# Patient Record
Sex: FEMALE | Race: WHITE | NOT HISPANIC OR LATINO | Employment: OTHER | URBAN - NONMETROPOLITAN AREA
[De-identification: names, ages, dates, MRNs, and addresses within clinical notes are randomized per-mention and may not be internally consistent; named-entity substitution may affect disease eponyms.]

---

## 2017-01-10 ENCOUNTER — COMMUNICATION - GICH (OUTPATIENT)
Dept: FAMILY MEDICINE | Facility: OTHER | Age: 62
End: 2017-01-10

## 2017-01-10 DIAGNOSIS — M54.50 LOW BACK PAIN: ICD-10-CM

## 2017-01-12 ENCOUNTER — COMMUNICATION - GICH (OUTPATIENT)
Dept: FAMILY MEDICINE | Facility: OTHER | Age: 62
End: 2017-01-12

## 2017-01-12 DIAGNOSIS — M62.830 MUSCLE SPASM OF BACK: ICD-10-CM

## 2017-03-09 ENCOUNTER — COMMUNICATION - GICH (OUTPATIENT)
Dept: FAMILY MEDICINE | Facility: OTHER | Age: 62
End: 2017-03-09

## 2017-03-09 DIAGNOSIS — F41.3 OTHER MIXED ANXIETY DISORDERS: ICD-10-CM

## 2017-03-11 ENCOUNTER — COMMUNICATION - GICH (OUTPATIENT)
Dept: FAMILY MEDICINE | Facility: OTHER | Age: 62
End: 2017-03-11

## 2017-03-11 DIAGNOSIS — R03.0 ELEVATED BLOOD PRESSURE READING WITHOUT DIAGNOSIS OF HYPERTENSION: ICD-10-CM

## 2017-04-06 ENCOUNTER — COMMUNICATION - GICH (OUTPATIENT)
Dept: FAMILY MEDICINE | Facility: OTHER | Age: 62
End: 2017-04-06

## 2017-04-06 DIAGNOSIS — M54.50 LOW BACK PAIN: ICD-10-CM

## 2017-04-07 ENCOUNTER — COMMUNICATION - GICH (OUTPATIENT)
Dept: FAMILY MEDICINE | Facility: OTHER | Age: 62
End: 2017-04-07

## 2017-04-07 DIAGNOSIS — M62.830 MUSCLE SPASM OF BACK: ICD-10-CM

## 2017-04-08 ENCOUNTER — COMMUNICATION - GICH (OUTPATIENT)
Dept: FAMILY MEDICINE | Facility: OTHER | Age: 62
End: 2017-04-08

## 2017-04-08 DIAGNOSIS — I10 ESSENTIAL (PRIMARY) HYPERTENSION: ICD-10-CM

## 2017-05-05 ENCOUNTER — COMMUNICATION - GICH (OUTPATIENT)
Dept: FAMILY MEDICINE | Facility: OTHER | Age: 62
End: 2017-05-05

## 2017-05-05 DIAGNOSIS — M62.830 MUSCLE SPASM OF BACK: ICD-10-CM

## 2017-05-25 ENCOUNTER — HOSPITAL ENCOUNTER (OUTPATIENT)
Dept: RADIOLOGY | Facility: OTHER | Age: 62
End: 2017-05-25
Attending: FAMILY MEDICINE

## 2017-05-25 ENCOUNTER — HISTORY (OUTPATIENT)
Dept: FAMILY MEDICINE | Facility: OTHER | Age: 62
End: 2017-05-25

## 2017-05-25 ENCOUNTER — COMMUNICATION - GICH (OUTPATIENT)
Dept: SURGERY | Facility: OTHER | Age: 62
End: 2017-05-25

## 2017-05-25 ENCOUNTER — OFFICE VISIT - GICH (OUTPATIENT)
Dept: FAMILY MEDICINE | Facility: OTHER | Age: 62
End: 2017-05-25

## 2017-05-25 DIAGNOSIS — E03.9 HYPOTHYROIDISM: ICD-10-CM

## 2017-05-25 DIAGNOSIS — Z12.11 ENCOUNTER FOR SCREENING FOR MALIGNANT NEOPLASM OF COLON: ICD-10-CM

## 2017-05-25 DIAGNOSIS — F34.1 DYSTHYMIC DISORDER: ICD-10-CM

## 2017-05-25 DIAGNOSIS — I10 ESSENTIAL (PRIMARY) HYPERTENSION: ICD-10-CM

## 2017-05-25 DIAGNOSIS — Z00.00 ENCOUNTER FOR GENERAL ADULT MEDICAL EXAMINATION WITHOUT ABNORMAL FINDINGS: ICD-10-CM

## 2017-05-25 DIAGNOSIS — E78.2 MIXED HYPERLIPIDEMIA: ICD-10-CM

## 2017-05-25 DIAGNOSIS — Z12.31 ENCOUNTER FOR SCREENING MAMMOGRAM FOR MALIGNANT NEOPLASM OF BREAST: ICD-10-CM

## 2017-05-25 LAB
A/G RATIO - HISTORICAL: 1.6 (ref 1–2)
ABSOLUTE BASOPHILS - HISTORICAL: 0 THOU/CU MM
ABSOLUTE EOSINOPHILS - HISTORICAL: 0.2 THOU/CU MM
ABSOLUTE LYMPHOCYTES - HISTORICAL: 1.9 THOU/CU MM (ref 0.9–2.9)
ABSOLUTE MONOCYTES - HISTORICAL: 0.5 THOU/CU MM
ABSOLUTE NEUTROPHILS - HISTORICAL: 4.3 THOU/CU MM (ref 1.7–7)
ALBUMIN SERPL-MCNC: 4.4 G/DL (ref 3.5–5.7)
ALP SERPL-CCNC: 64 IU/L (ref 34–104)
ALT (SGPT) - HISTORICAL: 12 IU/L (ref 7–52)
ANION GAP - HISTORICAL: 5 (ref 5–18)
AST SERPL-CCNC: 20 IU/L (ref 13–39)
BASOPHILS # BLD AUTO: 0.6 %
BILIRUB SERPL-MCNC: 0.9 MG/DL (ref 0.3–1)
BUN SERPL-MCNC: 13 MG/DL (ref 7–25)
BUN/CREAT RATIO - HISTORICAL: 14
CALCIUM SERPL-MCNC: 9.5 MG/DL (ref 8.6–10.3)
CHLORIDE SERPLBLD-SCNC: 99 MMOL/L (ref 98–107)
CHOL/HDL RATIO - HISTORICAL: 3.39
CHOLESTEROL TOTAL: 139 MG/DL
CO2 SERPL-SCNC: 26 MMOL/L (ref 21–31)
CREAT SERPL-MCNC: 0.92 MG/DL (ref 0.7–1.3)
EOSINOPHIL NFR BLD AUTO: 2.2 %
ERYTHROCYTE [DISTWIDTH] IN BLOOD BY AUTOMATED COUNT: 12.5 % (ref 11.5–15.5)
GFR IF NOT AFRICAN AMERICAN - HISTORICAL: >60 ML/MIN/1.73M2
GLOBULIN - HISTORICAL: 2.7 G/DL (ref 2–3.7)
GLUCOSE SERPL-MCNC: 89 MG/DL (ref 70–105)
HCT VFR BLD AUTO: 38 % (ref 33–51)
HDLC SERPL-MCNC: 41 MG/DL (ref 23–92)
HEMOGLOBIN: 12.7 G/DL (ref 12–16)
LDLC SERPL CALC-MCNC: 72 MG/DL
LYMPHOCYTES NFR BLD AUTO: 27.4 % (ref 20–44)
MCH RBC QN AUTO: 27.9 PG (ref 26–34)
MCHC RBC AUTO-ENTMCNC: 33.4 G/DL (ref 32–36)
MCV RBC AUTO: 83 FL (ref 80–100)
MONOCYTES NFR BLD AUTO: 6.9 %
NEUTROPHILS NFR BLD AUTO: 62.3 % (ref 42–72)
NON-HDL CHOLESTEROL - HISTORICAL: 98 MG/DL
PATIENT STATUS - HISTORICAL: NORMAL
PLATELET # BLD AUTO: 279 THOU/CU MM (ref 140–440)
PMV BLD: 8.9 FL (ref 6.5–11)
POTASSIUM SERPL-SCNC: 4 MMOL/L (ref 3.5–5.1)
PROT SERPL-MCNC: 7.1 G/DL (ref 6.4–8.9)
RED BLOOD COUNT - HISTORICAL: 4.56 MIL/CU MM (ref 4–5.2)
SODIUM SERPL-SCNC: 130 MMOL/L (ref 133–143)
TRIGL SERPL-MCNC: 132 MG/DL
TSH - HISTORICAL: 1.23 UIU/ML (ref 0.34–5.6)
WHITE BLOOD COUNT - HISTORICAL: 6.9 THOU/CU MM (ref 4.5–11)

## 2017-05-25 ASSESSMENT — ANXIETY QUESTIONNAIRES
4. TROUBLE RELAXING: SEVERAL DAYS
5. BEING SO RESTLESS THAT IT IS HARD TO SIT STILL: NOT AT ALL
GAD7 TOTAL SCORE: 1
6. BECOMING EASILY ANNOYED OR IRRITABLE: NOT AT ALL
7. FEELING AFRAID AS IF SOMETHING AWFUL MIGHT HAPPEN: NOT AT ALL
2. NOT BEING ABLE TO STOP OR CONTROL WORRYING: NOT AT ALL
1. FEELING NERVOUS, ANXIOUS, OR ON EDGE: NOT AT ALL
3. WORRYING TOO MUCH ABOUT DIFFERENT THINGS: NOT AT ALL

## 2017-05-25 ASSESSMENT — PATIENT HEALTH QUESTIONNAIRE - PHQ9: SUM OF ALL RESPONSES TO PHQ QUESTIONS 1-9: 6

## 2017-05-26 ENCOUNTER — HISTORY (OUTPATIENT)
Dept: FAMILY MEDICINE | Facility: OTHER | Age: 62
End: 2017-05-26

## 2017-05-26 LAB
ALB RAND URINE - HISTORICAL: 9.9 MG/L
CREATININE, URINE - HISTORICAL: 1.19 G/L
MICROALBUMIN, RAND UR - HISTORICAL: 8.3 MG/G CREAT

## 2017-06-02 ENCOUNTER — HISTORY (OUTPATIENT)
Dept: SURGERY | Facility: OTHER | Age: 62
End: 2017-06-02

## 2017-06-09 ENCOUNTER — HOSPITAL ENCOUNTER (OUTPATIENT)
Dept: SURGERY | Facility: OTHER | Age: 62
Discharge: HOME | End: 2017-06-09
Attending: SURGERY | Admitting: SURGERY

## 2017-06-09 ENCOUNTER — HISTORY (OUTPATIENT)
Dept: SURGERY | Facility: OTHER | Age: 62
End: 2017-06-09

## 2017-06-09 ENCOUNTER — TRANSFERRED RECORDS (OUTPATIENT)
Dept: MULTI SPECIALTY CLINIC | Facility: CLINIC | Age: 62
End: 2017-06-09

## 2017-06-09 ENCOUNTER — SURGERY (OUTPATIENT)
Dept: SURGERY | Facility: OTHER | Age: 62
End: 2017-06-09

## 2017-06-14 ENCOUNTER — COMMUNICATION - GICH (OUTPATIENT)
Dept: SURGERY | Facility: OTHER | Age: 62
End: 2017-06-14

## 2017-06-14 ENCOUNTER — HISTORY (OUTPATIENT)
Dept: SURGERY | Facility: OTHER | Age: 62
End: 2017-06-14

## 2017-07-06 ENCOUNTER — COMMUNICATION - GICH (OUTPATIENT)
Dept: FAMILY MEDICINE | Facility: OTHER | Age: 62
End: 2017-07-06

## 2017-07-06 DIAGNOSIS — M54.50 LOW BACK PAIN: ICD-10-CM

## 2017-08-25 ENCOUNTER — COMMUNICATION - GICH (OUTPATIENT)
Dept: FAMILY MEDICINE | Facility: OTHER | Age: 62
End: 2017-08-25

## 2017-08-25 DIAGNOSIS — F41.3 OTHER MIXED ANXIETY DISORDERS: ICD-10-CM

## 2017-09-15 ENCOUNTER — HISTORY (OUTPATIENT)
Dept: FAMILY MEDICINE | Facility: OTHER | Age: 62
End: 2017-09-15

## 2017-09-15 ENCOUNTER — OFFICE VISIT - GICH (OUTPATIENT)
Dept: FAMILY MEDICINE | Facility: OTHER | Age: 62
End: 2017-09-15

## 2017-09-15 DIAGNOSIS — S61.219A LACERATION OF FINGER WITHOUT FOREIGN BODY WITHOUT DAMAGE TO NAIL: ICD-10-CM

## 2017-10-02 ENCOUNTER — COMMUNICATION - GICH (OUTPATIENT)
Dept: FAMILY MEDICINE | Facility: OTHER | Age: 62
End: 2017-10-02

## 2017-10-02 DIAGNOSIS — F41.3 OTHER MIXED ANXIETY DISORDERS: ICD-10-CM

## 2017-11-03 ENCOUNTER — COMMUNICATION - GICH (OUTPATIENT)
Dept: FAMILY MEDICINE | Facility: OTHER | Age: 62
End: 2017-11-03

## 2017-11-03 DIAGNOSIS — M62.830 MUSCLE SPASM OF BACK: ICD-10-CM

## 2017-11-14 ENCOUNTER — COMMUNICATION - GICH (OUTPATIENT)
Dept: FAMILY MEDICINE | Facility: OTHER | Age: 62
End: 2017-11-14

## 2017-11-14 DIAGNOSIS — I10 ESSENTIAL (PRIMARY) HYPERTENSION: ICD-10-CM

## 2017-11-28 ENCOUNTER — AMBULATORY - GICH (OUTPATIENT)
Dept: LAB | Facility: OTHER | Age: 62
End: 2017-11-28

## 2017-11-28 DIAGNOSIS — I10 ESSENTIAL (PRIMARY) HYPERTENSION: ICD-10-CM

## 2017-11-28 LAB
ANION GAP - HISTORICAL: 9 (ref 5–18)
BUN SERPL-MCNC: 11 MG/DL (ref 7–25)
BUN/CREAT RATIO - HISTORICAL: 13
CALCIUM SERPL-MCNC: 9.2 MG/DL (ref 8.6–10.3)
CHLORIDE SERPLBLD-SCNC: 98 MMOL/L (ref 98–107)
CO2 SERPL-SCNC: 26 MMOL/L (ref 21–31)
CREAT SERPL-MCNC: 0.83 MG/DL (ref 0.7–1.3)
GFR IF NOT AFRICAN AMERICAN - HISTORICAL: >60 ML/MIN/1.73M2
GLUCOSE SERPL-MCNC: 96 MG/DL (ref 70–105)
POTASSIUM SERPL-SCNC: 4 MMOL/L (ref 3.5–5.1)
SODIUM SERPL-SCNC: 133 MMOL/L (ref 133–143)

## 2017-11-29 ENCOUNTER — COMMUNICATION - GICH (OUTPATIENT)
Dept: FAMILY MEDICINE | Facility: OTHER | Age: 62
End: 2017-11-29

## 2017-11-29 DIAGNOSIS — T75.3XXA MOTION SICKNESS: ICD-10-CM

## 2017-12-05 ENCOUNTER — AMBULATORY - GICH (OUTPATIENT)
Dept: SCHEDULING | Facility: OTHER | Age: 62
End: 2017-12-05

## 2017-12-10 ENCOUNTER — COMMUNICATION - GICH (OUTPATIENT)
Dept: FAMILY MEDICINE | Facility: OTHER | Age: 62
End: 2017-12-10

## 2017-12-10 DIAGNOSIS — F41.1 GENERALIZED ANXIETY DISORDER: ICD-10-CM

## 2017-12-28 NOTE — TELEPHONE ENCOUNTER
Patient Information     Patient Name MRN Mita Helton [id number] Female 1955      Telephone Encounter by Karina Greer RN at 7/10/2017  9:45 AM     Author:  Karina Greer RN Service:  (none) Author Type:  NURS- Registered Nurse     Filed:  7/10/2017  9:48 AM Encounter Date:  2017 Status:  Signed     :  Karina Greer RN (NURS- Registered Nurse)            Nsaids  Office visit in the past 12 months or per provider note.  Last visit with RENEE MURRIETA was on: 2017 in [facility]-physical   Next visit with RENEE MURRIETA is on: No future appointment listed with this provider  Max refill for 12 months from last office visit or per provider note.  Prescription refilled per RN Medication Refill Policy.................... Karina Greer RN ....................  7/10/2017   9:47 AM

## 2017-12-28 NOTE — TELEPHONE ENCOUNTER
Patient Information     Patient Name MRN Mita Helton [id number] Female 1955      Telephone Encounter by Luci Segovia at 2017 11:23 AM     Author:  Luci Segovia Service:  (none) Author Type:  (none)     Filed:  2017 11:23 AM Encounter Date:  2017 Status:  Signed     :  Luci Segovia            Faxed to christopher Segovia LPN ....................2017  11:23 AM

## 2017-12-28 NOTE — OR ANESTHESIA
Patient Information     Patient Name MRN Sex Mita Paiz [id number] Female 1955      OR Anesthesia by Samina Parikh CRNA at 2017 11:26 AM     Author:  Samina Parikh CRNA Service:  (none) Author Type:  NURS- Nurse Anesthetist     Filed:  2017 11:26 AM Date of Service:  2017 11:26 AM Status:  Signed     :  Samina Parikh CRNA (NURS- Nurse Anesthetist)                                                           ANESTHESIA ASSESSMENT    Date: 17 Time: 11:26 AM      Patient:  Mita Gabriel    Procedure(s) (LRB):  COLONOSCOPY (N/A)    Past Medical History:     Diagnosis  Date     Anxiety      Depression      History of blood transfusion     with delivery of son      HTN (hypertension)      Hyperlipidemia      Hypothyroidism (acquired)      PONV (postoperative nausea and vomiting)      Wrist fracture, left        Past Surgical History:      Procedure  Laterality Date     CATARACT EXTRACTION Bilateral       SECTION       PARTIAL THYROIDECTOMY       WRIST FRACTURE TX      Wrist ORIF         Family History       Problem   Relation Age of Onset     Good Health  Mother      Genitourinary Disease  Father      kidney disease       Cancer  Maternal Grandmother      Bladder       Stroke  Paternal Grandmother 70     Cancer  Maternal Grandfather      Throat and lung       Good Health  Sister      Good Health  Sister      Good Health  Brother      Good Health  Brother      Good Health  Brother      slightly developmentally delayed       Other  Other      Family history of depression and anxiety       Anesthesia Problem  No Family History      Notes no prior complications from anesthesia.       Cancer-breast  No Family History        Patient Active Problem List     Diagnosis  Code     HYPOTHYROIDISM E03.9     PELVIC PAIN, CHRONIC R10.9     HYPERTENSION I10     ARTHRITIS, BACK M47.9     LOW BACK PAIN, CHRONIC M54.5     DEPRESSION/ANXIETY F34.1     ANXIETY F41.1     ALCOHOL  ABUSE, EPISODIC, HX OF F10.21     INSOMNIA NEC G47.00     MOTION SICKNESS T75.3XXA     OBESITY NOS E66.9     CATARACT, SENILE, BILATERAL H25.9     H/O non-ST elevation myocardial infarction (NSTEMI) I25.2     Special screening for malignant neoplasms, colon Z12.11       Prescriptions Prior to Admission       Medication  Sig Dispense Refill     aspirin (ECOTRIN) 81 mg enteric coated tablet Take 1 tablet by mouth once daily with a meal.  0     atorvastatin (LIPITOR) 20 mg tablet Take 1 tablet by mouth once daily. 90 tablet 4     cyclobenzaprine (FLEXERIL) 10 mg tablet TAKE 1 TABLET BY MOUTH TWICE DAILY AS NEEDED FOR MUSCLE SPASMS 60 tablet 5     folic acid 1 mg tablet Take 1 mg by mouth once daily.       gabapentin (NEURONTIN) 300 mg capsule TAKE 1 CAPSULE BY MOUTH THREE TIMES DAILY 270 capsule 0     hydroCHLOROthiazide (HCTZ) 25 mg tablet Take 1 tablet by mouth once daily. 90 tablet 4     levothyroxine (SYNTHROID) 50 mcg tablet Take 1 tablet by mouth before breakfast. 90 tablet 4     lisinopril (PRINIVIL; ZESTRIL) 5 mg tablet Take 1 tablet by mouth once daily. 90 tablet 4     LORazepam (ATIVAN) 0.5 mg tab TAKE 1 TABLET BY MOUTH THREE TIMES DAILY 90 tablet 5     methocarbamol (ROBAXIN) 500 mg tablet Take 1-2 tablets by mouth 4 times daily. 120 tablet 11     metoprolol succinate (TOPROL XL) 50 mg sustained-release tablet Take 1 tablet by mouth once daily. 90 tablet 4     polyethylene glycol-electrolyte (NULYTELY) 420 gram solution Take 240 mL by mouth every 10 minutes. 4000 mL 0     thiamine (VITAMIN B1) 100 mg tablet Take 100 mg by mouth once daily.       TRANSDERM-SCOP 1.5 mg (1 mg over 3 days) patch USE AS DIRECTED FOR FLYING 4 Patch 0     traZODone (DESYREL) 50 mg tablet TAKE 3 TABLETS BY MOUTH AT BEDTIME 270 tablet 5     venlafaxine (EFFEXOR XR) 150 mg Extended-Release capsule Take 1 capsule by mouth every morning. 90 capsule 4       Allergies:No Known Allergies    Review of Systems:  GERD: No  Chest pain:  No  Shortness of breath: No  Recent fever: No  Poor exercise tolerance: No  Bleeding tendency: No  Pregnant: No  Anesthesia Complications: PONV      History    Smoking Status      Never Smoker   Smokeless Tobacco      Never Used     Social History     Social History        Marital status:       Spouse name: N/A     Number of children:  N/A     Years of education:  N/A     Social History Main Topics       Smoking status: Never Smoker     Smokeless tobacco: Never Used     Alcohol use No     Drug use: No     Sexual activity: Not on file     Other Topics   Concern      Service  Yes     [employer]      Blood Transfusions  Yes     Child birth 1981      Caffeine Concern  Yes     2 cups of coffee daily      Occupational Exposure  No     Hobby Hazards  No     Sleep Concern  Yes     Wakes up every 2-3 hours, always has, uses sleep aids      Stress Concern  Yes     5 most days on a scale of 1-10 (8/31/15) Husbands health, family       Weight Concern  Yes     Would like to lose, dropping weight now      Special Diet  No     Back Care  Yes     Accupuncture every other week, massage      Exercise  Yes     gardening, walks daily 8 blocks      Bike Helmet  No     Doesn't ride      Seat Belt  Yes     Self-Exams  Yes     Social History Narrative      with one grown son, two grand children (older granddaughter with brain tumor - she plays hockey, and not intervening with tumor unless things change)    Goes south to [place] for the winter.  Used to golf, but  unable now due to having MS.     was a  but does not fly due to MS    Patient has never smoked.     Regular Exercise - no       Physical Examination:  Breastfeeding? No There is no height or weight on file to calculate BMI. There is no height or weight on file to calculate BSA.  Dental Condition: Good     Mallampati Score (Airway): II  Cardiovascular: Normal  Pulmonary: Normal  Other: (not recorded)    Recent Labs in [facility]:    No  results for input(s): SODIUM, POTASSIUM, CHLORIDE, PN1NBLKP, ANIONGAP, BUN, CREATININE, BUNCREARATIO, CALCIUM, GLUCOSE, GLUCOSEMETER, KETONES, MAGNESIUM, WBC, HGB, HCT, PLT, ABORH, RHTYPE, PREGURINE, BHCGQL, HCGBETAQUANT, INR in the last 72 hours.          Assessment/Plan:  ASA Class: II  Risk of dental injury discussed: Yes  NPO status confirmed: Yes  Anesthetic Plan: MAC  Risk/Benefit/Alt discussed: Yes  Questions answered: Yes  Emergency Case?: No  Labs/ECG/Radiology Reviewed?: Yes      H&P Reviewed.  Patient Examined.      Provider Electronic Signature:  Samina Parikh CRNA

## 2017-12-28 NOTE — OR POSTOP
Patient Information     Patient Name MRN Sex Mita Paiz [id number] Female 1955      OR PostOp by Helen Leon RN at 2017  1:05 PM     Author:  Helen Leon RN Service:  (none) Author Type:  NURS- Registered Nurse     Filed:  2017  1:06 PM Date of Service:  2017  1:05 PM Status:  Signed     :  Helen Leon RN (NURS- Registered Nurse)            Discharge Note    Data:  Mita Gabriel has been discharged home at 1304 via ambulatory accompanied by Registered Nurse.      Action:  Written discharge/follow-up instructions were provided to patient. Prescriptions : None.  Belongings sent with patient. Medications from home sent with patient/family: Not Applicable  Equipment none .     Response:  Patient verbalized understanding of discharge instructions, reason for discharge, and necessary follow-up appointments.

## 2017-12-28 NOTE — TELEPHONE ENCOUNTER
Patient Information     Patient Name MRN Mita Helton [id number] Female 1955      Telephone Encounter by Barbi Macedo MD at 2017  9:25 AM     Author:  Barbi Macedo MD Service:  (none) Author Type:  Physician     Filed:  2017  9:26 AM Encounter Date:  2017 Status:  Signed     :  Barbi Macedo MD (Physician)            I wrote a prescription for #90.  She will need to be seen by primary care physician for ongoing refill to get her through the winter in [place].  Barbi Macedo MD ....................  2017   9:25 AM

## 2017-12-28 NOTE — PATIENT INSTRUCTIONS
Patient Information     Patient Name MRN Mita Helton [id number] Female 1955      Patient Instructions by Melodie Jon NP at 9/15/2017  4:30 PM     Author:  Melodie Jon NP Service:  (none) Author Type:  PHYS- Nurse Practitioner     Filed:  9/15/2017  5:01 PM Encounter Date:  9/15/2017 Status:  Signed     :  Melodie Jon NP (PHYS- Nurse Practitioner)             Wound Care for Cuts   ________________________________________________________________________  KEY POINTS    A cut type of wound is an opening on the surface of the skin and may be straight, jagged, or run in several directions.    The treatment of a wound depends on what caused the wound, where it is, and the size and shape. You can take care of some cuts yourself. A healthcare provider should treat large, deep, jagged, or dirty wounds.    Wash your hands thoroughly with soap and water before you touch the wound. If it is bleeding and is spurting blood, put pressure on it with a bandage or clean cloth and get the medical care right away.  _______________________________________________________________  What is a cut?  Cuts are wounds that go through one or more layers of skin and may go deeper into the fat, muscle, blood vessels, or other tissues under the skin, even to bone. A cut can be seen on the surface of the skin and may be straight, jagged, or run in several directions. A cut is also called a laceration.  What is the cause?  Most cuts happen:    During a fall or an accident    By running into or getting hit with something sharp, pointed, or hard    While working with something sharp, pointed, rough, or jagged    By a deep scratch or bite from an animal  What are the symptoms?  Symptoms may include:    An opening in the skin, or loss of skin    Pain    Redness    Sometimes bleeding  How is it treated?  The treatment of a wound depends on what caused the wound, where it is, and the size and shape. You can  take care of some cuts yourself. A healthcare provider should treat large, deep, jagged, or dirty wounds. A wound heals more quickly, and with less risk of infection and scarring, when the wound is kept clean and the edges are held close together as it heals.  Call or see your healthcare provider when you have a new cut if:    You have a large, deep, or jagged wound    You have bleeding that will not stop    Your cut was caused by something that went through several layers of clothing or through a shoe    Your cut happened in a dirty setting such as a barnyard, construction site, or animal shelter    You have numbness or tingling near the wound    You have not had a tetanus shot in the last 5 years and have a wound caused by a dirty object or there is dirt in the wound    There are foreign objects in the wound, such as wood, glass, or metal slivers    You can see bone, muscle, or tendon in the wound    You have any questions about how to treat the wound  How can I take care of myself?  If you have a small cut:    Wash your hands thoroughly with soap and water for at least 20 seconds before you touch the area.    Clean the wound as well as possible with mild soap and water. Remove any bits of dirt, small pieces of rock, or other debris that you can easily see, but do not poke or pick at the wound.    If it s bleeding and is spurting blood, put pressure on it with a bandage or clean cloth and get the medical care right away. You may need to call 911.    If it s not spurting blood but oozing, put pressure on the wound with a clean cloth or bandage until the bleeding stops, which may take up to 20 minutes. If the wound is still bleeding after 20 minutes, call your health care provider.    Keep the wound and the area around it clean and dry. You may need to put a bandage over the area to keep it clean and dry. Change the bandage every day. Change the bandage more often if it gets dirty or wet.    To prevent infection in  a minor wound, you may use a nonprescription antibiotic ointment. Read the labels and buy products that have only the ingredients that you need and are not allergic to. If you are not sure which medicine is best for your wound, ask your pharmacist.    Don't take aspirin if your wound is bleeding. If needed, take nonprescription pain medicine, such as acetaminophen. Acetaminophen may cause liver damage or other problems. Read the label carefully and take as directed. Unless recommended by your provider, don't take more than 3000 milligrams (mg) in 24 hours or take it for longer than 10 days. To make sure you don t take too much, check other medicines you take to see if they also contain acetaminophen. Ask your provider if you need to avoid drinking alcohol while taking this medicine. Nonsteroidal anti-inflammatory medicines (NSAIDs), such as ibuprofen or naproxen may cause stomach bleeding and other problems. These risks increase with age. Unless recommended by your healthcare provider, don't take an NSAID for more than 10 days.    As the wound heals, some swelling, redness, and mild pain are normal.    Call your healthcare provider if you have:    Symptoms of infection, which include new or worsening redness, swelling, pain, warmth, or drainage in the area of the wound    New bleeding from the wound that won't stop    Pain that is increasing or not getting better with pain medicine    Red streaks going from the wound toward the center of your body, for example, up your arm    Fever, chills, nausea, vomiting, or muscle aches    Any questions about caring for the wound

## 2017-12-28 NOTE — TELEPHONE ENCOUNTER
Patient Information     Patient Name MRN Mita Helton [id number] Female 1955      Telephone Encounter by Kady Gurrola at 11/15/2017  9:49 AM     Author:  Kady Gurrola Service:  (none) Author Type:  (none)     Filed:  11/15/2017  9:51 AM Encounter Date:  2017 Status:  Signed     :  Kady Gurrola            Informed patient of lab orders placed. She was transferred to appointment line to schedule lab only appointment.  Kady Gurrola LPN............................... 11/15/2017 9:51 AM

## 2017-12-28 NOTE — TELEPHONE ENCOUNTER
Patient Information     Patient Name MRN Sex Mita Paiz [id number] Female 1955      Telephone Encounter by Karina Greer RN at 10/3/2017 12:18 PM     Author:  Karina Greer RN Service:  (none) Author Type:  NURS- Registered Nurse     Filed:  10/3/2017 12:29 PM Encounter Date:  10/2/2017 Status:  Signed     :  Karina Greer RN (NURS- Registered Nurse)            This is a Refill request from: [facility]  Name of Medication:LORazepam (ATIVAN) 0.5 mg tab  Quantity requested: 90 x 3   Last fill date: 17  Due for refill: 17  Last visit with PCP:  RENEE MURRIETA DO was on:17  Controlled Substance Agreement: none  Diagnosis r/t this medication request: Other mixed anxiety disorders     Unable to complete prescription refill per RN Medication Refill Policy.................... Karina Greer RN ....................  10/3/2017   12:19 PM

## 2017-12-28 NOTE — TELEPHONE ENCOUNTER
Patient Information     Patient Name MRN Mita Helton [id number] Female 1955      Telephone Encounter by Kady Gurrola at 10/3/2017  2:55 PM     Author:  Kady Gurrola Service:  (none) Author Type:  (none)     Filed:  10/3/2017  2:55 PM Encounter Date:  10/2/2017 Status:  Signed     :  Kady Gurrola            Prescription faxed to pharmacy.  Kady Gurrola LPN............................... 10/3/2017 2:55 PM

## 2017-12-28 NOTE — TELEPHONE ENCOUNTER
Patient Information     Patient Name MRN Mita Helton [id number] Female 1955      Telephone Encounter by Umberto Corea at 10/3/2017  2:55 PM     Author:  Umberto Corea Service:  (none) Author Type:  (none)     Filed:  10/3/2017  2:56 PM Encounter Date:  10/2/2017 Status:  Signed     :  Umberto Corea            After birth date was verified, spoke with Mita and let her know that her prescription was refilled for Ativan. No further questions or concerns.    Umberto Corea ....................  10/3/2017   2:56 PM

## 2017-12-28 NOTE — TELEPHONE ENCOUNTER
Patient Information     Patient Name MRN Sex Mita Paiz [id number] Female 1955      Telephone Encounter by Karina Greer RN at 2017 11:01 AM     Author:  Karina Greer RN Service:  (none) Author Type:  NURS- Registered Nurse     Filed:  2017 11:05 AM Encounter Date:  2017 Status:  Signed     :  Karina Greer RN (NURS- Registered Nurse)            This is a Refill request from: [facility]  Name of Medication: TRANSDERM-SCOP 1.5 mg (1 mg over 3 days) patch  Quantity requested: 4 patches  Last fill date: 2016  Due for refill: yes  Last visit with RENEE MURRIETA was on: 2017-physical     Diagnosis r/t this medication request: Motion sickness     Unable to complete prescription refill per RN Medication Refill Policy.................... Karina Greer RN ....................  2017   11:01 AM

## 2017-12-28 NOTE — PROGRESS NOTES
Patient Information     Patient Name MRN Sex     Mita Gabriel [id number] Female 1955      Progress Notes by Melodie Jon NP at 9/15/2017  4:30 PM     Author:  Melodie Jon NP Service:  (none) Author Type:  PHYS- Nurse Practitioner     Filed:  9/15/2017  7:02 PM Encounter Date:  9/15/2017 Status:  Signed     :  Melodie Jon NP (PHYS- Nurse Practitioner)            Nursing Notes:   Sherly Alvarez NAVEEN  9/15/2017  5:00 PM  Signed  Patient is here today with a left pointer finger laceration, was cutting vegetables. Sherly Alvarez LPN......................9/15/2017 4:46 PM    SUBJECTIVE:    Mita Gabriel is a 61 y.o. female who presents for finger laceration    Hand Laceration    The incident occurred less than 1 hour ago. The laceration is located on the left hand. Size: 0.5 cm. The laceration mechanism was a clean knife. The pain is at a severity of 2/10. The pain has been constant since onset. She reports no foreign bodies present. Her tetanus status is UTD ().       Current Outpatient Prescriptions on File Prior to Visit       Medication  Sig Dispense Refill     aspirin (ECOTRIN) 81 mg enteric coated tablet Take 1 tablet by mouth once daily with a meal.  0     atorvastatin (LIPITOR) 20 mg tablet Take 1 tablet by mouth once daily. 90 tablet 4     cyclobenzaprine (FLEXERIL) 10 mg tablet TAKE 1 TABLET BY MOUTH TWICE DAILY AS NEEDED FOR MUSCLE SPASMS 60 tablet 5     folic acid 1 mg tablet Take 1 mg by mouth once daily.       gabapentin (NEURONTIN) 300 mg capsule TAKE 1 CAPSULE BY MOUTH THREE TIMES DAILY 270 capsule 2     hydroCHLOROthiazide (HCTZ) 25 mg tablet Take 1 tablet by mouth once daily. 90 tablet 4     levothyroxine (SYNTHROID) 50 mcg tablet Take 1 tablet by mouth before breakfast. 90 tablet 4     lisinopril (PRINIVIL; ZESTRIL) 5 mg tablet Take 1 tablet by mouth once daily. 90 tablet 4     LORazepam (ATIVAN) 0.5 mg tab Take 1 tablet by mouth 3 times daily. 90 tablet 0      methocarbamol (ROBAXIN) 500 mg tablet Take 1-2 tablets by mouth 4 times daily. 120 tablet 11     metoprolol succinate (TOPROL XL) 50 mg sustained-release tablet Take 1 tablet by mouth once daily. 90 tablet 4     polyethylene glycol-electrolyte (NULYTELY) 420 gram solution Take 240 mL by mouth every 10 minutes. 4000 mL 0     thiamine (VITAMIN B1) 100 mg tablet Take 100 mg by mouth once daily.       TRANSDERM-SCOP 1.5 mg (1 mg over 3 days) patch USE AS DIRECTED FOR FLYING 4 Patch 0     traZODone (DESYREL) 50 mg tablet TAKE 3 TABLETS BY MOUTH AT BEDTIME 270 tablet 5     venlafaxine (EFFEXOR XR) 150 mg Extended-Release capsule Take 1 capsule by mouth every morning. 90 capsule 4     No current facility-administered medications on file prior to visit.        REVIEW OF SYSTEMS:  ROS    OBJECTIVE:  /80  Pulse 64  Temp 96.9  F (36.1  C) (Tympanic)  Wt 78.5 kg (173 lb)  Breastfeeding? No  BMI 29.24 kg/m2    EXAM:   Physical Exam   Constitutional: She is well-developed, well-nourished, and in no distress.   HENT:   Head: Normocephalic and atraumatic.   Eyes: Conjunctivae are normal.   Cardiovascular: Normal rate.    Pulmonary/Chest: Effort normal. No respiratory distress.   Musculoskeletal:   LT pointer finger has a 0.5 cm round flap type laceration, no actively bleeding. Flap is nearly cut through and off.    Neurological: She is alert.   Skin: Skin is warm and dry. No rash noted.   Psychiatric: Mood and affect normal.   Nursing note and vitals reviewed.    Finger treatments are gone over. Will use silver nitrate to stop any oozing. Dressing applied. No need for further tx.       ASSESSMENT/PLAN:    ICD-10-CM    1. Finger laceration, initial encounter S61.219A         Plan:  Dressing applied. F/U if needed. Wound cares discussed.       YOU VASQUEZ NP ....................  9/15/2017   7:02 PM

## 2017-12-28 NOTE — TELEPHONE ENCOUNTER
Patient Information     Patient Name MRN Sex Mita Paiz [id number] Female 1955      Telephone Encounter by Tobias Vo RN at 2017 11:36 AM     Author:  Tobias Vo RN Service:  (none) Author Type:  NURS- Registered Nurse     Filed:  2017 11:44 AM Encounter Date:  11/3/2017 Status:  Signed     :  Tobias Vo RN (NURS- Registered Nurse)            This is a Refill request from: [facility]  Name of Medication: Flexeril  Quantity requested: 60 tabs with 5 refills  Last fill date: 10/5/17 as per rx request for a 30 day supply  Due for refill: Yes, as per chart review and per rx request  Last visit with RENEE MURRIETA was on: 2017 in [facility]  PCP:  RENEE MURRIETA DO  Controlled Substance Agreement: N/A   Diagnosis r/t this medication request: Back Spasm    Chart review shows that patient was seen by PCP last for a physical on 17. Rx as requested was noted and reviewed by PCP as per office visit notes on that date. No changes noted to rx as requested. However, patient is to have labs completed every 6 months in relation to diagnosis of hypertension. Labs are coming due at the end of 2017. Writer will karma up a 6 month supply of rx as requested, but will send patient a reminder letter that she is due for recheck of her labs as per PCP.     Unable to complete prescription refill per RN Medication Refill Policy.................... Tobias Vo RN ....................  2017   11:37 AM

## 2017-12-28 NOTE — PROCEDURES
Patient Information     Patient Name MRN Sex Mita Paiz [id number] Female 1955      Procedures by Stephany Omalley MD at 2017 12:14 PM     Author:  Stephany Omalley MD Service:  (none) Author Type:  Physician     Filed:  2017 12:19 PM Date of Service:  2017 12:14 PM Status:  Signed     :  Stephany Omalley MD (Physician)        Pre-procedure Diagnoses:    1. Special screening for malignant neoplasms, colon [Z12.11]           Post-procedure Diagnoses:    1. Rectal polyp [K62.1]    2. Diverticulosis of colon without diverticulitis [K57.30]           Procedures:    1. SC COLONOSCOPY BIOPSY SINGLE OR MULTIPLE [58055.0]               PROCEDURE NOTE    SURGEON: Stephany Omalley MD.    PRE-OP DIAGNOSIS:  Screening Colonoscopy      POST-OP DIAGNOSIS: colon polyp rectum, diverticula of colon    PROCEDURE:  Colonoscopy with polypectomy-cold forceps    ESTIMATED BLOOD LOSS: none    COMPLICATIONS:  None    SPECIMEN:  Rectal polyp    ANESTHESIA:  See anesthesia note, anesthesia requested due to: chronic benzodiazepine use    INDICATION FOR THE PROCEDURE: The patient is a 61 y.o. female. The patient has no complaints. I explained to the patient the risks, benefits and alternatives to screening colonoscopy for evaluating the colon for colon polyps and colon cancer. We specifically discussed the risks of bleeding, infection, perforation, potential inability to reach the cecum and the risks of sedation. The patient's questions were answered and the patient wished to proceed. Informed consent paperwork was completed.    PROCEDURE: The patient was taken to the endoscopy suite. Appropriate monitors were attached. The patient was placed in the left lateral decubitus position.Timeout was performed confirming the patient's identity and procedure to be performed.  After appropriate sedation was confirmed, digital rectal exam was performed.  There was normal tone and no gross abnormality was noted. The lubricated  colonoscope was introduced into the anus the colon was insufflated with air. The prep quality was adequate. Under direct visualization the scope was advanced to the cecum. The ileocecal valve was intubated and the terminal ileum inspected. No gross abnormality was noted. The scope was withdrawn back into the cecum. The mucosa of colon was inspected while withdrawing the scope. Multiple diverticula were noted in the colon. A tiny sessile polyp was noted in the rectum and removed with cold forceps. The scope was retroflexed in the rectum and the anorectal junction was inspected. No abnormalities were noted. The scope was returned to a neutral position and the colon was decompressed. The scope was removed. The patient tolerated the procedure with no immediately apparent complication. The patient was taken to recovery in stable condition.    FOLLOW UP:  RECOMMEND high fiber diet, follow up: will call with pathology results.    Stephany Omalley MD

## 2017-12-28 NOTE — TELEPHONE ENCOUNTER
Patient Information     Patient Name MRN Mita Helton [id number] Female 1955      Telephone Encounter by Tobias Vo RN at 2017  4:38 PM     Author:  Tobias Vo RN Service:  (none) Author Type:  NURS- Registered Nurse     Filed:  2017  4:51 PM Encounter Date:  2017 Status:  Signed     :  Tobias Vo RN (NURS- Registered Nurse)            Writer received soft transfer from call center with patient on the line. Per call center staff, patient is calling to inquire about her ativan rx that was requested on 17. Writer spoke to patient. Advised her that rx for ativan was written by PCP for a 6 month supply on 3/9/17, and shouldn't be due for refill until closer to that date. Patient reports that she is out of rx as requested. Last refill date per patient was on 17 for a 30 day supply. Patient reports no misuse of rx as requested, in fact states she has been on it for 17 years with no misuse. Writer advised patient that as PCP is out of the office, and rx wasn't due for refill per chart review until 17, refill RN had routed rx request to PCP for her consideration/approval when she returns. However, writer is more than willing to route rx request to another provider for their consideration/approval in PCP's absence if patient is out. Patient happy with this plan of care. Writer will route rx request to PCP's teamlet in PCP's absence at this time for a limited supply. After call was disconnected, this writer received an additional call from patient. Patient calling to report that her ativan rx doesn't transfer with her when she canales down in [place], and doesn't transfer back up when she comes home to [place] in the spring. Patient reports that PCP wrote her a new rx when she was seen at her physical in May 2017 for a limited supply of ativan. Writer advised patient that he would route this information to authorizing provider as well. Writer was unable to find rx however  as to what patient is referencing in her chart. Patient was seen for a physical with PCP however in May 2017, and mood/anxiety was addressed with no changes in medication noted.    Writer will route rx request to PCP's teamlet at this time as per patient request.    Unable to complete prescription refill per RN Medication Refill Policy.................... Tobias Vo RN ....................  8/30/2017   4:49 PM

## 2017-12-28 NOTE — OR ANESTHESIA
Patient Information     Patient Name MRN Sex Mita Mesa [id number] Female 1955      OR Anesthesia by Samina Parikh CRNA at 2017 12:15 PM     Author:  Samina Parikh CRNA Service:  (none) Author Type:  NURS- Nurse Anesthetist     Filed:  2017 12:15 PM Date of Service:  2017 12:15 PM Status:  Signed     :  Samina Parikh CRNA (NURS- Nurse Anesthetist)            Anesthesia Post Operative Care Note    Name: Mita Gabriel  MRN:   [id number]  :    1955       Procedure Done:  See Surgeon Note        Anesthesia Technique    Anesthetic Type:  MAC       MAC Type:  NC     Oral Trauma:  No    Intraoperative Course   Hemodynamics:  Stable    Ventilation Normal:  Yes Lung Sounds:  Normal      PACU Course        Nondepolarizer Used:       Reversed: N/A   Hemodynamics:  Stable      Hydration: Euvolemic   Temperature:  36.1 - 38.3      Mental Status:  Awake, alert, follows commands   Pain Management:  Adequate   Regional Block:  No   Anesthesia Complications:  None      Vital Signs:  Temp: 96.1  F (35.6  C)  Pulse: 71  BP: (!) 138/117  Resp: 18  SpO2: 98 %                       Active Lines:  Patient Lines/Drains/Airways Status    Active Line     Name: Placement date: Placement time: Site: Days:    PERIPHERAL VAD Right Hand 17   1130   Hand   less than 1                Intake & Output:       Labs:  No results for input(s): WY1YDDYZTVB, EXM4EYBTBJIX, PHARTERIAL, FHG3LPTWCHYT, G4AKNCBANJKD in the last 24 hours.    No results for input(s): MAGNESIUM in the last 24 hours.    No results for input(s): GLUCOSEMETER in the last 720 hours.        Samina Parikh CRNA ....................  2017   12:15 PM

## 2017-12-29 NOTE — H&P
Patient Information     Patient Name MRN Sex Mita Paiz [id number] Female 1955      H&P by Stephany Omalley MD at 2017 11:30 AM     Author:  Stephany Omalley MD Service:  (none) Author Type:  Physician     Filed:  2017 11:32 AM Date of Service:  2017 11:30 AM Status:  Signed     :  Stephany Omalley MD (Physician)            PRE-PROCEDURE NOTE    CHIEF COMPLAINT / REASON FOR PROCEDURE:  Need for screening colonoscopy.    PERTINENT HISTORY   Patient with no complaints. Previous colonoscopy none. No diarrhea, constipation, abdominal pain or rectal bleeding. No family history of colon polyps or colon cancer.    Past Medical History:     Diagnosis  Date     Anxiety      Depression      History of blood transfusion     with delivery of son      HTN (hypertension)      Hyperlipidemia      Hypothyroidism (acquired)      PONV (postoperative nausea and vomiting)      Wrist fracture, left      Past Surgical History:      Procedure  Laterality Date     CATARACT EXTRACTION Bilateral       SECTION       PARTIAL THYROIDECTOMY       WRIST FRACTURE TX      Wrist ORIF       Other:  None  Bleeding tendencies:  No    Relevant Family History:  None    Relevant Social History:  None    A relevant review of systems was performed and was negative.    ALLERGIES/SENSITIVITIES: No Known Allergies     CURRENT MEDICATIONS:    Current Facility-Administered Medications        Medication  Dose Route Frequency Last Rate     lactated Ringers infusion  25 mL/hr Intravenous continuous       lidocaine (1%) injection 0.1-1 mL  0.1-1 mL Intra-Dermal one time prn       scopolamine 1.5mg 1 Patch (TRANSDERM SCOP)  1 Patch Transdermal q72h       sodium chloride 0.9% 5 mL syringe (NORMAL SALINE)  5 mL Intravenous Each Time PRN        Prior to Admission medications          Medication Sig Start Date End Date Taking? Last Dose Authorizing Provider   aspirin (ECOTRIN) 81 mg enteric coated tablet Take 1 tablet by mouth  once daily with a meal. 1/20/15   5/31/2017 at 0800 Ella Baptiste DO   atorvastatin (LIPITOR) 20 mg tablet Take 1 tablet by mouth once daily. 5/26/17 6/8/2017 at am Ella Baptiste,    cyclobenzaprine (FLEXERIL) 10 mg tablet TAKE 1 TABLET BY MOUTH TWICE DAILY AS NEEDED FOR MUSCLE SPASMS 5/9/17 6/8/2017 at pm Ella Baptiste,    folic acid 1 mg tablet Take 1 mg by mouth once daily.    Past Week at Unknown time Reported, Patient   gabapentin (NEURONTIN) 300 mg capsule TAKE 1 CAPSULE BY MOUTH THREE TIMES DAILY 4/6/17    Ella Baptiste,    hydroCHLOROthiazide (HCTZ) 25 mg tablet Take 1 tablet by mouth once daily. 5/26/17 6/9/2017 at 0800 Ella Baptiste,    levothyroxine (SYNTHROID) 50 mcg tablet Take 1 tablet by mouth before breakfast. 5/26/17    Ella Baptiste DO   lisinopril (PRINIVIL; ZESTRIL) 5 mg tablet Take 1 tablet by mouth once daily. 5/26/17 6/9/2017 at 0800 Ella Baptiste,    LORazepam (ATIVAN) 0.5 mg tab TAKE 1 TABLET BY MOUTH THREE TIMES DAILY 3/9/17    Ella Baptiste,    methocarbamol (ROBAXIN) 500 mg tablet Take 1-2 tablets by mouth 4 times daily. 1/12/17    Ella Baptiste,    metoprolol succinate (TOPROL XL) 50 mg sustained-release tablet Take 1 tablet by mouth once daily. 5/26/17 6/9/2017 at 0800 Ella Baptiste,    polyethylene glycol-electrolyte (NULYTELY) 420 gram solution Take 240 mL by mouth every 10 minutes. 5/25/17 6/9/2017 at am Stephany Omalley MD   thiamine (VITAMIN B1) 100 mg tablet Take 100 mg by mouth once daily. 1/13/15   Past Week at Unknown time PRESCRIBING, PROVIDER   TRANSDERM-SCOP 1.5 mg (1 mg over 3 days) patch USE AS DIRECTED FOR FLYING 4/8/16   Not Verified at Unknown time Barbi Macedo MD   traZODone (DESYREL) 50 mg tablet TAKE 3 TABLETS BY MOUTH AT BEDTIME 9/13/16    Ella Baptiste,    venlafaxine (EFFEXOR XR) 150 mg Extended-Release capsule Take 1 capsule by mouth every morning. 5/26/17    Ella Baptiste, DO        PRE-SEDATION ASSESSMENT:    Lung Exam:  Normal  Heart Exam:  Normal    Comment(s):      IMPRESSION:  Need for screening colonoscopy.    PLAN:  I discussed screening colonoscopy with the patient. Anesthesia coverage requested due to chronic benzodiazepine use.    Stephany Omalley MD

## 2017-12-30 NOTE — NURSING NOTE
Patient Information     Patient Name MRN Mita Helton [id number] Female 1955      Nursing Note by Sherly Alvarez at 9/15/2017  4:30 PM     Author:  Sherly Alvarez Service:  (none) Author Type:  (none)     Filed:  9/15/2017  5:00 PM Encounter Date:  9/15/2017 Status:  Signed     :  Sherly Alvarez            Patient is here today with a left pointer finger laceration, was cutting vegetables. Sherly Alvarez LPN......................9/15/2017 4:46 PM

## 2018-01-02 NOTE — TELEPHONE ENCOUNTER
Patient Information     Patient Name MRN Mita Helton [id number] Female 1955      Telephone Encounter by Karina Greer RN at 2017  8:26 AM     Author:  Karina Greer RN Service:  (none) Author Type:  NURS- Registered Nurse     Filed:  2017  8:31 AM Encounter Date:  1/10/2017 Status:  Signed     :  Karina Greer RN (NURS- Registered Nurse)            Refill request for Robaxin 500 mg inappropriate. Discontinued 3/15/16 as not effective. Pharmacy alerted. Unable to complete prescription refill per RN Medication Refill Policy.................... Karina Greer RN ....................  2017   8:29 AM

## 2018-01-03 NOTE — TELEPHONE ENCOUNTER
Patient Information     Patient Name MRN Mita Helton [id number] Female 1955      Telephone Encounter by Kady Gurrola at 2017  4:52 PM     Author:  Kady Gurrola Service:  (none) Author Type:  (none)     Filed:  2017  4:53 PM Encounter Date:  2017 Status:  Signed     :  Kady Gurrola            Phone number not available at this time.  Kady Gurrola LPN............................... 2017 4:53 PM

## 2018-01-03 NOTE — TELEPHONE ENCOUNTER
Patient Information     Patient Name MRN Mita Helton [id number] Female 1955      Telephone Encounter by Ella Murrieta DO at 2017  4:46 PM     Author:  Ella Murrieta DO Service:  (none) Author Type:  PHYS- Osteopathic     Filed:  2017  4:46 PM Encounter Date:  2017 Status:  Signed     :  Ella Murrieta DO (PHYS- Osteopathic)            Rx sent to pharmacy.  ELLA MURRIETA DO

## 2018-01-03 NOTE — TELEPHONE ENCOUNTER
Patient Information     Patient Name MRN Sex Mita Paiz [id number] Female 1955      Telephone Encounter by Karina Greer RN at 3/13/2017 11:37 AM     Author:  Karina Greer RN Service:  (none) Author Type:  NURS- Registered Nurse     Filed:  3/13/2017 11:41 AM Encounter Date:  3/11/2017 Status:  Signed     :  Karina Greer RN (NURS- Registered Nurse)            Ace Inhibitors  Office visit in the past 12 months or per provider note.  Last visit with RENEE MURRIETA was on: 03/15/2016 in [facility]- Pre- op with RENEE MURRIETA DO on 16  Next visit with RENEE MURRIETA is on: No future appointment listed with this provider  Lab test requirements:  Creatinine and Potassium annually, if ordering lab, order BMP.  CREATININE (mg/dL)    Date Value   2016 0.89     POTASSIUM (mmol/L)    Date Value   2016 4.0   Max refill for 12 months from last office visit or per provider note  Prescription refilled per RN Medication Refill Policy.................... Karina Greer RN ....................  3/13/2017   11:40 AM

## 2018-01-03 NOTE — TELEPHONE ENCOUNTER
Patient Information     Patient Name MRMita Rizzo [id number] Female 1955      Telephone Encounter by Kady Gurrola at 2017 10:42 AM     Author:  Kady Gurrola Service:  (none) Author Type:  (none)     Filed:  2017 10:44 AM Encounter Date:  2017 Status:  Signed     :  Kady Gurrola            Mita is calling this morning because her metaxalone 800mg is not covered under her new insurance. She states that she is wanting to be prescribed her methocarbamol again to see if this is covered. Please address.  Kady Gurrola LPN............................... 2017 10:43 AM

## 2018-01-03 NOTE — TELEPHONE ENCOUNTER
"Patient Information     Patient Name MRN Mita Helton [id number] Female 1955      Telephone Encounter by Karina Greer RN at 3/9/2017  2:18 PM     Author:  Karina Greer RN Service:  (none) Author Type:  NURS- Registered Nurse     Filed:  3/9/2017  2:28 PM Encounter Date:  3/9/2017 Status:  Signed     :  Karina Greer RN (NURS- Registered Nurse)            This is a Refill request from: [facility] in [place], [place]  Name of Medication: Ativan 0.5 mg  Quantity requested: 90 x 5  Last fill date: 09/15/16  Due for refill: yes  Last visit with RENEE MURRIETA was on: 03/15/2016 in [facility]-Patient due for annual OV- Contacted patient to update and she reports that she is in [place] until May 2017, 'Renee knows I won't be back until May\".    PCP:  RENEE MURRIETA DO  Controlled Substance Agreement: none  Diagnosis r/t this medication request:  Other mixed anxiety disorders     Unable to complete prescription refill per RN Medication Refill Policy.................... Karina Greer RN ....................  3/9/2017   2:18 PM          "

## 2018-01-03 NOTE — TELEPHONE ENCOUNTER
Patient Information     Patient Name MRN Mita Helton [id number] Female 1955      Telephone Encounter by Kady Gurrola at 2017 11:56 AM     Author:  Kady Gurrola Service:  (none) Author Type:  (none)     Filed:  2017 11:57 AM Encounter Date:  2017 Status:  Signed     :  Kady Gurrola            Left message for Mita to return call on [phone].  Kady Gurrola LPN............................... 2017 11:57 AM

## 2018-01-03 NOTE — TELEPHONE ENCOUNTER
Patient Information     Patient Name MRN Mita Helton [id number] Female 1955      Telephone Encounter by Kady Gurrola at 2017  1:33 PM     Author:  Kady Gurrola Service:  (none) Author Type:  (none)     Filed:  2017  1:33 PM Encounter Date:  2017 Status:  Signed     :  Kady Gurrola of prescription being sent in.  .Kady Gurrola LPN............................... 2017 1:33 PM

## 2018-01-03 NOTE — TELEPHONE ENCOUNTER
Patient Information     Patient Name MRN Mita Helton [id number] Female 1955      Telephone Encounter by Kady Gurrola at 3/10/2017 11:35 AM     Author:  Kady Gurrola Service:  (none) Author Type:  (none)     Filed:  3/10/2017 11:36 AM Encounter Date:  3/9/2017 Status:  Signed     :  Kady Gurrola            Prescription faxed to pharmacy.  Kady Gurrola LPN............................... 3/10/2017 11:36 AM

## 2018-01-04 NOTE — TELEPHONE ENCOUNTER
Patient Information     Patient Name MRN Sex Mita Paiz [id number] Female 1955      Telephone Encounter by Karina Greer RN at 2017  3:40 PM     Author:  Karina Greer RN Service:  (none) Author Type:  NURS- Registered Nurse     Filed:  2017  3:44 PM Encounter Date:  2017 Status:  Signed     :  Karina Greer RN (NURS- Registered Nurse)            Nsaids  Office visit in the past 12 months or per provider note.  Last visit with RENEE MURRIETA was on: 03/15/2016 in [facility]  Next visit with RENEE MURRIETA is on: No future appointment listed with this provider  Max refill for 12 months from last office visit or per provider note.  Due for exam.  Limited refill per protocol and letter mailed.  Karina Greer RN ........   2017    3:42 PM

## 2018-01-04 NOTE — TELEPHONE ENCOUNTER
Patient Information     Patient Name MRN Sex Mita Paiz [id number] Female 1955      Telephone Encounter by Karina Greer RN at 2017  4:02 PM     Author:  Karina Greer RN Service:  (none) Author Type:  NURS- Registered Nurse     Filed:  2017  4:08 PM Encounter Date:  2017 Status:  Signed     :  Karina Greer RN (NURS- Registered Nurse)            This is a Refill request from: [facility]  Name of Medication: Flexeril 10 mg  Quantity requested: 60  Last fill date: 3/10/17  Due for refill: 4/10/17  Last visit with RENEE MURRIETA was on: 03/15/2016 in [facility]-Pre-op on 16- due for annual medication review OV-letter sent  PCP:  RENEE MURRIETA DO  Controlled Substance Agreement:  none   Diagnosis r/t this medication request: back spasm     Unable to complete prescription refill per RN Medication Refill Policy.................... Karina Greer RN ....................  2017   4:02 PM

## 2018-01-04 NOTE — TELEPHONE ENCOUNTER
Patient Information     Patient Name MRN Sex Mita Paiz [id number] Female 1955      Telephone Encounter by Karina Greer RN at 2017  8:30 AM     Author:  Karina Greer RN Service:  (none) Author Type:  NURS- Registered Nurse     Filed:  2017  8:34 AM Encounter Date:  2017 Status:  Signed     :  Karina Greer RN (NURS- Registered Nurse)            FYI- Patient remains due for annual OV after notification on 17  This is a Refill request from: [facility]  Name of Medication: Flexeril 10 mg  Quantity requested: 60  Last fill date: 17  Due for refill: 17  Last visit with RENEE MURRIETA was on: 03/15/2016 in [facility]  PCP:  RENEE MURRIETA DO  Controlled Substance Agreement: na  Diagnosis r/t this medication request: Back spasm     Unable to complete prescription refill per RN Medication Refill Policy.................... Karina Greer RN ....................  2017   8:31 AM

## 2018-01-04 NOTE — TELEPHONE ENCOUNTER
Patient Information     Patient Name MRN Sex Mita Paiz [id number] Female 1955      Telephone Encounter by Karina Greer RN at 4/10/2017 10:04 AM     Author:  Karina Greer RN Service:  (none) Author Type:  NURS- Registered Nurse     Filed:  4/10/2017 10:07 AM Encounter Date:  2017 Status:  Signed     :  Karina Greer RN (NURS- Registered Nurse)            Beta Blockers   Office visit in the past 12 months or per provider note.  Last visit with RENEE MURRIETA was on: 03/15/2016 in [facility]-Pre-op with RENEE MURRIETA DO on 16  Next visit with RENEE MURRIETA is on: No future appointment listed with this provider  Next visit with Family Practice is on: No future appointment listed in this department  Max refill for 12 months from last office visit or per provider note.  Prescription refilled per RN Medication Refill Policy.................... Karina Greer RN ....................  4/10/2017   10:06 AM

## 2018-01-05 NOTE — TELEPHONE ENCOUNTER
Patient Information     Patient Name MRN Mita Helton [id number] Female 1955      Telephone Encounter by Shantell Mathews at 2017  2:58 PM     Author:  Shantell Mathews Service:  (none) Author Type:  (none)     Filed:  2017  3:02 PM Encounter Date:  2017 Status:  Signed     :  Shantell Mathews            Screening Questions for the Scheduling of Screening Colonoscopies   (If Colonoscopy is diagnostic, Provider should review the chart before scheduling.)  Are you younger than 50 or older than 80?  NO  Do you take aspirin or fish oil?  FISH OIL  (if yes, tell patient to stop 1 week prior to Colonoscopy)  Do you take warfarin (Coumadin), clopidogrel (Plavix), apixaban (Eliquis), dabigatram (Pradaxa), rivaroxaban (Xarelto) or any blood thinner? NO   Do you use oxygen at home?  NO   Do you have kidney disease? NO   Are you on dialysis? NO   Have you had a stroke or heart attack in the last year? NO   Have you had a stent in your heart or any blood vessel in the last year? NO  Have you had a transplant of any organ? NO  Have you had a colonoscopy or upper endoscopy (EGD) before? NO         When?  NO  Date of scheduled Colonoscopy. 2017  Provider [facility]

## 2018-01-05 NOTE — PROGRESS NOTES
Patient Information     Patient Name MRN Sex Mita Paiz [id number] Female 1955      Progress Notes by Lesa Mojica at 2017  9:29 AM     Author:  Lesa Mojica Service:  (none) Author Type:  (none)     Filed:  2017  9:29 AM Date of Service:  2017  9:29 AM Status:  Signed     :  Lesa Mojica            Falls Risk Criteria:    Age 65 and older or under age 4        Sensory deficits    Poor vision    Use of ambulatory aides    Impaired judgment    Unable to walk independently    Meets High Risk criteria for falls:  no

## 2018-01-05 NOTE — PROGRESS NOTES
Patient Information     Patient Name MRN Sex Mita Paiz [id number] Female 1955      Progress Notes by Ella Baptiste DO at 2017 10:00 AM     Author:  Ella Baptiste DO Service:  (none) Author Type:  PHYS- Osteopathic     Filed:  2017  7:19 AM Encounter Date:  2017 Status:  Signed     :  Ella Baptiste DO (PHYS- Osteopathic)            ANNUAL PHYSICAL - FEMALE    HPI: Mita Gabriel is a 61 y.o. female who presents for a yearly exam.  Concerns include:    Returned from [place] last week.  Had a good winter.  Feels well without complaints today.  Has a history of HTN which she is on HCTZ, Lisinopril, Toprol XL.  She also had an NSTEMI, which was thought to be caused by alcohol intoxication and strain on the heart.  No angiogram performed at that time.  No further chest pains, SOB.  No N/V/D.  No MICHELLE.  She also has chronic hypothyroidism, hyperlipidemia, and depression/anxiety that she has no concerns about today.    No LMP recorded. Patient is postmenopausal.   Contraception: NA  Risk for STI?: No  Last pap: 3/15/2016, normal with negative HPV.  Due 3/15/2021  Any hx of abnormal paps:  No  FH of early CA?: No  Cholesterol/DM concerns/screening: due today  Tobacco?: No  Calcium intake: No  DEXA: will discuss next year, as she agrees to a colonoscopy today for the first time.   Last mammo: prior to appointment; results pending.  Colonoscopy: never had one previously.  Referral made today.  Immunizations: Tdap 2009; continue yearly flu vaccines; shingles 2013; Prevnar 13 3/15/2016 and Pneumovax 2015.    Patient Active Problem List      Diagnosis Date Noted     H/O non-ST elevation myocardial infarction (NSTEMI) 2015     CATARACT, SENILE, BILATERAL 2012     INSOMNIA NEC 2011     MOTION SICKNESS 2011     OBESITY NOS 2011     HYPOTHYROIDISM 2009     PELVIC PAIN, CHRONIC 2009     HYPERTENSION 10/21/2008     ARTHRITIS, BACK  10/21/2008     LOW BACK PAIN, CHRONIC 10/21/2008     DEPRESSION/ANXIETY 10/21/2008     ANXIETY 10/16/2008     ALCOHOL ABUSE, EPISODIC, HX OF 10/16/2008       Past Medical History:     Diagnosis  Date     Anxiety      Depression      History of blood transfusion     with delivery of son      HTN (hypertension)      Hyperlipidemia      Hypothyroidism (acquired)      Wrist fracture, left      Past Surgical History:      Procedure  Laterality Date      SECTION       PARTIAL THYROIDECTOMY       WRIST FRACTURE TX      Wrist ORIF       Social History     Social History        Marital status:       Spouse name: N/A     Number of children:  N/A     Years of education:  N/A     Occupational History      Not on file.     Social History Main Topics       Smoking status: Never Smoker     Smokeless tobacco: Never Used     Alcohol use No     Drug use: No     Sexual activity: Not on file     Other Topics   Concern      Service  Yes     [employer]      Blood Transfusions  Yes     Child birth       Caffeine Concern  Yes     2 cups of coffee daily      Occupational Exposure  No     Hobby Hazards  No     Sleep Concern  Yes     Wakes up every 2-3 hours, always has, uses sleep aids      Stress Concern  Yes     5 most days on a scale of 1-10 (8/31/15) Husbands health, family       Weight Concern  Yes     Would like to lose, dropping weight now      Special Diet  No     Back Care  Yes     Accupuncture every other week, massage      Exercise  Yes     gardening, walks daily 8 blocks      Bike Helmet  No     Doesn't ride      Seat Belt  Yes     Self-Exams  Yes     Social History Narrative      with one grown son, two grand children (older granddaughter with brain tumor - she plays hockey, and not intervening with tumor unless things change)    Goes south to [place] for the winter.  Used to golf, but  unable now due to having MS.     was a  but does not fly due to MS    Patient has  never smoked.     Regular Exercise - no     Family History       Problem   Relation Age of Onset     Good Health  Mother      Genitourinary Disease  Father      kidney disease       Cancer  Maternal Grandmother      Bladder       Stroke  Paternal Grandmother 70     Cancer  Maternal Grandfather      Throat and lung       Good Health  Sister      Good Health  Sister      Good Health  Brother      Good Health  Brother      Good Health  Brother      slightly developmentally delayed       Other  Other      Family history of depression and anxiety       Anesthesia Problem  No Family History      Notes no prior complications from anesthesia.       Cancer-breast  No Family History      Current Outpatient Prescriptions       Medication  Sig Dispense Refill     aspirin (ECOTRIN) 81 mg enteric coated tablet Take 1 tablet by mouth once daily with a meal.  0     atorvastatin (LIPITOR) 20 mg tablet Take 1 tablet by mouth once daily. 90 tablet 3     cyclobenzaprine (FLEXERIL) 10 mg tablet TAKE 1 TABLET BY MOUTH TWICE DAILY AS NEEDED FOR MUSCLE SPASMS 60 tablet 5     folic acid 1 mg tablet Take 1 mg by mouth once daily.       gabapentin (NEURONTIN) 300 mg capsule TAKE 1 CAPSULE BY MOUTH THREE TIMES DAILY 270 capsule 0     hydrochlorothiazide (HCTZ) 25 mg tablet Take 1 tablet by mouth once daily. 90 tablet 3     levothyroxine (SYNTHROID) 50 mcg tablet Take 1 tablet by mouth before breakfast. 90 tablet 3     lisinopril (PRINIVIL; ZESTRIL) 5 mg tablet TAKE 1 TABLET BY MOUTH EVERY DAY 90 tablet 0     LORazepam (ATIVAN) 0.5 mg tab TAKE 1 TABLET BY MOUTH THREE TIMES DAILY 90 tablet 5     methocarbamol (ROBAXIN) 500 mg tablet Take 1-2 tablets by mouth 4 times daily. 120 tablet 11     metoprolol succinate (TOPROL XL) 50 mg sustained-release tablet TAKE 1 TABLET BY MOUTH EVERY DAY 90 tablet 0     thiamine (VITAMIN B1) 100 mg tablet Take 100 mg by mouth once daily.       TRANSDERM-SCOP 1.5 mg (1 mg over 3 days) patch USE AS DIRECTED FOR  "FLYING 4 Patch 0     traZODone (DESYREL) 50 mg tablet TAKE 3 TABLETS BY MOUTH AT BEDTIME 270 tablet 5     venlafaxine (EFFEXOR XR) 150 mg Extended-Release capsule TAKE 1 CAPSULE BY MOUTH EVERY MORNING. 90 capsule 2     No current facility-administered medications for this visit.      Medications have been reviewed by me and are current to the best of my knowledge and ability.     REVIEW OF SYSTEMS:  Refer to HPI; all other systems reviewed and negative.    PHYSICAL EXAM:  /72  Pulse 68  Ht 1.638 m (5' 4.5\")  Wt 81.3 kg (179 lb 3.2 oz)  BMI 30.28 kg/m2  CONSTITUTIONAL:  Alert, cooperative, NAD.  EYES: No scleral icterus.  PERRLA.  Conjunctiva clear.  ENT/MOUTH: External ears and nose normal.  TMs normal.  Moist mucous membranes. Oropharynx clear.    ENDO: No thyromegaly or thyroid nodules.  LYMPH:  No cervical or supraclavicular LA.    BREASTS: Declines today  CARDIOVASCULAR: Regular, S1, S2.  No S3 or S4.  No murmur/gallop/rub.  No peripheral edema.  RESPIRATORY: CTA bilaterally, no wheezes, rhonchi or rales.  GI: Bowel sounds wnl.  Soft, nontender, nondistended.  No masses or HSM.  No rebound or guarding.  : Declines today.  MSKEL: Grossly normal ROM.  No clubbing.  INTEGUMENTARY:  Warm, dry.  No rash noted on exposed skin.  NEUROLOGIC: Facies symmetric.  Grossly normal movement and tone.  No tremor.  PSYCHIATRIC: Affect normal.  Speech fluent.      PHQ Depression Screening 6/23/2016 5/25/2017   Date of PHQ exam (doc flow) 6/23/2016 5/25/2017   1. Lack of interest/pleasure 0 - Not at all 1 - Several days   2. Feeling down/depressed 0 - Not at all 0 - Not at all   PHQ-2 TOTAL SCORE 0 1   3. Trouble sleeping 2 - More than half the days 0 - Not at all   4. Decreased energy 1 - Several days 0 - Not at all   5. Appetite change 1 - Several days 1 - Several days   6. Feelings of failure 0 - Not at all 1 - Several days   7. Trouble concentrating 0 - Not at all 1 - Several days   8. Activity level 0 - Not at all " 1 - Several days   9. Hurting yourself 0 - Not at all 1 - Several days   PHQ-9 TOTAL SCORE 4 6   PHQ-9 Severity Level none mild   Functional Impairment somewhat difficult somewhat difficult       Results for orders placed or performed in visit on 05/25/17      COMPLETE METABOLIC PANEL      Result  Value Ref Range    SODIUM 130 (L) 133 - 143 mmol/L    POTASSIUM 4.0 3.5 - 5.1 mmol/L    CHLORIDE 99 98 - 107 mmol/L    CO2,TOTAL 26 21 - 31 mmol/L    ANION GAP 5 5 - 18                    GLUCOSE 89 70 - 105 mg/dL    CALCIUM 9.5 8.6 - 10.3 mg/dL    BUN 13 7 - 25 mg/dL    CREATININE 0.92 0.70 - 1.30 mg/dL    BUN/CREAT RATIO           14                    GFR if African American >60 >60 ml/min/1.73m2    GFR if not African American >60 >60 ml/min/1.73m2    ALBUMIN 4.4 3.5 - 5.7 g/dL    PROTEIN,TOTAL 7.1 6.4 - 8.9 g/dL    GLOBULIN                  2.7 2.0 - 3.7 g/dL    A/G RATIO 1.6 1.0 - 2.0                    BILIRUBIN,TOTAL 0.9 0.3 - 1.0 mg/dL    ALK PHOSPHATASE 64 34 - 104 IU/L    ALT (SGPT) 12 7 - 52 IU/L    AST (SGOT) 20 13 - 39 IU/L   TSH      Result  Value Ref Range    TSH 1.23 0.34 - 5.60 uIU/mL   LIPID PANEL      Result  Value Ref Range    CHOLESTEROL,TOTAL 139 <200 mg/dL    TRIGLYCERIDES 132 <150 mg/dL    HDL CHOLESTEROL 41 23 - 92 mg/dL    NON-HDL CHOLESTEROL 98 <145 mg/dl    CHOL/HDL RATIO            3.39 <4.50                    LDL CHOLESTEROL 72 <100 mg/dL    PATIENT STATUS            FASTING                   CBC WITH AUTO DIFFERENTIAL      Result  Value Ref Range    WHITE BLOOD COUNT         6.9 4.5 - 11.0 thou/cu mm    RED BLOOD COUNT           4.56 4.00 - 5.20 mil/cu mm    HEMOGLOBIN                12.7 12.0 - 16.0 g/dL    HEMATOCRIT                38.0 33.0 - 51.0 %    MCV                       83 80 - 100 fL    MCH                       27.9 26.0 - 34.0 pg    MCHC                      33.4 32.0 - 36.0 g/dL    RDW                       12.5 11.5 - 15.5 %    PLATELET COUNT            279 140 - 440 thou/cu  mm    MPV                       8.9 6.5 - 11.0 fL    NEUTROPHILS               62.3 42.0 - 72.0 %    LYMPHOCYTES               27.4 20.0 - 44.0 %    MONOCYTES                 6.9 <12.0 %    EOSINOPHILS               2.2 <8.0 %    BASOPHILS                 0.6 <3.0 %    ABSOLUTE NEUTROPHILS      4.3 1.7 - 7.0 thou/cu mm    ABSOLUTE LYMPHOCYTES      1.9 0.9 - 2.9 thou/cu mm    ABSOLUTE MONOCYTES        0.5 <0.9 thou/cu mm    ABSOLUTE EOSINOPHILS      0.2 <0.5 thou/cu mm    ABSOLUTE BASOPHILS        0.0 <0.3 thou/cu mm   MICROALBUMIN RANDOM URINE      Result  Value Ref Range    ALB RAND URINE            9.9 mg/L    CREATININE,URINE          1.19 g/L    MICROALBUMIN,RAND UR      8.3 <30.0 mg/g creat       ASSESSMENT/PLAN:    ICD-10-CM    1. Annual physical exam Z00.00    2. Colon cancer screening Z12.11 COLONOSCOPY SCREENING-GICH   3. HYPERTENSION I10 COMPLETE METABOLIC PANEL      MICROALBUMIN RANDOM URINE      LIPID PANEL      COMPLETE METABOLIC PANEL      LIPID PANEL      MICROALBUMIN RANDOM URINE      hydroCHLOROthiazide (HCTZ) 25 mg tablet      lisinopril (PRINIVIL; ZESTRIL) 5 mg tablet      metoprolol succinate (TOPROL XL) 50 mg sustained-release tablet   4. Hypothyroidism, unspecified type E03.9 CBC WITH DIFFERENTIAL      TSH      CBC WITH DIFFERENTIAL      TSH      CBC WITH AUTO DIFFERENTIAL      levothyroxine (SYNTHROID) 50 mcg tablet   5. Mixed hyperlipidemia E78.2 atorvastatin (LIPITOR) 20 mg tablet   6. DEPRESSION/ANXIETY F34.1 venlafaxine (EFFEXOR XR) 150 mg Extended-Release capsule     Relevant cancer screening discussed.    Counseled on healthy diet, Calcium and vitamin D intake, and exercise.    HTN, chronic, improved: ongoing exercise discussed and will continue to follow up every 6 months.  Monitoring labs ordered as above; and refills of medications x 1 year.    Hypothyroidism, chronic, stable: due to check TSH today.  If stable, levothyroxine will be refilled x 1 year.    HLP, chronic, stable: lipid  panel added to above labs. Continue atorvastatin at current dosage due to history of NSTEMI.    Depression/Anxiety, chronic, improved: she states she is feeling the best she has for quite a while.  Continue at current dosage - refilled x 1 year.    RENEE MURRIETA, DO

## 2018-01-12 ENCOUNTER — COMMUNICATION - GICH (OUTPATIENT)
Dept: FAMILY MEDICINE | Facility: OTHER | Age: 63
End: 2018-01-12

## 2018-01-12 DIAGNOSIS — M62.830 MUSCLE SPASM OF BACK: ICD-10-CM

## 2018-01-21 ENCOUNTER — COMMUNICATION - GICH (OUTPATIENT)
Dept: FAMILY MEDICINE | Facility: OTHER | Age: 63
End: 2018-01-21

## 2018-01-21 DIAGNOSIS — M62.830 MUSCLE SPASM OF BACK: ICD-10-CM

## 2018-01-28 VITALS
HEART RATE: 68 BPM | BODY MASS INDEX: 29.85 KG/M2 | SYSTOLIC BLOOD PRESSURE: 110 MMHG | HEIGHT: 65 IN | DIASTOLIC BLOOD PRESSURE: 72 MMHG | WEIGHT: 179.2 LBS

## 2018-01-28 VITALS
SYSTOLIC BLOOD PRESSURE: 140 MMHG | HEART RATE: 64 BPM | DIASTOLIC BLOOD PRESSURE: 80 MMHG | BODY MASS INDEX: 29.24 KG/M2 | WEIGHT: 173 LBS | TEMPERATURE: 96.9 F

## 2018-02-04 ASSESSMENT — ANXIETY QUESTIONNAIRES: GAD7 TOTAL SCORE: 1

## 2018-02-04 ASSESSMENT — PATIENT HEALTH QUESTIONNAIRE - PHQ9: SUM OF ALL RESPONSES TO PHQ QUESTIONS 1-9: 6

## 2018-02-12 ENCOUNTER — DOCUMENTATION ONLY (OUTPATIENT)
Dept: FAMILY MEDICINE | Facility: OTHER | Age: 63
End: 2018-02-12

## 2018-02-12 PROBLEM — Z12.11 SPECIAL SCREENING FOR MALIGNANT NEOPLASMS, COLON: Status: ACTIVE | Noted: 2017-06-09

## 2018-02-12 RX ORDER — HYDROCHLOROTHIAZIDE 25 MG/1
25 TABLET ORAL DAILY
COMMUNITY
Start: 2017-05-26 | End: 2018-06-12

## 2018-02-12 RX ORDER — ASPIRIN 81 MG/1
81 TABLET ORAL
Status: ON HOLD | COMMUNITY
Start: 2015-01-20 | End: 2022-05-19

## 2018-02-12 RX ORDER — FOLIC ACID 1 MG/1
1 TABLET ORAL DAILY
COMMUNITY

## 2018-02-12 RX ORDER — SCOLOPAMINE TRANSDERMAL SYSTEM 1 MG/1
PATCH, EXTENDED RELEASE TRANSDERMAL
COMMUNITY
Start: 2017-11-29 | End: 2020-06-23

## 2018-02-12 RX ORDER — ATORVASTATIN CALCIUM 20 MG/1
20 TABLET, FILM COATED ORAL DAILY
COMMUNITY
Start: 2017-05-26 | End: 2018-06-12

## 2018-02-12 RX ORDER — CYCLOBENZAPRINE HCL 10 MG
1 TABLET ORAL 2 TIMES DAILY PRN
COMMUNITY
Start: 2017-11-07 | End: 2018-05-08

## 2018-02-12 RX ORDER — LEVOTHYROXINE SODIUM 50 UG/1
50 TABLET ORAL
COMMUNITY
Start: 2017-05-26 | End: 2018-06-12

## 2018-02-12 RX ORDER — LORAZEPAM 0.5 MG/1
0.5 TABLET ORAL 3 TIMES DAILY
COMMUNITY
Start: 2017-10-03 | End: 2018-04-02

## 2018-02-12 RX ORDER — GABAPENTIN 300 MG/1
300 CAPSULE ORAL 3 TIMES DAILY
COMMUNITY
Start: 2017-07-10 | End: 2018-04-02

## 2018-02-12 RX ORDER — METOPROLOL SUCCINATE 50 MG/1
50 TABLET, EXTENDED RELEASE ORAL DAILY
COMMUNITY
Start: 2017-05-26 | End: 2018-06-12

## 2018-02-12 RX ORDER — TRAZODONE HYDROCHLORIDE 50 MG/1
150 TABLET, FILM COATED ORAL AT BEDTIME
COMMUNITY
Start: 2017-12-11 | End: 2018-06-12

## 2018-02-12 RX ORDER — VENLAFAXINE HYDROCHLORIDE 150 MG/1
150 CAPSULE, EXTENDED RELEASE ORAL EVERY MORNING
COMMUNITY
Start: 2017-05-26 | End: 2018-06-12

## 2018-02-12 RX ORDER — METHOCARBAMOL 500 MG/1
500-1000 TABLET, FILM COATED ORAL 4 TIMES DAILY
COMMUNITY
Start: 2017-01-12 | End: 2018-07-07

## 2018-02-12 RX ORDER — LANOLIN ALCOHOL/MO/W.PET/CERES
100 CREAM (GRAM) TOPICAL DAILY PRN
COMMUNITY
Start: 2015-01-13

## 2018-02-12 RX ORDER — LISINOPRIL 5 MG/1
5 TABLET ORAL DAILY
COMMUNITY
Start: 2017-05-26 | End: 2018-06-12

## 2018-02-12 RX ORDER — POLYETHYLENE GLYCOL 3350, SODIUM CHLORIDE, SODIUM BICARBONATE, POTASSIUM CHLORIDE 420; 11.2; 5.72; 1.48 G/4L; G/4L; G/4L; G/4L
240 POWDER, FOR SOLUTION ORAL
COMMUNITY
Start: 2017-05-25 | End: 2018-06-12

## 2018-02-12 NOTE — TELEPHONE ENCOUNTER
Patient Information     Patient Name MRN Mita Helton [id number] Female 1955      Telephone Encounter by Karina Greer RN at 2018  3:55 PM     Author:  Karina Greer RN Service:  (none) Author Type:  NURS- Registered Nurse     Filed:  2018  3:59 PM Encounter Date:  2018 Status:  Signed     :  Karina Greer RN (NURS- Registered Nurse)            Filled 17 #120 x 11 to [facility] in [place], [place]. Pharmacy alerted. Unable to complete prescription refill per RN Medication Refill Policy.................... Karina Greer RN ....................  2018   3:57 PM    Prescribing Provider: Ella Murrieta DO                 Order Date: 2017  Ordered by: ELLA MURRIETA  Medication:methocarbamol (ROBAXIN) 500 mg tablet    Qty:120 tablet  Ref:11  Start:2017   End:              Route:Oral                Class:eRx    Sig:Take 1-2 tablets by mouth 4 times daily.    Pharmacy:[facility]  [address] - [phone]

## 2018-02-12 NOTE — TELEPHONE ENCOUNTER
Patient Information     Patient Name MRN Mita Helton [id number] Female 1955      Telephone Encounter by Karina Greer RN at 2017  8:53 AM     Author:  Karina Greer RN Service:  (none) Author Type:  NURS- Registered Nurse     Filed:  2017  8:56 AM Encounter Date:  12/10/2017 Status:  Signed     :  Karina Greer RN (NURS- Registered Nurse)            This is a Refill request from:[facility]  Name of Medication: TraZODone (DESYREL) 50 mg tablet  Quantity requested: 270 x 3   Last fill date: 16  Due for refill: yes  Last visit with RENEE MURRIETA was on: 2017- Physical   Controlled Substance Agreement: na  Diagnosis r/t this medication request: Generalized anxiety disorder     Unable to complete prescription refill per RN Medication Refill Policy.................... Karina Greer RN ....................  2017   8:53 AM

## 2018-02-13 NOTE — TELEPHONE ENCOUNTER
Patient Information     Patient Name MRN Mita Helton [id number] Female 1955      Telephone Encounter by Karina Greer RN at 2018 10:33 AM     Author:  Karina Greer RN Service:  (none) Author Type:  NURS- Registered Nurse     Filed:  2018 10:42 AM Encounter Date:  2018 Status:  Signed     :  Karina Greer RN (NURS- Registered Nurse)            This is a Refill request from: [facility]  Name of Medication: Methocarbamol (ROBAXIN) 500 mg tablet  Quantity requested: 120 x 4  Last fill date: 17  Last visit with RENEE MURRIETA was on: 2017   Controlled Substance Agreement:  na  Diagnosis r/t this medication request:Muscle spasms of back     Unable to complete prescription refill per RN Medication Refill Policy.................... Karina Greer RN ....................  2018   10:34 AM

## 2018-04-02 DIAGNOSIS — F41.3 OTHER MIXED ANXIETY DISORDERS: Primary | ICD-10-CM

## 2018-04-02 DIAGNOSIS — M54.42 BILATERAL LOW BACK PAIN WITH LEFT-SIDED SCIATICA, UNSPECIFIED CHRONICITY: ICD-10-CM

## 2018-04-04 RX ORDER — LORAZEPAM 0.5 MG/1
TABLET ORAL
Qty: 90 TABLET | Refills: 5 | Status: SHIPPED | OUTPATIENT
Start: 2018-04-04 | End: 2018-10-02

## 2018-04-04 RX ORDER — LORAZEPAM 0.5 MG/1
TABLET ORAL
Qty: 90 TABLET | Refills: 0 | OUTPATIENT
Start: 2018-04-04

## 2018-04-04 RX ORDER — GABAPENTIN 300 MG/1
CAPSULE ORAL
Qty: 270 CAPSULE | Refills: 0 | OUTPATIENT
Start: 2018-04-04

## 2018-04-04 RX ORDER — GABAPENTIN 300 MG/1
CAPSULE ORAL
Qty: 270 CAPSULE | Refills: 2 | Status: SHIPPED | OUTPATIENT
Start: 2018-04-04 | End: 2018-12-29

## 2018-04-04 NOTE — TELEPHONE ENCOUNTER
Gabapentin (NEURONTIN) 300 MG capsule     Last Written Prescription Date: 07/10/2017  Last Fill Quantity: 270,   # refills: 2    LORazepam (ATIVAN) 0.5 MG tablet  Last Written Prescription Date:  10/03/2017  Last Fill Quantity: 90,   # refills: 5    Last Office Visit: 05/25/2017    Future Office visit:    Next 5 appointments (look out 90 days)     Jun 12, 2018  9:00 AM CDT   PHYSICAL with Ella Baptiste DO   [facility] ([facility]    [address]  [facility] [address]   [phone]                   Routing refill request to provider for review/approval because:  Drug not on the [facility], [facility] or [facility] refill protocol or controlled substance    Unable to complete prescription refill per RNMedication Refill Policy.................... Karina Greer ....................  4/4/2018   4:04 PM

## 2018-04-06 ENCOUNTER — TELEPHONE (OUTPATIENT)
Dept: INTERNAL MEDICINE | Facility: OTHER | Age: 63
End: 2018-04-06

## 2018-04-06 NOTE — TELEPHONE ENCOUNTER
Patient states that Rx for Lorazepam was faxed to the local [facility] instead of [facility], in [place], [place]. She will be in [place] approximately 60 days longer. Refaxed prescription signed by Ella Baptiste DO as requested. Called local [facility] and pharmacist, Maulik, confirmed that he is cancelling the Rx faxed there on 4-4-18. Called pharmacist, Ibeth, in [place] and gave verbal order on Gabapentin as they deny receipt of ERx which shows confirmed on 4-4-18. Pharmacist confirmed that both medications will be ready for patient.  Kelle Kumar, RN on 4/6/2018 at 8:48 AM

## 2018-05-08 DIAGNOSIS — M62.830 BACK MUSCLE SPASM: Primary | ICD-10-CM

## 2018-05-10 RX ORDER — CYCLOBENZAPRINE HCL 10 MG
TABLET ORAL
Qty: 60 TABLET | Refills: 5 | Status: SHIPPED | OUTPATIENT
Start: 2018-05-10 | End: 2018-10-23

## 2018-05-10 NOTE — TELEPHONE ENCOUNTER
Cyclobenzaprine (FLEXERIL) 10 mg tablet     Last Written Prescription Date:  11/07/2017  Last Fill Quantity: 60,   # refills: 5  Last Office Visit: 05/25/2017  Future Office visit:    Next 5 appointments (look out 90 days)     Jun 12, 2018  9:00 AM CDT   PHYSICAL with Ella Baptiste DO   [facility] ([facility]    [address]  [facility] [address]   [phone]                   Routing refill request to provider for review/approval because: Drug not on the [facility], [facility] or [facility] refill protocol or controlled substance      Unable to complete prescription refill per RNMedication Refill Policy.................... Karina Greer ....................  5/10/2018   7:30 AM

## 2018-06-12 ENCOUNTER — HOSPITAL ENCOUNTER (OUTPATIENT)
Dept: MAMMOGRAPHY | Facility: OTHER | Age: 63
Discharge: HOME | End: 2018-06-12
Attending: FAMILY MEDICINE | Admitting: FAMILY MEDICINE
Payer: COMMERCIAL

## 2018-06-12 ENCOUNTER — OFFICE VISIT (OUTPATIENT)
Dept: FAMILY MEDICINE | Facility: OTHER | Age: 63
End: 2018-06-12
Attending: FAMILY MEDICINE
Payer: COMMERCIAL

## 2018-06-12 VITALS
HEART RATE: 64 BPM | BODY MASS INDEX: 28.79 KG/M2 | HEIGHT: 64 IN | SYSTOLIC BLOOD PRESSURE: 112 MMHG | WEIGHT: 168.6 LBS | DIASTOLIC BLOOD PRESSURE: 78 MMHG

## 2018-06-12 DIAGNOSIS — G89.29 CHRONIC NECK PAIN: ICD-10-CM

## 2018-06-12 DIAGNOSIS — M54.2 CHRONIC NECK PAIN: ICD-10-CM

## 2018-06-12 DIAGNOSIS — E78.2 MIXED HYPERLIPIDEMIA: ICD-10-CM

## 2018-06-12 DIAGNOSIS — Z11.59 ENCOUNTER FOR HEPATITIS C SCREENING TEST FOR LOW RISK PATIENT: ICD-10-CM

## 2018-06-12 DIAGNOSIS — Z13.820 SCREENING FOR OSTEOPOROSIS: ICD-10-CM

## 2018-06-12 DIAGNOSIS — Z12.31 VISIT FOR SCREENING MAMMOGRAM: ICD-10-CM

## 2018-06-12 DIAGNOSIS — E06.3 HYPOTHYROIDISM DUE TO HASHIMOTO'S THYROIDITIS: ICD-10-CM

## 2018-06-12 DIAGNOSIS — I10 ESSENTIAL HYPERTENSION: ICD-10-CM

## 2018-06-12 DIAGNOSIS — F41.1 ANXIETY STATE: ICD-10-CM

## 2018-06-12 DIAGNOSIS — Z11.4 SCREENING FOR HIV WITHOUT PRESENCE OF RISK FACTORS: ICD-10-CM

## 2018-06-12 DIAGNOSIS — Z00.00 ROUTINE HISTORY AND PHYSICAL EXAMINATION OF ADULT: Primary | ICD-10-CM

## 2018-06-12 LAB
ALBUMIN SERPL-MCNC: 4.2 G/DL (ref 3.5–5.7)
ALP SERPL-CCNC: 65 U/L (ref 34–104)
ALT SERPL W P-5'-P-CCNC: 12 U/L (ref 7–52)
ANION GAP SERPL CALCULATED.3IONS-SCNC: 7 MMOL/L (ref 3–14)
AST SERPL W P-5'-P-CCNC: 16 U/L (ref 13–39)
BILIRUB SERPL-MCNC: 0.7 MG/DL (ref 0.3–1)
BUN SERPL-MCNC: 11 MG/DL (ref 7–25)
CALCIUM SERPL-MCNC: 9.4 MG/DL (ref 8.6–10.3)
CHLORIDE SERPL-SCNC: 95 MMOL/L (ref 98–107)
CHOLEST SERPL-MCNC: 111 MG/DL
CO2 SERPL-SCNC: 29 MMOL/L (ref 21–31)
CREAT SERPL-MCNC: 0.77 MG/DL (ref 0.6–1.2)
GFR SERPL CREATININE-BSD FRML MDRD: 76 ML/MIN/1.7M2
GLUCOSE SERPL-MCNC: 86 MG/DL (ref 70–105)
HDLC SERPL-MCNC: 36 MG/DL (ref 23–92)
LDLC SERPL CALC-MCNC: 54 MG/DL
NONHDLC SERPL-MCNC: 75 MG/DL
POTASSIUM SERPL-SCNC: 3.8 MMOL/L (ref 3.5–5.1)
PROT SERPL-MCNC: 7.2 G/DL (ref 6.4–8.9)
SODIUM SERPL-SCNC: 131 MMOL/L (ref 134–144)
T4 FREE SERPL-MCNC: 0.93 NG/DL (ref 0.6–1.6)
TRIGL SERPL-MCNC: 104 MG/DL
TSH SERPL DL<=0.05 MIU/L-ACNC: 0.92 IU/ML (ref 0.34–5.6)

## 2018-06-12 PROCEDURE — 80053 COMPREHEN METABOLIC PANEL: CPT | Performed by: FAMILY MEDICINE

## 2018-06-12 PROCEDURE — 87389 HIV-1 AG W/HIV-1&-2 AB AG IA: CPT | Performed by: FAMILY MEDICINE

## 2018-06-12 PROCEDURE — 77063 BREAST TOMOSYNTHESIS BI: CPT

## 2018-06-12 PROCEDURE — 84439 ASSAY OF FREE THYROXINE: CPT | Performed by: FAMILY MEDICINE

## 2018-06-12 PROCEDURE — 86803 HEPATITIS C AB TEST: CPT | Performed by: FAMILY MEDICINE

## 2018-06-12 PROCEDURE — 36415 COLL VENOUS BLD VENIPUNCTURE: CPT | Performed by: FAMILY MEDICINE

## 2018-06-12 PROCEDURE — 84443 ASSAY THYROID STIM HORMONE: CPT | Performed by: FAMILY MEDICINE

## 2018-06-12 PROCEDURE — 80061 LIPID PANEL: CPT | Performed by: FAMILY MEDICINE

## 2018-06-12 PROCEDURE — 99396 PREV VISIT EST AGE 40-64: CPT | Performed by: FAMILY MEDICINE

## 2018-06-12 RX ORDER — LEVOTHYROXINE SODIUM 50 UG/1
50 TABLET ORAL
Qty: 90 TABLET | Refills: 4 | Status: SHIPPED | OUTPATIENT
Start: 2018-06-12 | End: 2019-06-21

## 2018-06-12 RX ORDER — LISINOPRIL 5 MG/1
5 TABLET ORAL DAILY
Qty: 90 TABLET | Refills: 4 | Status: SHIPPED | OUTPATIENT
Start: 2018-06-12 | End: 2019-06-21

## 2018-06-12 RX ORDER — TRAZODONE HYDROCHLORIDE 50 MG/1
150 TABLET, FILM COATED ORAL AT BEDTIME
Qty: 90 TABLET | Refills: 4 | Status: SHIPPED | OUTPATIENT
Start: 2018-06-12 | End: 2018-12-02

## 2018-06-12 RX ORDER — VENLAFAXINE HYDROCHLORIDE 150 MG/1
150 CAPSULE, EXTENDED RELEASE ORAL EVERY MORNING
Qty: 90 CAPSULE | Refills: 4 | Status: SHIPPED | OUTPATIENT
Start: 2018-06-12 | End: 2019-06-21

## 2018-06-12 RX ORDER — HYDROCHLOROTHIAZIDE 25 MG/1
25 TABLET ORAL DAILY
Qty: 90 TABLET | Refills: 4 | Status: SHIPPED | OUTPATIENT
Start: 2018-06-12 | End: 2019-06-21

## 2018-06-12 RX ORDER — METOPROLOL SUCCINATE 50 MG/1
50 TABLET, EXTENDED RELEASE ORAL DAILY
Qty: 90 TABLET | Refills: 4 | Status: SHIPPED | OUTPATIENT
Start: 2018-06-12 | End: 2019-06-21

## 2018-06-12 RX ORDER — ATORVASTATIN CALCIUM 20 MG/1
20 TABLET, FILM COATED ORAL DAILY
Qty: 90 TABLET | Refills: 4 | Status: SHIPPED | OUTPATIENT
Start: 2018-06-12 | End: 2019-06-21

## 2018-06-12 ASSESSMENT — ANXIETY QUESTIONNAIRES
7. FEELING AFRAID AS IF SOMETHING AWFUL MIGHT HAPPEN: SEVERAL DAYS
2. NOT BEING ABLE TO STOP OR CONTROL WORRYING: SEVERAL DAYS
3. WORRYING TOO MUCH ABOUT DIFFERENT THINGS: SEVERAL DAYS
6. BECOMING EASILY ANNOYED OR IRRITABLE: NOT AT ALL
5. BEING SO RESTLESS THAT IT IS HARD TO SIT STILL: SEVERAL DAYS
GAD7 TOTAL SCORE: 7
1. FEELING NERVOUS, ANXIOUS, OR ON EDGE: SEVERAL DAYS

## 2018-06-12 ASSESSMENT — PATIENT HEALTH QUESTIONNAIRE - PHQ9: 5. POOR APPETITE OR OVEREATING: MORE THAN HALF THE DAYS

## 2018-06-12 NOTE — LETTER
Mita Gabriel  [address] [place] [address]    6/12/2018      Dear Ms. Gabriel,    I wanted to let you know about your recent labs.    Your labs look great!  We will keep all medications stable at this time.    Please contact us at [phone] with any questions or concerns that you have.    I have attached your lab results for your records.        Sincerely,         Ella Baptiste     Resulted Orders   Lipid Panel   Result Value Ref Range    Cholesterol 111 <200 mg/dL    Triglycerides 104 <150 mg/dL    HDL Cholesterol 36 23 - 92 mg/dL    LDL Cholesterol Calculated 54 <100 mg/dL      Comment:      Desirable:       <100 mg/dl    Non HDL Cholesterol 75 <130 mg/dL   Comprehensive metabolic panel   Result Value Ref Range    Sodium 131 (L) 134 - 144 mmol/L    Potassium 3.8 3.5 - 5.1 mmol/L    Chloride 95 (L) 98 - 107 mmol/L    Carbon Dioxide 29 21 - 31 mmol/L    Anion Gap 7 3 - 14 mmol/L    Glucose 86 70 - 105 mg/dL    Urea Nitrogen 11 7 - 25 mg/dL    Creatinine 0.77 0.60 - 1.20 mg/dL    GFR Estimate 76 >60 mL/min/1.7m2    GFR Estimate If Black >90 >60 mL/min/1.7m2    Calcium 9.4 8.6 - 10.3 mg/dL    Bilirubin Total 0.7 0.3 - 1.0 mg/dL    Albumin 4.2 3.5 - 5.7 g/dL    Protein Total 7.2 6.4 - 8.9 g/dL    Alkaline Phosphatase 65 34 - 104 U/L    ALT 12 7 - 52 U/L    AST 16 13 - 39 U/L   TSH   Result Value Ref Range    Thyrotropin 0.92 0.34 - 5.60 IU/mL   T4, free   Result Value Ref Range    T4 Free 0.93 0.60 - 1.60 ng/dL

## 2018-06-12 NOTE — PATIENT INSTRUCTIONS

## 2018-06-12 NOTE — PROGRESS NOTES
Nursing Notes:   Kady Gurrola LPN  6/12/2018  9:08 AM  Unsigned  Patient here for physical.   Kady Claudio............................... 6/12/2018 9:08 AM        ANNUAL PHYSICAL - FEMALE    HPI: patient who presents for a yearly exam.  Concerns include:  1. Continued and worsening upper back pain.  Worse than lower back.  Receiving acupuncture once a week for it; with some relief.  Uses OTC NSAIDs with minimal relief.  Has had Xrays in the past showing degeneration - last one in 2015.  Interested in steroid type injections; has not had a recent MRI.  Has anxiety and claustrophobia - worried about having MRI done.  Insurance will not cover it done in [place].  Attempt will be made to have it done here at [facility].    No LMP recorded. Patient is postmenopausal.   Contraception: NA  Risk for STI?: No  Last pap: 3/15/2016; Neg.  Neg HPV.  Any hx of abnormal paps:  No  FH ofearly CA?: No  Cholesterol/DM concerns/screening: Due  Tobacco?: No  Calcium intake: No  DEXA: DUE  Last mammo: completed today  Colonoscopy: 6/9/2017; normal.  10 year follow up.  Immunizations: Tdap 1/9/2009; completes flu; shingles 7/19/2013; prevnar 3/15/2016, psv23 1/13/2015 and 1/15/2017    Patient Active Problem List   Diagnosis     Personal history of alcoholism (H)     Anxiety state     Osteoarthritis of spine     Senile cataract     Dysthymic disorder     H/O non-ST elevation myocardial infarction (NSTEMI)     Hypertension     Hypothyroidism     Insomnia     Lumbago     Obesity     Motion sickness     Abdominal pain     Special screening for malignant neoplasms, colon     Mixed hyperlipidemia       Past Medical History:   Diagnosis Date     Anxiety disorder      Closed fracture of left carpal bone      Essential (primary) hypertension      Hyperlipidemia      Hypothyroidism      Major depressive disorder, single episode      Postoperative nausea and vomiting      Transfusion history     with delivery of son       Past Surgical  History:   Procedure Laterality Date     CATARACT IOL, RT/LT Bilateral       SECTION       COLONOSCOPY N/A 2017    Follow up      OPEN REDUCTION INTERNAL FIXATION WRIST      treating a fracture     THYROIDECTOMY       Partial       Social History     Social History     Marital status:      Spouse name: N/A     Number of children: N/A     Years of education: N/A     Social History Main Topics     Smoking status: Never Smoker     Smokeless tobacco: Never Used     Alcohol use No     Drug use: Not on file      Comment: Drug use: No     Sexual activity: Not on file     Other Topics Concern     Not on file     Social History Narrative     with one grown son, two grand children (older granddaughter with brain tumor - she plays hockey, and not intervening with tumor unless things change)  Goes south to [place] for the winter.  Used to golf, but  unable now due to having MS.  Dai    nd was a  but does not fly due to MS  Patient has never smoked.   Regular Exercise - no       Family History   Problem Relation Age of Onset     Family History Negative Mother      Good Health     Genitourinary Problems Father      Genitourinary Disease,kidney disease     CANCER Maternal Grandmother      Cancer,Bladder     Other - See Comments Paternal Grandmother 70     Stroke     CANCER Maternal Grandfather      Cancer,Throat and lung     Other - See Comments Other      Family history of depression and anxiety     Family History Negative Sister      Good Health     Family History Negative Sister      Good Health     Family History Negative Brother      Good Health     Family History Negative Brother      Good Health     Family History Negative Brother      Good Health,slightly developmentally delayed     Anesthesia Reaction No family hx of      Anesthesia Problem,Notes no prior complications from anesthesia.     Breast Cancer No family hx of      Cancer-breast       Current Outpatient Prescriptions    Medication Sig Dispense Refill     atorvastatin (LIPITOR) 20 MG tablet Take 1 tablet (20 mg) by mouth daily 90 tablet 4     hydrochlorothiazide (HYDRODIURIL) 25 MG tablet Take 1 tablet (25 mg) by mouth daily 90 tablet 4     levothyroxine (SYNTHROID/LEVOTHROID) 50 MCG tablet Take 1 tablet (50 mcg) by mouth every morning (before breakfast) 90 tablet 4     lisinopril (PRINIVIL/ZESTRIL) 5 MG tablet Take 1 tablet (5 mg) by mouth daily 90 tablet 4     metoprolol succinate (TOPROL-XL) 50 MG 24 hr tablet Take 1 tablet (50 mg) by mouth daily 90 tablet 4     traZODone (DESYREL) 50 MG tablet Take 3 tablets (150 mg) by mouth At Bedtime 90 tablet 4     venlafaxine (EFFEXOR-XR) 150 MG 24 hr capsule Take 1 capsule (150 mg) by mouth every morning 90 capsule 4     aspirin EC 81 MG EC tablet Take 81 mg by mouth daily with food       cyclobenzaprine (FLEXERIL) 10 MG tablet TAKE 1 TABLET BY MOUTH TWICE DAILY AS NEEDED FOR MUSCLE SPASMS 60 tablet 5     folic acid (FOLVITE) 1 MG tablet Take 1 mg by mouth daily       gabapentin (NEURONTIN) 300 MG capsule TAKE 1 CAPSULE BY MOUTH THREE TIMES DAILY 270 capsule 2     LORazepam (ATIVAN) 0.5 MG tablet TAKE 1 TABLET BY MOUTH THREE TIMES DAILY 90 tablet 5     methocarbamol (ROBAXIN) 500 MG tablet Take 500-1,000 mg by mouth 4 times daily       scopolamine (TRANSDERM) 72 hr patch APPLY 1 PATCH EVERY 72 HOURS FOR MOTION SICKNESS       thiamine 100 MG tablet Take 100 mg by mouth daily       [DISCONTINUED] atorvastatin (LIPITOR) 20 MG tablet Take 20 mg by mouth daily       [DISCONTINUED] hydrochlorothiazide (HYDRODIURIL) 25 MG tablet Take 25 mg by mouth daily       [DISCONTINUED] levothyroxine (SYNTHROID/LEVOTHROID) 50 MCG tablet Take 50 mcg by mouth every morning (before breakfast)       [DISCONTINUED] lisinopril (PRINIVIL/ZESTRIL) 5 MG tablet Take 5 mg by mouth daily       [DISCONTINUED] metoprolol succinate (TOPROL-XL) 50 MG 24 hr tablet Take 50 mg by mouth daily       [DISCONTINUED] traZODone  "(DESYREL) 50 MG tablet Take 150 mg by mouth At Bedtime       [DISCONTINUED] venlafaxine (EFFEXOR-XR) 150 MG 24 hr capsule Take 150 mg by mouth every morning          REVIEW OF SYSTEMS:  Refer to HPI; all other systems reviewed and negative.    PHYSICAL EXAM:  /78 (BP Location: Right arm, Patient Position: Sitting, Cuff Size: Adult Regular)  Pulse 64  Ht 5' 4\" (1.626 m)  Wt 168 lb 9.6 oz (76.5 kg)  BMI 28.94 kg/m2  CONSTITUTIONAL:  Alert, cooperative, NAD.  EYES: No scleral icterus.  PERRLA.  Conjunctiva clear.  ENT/MOUTH: External ears and nose normal.  TMs normal.  Moist mucous membranes. Oropharynx clear.    ENDO: No thyromegaly or thyroid nodules.  LYMPH:  No cervical or supraclavicular LA.    BREASTS: No skin abnormalities, no erythema.  No discrete masses.  No nipple discharge, no axillary, supra- or infraclavicular LA.   CARDIOVASCULAR: Regular, S1, S2.  No S3 or S4.  No murmur/gallop/rub.  No peripheral edema.  RESPIRATORY: CTA bilaterally, no wheezes, rhonchi or rales.  GI: Bowel sounds wnl.  Soft, nontender, non-distended.  No masses or HSM.  No rebound or guarding.  : Vulva: normal, no lesions or discharge  Urethral meatus: normal size and location, no lesions or discharge  Urethra: no tenderness or masses  Bladder: no fullness or tenderness  Vagina: normal appearance, no abnormal discharge, no lesions.  No evidence of cystocele or rectocele.  Cervix: normal appearance, no lesions, no abnormal discharge.  Uterus: normal size and position, mobile, non-tender  Adnexa: nopalpable masses bilaterally. No cervical motion tenderness.  Pap smear obtained: No  MSKEL: Grossly normal ROM.  No clubbing.  INTEGUMENTARY:Warm, dry.  No rash noted on exposed skin.  NEUROLOGIC: Facies symmetric.  Grossly normal movement and tone.  No tremor.  PSYCHIATRIC: Affect normal.  Speech fluent.      PHQ-9 SCORE 6/23/2016 5/25/2017 6/12/2018   Total Score 4 6 4       Results for orders placed or performed in visit on " 11/28/17   Basic metabolic panel   Result Value Ref Range    Sodium 133 133 - 143 mmol/L    Potassium 4.0 3.5 - 5.1 mmol/L    Glucose 96 70 - 105 mg/dL    Urea Nitrogen 11 7 - 25 mg/dL    Carbon Dioxide 26 21 - 31 mmol/L    GFR If Not  - Historical >60 >60 ml/min/1.73m2    BUN/Creatinine Ratio - Historical 13     GFR Estimate If Black >60 >60 ml/min/1.73m2    Chloride 98 98 - 107 mmol/L    Anion Gap - Historical 9 5 - 18    Calcium 9.2 8.6 - 10.3 mg/dL    Creatinine 0.83 0.70 - 1.30 mg/dL       ASSESSMENT/PLAN:  1. Routine history and physical examination of adult    2. Hypothyroidism due to Hashimoto's thyroiditis  Due for monitoring labs.  If stable; will renew medication x 1 year.  - TSH  - T4, free  - levothyroxine (SYNTHROID/LEVOTHROID) 50 MCG tablet; Take 1 tablet (50 mcg) by mouth every morning (before breakfast)  Dispense: 90 tablet; Refill: 4    3. Essential hypertension  Chronic, stable.  Congratulated on 11# weight loss in last one year; and encouraged to continue healthy walking.  Will refill current medications as below.  Monitoring CMP obtained today due to history of EtOH abuse, and need of monitoring liver enzymes in addition to electrolytes, renal function.  - Comprehensive metabolic panel  - hydrochlorothiazide (HYDRODIURIL) 25 MG tablet; Take 1 tablet (25 mg) by mouth daily  Dispense: 90 tablet; Refill: 4  - lisinopril (PRINIVIL/ZESTRIL) 5 MG tablet; Take 1 tablet (5 mg) by mouth daily  Dispense: 90 tablet; Refill: 4  - metoprolol succinate (TOPROL-XL) 50 MG 24 hr tablet; Take 1 tablet (50 mg) by mouth daily  Dispense: 90 tablet; Refill: 4    4. Mixed hyperlipidemia  Chronic, stable.  Lipid panel obtained today.  If stable, renew lipitor 20mg daily x 1 year.  - Lipid Panel  - atorvastatin (LIPITOR) 20 MG tablet; Take 1 tablet (20 mg) by mouth daily  Dispense: 90 tablet; Refill: 4    5. Screening for osteoporosis  Dexa scan ordered - will complete next year with mammogram.  - DX  Hip/Pelvis/Spine; Future    6. Chronic neck pain  Chronic, worsening.  MRI will be obtained for consideration of steroid injections with IR.  - MR Cervical Spine w/o Contrast; Future    7. Encounter for hepatitis C screening test for low risk patient  - Hepatitis C antibody    8. Screening for HIV without presence of risk factors  - HIV Antigen Antibody Combo    9. Anxiety state  Chronic, stable.  Medications renewed x 1 year.  Will consider pre-treating with ativan before MRI (August); and if not able - will appeal to insurance company for possible open MRI vs under sedation.  - traZODone (DESYREL) 50 MG tablet; Take 3 tablets (150 mg) by mouth At Bedtime  Dispense: 90 tablet; Refill: 4  - venlafaxine (EFFEXOR-XR) 150 MG 24 hr capsule; Take 1 capsule (150 mg) by mouth every morning  Dispense: 90 capsule; Refill: 4    Relevant cancer screening discussed.    Counseled on healthy diet, Calcium and vitamin D intake, and exercise.    Ella Baptiste

## 2018-06-12 NOTE — MR AVS SNAPSHOT
After Visit Summary   6/12/2018    Mita Gabriel    MRN: [id number]           Patient Information     Date Of Birth          1955        Visit Information        Provider Department      6/12/2018 9:00 AM Ella Baptiste DO [facility]        Today's Diagnoses     Routine history and physical examination of adult    -  1    Hypothyroidism due to Hashimoto's thyroiditis        Essential hypertension        Mixed hyperlipidemia        Screening for osteoporosis        Chronic neck pain        Encounter for hepatitis C screening test for low risk patient        Screening for HIV without presence of risk factors        Anxiety state          Care Instructions    Preventive Health Recommendations  Female Ages 50 - 64    Yearly exam: See your health care provider every year in order to  o Review health changes.   o Discuss preventive care.    o Review your medicines if your doctor has prescribed any.      Get a Pap test every three years (unless you have an abnormal result and your provider advises testing more often).    If you get Pap tests with HPV test, you only need to test every 5 years, unless you have an abnormal result.     You do not need a Pap test if your uterus was removed (hysterectomy) and you have not had cancer.    You should be tested each year for STDs (sexually transmitted diseases) if you're at risk.     Have a mammogram every 1 to 2 years.    Have a colonoscopy at age 50, or have a yearly FIT test (stool test). These exams screen for colon cancer.      Have a cholesterol test every 5 years, or more often if advised.    Have a diabetes test (fasting glucose) every three years. If you are at risk for diabetes, you should have this test more often.     If you are at risk for osteoporosis (brittle bone disease), think about having a bone density scan (DEXA).    Shots: Get a flu shot each year. Get a tetanus shot every 10 years.    Nutrition:     Eat at least 5  "servings of fruits and vegetables each day.    Eat whole-grain bread, whole-wheat pasta and brown rice instead of white grains and rice.    Talk to your provider about Calcium and Vitamin D.     Lifestyle    Exercise at least 150 minutes a week (30 minutes a day, 5 days a week). This will help you control your weight and prevent disease.    Limit alcohol to one drink per day.    No smoking.     Wear sunscreen to prevent skin cancer.     See your dentist every six months for an exam and cleaning.    See your eye doctor every 1 to 2 years.            Follow-ups after your visit        Future tests that were ordered for you today     Open Future Orders        Priority Expected Expires Ordered    MR Cervical Spine w/o Contrast Routine  6/12/2019 6/12/2018    DX Hip/Pelvis/Spine Routine  6/12/2019 6/12/2018            Who to contact     If you have questions or need follow up information about today's clinic visit or your schedule please contact [facility] directly at [phone].  Normal or non-critical lab and imaging results will be communicated to you by [employer], letter or phone within 4 business days after the clinic has received the results. If you do not hear from us within 7 days, please contact the clinic through [employer] or phone. If you have a critical or abnormal lab result, we will notify you by phone as soon as possible.  Submit refill requests through [employer] or call your pharmacy and they will forward the refill request to us. Please allow 3 business days for your refill to be completed.          Additional Information About Your Visit        [employer] Information     [employer] lets you send messages to your doctor, view your test results, renew your prescriptions, schedule appointments and more. To sign up, go to [email or web address] . Click on \"Log in\" on the left side of the screen, which will take you to the Welcome page. Then click on \"Sign up Now\" on the right side of the page. " "    You will be asked to enter the access code listed below, as well as some personal information. Please follow the directions to create your username and password.     Your access code is: [email or web address]  Expires: 9/10/2018  8:35 AM     Your access code will  in 90 days. If you need help or a new code, please call your [place] clinic or [phone].        Care EveryWhere ID     This is your Care EveryWhere ID. This could be used by other organizations to access your [place] medical records  [id number]        Your Vitals Were     Pulse Height BMI (Body Mass Index)             64 5' 4\" (1.626 m) 28.94 kg/m2          Blood Pressure from Last 3 Encounters:   18 112/78   09/15/17 140/80   17 110/72    Weight from Last 3 Encounters:   18 168 lb 9.6 oz (76.5 kg)   09/15/17 173 lb (78.5 kg)   17 179 lb 3.2 oz (81.3 kg)              We Performed the Following     Comprehensive metabolic panel     Hepatitis C antibody     HIV Antigen Antibody Combo     Lipid Panel     T4, free     TSH          Where to get your medicines      These medications were sent to [id number] - [place], [place] - [address], [facility] [address]     Phone:  [phone]     atorvastatin 20 MG tablet    hydrochlorothiazide 25 MG tablet    levothyroxine 50 MCG tablet    lisinopril 5 MG tablet    metoprolol succinate 50 MG 24 hr tablet    traZODone 50 MG tablet    venlafaxine 150 MG 24 hr capsule          Primary Care Provider Office Phone # Fax #    Ella Baptiste [phone] [address] [facility] [address]        Equal Access to Services     RIAZ MOHAN : Ramirez Ortiz, rafael vitale, qaybta kaalmada shell salcedo. So [facility] [phone].    ATENCIÓN: Si habla español, tiene a guo disposición servicios gratuitos de asistencia lingüística. Llame al " [phone].    We comply with applicable federal civil rights laws and [place] laws. We do not discriminate on the basis of race, color, national origin, age, disability, sex, sexual orientation, or gender identity.            Thank you!     Thank you for choosing [facility]  for your care. Our goal is always to provide you with excellent care. Hearing back from our patients is one way we can continue to improve our services. Please take a few minutes to complete the written survey that you may receive in the mail after your visit with us. Thank you!             Your Updated Medication List - Protect others around you: Learn how to safely use, store and throw away your medicines at www.disposemymeds.org.          This list is accurate as of 6/12/18 10:10 AM.  Always use your most recent med list.                   Brand Name Dispense Instructions for use Diagnosis    aspirin 81 MG EC tablet      Take 81 mg by mouth daily with food        atorvastatin 20 MG tablet    LIPITOR    90 tablet    Take 1 tablet (20 mg) by mouth daily    Mixed hyperlipidemia       cyclobenzaprine 10 MG tablet    FLEXERIL    60 tablet    TAKE 1 TABLET BY MOUTH TWICE DAILY AS NEEDED FOR MUSCLE SPASMS    Back muscle spasm       folic acid 1 MG tablet    FOLVITE     Take 1 mg by mouth daily        gabapentin 300 MG capsule    NEURONTIN    270 capsule    TAKE 1 CAPSULE BY MOUTH THREE TIMES DAILY    Bilateral low back pain with left-sided sciatica, unspecified chronicity       hydrochlorothiazide 25 MG tablet    HYDRODIURIL    90 tablet    Take 1 tablet (25 mg) by mouth daily    Essential hypertension       levothyroxine 50 MCG tablet    SYNTHROID/LEVOTHROID    90 tablet    Take 1 tablet (50 mcg) by mouth every morning (before breakfast)    Hypothyroidism due to Hashimoto's thyroiditis       lisinopril 5 MG tablet    PRINIVIL/ZESTRIL    90 tablet    Take 1 tablet (5 mg) by mouth daily    Essential hypertension        LORazepam 0.5 MG tablet    ATIVAN    90 tablet    TAKE 1 TABLET BY MOUTH THREE TIMES DAILY    Other mixed anxiety disorders       methocarbamol 500 MG tablet    ROBAXIN     Take 500-1,000 mg by mouth 4 times daily        metoprolol succinate 50 MG 24 hr tablet    TOPROL-XL    90 tablet    Take 1 tablet (50 mg) by mouth daily    Essential hypertension       scopolamine 72 hr patch    TRANSDERM     APPLY 1 PATCH EVERY 72 HOURS FOR MOTION SICKNESS        thiamine 100 MG tablet      Take 100 mg by mouth daily        traZODone 50 MG tablet    DESYREL    90 tablet    Take 3 tablets (150 mg) by mouth At Bedtime    Anxiety state       venlafaxine 150 MG 24 hr capsule    EFFEXOR-XR    90 capsule    Take 1 capsule (150 mg) by mouth every morning    Anxiety state

## 2018-06-13 LAB
HCV AB SERPL QL IA: NONREACTIVE
HIV 1+2 AB+HIV1 P24 AG SERPL QL IA: NONREACTIVE

## 2018-06-13 ASSESSMENT — ANXIETY QUESTIONNAIRES: GAD7 TOTAL SCORE: 7

## 2018-06-13 ASSESSMENT — PATIENT HEALTH QUESTIONNAIRE - PHQ9: SUM OF ALL RESPONSES TO PHQ QUESTIONS 1-9: 4

## 2018-06-20 ENCOUNTER — HOSPITAL ENCOUNTER (OUTPATIENT)
Dept: BONE DENSITY | Facility: OTHER | Age: 63
End: 2018-06-20
Attending: FAMILY MEDICINE
Payer: COMMERCIAL

## 2018-06-20 ENCOUNTER — HOSPITAL ENCOUNTER (OUTPATIENT)
Dept: MRI IMAGING | Facility: OTHER | Age: 63
Discharge: HOME | End: 2018-06-20
Attending: FAMILY MEDICINE | Admitting: FAMILY MEDICINE
Payer: COMMERCIAL

## 2018-06-20 DIAGNOSIS — M54.2 CHRONIC NECK PAIN: ICD-10-CM

## 2018-06-20 DIAGNOSIS — G89.29 CHRONIC NECK PAIN: ICD-10-CM

## 2018-06-20 DIAGNOSIS — Z13.820 SCREENING FOR OSTEOPOROSIS: ICD-10-CM

## 2018-06-20 PROCEDURE — 77080 DXA BONE DENSITY AXIAL: CPT

## 2018-06-20 PROCEDURE — 72141 MRI NECK SPINE W/O DYE: CPT

## 2018-07-07 DIAGNOSIS — M62.830 SPASM OF BACK MUSCLES: Primary | ICD-10-CM

## 2018-07-10 RX ORDER — METHOCARBAMOL 500 MG/1
TABLET, FILM COATED ORAL
Qty: 120 TABLET | Refills: 4 | Status: SHIPPED | OUTPATIENT
Start: 2018-07-10 | End: 2019-02-18

## 2018-07-10 NOTE — TELEPHONE ENCOUNTER
PCP is out this week. Will route to teamlet for consideration.   Methocarbamol (ROBAXIN) 500 mg tablet    Last Written Prescription Date:  01/24/2018  Last Fill Quantity: 120,   # refills: 4  Last Office Visit: 06/12/2018-Physical   Future Office visit:       Routing refill request to provider for review/approval because: Drug not on the [facility], [facility] or [facility] refill protocol or controlled substance    Unable to complete prescription refill per RNMedication Refill Policy.................... Karina Greer ....................  7/10/2018   10:57 AM

## 2018-07-23 NOTE — PROGRESS NOTES
Patient Information     Patient Name  Mita Gabriel MRN  [id number] Sex  Female   1955      Letter by Ella Murrieta DO at      Author:  Ella Murrieta DO Service:  (none) Author Type:  (none)    Filed:   Encounter Date:  2017 Status:  (Other)           Mita Gabriel  [address] [facility] [address]          2017    Dear Ms. Gabriel:    This is to remind you that you are due for your annual medication review office visit with  ELLA MURRIETA DO. Your last visit was on 03/15/2016.     Additional refills of your medication require you to complete this visit.    Please call [phone] to schedule your appointment.    Thank you for choosing [facility] for your health care needs.    Sincerely,      Refill RN  [facility]

## 2018-07-23 NOTE — PROGRESS NOTES
Patient Information     Patient Name  Mita Gabriel MRN  [id number] Sex  Female   1955      Letter by Ella Murrieta DO at      Author:  Ella Murrieta DO Service:  (none) Author Type:  (none)    Filed:   Encounter Date:  2017 Status:  (Other)           Mita Gabriel  [address] [facility] [address]          2017    Dear Ms. Gabriel:    A 90 day refill of Gabapentin (NEURONTIN) 300 mg capsule has been called into your pharmacy.    Additional refills require an annual office visit with ELLA MURRIETA DO.   Please call the clinic at [phone] to schedule your appointment.    If you should require additional refills before your scheduled appointment, please contact your pharmacy and we will refill your medication until that date.      Thank you,    The Refill Nurse  [facility]

## 2018-07-23 NOTE — PROGRESS NOTES
Patient Information     Patient Name  Mita Gabriel MRN  [id number] Sex  Female   1955      Letter by Ella Baptiste DO at      Author:  Ella Baptiste DO Service:  (none) Author Type:  (none)    Filed:   Encounter Date:  11/3/2017 Status:  (Other)           Mita Gabriel  [address] [facility] [address]          2017    Dear Ms. Gabriel:    This is to remind you that you are coming due for your 6 month lab only appointment with relation to your diagnosis of hypertension and to support continued use of your antihypertensive medications. Your last visit was on 17. Additional refills of your medication require you to complete this visit.    Please call [phone] to schedule your lab only appointment.    Thank you for choosing [facility] for your health care needs.    Sincerely,      Refill RN  [facility]

## 2018-07-24 NOTE — PROGRESS NOTES
Patient Information     Patient Name  Mita Gabriel MRN  [id number] Sex  Female   1955      Letter by Qamar Ovalles MD at      Author:  Qamar Ovalles MD Service:  (none) Author Type:  (none)    Filed:   Date of Service:   Status:  (Other)       [facility]  [address]  [place] [address]  [phone]         Mita Gabriel   [address] [place] [address]      May 26, 2017  Date of Breast Imagin2017 10:51 AM    Dear Ms. Gabriel:    We are pleased to inform you that the result of your recent breast imaging examination is normal/benign (not cancer).    A report of your results was sent to your health care provider(s).    Your images will become part of your medical file here at [facility] and will be available for your continuing care. You are responsible for informing any new health care provider or breast imaging facility of the date and location of this examination.    Although mammography is the most accurate method for early detection, not all cancers are found through mammography. If you notice any new changes in your breast(s) please inform your health care provider without delay.    Thank you for choosing [facility] to participate in your healthcare needs.         [facility] Recommendations for Early Breast Cancer Detection   in Women without Symptoms  When to start having mammograms to screen for breast cancer, and how often to have them, is a personal decision. It should be based on your preferences, your values and your risk for developing breast cancer. [facility] recommends that you and your health care provider together determine when mammograms are right for you.    [facility] recommends the following guidelines for women who have an average risk for breast cancer, based on American Cancer Society guidelines:    Age 40 to 44: Mammograms  are optional.     Age 45 to 54: Have a mammogram every year.           Age 55 and older: Have a mammogram every year, or transition to having one every 2 years. Continue to have mammograms as long as your health is good.    If you have a higher than average risk for breast cancer, your health care provider may recommend a different schedule.

## 2018-07-24 NOTE — PROGRESS NOTES
Patient Information     Patient Name  Mita Gabriel MRN  [id number] Sex  Female   1955      Letter by Ella Murrieta DO at      Author:  Ella Murrieta DO Service:  (none) Author Type:  (none)    Filed:   Encounter Date:  2017 Status:  (Other)           Mita Gabriel  [address] [facility] [address]          2017    Dear Ms. Gabriel:    Following are the tests completed during your last clinic visit.  The results of these tests are normal and require no further attention unless otherwise noted.    Results for orders placed or performed in visit on 17      BASIC METABOLIC PANEL      Result  Value Ref Range    SODIUM 133 133 - 143 mmol/L    POTASSIUM 4.0 3.5 - 5.1 mmol/L    CHLORIDE 98 98 - 107 mmol/L    CO2,TOTAL 26 21 - 31 mmol/L    ANION GAP 9 5 - 18                    GLUCOSE 96 70 - 105 mg/dL    CALCIUM 9.2 8.6 - 10.3 mg/dL    BUN 11 7 - 25 mg/dL    CREATININE 0.83 0.70 - 1.30 mg/dL    BUN/CREAT RATIO           13                    GFR if African American >60 >60 ml/min/1.73m2    GFR if not African American >60 >60 ml/min/1.73m2       If you have any further questions or problems contact my office at  [phone].    Thank you,    ELLA MURRIETA DO

## 2018-07-24 NOTE — PROGRESS NOTES
Patient Information     Patient Name  Mita Gabriel MRN  [id number] Sex  Female   1955      Letter by Stephany Omalley MD at      Author:  Stephany Omalley MD Service:  (none) Author Type:  (none)    Filed:   Encounter Date:  2017 Status:  (Other)           Mita Gabriel  [address] [facility] [address]          2017    Dear Ms. Gabriel:    This letter is in regards to your colonoscopy that as done by Stephany Omalley MD on 6/10/17.  The polyps removed from your colon were HYPERPLASTIC.  Hyperplastic polyps are not known to be pre-cancerous and are BENIGN.  For that reason, Dr. Stephany Omalley MD recommends a repeat colonoscopy in 10 years unless you have problems.      Dr. Stephany Omalley MD also recommends a high fiber diet. A fiber supplement such as Metamucil, FiberCon, or Citrucel is recommended. Generic forms of these supplements are fine. These are available over the counter.     If you have any questions, please call the Surgery department at [phone].  A copy of this report will be placed in your electronic medical record for your primary care provider's review.    Sincerely,    General Surgery    Reviewed and electronically signed by provider.

## 2018-08-03 DIAGNOSIS — I10 ESSENTIAL HYPERTENSION: ICD-10-CM

## 2018-08-03 DIAGNOSIS — E78.2 MIXED HYPERLIPIDEMIA: ICD-10-CM

## 2018-08-05 DIAGNOSIS — E06.3 HYPOTHYROIDISM DUE TO HASHIMOTO'S THYROIDITIS: ICD-10-CM

## 2018-08-05 DIAGNOSIS — F41.1 ANXIETY STATE: ICD-10-CM

## 2018-08-05 DIAGNOSIS — I10 ESSENTIAL HYPERTENSION: ICD-10-CM

## 2018-08-07 RX ORDER — ATORVASTATIN CALCIUM 20 MG/1
TABLET, FILM COATED ORAL
Qty: 90 TABLET | Refills: 0 | OUTPATIENT
Start: 2018-08-07

## 2018-08-07 RX ORDER — METOPROLOL SUCCINATE 50 MG/1
TABLET, EXTENDED RELEASE ORAL
Qty: 90 TABLET | Refills: 0 | OUTPATIENT
Start: 2018-08-07

## 2018-08-07 NOTE — TELEPHONE ENCOUNTER
Filled 6/12/18 #90 x 4. Due 6/12/19. Pharmacy alerted. Unable to complete prescription refill per RNMedication Refill Policy.................... Karina Greer ....................  8/7/2018   8:23 AM      atorvastatin (LIPITOR) 20 MG tablet 90 tablet 4 6/12/2018  No   Sig - Route: Take 1 tablet (20 mg) by mouth daily - Oral   Class: E-Prescribe   Order: [id number]   E-Prescribing Status: Receipt confirmed by pharmacy (6/12/2018 10:04 AM CDT)       metoprolol succinate (TOPROL-XL) 50 MG 24 hr tablet 90 tablet 4 6/12/2018  No   Sig - Route: Take 1 tablet (50 mg) by mouth daily - Oral   Class: E-Prescribe   Order: [id number]   E-Prescribing Status: Receipt confirmed by pharmacy (6/12/2018 10:04 AM CDT)     Pharmacy   [id number] - [place], [place] - [address]

## 2018-08-08 RX ORDER — VENLAFAXINE HYDROCHLORIDE 150 MG/1
CAPSULE, EXTENDED RELEASE ORAL
Qty: 90 CAPSULE | Refills: 0 | OUTPATIENT
Start: 2018-08-08

## 2018-08-08 RX ORDER — HYDROCHLOROTHIAZIDE 25 MG/1
TABLET ORAL
Qty: 90 TABLET | Refills: 0 | OUTPATIENT
Start: 2018-08-08

## 2018-08-08 RX ORDER — LEVOTHYROXINE SODIUM 50 UG/1
TABLET ORAL
Qty: 90 TABLET | Refills: 0 | OUTPATIENT
Start: 2018-08-08

## 2018-08-08 NOTE — TELEPHONE ENCOUNTER
Filled 6/12/18 #90 x 4. Pharmacy alerted. Unable to complete prescription refill per RNMedication Refill Policy.................... Karina Greer ....................  8/8/2018   7:43 AM      hydrochlorothiazide (HYDRODIURIL) 25 MG tablet 90 tablet 4 6/12/2018  No   Sig - Route: Take 1 tablet (25 mg) by mouth daily - Oral   Class: E-Prescribe   Order: [id number]   E-Prescribing Status: Receipt confirmed by pharmacy (6/12/2018 10:04 AM CDT)   Printout Tracking   External Result Report   Pharmacy   [facility] [address] [place] [address] [place], [place] - [address]       levothyroxine (SYNTHROID/LEVOTHROID) 50 MCG tablet 90 tablet 4 6/12/2018  No   Sig - Route: Take 1 tablet (50 mcg) by mouth every morning (before breakfast) - Oral   Class: E-Prescribe   Order: [id number]   E-Prescribing Status: Receipt confirmed by pharmacy (6/12/2018 10:04 AM CDT)   Printout Tracking   External Result Report   Pharmacy   [id number] - [place], [place] - [address]  [address]     venlafaxine (EFFEXOR-XR) 150 MG 24 hr capsule 90 capsule 4 6/12/2018  No   Sig - Route: Take 1 capsule (150 mg) by mouth every morning - Oral   Class: E-Prescribe   Order: [id number]   E-Prescribing Status: Receipt confirmed by pharmacy (6/12/2018 10:05 AM CDT)   Printout Tracking   External Result Report   Pharmacy   [facility] [id number] - [place], [place] - [address]  [address]

## 2018-10-02 DIAGNOSIS — F41.3 OTHER MIXED ANXIETY DISORDERS: ICD-10-CM

## 2018-10-05 NOTE — TELEPHONE ENCOUNTER
Lorazepam  0.5 mg     Last Written Prescription Date:  4/4/18  Last Fill Quantity: 90,   # refills: 5  Last Office Visit: 6/12/18  Future Office visit:       Routing refill request to provider for review/approval because:  Drug not on the [facility] or [facility] refill protocol or controlled substance    Unable to complete prescription refill per RN Medication Refill Policy.................... Saadia Prieto ....................  10/5/2018   8:46 AM

## 2018-10-09 RX ORDER — LORAZEPAM 0.5 MG/1
TABLET ORAL
Qty: 90 TABLET | Refills: 5 | Status: SHIPPED | OUTPATIENT
Start: 2018-10-09 | End: 2019-04-05

## 2018-10-23 DIAGNOSIS — M62.830 BACK MUSCLE SPASM: ICD-10-CM

## 2018-10-24 DIAGNOSIS — M62.830 BACK MUSCLE SPASM: ICD-10-CM

## 2018-10-25 RX ORDER — CYCLOBENZAPRINE HCL 10 MG
TABLET ORAL
Qty: 60 TABLET | Refills: 5 | Status: SHIPPED | OUTPATIENT
Start: 2018-10-25 | End: 2019-04-11

## 2018-10-25 NOTE — TELEPHONE ENCOUNTER
Cyclobenzaprine (FLEXERIL) 10 MG tablet  Last Written Prescription Date: 05/10/2018  Last Fill Quantity: 60,   # refills: 5  Last Office Visit: 06/12/2018  Future Office visit:       Routing refill request to provider for review/approval because: Drug not on the [facility], P or [facility] refill protocol or controlled substance    Unable to complete prescription refill per RNMedication Refill Policy.................... Karina Greer ....................  10/25/2018   1:54 PM

## 2018-10-26 RX ORDER — CYCLOBENZAPRINE HCL 10 MG
TABLET ORAL
Qty: 60 TABLET | Refills: 0 | OUTPATIENT
Start: 2018-10-26

## 2018-10-26 NOTE — TELEPHONE ENCOUNTER
Filled 10/25/18 #60 x 5. Pharmacy alerted. Unable to complete prescription refill per RNMedication Refill Policy.................... Karina Greer ....................  10/26/2018   10:29 AM      cyclobenzaprine (FLEXERIL) 10 MG tablet 60 tablet 5 10/25/2018  No   Sig: TAKE 1 TABLET BY MOUTH TWICE DAILY AS NEEDED FOR MUSCLE SPASMS   Class: E-Prescribe   Order: [id number]   E-Prescribing Status: Receipt confirmed by pharmacy (10/25/2018  2:03 PM CDT)   Printout Tracking   External Result Report   Pharmacy   [id number] - [place], [place] - [address]  [address]

## 2018-11-23 DIAGNOSIS — M62.830 BACK MUSCLE SPASM: ICD-10-CM

## 2018-11-23 RX ORDER — CYCLOBENZAPRINE HCL 10 MG
TABLET ORAL
Qty: 60 TABLET | Refills: 0 | OUTPATIENT
Start: 2018-11-23

## 2018-11-23 NOTE — TELEPHONE ENCOUNTER
Filled 10/25/18 #60 x 5. Pharmacy alerted. Unable to complete prescription refill per RNMedication Refill Policy.................... Karina Greer ....................  11/23/2018   4:08 PM    cyclobenzaprine (FLEXERIL) 10 MG tablet 60 tablet 5 10/25/2018  No   Sig: TAKE 1 TABLET BY MOUTH TWICE DAILY AS NEEDED FOR MUSCLE SPASMS   Class: E-Prescribe   Order: [id number]   E-Prescribing Status: Receipt confirmed by pharmacy (10/25/2018  2:03 PM CDT)   Printout Tracking   External Result Report   Pharmacy   [id number] - [place], [place] - [address]  [address]

## 2018-12-02 DIAGNOSIS — F41.1 ANXIETY STATE: ICD-10-CM

## 2018-12-04 RX ORDER — TRAZODONE HYDROCHLORIDE 50 MG/1
TABLET, FILM COATED ORAL
Qty: 270 TABLET | Refills: 1 | Status: SHIPPED | OUTPATIENT
Start: 2018-12-04 | End: 2019-06-03

## 2018-12-04 NOTE — TELEPHONE ENCOUNTER
"Requested Prescriptions   Pending Prescriptions Disp Refills     traZODone (DESYREL) 50 MG tablet [Pharmacy Med Name: TRAZODONE 50MG TABLETS] 270 tablet 0     Sig: TAKE 3 TABLETS BY MOUTH AT BEDTIME    Serotonin Modulators Passed    12/2/2018 12:59 PM       Passed - Recent (12 mo) or future (30 days) visit within the authorizing provider's specialty    Patient had office visit in the last 12 months or has a visit in the next 30 days with authorizing provider or within the authorizing provider's specialty.  See \"Patient Info\" tab in inbasket, or \"Choose Columns\" in Meds & Orders section of the refill encounter.             Passed - Patient is age 18 or older       Passed - No active pregnancy on record       Passed - No positive pregnancy test in past 12 months        LOV-06/12/2018  Prescription refilled per RN Medication RefillPolicy.................... Karina Greer ....................  12/4/2018   11:07 AM        "

## 2018-12-29 DIAGNOSIS — M54.42 BILATERAL LOW BACK PAIN WITH LEFT-SIDED SCIATICA, UNSPECIFIED CHRONICITY: ICD-10-CM

## 2018-12-31 DIAGNOSIS — M54.42 BILATERAL LOW BACK PAIN WITH LEFT-SIDED SCIATICA, UNSPECIFIED CHRONICITY: ICD-10-CM

## 2019-01-03 RX ORDER — GABAPENTIN 300 MG/1
CAPSULE ORAL
Qty: 270 CAPSULE | Refills: 1 | Status: SHIPPED | OUTPATIENT
Start: 2019-01-03 | End: 2019-06-21

## 2019-01-03 RX ORDER — GABAPENTIN 300 MG/1
CAPSULE ORAL
Qty: 270 CAPSULE | Refills: 0 | OUTPATIENT
Start: 2019-01-03

## 2019-01-03 NOTE — TELEPHONE ENCOUNTER
"Filled 1/3/19 #270 x 1 to [facility] in [place], [place]. Contacted [facility] in [place] and spoke with Birdie who states,\" [facility] in  is having a problem with obtaining e -prescribed medications and corporate is working on this. Can I get a verbal\"? Verbal given to and read back by Birdie.       01/03/19 1121 Sign Ella Baptiste, DO Reorder from Order: [id number]       gabapentin (NEURONTIN) 300 MG capsule 270 capsule 1 1/3/2019  No   Sig: TAKE 1 CAPSULE BY MOUTH THREE TIMES DAILY   Sent to pharmacy as: gabapentin (NEURONTIN) 300 MG capsule   Class: E-Prescribe   Order: [id number]   E-Prescribing Status: Receipt confirmed by pharmacy (1/3/2019 11:21 AM CST)   Printout Tracking     External Result Report   Pharmacy     [facility] [id number] - [place], [place] - [address]       "

## 2019-01-03 NOTE — TELEPHONE ENCOUNTER
Gabapentin      Last Written Prescription Date:  04/04/2018  Last Fill Quantity: 270,   # refills: 2  Last Office Visit: 06/12/2018  Future Office visit:       Routing refill request to provider for review/approval because: Drug not on the [facility], P or [facility] refill protocol or controlled substance    Unable to complete prescription refill per RNMedication Refill Policy.................... Karina Greer ....................  1/3/2019   8:07 AM

## 2019-02-18 DIAGNOSIS — M62.830 SPASM OF BACK MUSCLES: ICD-10-CM

## 2019-02-21 RX ORDER — METHOCARBAMOL 500 MG/1
TABLET, FILM COATED ORAL
Qty: 120 TABLET | Refills: 3 | Status: SHIPPED | OUTPATIENT
Start: 2019-02-21 | End: 2019-07-28

## 2019-02-21 NOTE — TELEPHONE ENCOUNTER
Methocarbamol (ROBAXIN) 500 MG tablet  Last Written Prescription Date: 07/10/2018  Last Fill Quantity: 120,   # refills: 4  Last Office Visit: 06/12/2018- Physical   Future Office visit:       Routing refill request to provider for review/approval because: Drug not on the [facility], P or [facility] refill protocol or controlled substance    Unable to complete prescription refill per RNMedication Refill Policy.................... Karina Greer ....................  2/21/2019   7:54 AM

## 2019-04-05 DIAGNOSIS — F41.3 OTHER MIXED ANXIETY DISORDERS: ICD-10-CM

## 2019-04-09 RX ORDER — LORAZEPAM 0.5 MG/1
TABLET ORAL
Qty: 90 TABLET | Refills: 5 | Status: SHIPPED | OUTPATIENT
Start: 2019-04-09 | End: 2019-10-30

## 2019-04-09 NOTE — TELEPHONE ENCOUNTER
LORazepam (ATIVAN) 0.5 MG tablet  Last Written Prescription Date: 10/09/2018  Last Fill Quantity: 90,   # refills: 5  Last Office Visit: 06/12/2018-physical   Future Office visit:    Next 5 appointments (look out 90 days)    Jun 12, 2019  9:00 AM CDT  PHYSICAL with Ella Baptiste DO  [facility] ([facility] [address]  [place] [address]  [phone]           Routing refill request to provider for review/approval because: Drug not on the [facility], [facility] or [facility] refill protocol or controlled substance    Unable to complete prescription refill per RNMedication Refill Policy.................... Karina Greer ....................  4/9/2019   11:20 AM

## 2019-04-10 ENCOUNTER — TELEPHONE (OUTPATIENT)
Dept: FAMILY MEDICINE | Facility: OTHER | Age: 64
End: 2019-04-10

## 2019-04-10 DIAGNOSIS — M62.830 BACK MUSCLE SPASM: ICD-10-CM

## 2019-04-11 RX ORDER — CYCLOBENZAPRINE HCL 10 MG
TABLET ORAL
Qty: 60 TABLET | Refills: 5 | Status: SHIPPED | OUTPATIENT
Start: 2019-04-11 | End: 2019-10-03

## 2019-04-11 NOTE — TELEPHONE ENCOUNTER
Attempted to call patient.  No answer and voicemail box was full.  Noreen Polo LPN 4/11/2019   8:39 AM

## 2019-04-11 NOTE — TELEPHONE ENCOUNTER
Spoke to patient.  She stated that she would like her Ativan sent to the [facility] in [facility].  Patient also states that she needs a refill on her cyclobenzaprine which [facility].   Noreen Polo LPN 4/11/2019   11:13 AM

## 2019-04-11 NOTE — TELEPHONE ENCOUNTER
Informed Mita that I just refaxed prescription to [facility] in [facility].  Kady Claudio............................... 4/11/2019 2:00 PM

## 2019-04-12 NOTE — TELEPHONE ENCOUNTER
"Per fax from [facility] in [place]- We have not received Rx. for Ativan. Per Pharmacist Adria, \" I can see the [facility] in [place], [place] received this but we can't transfer this Rx from them. We need a verbal order\".  Verbal order given to and read back by Adria Pharmacist. Karina Greer RN on 4/12/2019 at 1:58 PM      04/09/19 1447 Sign Ella Baptiste, DO Reorder from Order: [id number]   Outpatient Medication Detail      Disp Refills Start End NAOMI   LORazepam (ATIVAN) 0.5 MG tablet 90 tablet 5 4/9/2019  No   Sig: TAKE 1 TABLET BY MOUTH THREE TIMES DAILY   Class: Local Print   Order: [id number]   Printout Tracking     External Result Report   Pharmacy     [facility] [address] [place] [address] - [address]       "

## 2019-04-13 DIAGNOSIS — M62.830 BACK MUSCLE SPASM: ICD-10-CM

## 2019-04-16 RX ORDER — CYCLOBENZAPRINE HCL 10 MG
TABLET ORAL
Qty: 60 TABLET | Refills: 0 | OUTPATIENT
Start: 2019-04-16

## 2019-04-16 NOTE — TELEPHONE ENCOUNTER
Filled 04/11/2019 #60 x 5. Pharmacy alerted. Unable to complete prescription refill per RNMedication Refill Policy.................... Karina Greer ....................  4/16/2019   10:35 AM      cyclobenzaprine (FLEXERIL) 10 MG tablet 60 tablet 5 4/11/2019  No   Sig: TAKE 1 TABLET BY MOUTH TWICE DAILY AS NEEDED FOR MUSCLE SPASMS   Sent to pharmacy as: cyclobenzaprine (FLEXERIL) 10 MG tablet   Class: E-Prescribe   Order: [id number]   E-Prescribing Status: Receipt confirmed by pharmacy (4/11/2019  1:46 PM CDT)   Printout Tracking     External Result Report   Medication Administration Instructions     TAKE 1 TABLET BY MOUTH TWICE DAILY AS NEEDED FOR MUSCLE SPASMS   Pharmacy     [id number] - [place], [place] - [address] AT [facility] [address]

## 2019-04-25 ENCOUNTER — DOCUMENTATION ONLY (OUTPATIENT)
Dept: OTHER | Facility: CLINIC | Age: 64
End: 2019-04-25

## 2019-05-16 ENCOUNTER — HEALTH MAINTENANCE LETTER (OUTPATIENT)
Age: 64
End: 2019-05-16

## 2019-06-03 DIAGNOSIS — F41.1 ANXIETY STATE: ICD-10-CM

## 2019-06-05 RX ORDER — TRAZODONE HYDROCHLORIDE 50 MG/1
TABLET, FILM COATED ORAL
Qty: 21 TABLET | Refills: 0 | Status: SHIPPED | OUTPATIENT
Start: 2019-06-05 | End: 2019-06-21

## 2019-06-05 NOTE — TELEPHONE ENCOUNTER
Refill Request for: TraZODone (DESYREL) 50 MG tablet   Received From: [facility]  Last Written Prescription Date: 12/04/18 for 270 tablets and 1 refills  LOV: 06/12/18 with PCP  Next Appointment: 06/12/19 with PCP  Protocol: Serotonin Modulators Passed - 6/5 10:16 AM    Prescription approved per [facility] Refill Protocol. Limited supply refill for 7 days with no additional refills. Patient has office visit with PCP in one week. Patient to discuss long term refills at that time.       Noreen Moore on 6/5/2019 at 10:20 AM

## 2019-06-14 ENCOUNTER — HOSPITAL ENCOUNTER (OUTPATIENT)
Dept: MAMMOGRAPHY | Facility: OTHER | Age: 64
Discharge: HOME | End: 2019-06-14
Attending: FAMILY MEDICINE | Admitting: FAMILY MEDICINE
Payer: COMMERCIAL

## 2019-06-14 DIAGNOSIS — Z12.31 VISIT FOR SCREENING MAMMOGRAM: ICD-10-CM

## 2019-06-14 PROCEDURE — 77063 BREAST TOMOSYNTHESIS BI: CPT

## 2019-06-21 ENCOUNTER — OFFICE VISIT (OUTPATIENT)
Dept: FAMILY MEDICINE | Facility: OTHER | Age: 64
End: 2019-06-21
Attending: FAMILY MEDICINE
Payer: COMMERCIAL

## 2019-06-21 VITALS
WEIGHT: 175 LBS | HEART RATE: 72 BPM | HEIGHT: 64 IN | DIASTOLIC BLOOD PRESSURE: 78 MMHG | SYSTOLIC BLOOD PRESSURE: 124 MMHG | BODY MASS INDEX: 29.88 KG/M2 | TEMPERATURE: 97.2 F | RESPIRATION RATE: 16 BRPM

## 2019-06-21 DIAGNOSIS — E78.2 MIXED HYPERLIPIDEMIA: ICD-10-CM

## 2019-06-21 DIAGNOSIS — I10 ESSENTIAL HYPERTENSION: ICD-10-CM

## 2019-06-21 DIAGNOSIS — I25.2 H/O NON-ST ELEVATION MYOCARDIAL INFARCTION (NSTEMI): ICD-10-CM

## 2019-06-21 DIAGNOSIS — E06.3 HYPOTHYROIDISM DUE TO HASHIMOTO'S THYROIDITIS: ICD-10-CM

## 2019-06-21 DIAGNOSIS — G47.00 INSOMNIA, UNSPECIFIED TYPE: ICD-10-CM

## 2019-06-21 DIAGNOSIS — F41.1 ANXIETY STATE: ICD-10-CM

## 2019-06-21 DIAGNOSIS — E03.9 HYPOTHYROIDISM, UNSPECIFIED TYPE: ICD-10-CM

## 2019-06-21 DIAGNOSIS — M54.42 BILATERAL LOW BACK PAIN WITH LEFT-SIDED SCIATICA, UNSPECIFIED CHRONICITY: ICD-10-CM

## 2019-06-21 DIAGNOSIS — Z00.00 ROUTINE HISTORY AND PHYSICAL EXAMINATION OF ADULT: Primary | ICD-10-CM

## 2019-06-21 LAB
ANION GAP SERPL CALCULATED.3IONS-SCNC: 7 MMOL/L (ref 3–14)
BUN SERPL-MCNC: 15 MG/DL (ref 7–25)
CALCIUM SERPL-MCNC: 9.4 MG/DL (ref 8.6–10.3)
CHLORIDE SERPL-SCNC: 94 MMOL/L (ref 98–107)
CHOLEST SERPL-MCNC: 110 MG/DL
CO2 SERPL-SCNC: 31 MMOL/L (ref 21–31)
CREAT SERPL-MCNC: 0.86 MG/DL (ref 0.6–1.2)
GFR SERPL CREATININE-BSD FRML MDRD: 67 ML/MIN/{1.73_M2}
GLUCOSE SERPL-MCNC: 86 MG/DL (ref 70–105)
HDLC SERPL-MCNC: 38 MG/DL (ref 23–92)
LDLC SERPL CALC-MCNC: 54 MG/DL
NONHDLC SERPL-MCNC: 72 MG/DL
POTASSIUM SERPL-SCNC: 4 MMOL/L (ref 3.5–5.1)
SODIUM SERPL-SCNC: 132 MMOL/L (ref 134–144)
T4 FREE SERPL-MCNC: 0.92 NG/DL (ref 0.6–1.6)
TRIGL SERPL-MCNC: 91 MG/DL
TSH SERPL DL<=0.05 MIU/L-ACNC: 0.69 IU/ML (ref 0.34–5.6)

## 2019-06-21 PROCEDURE — 80048 BASIC METABOLIC PNL TOTAL CA: CPT | Mod: ZL | Performed by: FAMILY MEDICINE

## 2019-06-21 PROCEDURE — 84439 ASSAY OF FREE THYROXINE: CPT | Mod: ZL | Performed by: FAMILY MEDICINE

## 2019-06-21 PROCEDURE — 80061 LIPID PANEL: CPT | Mod: ZL | Performed by: FAMILY MEDICINE

## 2019-06-21 PROCEDURE — 99396 PREV VISIT EST AGE 40-64: CPT | Performed by: FAMILY MEDICINE

## 2019-06-21 PROCEDURE — 36415 COLL VENOUS BLD VENIPUNCTURE: CPT | Mod: ZL | Performed by: FAMILY MEDICINE

## 2019-06-21 PROCEDURE — 84443 ASSAY THYROID STIM HORMONE: CPT | Mod: ZL | Performed by: FAMILY MEDICINE

## 2019-06-21 RX ORDER — LEVOTHYROXINE SODIUM 50 UG/1
50 TABLET ORAL
Qty: 90 TABLET | Refills: 4 | Status: SHIPPED | OUTPATIENT
Start: 2019-06-21 | End: 2020-07-24

## 2019-06-21 RX ORDER — HYDROCHLOROTHIAZIDE 25 MG/1
25 TABLET ORAL DAILY
Qty: 90 TABLET | Refills: 4 | Status: SHIPPED | OUTPATIENT
Start: 2019-06-21 | End: 2020-07-24

## 2019-06-21 RX ORDER — GABAPENTIN 300 MG/1
CAPSULE ORAL
Qty: 270 CAPSULE | Refills: 4 | Status: SHIPPED | OUTPATIENT
Start: 2019-06-21 | End: 2020-07-24

## 2019-06-21 RX ORDER — METOPROLOL SUCCINATE 50 MG/1
50 TABLET, EXTENDED RELEASE ORAL DAILY
Qty: 90 TABLET | Refills: 4 | Status: SHIPPED | OUTPATIENT
Start: 2019-06-21 | End: 2020-07-24

## 2019-06-21 RX ORDER — LISINOPRIL 5 MG/1
5 TABLET ORAL DAILY
Qty: 90 TABLET | Refills: 4 | Status: SHIPPED | OUTPATIENT
Start: 2019-06-21 | End: 2020-07-24

## 2019-06-21 RX ORDER — TRAZODONE HYDROCHLORIDE 50 MG/1
TABLET, FILM COATED ORAL
Qty: 270 TABLET | Refills: 4 | Status: SHIPPED | OUTPATIENT
Start: 2019-06-21 | End: 2020-07-24

## 2019-06-21 RX ORDER — ZOLPIDEM TARTRATE 6.25 MG/1
6.25 TABLET, FILM COATED, EXTENDED RELEASE ORAL
Qty: 30 TABLET | Refills: 5 | Status: SHIPPED | OUTPATIENT
Start: 2019-06-21 | End: 2020-01-03

## 2019-06-21 RX ORDER — ATORVASTATIN CALCIUM 20 MG/1
20 TABLET, FILM COATED ORAL DAILY
Qty: 90 TABLET | Refills: 4 | Status: SHIPPED | OUTPATIENT
Start: 2019-06-21 | End: 2020-07-24

## 2019-06-21 RX ORDER — VENLAFAXINE HYDROCHLORIDE 150 MG/1
150 CAPSULE, EXTENDED RELEASE ORAL EVERY MORNING
Qty: 90 CAPSULE | Refills: 4 | Status: SHIPPED | OUTPATIENT
Start: 2019-06-21 | End: 2020-07-24

## 2019-06-21 ASSESSMENT — ANXIETY QUESTIONNAIRES
5. BEING SO RESTLESS THAT IT IS HARD TO SIT STILL: MORE THAN HALF THE DAYS
IF YOU CHECKED OFF ANY PROBLEMS ON THIS QUESTIONNAIRE, HOW DIFFICULT HAVE THESE PROBLEMS MADE IT FOR YOU TO DO YOUR WORK, TAKE CARE OF THINGS AT HOME, OR GET ALONG WITH OTHER PEOPLE: SOMEWHAT DIFFICULT
2. NOT BEING ABLE TO STOP OR CONTROL WORRYING: MORE THAN HALF THE DAYS
GAD7 TOTAL SCORE: 11
7. FEELING AFRAID AS IF SOMETHING AWFUL MIGHT HAPPEN: NOT AT ALL
6. BECOMING EASILY ANNOYED OR IRRITABLE: SEVERAL DAYS
3. WORRYING TOO MUCH ABOUT DIFFERENT THINGS: MORE THAN HALF THE DAYS
1. FEELING NERVOUS, ANXIOUS, OR ON EDGE: MORE THAN HALF THE DAYS

## 2019-06-21 ASSESSMENT — PATIENT HEALTH QUESTIONNAIRE - PHQ9
5. POOR APPETITE OR OVEREATING: MORE THAN HALF THE DAYS
SUM OF ALL RESPONSES TO PHQ QUESTIONS 1-9: 5

## 2019-06-21 ASSESSMENT — MIFFLIN-ST. JEOR: SCORE: 1333.79

## 2019-06-21 ASSESSMENT — PAIN SCALES - GENERAL: PAINLEVEL: MODERATE PAIN (5)

## 2019-06-21 NOTE — NURSING NOTE
Patient presents to the clinic today for a px.   Medication Reconciliation: complete    Mary Ann Gay LPN.................. 6/21/2019 10:48 AM    Blood in stool

## 2019-06-21 NOTE — PROGRESS NOTES
Nursing Notes:   Mary Ann Gay LPN  6/21/2019 11:15 AM  Signed  Patient presents to the clinic today for a px.   Medication Reconciliation: complete    Mary Ann Gay LPN.................. 6/21/2019 10:48 AM       ANNUAL PHYSICAL - FEMALE    HPI: Mita presents for a yearly exam.  Concerns include:  1. Needs medications refilled and monitoring labs done   A) Hypothyroidism.  No new symptoms.  Remains on 50mcg daily.   B) HTN.  Denies any CP, SOB, diaphoresis.  Eats out a lot, and some processed/convenience foods at home due to  having MS.  Cooks a lot though too.   C) Anxiety.  Increased stressors recently.  Mom sicker and moved into [facility] in [place], [place].  Dealing with siblings' opinions and family dynamics that changes with stress.  2. Interested in discussing her bunions after turning 65.  3. Increased back/neck pains.  Would like to get updated MRI of lumbar spine to consider injections, referral, next step in management.    No LMP recorded. Patient is postmenopausal.   Contraception: NA  Risk for STI?: No  Last pap: 3/15/2016; negative Co-testing.  Due 3/15/2021.  Any hx of abnormal paps:  No  FH ofearly CA?: No  Cholesterol/DM concerns/screening: Due  Tobacco?: Never  Calcium intake: in MTV.  DEXA: 6/20/2018: osteopenia.  Frax @ hip 0.5%; Frax for major 7.5%.  Continue Ca/Vitamin D3 and weight bearing exercises. Due next year.  Last mammo: 6/14/2019, normal with fibroglandular changes.  Colonoscopy: 6/9/2017; normal.  10 year follow up.  Immunizations: Tdap 1/16/2019; has had Zostavax, will discuss shingrix.  Receives yearly flu vaccines.  Completed pneumonia vaccines previously - will repeat after age 65.    Patient Active Problem List   Diagnosis     History of alcohol abuse     Anxiety state     Osteoarthritis of spine     Senile cataract     Dysthymic disorder     H/O non-ST elevation myocardial infarction (NSTEMI)     Hypertension     Hypothyroidism     Insomnia     Lumbago      Obesity     Motion sickness     Abdominal pain     Special screening for malignant neoplasms, colon     Mixed hyperlipidemia     Past Medical History:   Diagnosis Date     Anxiety disorder      Closed fracture of left carpal bone      Essential (primary) hypertension      Hyperlipidemia      Hypothyroidism      Major depressive disorder, single episode      Postoperative nausea and vomiting      Transfusion history     with delivery of son     Past Surgical History:   Procedure Laterality Date     CATARACT IOL, RT/LT Bilateral       SECTION       COLONOSCOPY N/A 2017    Follow up      OPEN REDUCTION INTERNAL FIXATION WRIST      treating a fracture     THYROIDECTOMY       Partial     Social History     Socioeconomic History     Marital status:      Spouse name: Not on file     Number of children: Not on file     Years of education: Not on file     Highest education level: Not on file   Occupational History     Not on file   Social Needs     Financial resource strain: Not on file     Food insecurity:     Worry: Not on file     Inability: Not on file     Transportation needs:     Medical: Not on file     Non-medical: Not on file   Tobacco Use     Smoking status: Never Smoker     Smokeless tobacco: Never Used   Substance and Sexual Activity     Alcohol use: No     Alcohol/week: 0.0 oz     Drug use: No     Comment: Drug use: No     Sexual activity: Not on file   Lifestyle     Physical activity:     Days per week: Not on file     Minutes per session: Not on file     Stress: Not on file   Relationships     Social connections:     Talks on phone: Not on file     Gets together: Not on file     Attends Quaker service: Not on file     Active member of club or organization: Not on file     Attends meetings of clubs or organizations: Not on file     Relationship status: Not on file     Intimate partner violence:     Fear of current or ex partner: Not on file     Emotionally abused: Not on file      Physically abused: Not on file     Forced sexual activity: Not on file   Other Topics Concern     Parent/sibling w/ CABG, MI or angioplasty before 65F 55M? Not Asked   Social History Narrative     with one grown son, two grand children (older granddaughter with brain tumor - she plays hockey, and not intervening with tumor unless things change)  Goes south to [place] for the winter.  Used to golf, but  unable now due to having MS.   was a  but does not fly due to MS  Patient has never smoked.   Regular Exercise - no     Family History   Problem Relation Age of Onset     No Known Problems Mother      Genitourinary Problems Father         Genitourinary Disease,kidney disease     No Known Problems Sister      No Known Problems Sister      No Known Problems Brother      No Known Problems Brother      No Known Problems Brother         mild cognitive delay     Cancer Maternal Grandmother         Bladder     Other - See Comments Paternal Grandmother 70        Stroke     Cancer Maternal Grandfather         Throat and lung     Other - See Comments Other         Family history of depression and anxiety     Anesthesia Reaction No family hx of         Anesthesia Problem,Notes no prior complications from anesthesia.     Breast Cancer No family hx of         Cancer-breast       Current Outpatient Medications   Medication Sig Dispense Refill     aspirin EC 81 MG EC tablet Take 81 mg by mouth daily with food       atorvastatin (LIPITOR) 20 MG tablet Take 1 tablet (20 mg) by mouth daily 90 tablet 4     cyclobenzaprine (FLEXERIL) 10 MG tablet TAKE 1 TABLET BY MOUTH TWICE DAILY AS NEEDED FOR MUSCLE SPASMS 60 tablet 5     folic acid (FOLVITE) 1 MG tablet Take 1 mg by mouth daily       gabapentin (NEURONTIN) 300 MG capsule TAKE 1 CAPSULE BY MOUTH THREE TIMES DAILY 270 capsule 4     hydrochlorothiazide (HYDRODIURIL) 25 MG tablet Take 1 tablet (25 mg) by mouth daily 90 tablet 4     levothyroxine (SYNTHROID/LEVOTHROID)  "50 MCG tablet Take 1 tablet (50 mcg) by mouth every morning (before breakfast) 90 tablet 4     lisinopril (PRINIVIL/ZESTRIL) 5 MG tablet Take 1 tablet (5 mg) by mouth daily 90 tablet 4     LORazepam (ATIVAN) 0.5 MG tablet TAKE 1 TABLET BY MOUTH THREE TIMES DAILY 90 tablet 5     methocarbamol (ROBAXIN) 500 MG tablet TAKE 1 TO 2 TABLETS BY MOUTH FOUR TIMES DAILY 120 tablet 3     metoprolol succinate ER (TOPROL-XL) 50 MG 24 hr tablet Take 1 tablet (50 mg) by mouth daily 90 tablet 4     scopolamine (TRANSDERM) 72 hr patch APPLY 1 PATCH EVERY 72 HOURS FOR MOTION SICKNESS       thiamine 100 MG tablet Take 100 mg by mouth daily       traZODone (DESYREL) 50 MG tablet TAKE 3 TABLETS BY MOUTH AT BEDTIME 270 tablet 4     venlafaxine (EFFEXOR-XR) 150 MG 24 hr capsule Take 1 capsule (150 mg) by mouth every morning 90 capsule 4     zolpidem ER (AMBIEN CR) 6.25 MG CR tablet Take 1 tablet (6.25 mg) by mouth nightly as needed for sleep 30 tablet 5      REVIEW OF SYSTEMS:  Refer to HPI; all other systems reviewed and negative.    PHYSICAL EXAM:  /78   Pulse 72   Temp 97.2  F (36.2  C) (Tympanic)   Resp 16   Ht 1.626 m (5' 4\")   Wt 79.4 kg (175 lb)   Breastfeeding? No   BMI 30.04 kg/m    CONSTITUTIONAL:  Alert, cooperative, NAD.  EYES: No scleral icterus.  PERRLA.  Conjunctiva clear.  ENT/MOUTH: External ears and nose normal.  TMs normal.  Moist mucous membranes. Oropharynx clear.    ENDO: No thyromegaly or thyroid nodules.  LYMPH:  No cervical or supraclavicular LA.    BREASTS: No skin abnormalities, no erythema.  No discrete masses.  No nipple discharge, no axillary, supra- or infraclavicular LA.   CARDIOVASCULAR: Regular, S1, S2.  No S3 or S4.  No murmur/gallop/rub.  No peripheral edema.  RESPIRATORY: CTA bilaterally, no wheezes, rhonchi or rales.  GI: Bowel sounds wnl.  Soft, nontender, non-distended.  No masses or HSM.  No rebound or guarding.  : Declines  Pap smear obtained: No  MSKEL: Grossly normal ROM.  No " clubbing.  INTEGUMENTARY:Warm, dry.  No rash noted on exposed skin.  NEUROLOGIC: Facies symmetric.  Grossly normal movement and tone.  No tremor.  PSYCHIATRIC: Affect normal.  Speech fluent.      PHQ-9 SCORE 5/25/2017 6/12/2018 6/21/2019   PHQ-9 Total Score 6 4 5     KIAH-7 SCORE 5/25/2017 6/12/2018 6/21/2019   Total Score 1 7 11           Results for orders placed or performed in visit on 06/21/19   T4, Free   Result Value Ref Range    T4 Free 0.92 0.60 - 1.60 ng/dL   TSH   Result Value Ref Range    Thyrotropin 0.69 0.34 - 5.60 IU/mL   Lipid Panel   Result Value Ref Range    Cholesterol 110 <200 mg/dL    Triglycerides 91 <150 mg/dL    HDL Cholesterol 38 23 - 92 mg/dL    LDL Cholesterol Calculated 54 <100 mg/dL    Non HDL Cholesterol 72 <130 mg/dL   Basic Metabolic Panel   Result Value Ref Range    Sodium 132 (L) 134 - 144 mmol/L    Potassium 4.0 3.5 - 5.1 mmol/L    Chloride 94 (L) 98 - 107 mmol/L    Carbon Dioxide 31 21 - 31 mmol/L    Anion Gap 7 3 - 14 mmol/L    Glucose 86 70 - 105 mg/dL    Urea Nitrogen 15 7 - 25 mg/dL    Creatinine 0.86 0.60 - 1.20 mg/dL    GFR Estimate 67 >60 mL/min/[1.73_m2]    GFR Estimate If Black 81 >60 mL/min/[1.73_m2]    Calcium 9.4 8.6 - 10.3 mg/dL       ASSESSMENT/PLAN:  1. Routine history and physical examination of adult    2. Hypothyroidism, unspecified type  Chronic, stable.  Refilled current dose of levothyroxine x 1 year.  - TSH; Future  - T4, Free; Future  - T4, Free  - TSH    3. Essential hypertension  Monitoring labs obtained.  Stable today - refill hydrochlorothiazide, lisinopril, Toprol XL.  - Basic Metabolic Panel; Future  - hydrochlorothiazide (HYDRODIURIL) 25 MG tablet; Take 1 tablet (25 mg) by mouth daily  Dispense: 90 tablet; Refill: 4  - lisinopril (PRINIVIL/ZESTRIL) 5 MG tablet; Take 1 tablet (5 mg) by mouth daily  Dispense: 90 tablet; Refill: 4  - metoprolol succinate ER (TOPROL-XL) 50 MG 24 hr tablet; Take 1 tablet (50 mg) by mouth daily  Dispense: 90 tablet; Refill:  4  - Basic Metabolic Panel    4. Mixed hyperlipidemia  Lipid panel collected today.  With history of NSTEMI - will be on statin for life.  Refilled at 20mg dose.  - Lipid Panel; Future  - atorvastatin (LIPITOR) 20 MG tablet; Take 1 tablet (20 mg) by mouth daily  Dispense: 90 tablet; Refill: 4  - Lipid Panel    5. Anxiety state  Trazodone not helping with sleep - will slowly transition to Ambien.  Decrease trazodone to 100mg for a week; then 50mg for a week while starting ambien, and then stop trazodone.  Written instructions provided.  Ok to continue Effexor at current dose.  - traZODone (DESYREL) 50 MG tablet; TAKE 3 TABLETS BY MOUTH AT BEDTIME  Dispense: 270 tablet; Refill: 4  - venlafaxine (EFFEXOR-XR) 150 MG 24 hr capsule; Take 1 capsule (150 mg) by mouth every morning  Dispense: 90 capsule; Refill: 4    6. H/O non-ST elevation myocardial infarction (NSTEMI)  Stable; no new symptoms.    7. Bilateral low back pain with left-sided sciatica, unspecified chronicity  Chronic, waxes and wanes with activity.  Continue gabapentin at current dose.  MRI lumbar spine ordered - discussed injections, possible PMR vs spine specialty referral.  For now continued with accupuncture.  - gabapentin (NEURONTIN) 300 MG capsule; TAKE 1 CAPSULE BY MOUTH THREE TIMES DAILY  Dispense: 270 capsule; Refill: 4  - MR Lumbar Spine w/o Contrast; Future    8. Hypothyroidism due to Hashimoto's thyroiditis  See above.  - levothyroxine (SYNTHROID/LEVOTHROID) 50 MCG tablet; Take 1 tablet (50 mcg) by mouth every morning (before breakfast)  Dispense: 90 tablet; Refill: 4    9. Insomnia, unspecified type  See #5.  Related to anxiety.  - zolpidem ER (AMBIEN CR) 6.25 MG CR tablet; Take 1 tablet (6.25 mg) by mouth nightly as needed for sleep  Dispense: 30 tablet; Refill: 5      Relevant cancer screening discussed.    Counseled on healthy diet, Calcium and vitamin D intake, and exercise.    Ella Baptiste, DO  Family Practice

## 2019-06-21 NOTE — PATIENT INSTRUCTIONS
Transition to Ambien:  Start taking a decreased amount of Trazodone - 100mg (two tablets) at bedtime for one week.  Then decrease to 50mg (1 tablet) at bedtime for one week AND start Ambien CR 6.25mg.  In two weeks; stop Trazodone.

## 2019-06-22 ASSESSMENT — ANXIETY QUESTIONNAIRES: GAD7 TOTAL SCORE: 11

## 2019-06-28 DIAGNOSIS — M54.42 BILATERAL LOW BACK PAIN WITH LEFT-SIDED SCIATICA, UNSPECIFIED CHRONICITY: ICD-10-CM

## 2019-07-03 RX ORDER — GABAPENTIN 300 MG/1
CAPSULE ORAL
Qty: 270 CAPSULE | Refills: 0 | OUTPATIENT
Start: 2019-07-03

## 2019-07-03 NOTE — TELEPHONE ENCOUNTER
RX was refilled 06/21/19.  Called pharmacy and verified they received RX from 06/21/19.  They confirmed they have refills, so will refuse RX as duplicate.  Prescription approved per [facility] Refill Protocol.

## 2019-07-22 DIAGNOSIS — F41.1 ANXIETY STATE: ICD-10-CM

## 2019-07-22 DIAGNOSIS — I10 ESSENTIAL HYPERTENSION: ICD-10-CM

## 2019-07-24 RX ORDER — METOPROLOL SUCCINATE 50 MG/1
TABLET, EXTENDED RELEASE ORAL
Qty: 90 TABLET | Refills: 0 | OUTPATIENT
Start: 2019-07-24

## 2019-07-24 RX ORDER — VENLAFAXINE HYDROCHLORIDE 150 MG/1
CAPSULE, EXTENDED RELEASE ORAL
Qty: 90 CAPSULE | Refills: 0 | OUTPATIENT
Start: 2019-07-24

## 2019-07-24 RX ORDER — LISINOPRIL 5 MG/1
TABLET ORAL
Qty: 90 TABLET | Refills: 0 | OUTPATIENT
Start: 2019-07-24

## 2019-07-24 RX ORDER — HYDROCHLOROTHIAZIDE 25 MG/1
TABLET ORAL
Qty: 90 TABLET | Refills: 0 | OUTPATIENT
Start: 2019-07-24

## 2019-07-27 DIAGNOSIS — E06.3 HYPOTHYROIDISM DUE TO HASHIMOTO'S THYROIDITIS: ICD-10-CM

## 2019-07-28 DIAGNOSIS — M62.830 SPASM OF BACK MUSCLES: ICD-10-CM

## 2019-07-29 DIAGNOSIS — M62.830 SPASM OF BACK MUSCLES: ICD-10-CM

## 2019-07-29 RX ORDER — LEVOTHYROXINE SODIUM 50 UG/1
TABLET ORAL
Qty: 90 TABLET | Refills: 0 | OUTPATIENT
Start: 2019-07-29

## 2019-07-29 NOTE — TELEPHONE ENCOUNTER
Call placed to pharmacy requesting with clarification that medication is in [place]. Freya Paez RN on 7/29/2019 at 3:06 PM

## 2019-07-31 NOTE — TELEPHONE ENCOUNTER
Routing refill request to provider for review/approval because:  Drug not on the [facility] refill protocol     Filled on 2-21-19 for #120 X 3 refills. LOV 6-21-19. Kelle Kumar RN on 7/31/2019 at 4:24 PM

## 2019-08-01 RX ORDER — METHOCARBAMOL 500 MG/1
TABLET, FILM COATED ORAL
Qty: 120 TABLET | Refills: 0 | OUTPATIENT
Start: 2019-08-01

## 2019-08-01 RX ORDER — METHOCARBAMOL 500 MG/1
TABLET, FILM COATED ORAL
Qty: 120 TABLET | Refills: 3 | Status: SHIPPED | OUTPATIENT
Start: 2019-08-01 | End: 2019-10-30

## 2019-08-01 NOTE — TELEPHONE ENCOUNTER
Filled 08/01/19 #120 x 3. Pharmacy alerted. Unable to complete prescription refill per RNMedication Refill Policy.................... Karina Greer ....................  8/1/2019   9:53 AM    methocarbamol (ROBAXIN) 500 MG tablet 120 tablet 3 8/1/2019  No   Sig: TAKE 1 TO 2 TABLETS BY MOUTH FOUR TIMES DAILY   Sent to pharmacy as: methocarbamol (ROBAXIN) 500 MG tablet   Class: E-Prescribe   Order: [id number]   E-Prescribing Status: Receipt confirmed by pharmacy (8/1/2019  4:42 AM CDT)   Printout Tracking     External Result Report   Pharmacy     [id number] - [place], [place] - [address]

## 2019-09-05 ENCOUNTER — TELEPHONE (OUTPATIENT)
Dept: FAMILY MEDICINE | Facility: OTHER | Age: 64
End: 2019-09-05

## 2019-09-05 DIAGNOSIS — M21.611 BUNION, RIGHT: ICD-10-CM

## 2019-09-05 DIAGNOSIS — M21.612 BUNION, LEFT: Primary | ICD-10-CM

## 2019-09-05 NOTE — TELEPHONE ENCOUNTER
SMS-Patient called and stated last time she was seen she discussed her bunions and a possible referral. She is ready to see someone for these to discuss surgery. Please advise patient when referral is placed.   Mian Smith on 9/5/2019 at 12:24 PM

## 2019-09-06 NOTE — TELEPHONE ENCOUNTER
Left message with Mita's  stating referral has been placed.  Kady Claudio............................... 9/6/2019 11:03 AM

## 2019-09-06 NOTE — TELEPHONE ENCOUNTER
Referral placed; Dr. Irving @ [facility] unless she desires a different location.  Ella Baptiste, DO

## 2019-09-16 DIAGNOSIS — G47.00 INSOMNIA, UNSPECIFIED TYPE: ICD-10-CM

## 2019-09-19 RX ORDER — ZOLPIDEM TARTRATE 6.25 MG/1
TABLET, FILM COATED, EXTENDED RELEASE ORAL
Qty: 30 TABLET | Refills: 0 | OUTPATIENT
Start: 2019-09-19

## 2019-09-19 NOTE — TELEPHONE ENCOUNTER
Zolpidem refilled on 6/21/19 #30 x 5 refills to [facility]  Pharmacy notified and they have refills  Clarisse Oscar RN on 9/19/2019 at 1:44 PM

## 2019-10-30 DIAGNOSIS — M62.830 SPASM OF BACK MUSCLES: ICD-10-CM

## 2019-10-30 DIAGNOSIS — F41.3 OTHER MIXED ANXIETY DISORDERS: ICD-10-CM

## 2019-11-04 NOTE — TELEPHONE ENCOUNTER
LORazepam (ATIVAN) 0.5 MG tablet  Last Written Prescription Date: 04/09/2019  Last Fill Quantity: 90,   # refills: 5    Methocarbamol (ROBAXIN) 500 MG   Last Written Prescription Date: 08/01/2019  Last Fill Quantity: 120,   # refills: 3  Last Office Visit: 06/21/2019  Future Office visit:       Routing refill request to provider for review/approval because:  Drug not on the [facility], [facility] or [facility] refill protocol or controlled substance.     Unable to complete prescription refill per RNMedication Refill Policy.................... Karina Greer RN ....................  11/4/2019   1:02 PM

## 2019-11-05 RX ORDER — METHOCARBAMOL 500 MG/1
TABLET, FILM COATED ORAL
Qty: 120 TABLET | Refills: 5 | Status: SHIPPED | OUTPATIENT
Start: 2019-11-05 | End: 2020-12-02

## 2019-11-05 RX ORDER — LORAZEPAM 0.5 MG/1
TABLET ORAL
Qty: 90 TABLET | Refills: 5 | Status: SHIPPED | OUTPATIENT
Start: 2019-11-05 | End: 2020-04-28

## 2020-01-02 DIAGNOSIS — G47.00 INSOMNIA, UNSPECIFIED TYPE: ICD-10-CM

## 2020-01-03 ENCOUNTER — TELEPHONE (OUTPATIENT)
Dept: FAMILY MEDICINE | Facility: OTHER | Age: 65
End: 2020-01-03

## 2020-01-03 RX ORDER — ZOLPIDEM TARTRATE 6.25 MG/1
TABLET, FILM COATED, EXTENDED RELEASE ORAL
Qty: 30 TABLET | Refills: 5 | Status: SHIPPED | OUTPATIENT
Start: 2020-01-03 | End: 2020-06-23

## 2020-01-03 NOTE — TELEPHONE ENCOUNTER
You denied Ambien refill and she was wondering why?  Does she need an office visit.  Norma J. Gosselin, LPN .......  1/3/2020  11:41 AM

## 2020-01-03 NOTE — TELEPHONE ENCOUNTER
Zolpidem ER (AMBIEN CR) 6.25 MG CR tablet    Last Written Prescription Date: 06/21/2019  Last Fill Quantity: 30,   # refills: 5  Last Office Visit: 06/21/2019  Future Office visit:       Routing refill request to provider for review/approval because:  Drug not on the [facility], [facility] or [facility] refill protocol or controlled substance.       Unable to complete prescription refill per RNMedication Refill Policy.................... Karina Greer RN ....................  1/3/2020   10:21 AM

## 2020-03-01 DIAGNOSIS — M62.830 SPASM OF BACK MUSCLES: ICD-10-CM

## 2020-03-04 RX ORDER — METHOCARBAMOL 500 MG/1
TABLET, FILM COATED ORAL
Qty: 120 TABLET | Refills: 5 | OUTPATIENT
Start: 2020-03-04

## 2020-03-04 NOTE — TELEPHONE ENCOUNTER
Methocarbamol refilled on 11/5/19 #120 x 5 refills [facility].   [facility] requesting.  Called and spoke with Iglesia at [facility] in [place] and they do not see refill from 11/5/2019 #120 x 5 refills.  Verbal given to Iglesia and they will get ready for patient.  Clarisse Oscar RN on 3/4/2020 at 10:24 AM

## 2020-03-11 ENCOUNTER — HEALTH MAINTENANCE LETTER (OUTPATIENT)
Age: 65
End: 2020-03-11

## 2020-03-28 DIAGNOSIS — M62.830 BACK MUSCLE SPASM: ICD-10-CM

## 2020-03-30 RX ORDER — CYCLOBENZAPRINE HCL 10 MG
TABLET ORAL
Qty: 60 TABLET | Refills: 0 | Status: SHIPPED | OUTPATIENT
Start: 2020-03-30 | End: 2020-04-28

## 2020-04-27 DIAGNOSIS — F41.3 OTHER MIXED ANXIETY DISORDERS: ICD-10-CM

## 2020-04-27 DIAGNOSIS — M62.830 BACK MUSCLE SPASM: ICD-10-CM

## 2020-04-27 NOTE — TELEPHONE ENCOUNTER
CYCLOBENZAPRINE 10MG TABLETS   Last Written Prescription Date:  3/30/2020  Last Fill Quantity: 60,   # refills: 0  Last Office Visit: 6/21/2019  Future Office visit:    Next 5 appointments (look out 90 days)    May 08, 2020  2:00 PM CDT  Pre-Op physical with Ella Baptiste, [facility] [address]  [place] [address]  [phone]   Jul 24, 2020  9:00 AM CDT  PHYSICAL with Ella Baptiste, [facility] [address]  [place] [address]  [phone]           LORAZEPAM 0.5MG TABLETS   Last Written Prescription Date:  90  Last Fill Quantity: 5,   # refills: 11/5/2019    Routing refill request to provider for review/approval because:  Drug not on the [facility], [facility] or [facility] refill protocol or controlled substance. Will forward to provider for consideration.  Unable to complete prescription refill per RNMedication Refill Policy.................... Freya Paez RN ....................  4/27/2020   3:20 PM

## 2020-04-28 RX ORDER — CYCLOBENZAPRINE HCL 10 MG
TABLET ORAL
Qty: 60 TABLET | Refills: 2 | Status: SHIPPED | OUTPATIENT
Start: 2020-04-28 | End: 2020-07-24

## 2020-04-28 RX ORDER — LORAZEPAM 0.5 MG/1
TABLET ORAL
Qty: 90 TABLET | Refills: 2 | Status: SHIPPED | OUTPATIENT
Start: 2020-04-28 | End: 2020-07-24

## 2020-06-23 DIAGNOSIS — G47.00 INSOMNIA, UNSPECIFIED TYPE: ICD-10-CM

## 2020-06-23 DIAGNOSIS — T75.3XXD MOTION SICKNESS, SUBSEQUENT ENCOUNTER: ICD-10-CM

## 2020-06-23 DIAGNOSIS — F41.1 ANXIETY STATE: Primary | ICD-10-CM

## 2020-06-23 RX ORDER — ZOLPIDEM TARTRATE 6.25 MG/1
TABLET, FILM COATED, EXTENDED RELEASE ORAL
Qty: 30 TABLET | Refills: 5 | Status: SHIPPED | OUTPATIENT
Start: 2020-06-23 | End: 2020-12-15

## 2020-06-23 RX ORDER — SCOLOPAMINE TRANSDERMAL SYSTEM 1 MG/1
PATCH, EXTENDED RELEASE TRANSDERMAL
Qty: 30 PATCH | Refills: 1 | Status: SHIPPED | OUTPATIENT
Start: 2020-06-23

## 2020-06-23 NOTE — TELEPHONE ENCOUNTER
Talked with patient and she is currently in [place] and will be flying back here in a few weeks. She would like a prescription for scopolamine patches as she gets them every few years and they last for her.  She also is requesting a refill on her zolpidem to [facility] down there.   Kandice Suazo, MECHELLE, LPN  6/23/2020  1:06 PM

## 2020-06-24 RX ORDER — ZOLPIDEM TARTRATE 6.25 MG/1
TABLET, FILM COATED, EXTENDED RELEASE ORAL
Qty: 30 TABLET | OUTPATIENT
Start: 2020-06-24

## 2020-06-24 NOTE — TELEPHONE ENCOUNTER
Zolpidem refilled on 6/23/2020 #30 x 5 refills to [facility].  Clarisse Oscar RN on 6/24/2020 at 9:18 AM

## 2020-07-24 ENCOUNTER — E-VISIT (OUTPATIENT)
Dept: FAMILY MEDICINE | Facility: OTHER | Age: 65
End: 2020-07-24

## 2020-07-24 ENCOUNTER — VIRTUAL VISIT (OUTPATIENT)
Dept: FAMILY MEDICINE | Facility: OTHER | Age: 65
End: 2020-07-24
Attending: FAMILY MEDICINE
Payer: COMMERCIAL

## 2020-07-24 DIAGNOSIS — M62.830 BACK MUSCLE SPASM: ICD-10-CM

## 2020-07-24 DIAGNOSIS — E06.3 HYPOTHYROIDISM DUE TO HASHIMOTO'S THYROIDITIS: ICD-10-CM

## 2020-07-24 DIAGNOSIS — M19.072 OSTEOARTHRITIS OF JOINTS OF TOES OF BOTH FEET: Primary | ICD-10-CM

## 2020-07-24 DIAGNOSIS — E78.2 MIXED HYPERLIPIDEMIA: ICD-10-CM

## 2020-07-24 DIAGNOSIS — Z53.9 ERRONEOUS ENCOUNTER--DISREGARD: Primary | ICD-10-CM

## 2020-07-24 DIAGNOSIS — M54.42 BILATERAL LOW BACK PAIN WITH LEFT-SIDED SCIATICA, UNSPECIFIED CHRONICITY: ICD-10-CM

## 2020-07-24 DIAGNOSIS — G47.00 INSOMNIA, UNSPECIFIED TYPE: ICD-10-CM

## 2020-07-24 DIAGNOSIS — M19.071 OSTEOARTHRITIS OF JOINTS OF TOES OF BOTH FEET: Primary | ICD-10-CM

## 2020-07-24 DIAGNOSIS — F41.1 ANXIETY STATE: ICD-10-CM

## 2020-07-24 DIAGNOSIS — F41.3 OTHER MIXED ANXIETY DISORDERS: ICD-10-CM

## 2020-07-24 DIAGNOSIS — I10 ESSENTIAL HYPERTENSION: ICD-10-CM

## 2020-07-24 PROCEDURE — 99214 OFFICE O/P EST MOD 30 MIN: CPT | Mod: 95 | Performed by: FAMILY MEDICINE

## 2020-07-24 RX ORDER — METOPROLOL SUCCINATE 50 MG/1
50 TABLET, EXTENDED RELEASE ORAL DAILY
Qty: 90 TABLET | Refills: 4 | Status: SHIPPED | OUTPATIENT
Start: 2020-07-24 | End: 2021-06-29

## 2020-07-24 RX ORDER — LEVOTHYROXINE SODIUM 50 UG/1
50 TABLET ORAL
Qty: 90 TABLET | Refills: 4 | Status: SHIPPED | OUTPATIENT
Start: 2020-07-24 | End: 2021-06-29

## 2020-07-24 RX ORDER — GABAPENTIN 300 MG/1
CAPSULE ORAL
Qty: 270 CAPSULE | Refills: 4 | Status: SHIPPED | OUTPATIENT
Start: 2020-07-24 | End: 2021-06-29

## 2020-07-24 RX ORDER — TRAZODONE HYDROCHLORIDE 50 MG/1
TABLET, FILM COATED ORAL
Qty: 180 TABLET | Refills: 4 | Status: SHIPPED | OUTPATIENT
Start: 2020-07-24 | End: 2021-06-29

## 2020-07-24 RX ORDER — ATORVASTATIN CALCIUM 20 MG/1
20 TABLET, FILM COATED ORAL DAILY
Qty: 90 TABLET | Refills: 4 | Status: SHIPPED | OUTPATIENT
Start: 2020-07-24 | End: 2021-06-29

## 2020-07-24 RX ORDER — LORAZEPAM 0.5 MG/1
TABLET ORAL
Qty: 90 TABLET | Refills: 5 | Status: SHIPPED | OUTPATIENT
Start: 2020-07-24 | End: 2021-01-12

## 2020-07-24 RX ORDER — LISINOPRIL 5 MG/1
5 TABLET ORAL DAILY
Qty: 90 TABLET | Refills: 4 | Status: SHIPPED | OUTPATIENT
Start: 2020-07-24 | End: 2021-06-29

## 2020-07-24 RX ORDER — CYCLOBENZAPRINE HCL 10 MG
TABLET ORAL
Qty: 60 TABLET | Refills: 5 | Status: SHIPPED | OUTPATIENT
Start: 2020-07-24 | End: 2021-01-12

## 2020-07-24 RX ORDER — HYDROCHLOROTHIAZIDE 25 MG/1
25 TABLET ORAL DAILY
Qty: 90 TABLET | Refills: 4 | Status: SHIPPED | OUTPATIENT
Start: 2020-07-24 | End: 2020-10-02

## 2020-07-24 RX ORDER — VENLAFAXINE HYDROCHLORIDE 150 MG/1
150 CAPSULE, EXTENDED RELEASE ORAL EVERY MORNING
Qty: 90 CAPSULE | Refills: 4 | Status: SHIPPED | OUTPATIENT
Start: 2020-07-24 | End: 2021-06-29

## 2020-07-24 ASSESSMENT — ANXIETY QUESTIONNAIRES
6. BECOMING EASILY ANNOYED OR IRRITABLE: NEARLY EVERY DAY
2. NOT BEING ABLE TO STOP OR CONTROL WORRYING: SEVERAL DAYS
IF YOU CHECKED OFF ANY PROBLEMS ON THIS QUESTIONNAIRE, HOW DIFFICULT HAVE THESE PROBLEMS MADE IT FOR YOU TO DO YOUR WORK, TAKE CARE OF THINGS AT HOME, OR GET ALONG WITH OTHER PEOPLE: VERY DIFFICULT
1. FEELING NERVOUS, ANXIOUS, OR ON EDGE: SEVERAL DAYS
5. BEING SO RESTLESS THAT IT IS HARD TO SIT STILL: NEARLY EVERY DAY
3. WORRYING TOO MUCH ABOUT DIFFERENT THINGS: NOT AT ALL
7. FEELING AFRAID AS IF SOMETHING AWFUL MIGHT HAPPEN: NOT AT ALL
GAD7 TOTAL SCORE: 9

## 2020-07-24 ASSESSMENT — PAIN SCALES - GENERAL: PAINLEVEL: SEVERE PAIN (7)

## 2020-07-24 ASSESSMENT — PATIENT HEALTH QUESTIONNAIRE - PHQ9
SUM OF ALL RESPONSES TO PHQ QUESTIONS 1-9: 9
5. POOR APPETITE OR OVEREATING: SEVERAL DAYS

## 2020-07-24 NOTE — NURSING NOTE
"Chief Complaint   Patient presents with     Recheck Medication       Initial There were no vitals taken for this visit. Estimated body mass index is 30.04 kg/m  as calculated from the following:    Height as of 6/21/19: 1.626 m (5' 4\").    Weight as of 6/21/19: 79.4 kg (175 lb).  Medication Reconciliation: complete    Kandice Suazo LPN  "

## 2020-07-24 NOTE — PROGRESS NOTES
"Mita Gabriel is a 64 year old female who is being evaluated via a billable video visit.      The patient has been notified of following:     \"This video visit will be conducted via a call between you and your physician/provider. We have found that certain health care needs can be provided without the need for an in-person physical exam.  This service lets us provide the care you need with a video conversation.  If a prescription is necessary we can send it directly to your pharmacy.  If lab work is needed we can place an order for that and you can then stop by our lab to have the test done at a later time.    Video visits are billed at different rates depending on your insurance coverage.  Please reach out to your insurance provider with any questions.  If during the course of the call the physician/provider feels a video visit is not appropriate, you will not be charged for this service.\"  Patient has given verbal consent for Video visit? Yes  How would you like to obtain your AVS? MyChart  If you are dropped from the video visit, the video invite should be resent to: Text to cell phone: [phone]  Will anyone else be joining your video visit? No     Subjective   Mita Gabriel is a 64 year old female who presents today via video visit for the following health issues:    HPI    Mita is back from [place]; and having a phone visit to review medications.    Mother passed away last year; had some sibling stressor/family issues after her death.  Believes this is more of a power struggle than actually about the money.  Events with this are continuing; and she doesn't know when they will settle down.  Mita's stress was high because of this and feeling in the middle.  She is also taking care of her handicapped brother, who eventually moved to an assisted living facility in [place].    They just returned from [place] one week ago; and is still in quarantine for 2 weeks.  Chartered a jet back so they could avoid the airport " and public.  Has some questions/concerns about Covid-19 and the safety of visiting people, wearing masks and being in public.    Surgery was scheduled for May on her toes/bunion/OA (involving a bone graft and joint reconstruction).  Has had cortisone injections into both feet but felt like that worsened the pain.  Dr. Irving left [facility] this month; therefore doesn't know what the next step is at this time.  Considering a surgery (lapiplasty 3D) with surgeon in [place], [place].  Dr. Barajas (Podiatry).    Sleep: taking Ambien CR at bedtime and normally can sleep ~3 hours.  Then to get back to sleep, she usually takes 1-2 tablets of trazodone and can get another ~3 hours of sleep.  Feels like her brain doesn't shut off.    Low back pain.  Continues.  Planning to get MRI in Sept when she is on Medicare, as her insurance didn't cover one earlier.    Needs to be on Shingrix wait list.  Will add her today for dose #2.      Patient Active Problem List   Diagnosis     History of alcohol abuse     Anxiety state     Osteoarthritis of spine     Senile cataract     Dysthymic disorder     H/O non-ST elevation myocardial infarction (NSTEMI)     Hypertension     Hypothyroidism     Insomnia     Lumbago     Obesity     Motion sickness     Abdominal pain     Special screening for malignant neoplasms, colon     Mixed hyperlipidemia     Past Surgical History:   Procedure Laterality Date     CATARACT IOL, RT/LT Bilateral       SECTION       COLONOSCOPY N/A 2017    Follow up   hyperplastic     OPEN REDUCTION INTERNAL FIXATION WRIST      treating a fracture     THYROIDECTOMY       Partial       Social History     Tobacco Use     Smoking status: Never Smoker     Smokeless tobacco: Never Used   Substance Use Topics     Alcohol use: No     Alcohol/week: 0.0 standard drinks     Family History   Problem Relation Age of Onset     No Known Problems Mother      Genitourinary Problems Father         Genitourinary  Disease,kidney disease     No Known Problems Sister      No Known Problems Sister      No Known Problems Brother      No Known Problems Brother      No Known Problems Brother         mild cognitive delay     Cancer Maternal Grandmother         Bladder     Other - See Comments Paternal Grandmother 70        Stroke     Cancer Maternal Grandfather         Throat and lung     Other - See Comments Other         Family history of depression and anxiety     Anesthesia Reaction No family hx of         Anesthesia Problem,Notes no prior complications from anesthesia.     Breast Cancer No family hx of         Cancer-breast         Reviewed and updated as needed this visit by Provider  Tobacco  Allergies  Meds  Problems  Med Hx  Surg Hx  Fam Hx       Review of Systems   CONSTITUTIONAL: NEGATIVE for fever, chills, change in weight  ENT/MOUTH: NEGATIVE for ear, mouth and throat problems  RESP: NEGATIVE for significant cough or SOB  CV: NEGATIVE for chest pain, palpitations or peripheral edema      Objective    Vitals - Patient Reported  Pain Score: Severe Pain (7)  Pain Loc: Low Back    Physical Exam  Vitals signs and nursing note reviewed.        GENERAL: Healthy, alert and no distress.  Tremulous voice noted.  LUNG: No audible wheezing; able to speak in full sentences without distress.  Psych: somewhat tangential in thought; but clear and concise in discussion of each topic we covered.    Diagnostic Test Results:  none           1. Other mixed anxiety disorders  Chronic, stable on Lorazepam TID  - LORazepam (ATIVAN) 0.5 MG tablet; TAKE 1 TABLET BY MOUTH THREE TIMES DAILY  Dispense: 90 tablet; Refill: 5  - CBC and Differential; Future    2. Back muscle spasm  Worsening; likely DDD lumbar.  Was planning MRI for possible steroid injection but this was not covered by insurance.  Therefore will plan to obtain in Sept 2020.  - cyclobenzaprine (FLEXERIL) 10 MG tablet; TAKE 1 TABLET BY MOUTH TWICE DAILY AS NEEDED FOR MUSCLE  SPASMS  Dispense: 60 tablet; Refill: 5    3. Insomnia, unspecified type  Chronic, stable on Ambien and trazodone.  - CBC and Differential; Future    4. Mixed hyperlipidemia  And s/p NSTEMI.  Rx for lipitor renewed x 1 year at current dose.  - atorvastatin (LIPITOR) 20 MG tablet; Take 1 tablet (20 mg) by mouth daily  Dispense: 90 tablet; Refill: 4  - Lipid Panel; Future    5. Bilateral low back pain with left-sided sciatica, unspecified chronicity  Chronic, worsening.  MRI planned in Sept 2020.  - gabapentin (NEURONTIN) 300 MG capsule; TAKE 1 CAPSULE BY MOUTH THREE TIMES DAILY  Dispense: 270 capsule; Refill: 4    6. Essential hypertension  Chronic, unknown state.  Will need to have BP checked in the office within the next year.  For now, continue on hydrochlorothiazide, lisinopril, metoprolol at current doses.  These were all refilled x 1 year.  - hydrochlorothiazide (HYDRODIURIL) 25 MG tablet; Take 1 tablet (25 mg) by mouth daily  Dispense: 90 tablet; Refill: 4  - lisinopril (ZESTRIL) 5 MG tablet; Take 1 tablet (5 mg) by mouth daily  Dispense: 90 tablet; Refill: 4  - metoprolol succinate ER (TOPROL-XL) 50 MG 24 hr tablet; Take 1 tablet (50 mg) by mouth daily  Dispense: 90 tablet; Refill: 4  - Comprehensive Metabolic Panel; Future    7. Hypothyroidism due to Hashimoto's thyroiditis  Due for monitoring labs; will order for future.  Once she has completed her quarantine from travel - consider presenting to clinic for lab only appointment.  - levothyroxine (SYNTHROID/LEVOTHROID) 50 MCG tablet; Take 1 tablet (50 mcg) by mouth every morning (before breakfast)  Dispense: 90 tablet; Refill: 4  - TSH; Future  - T4, Free; Future    8. Anxiety state  Chronic, stable on current effexor/ativan dosing.  If worsens; will refer to psychiatry.  - venlafaxine (EFFEXOR-XR) 150 MG 24 hr capsule; Take 1 capsule (150 mg) by mouth every morning  Dispense: 90 capsule; Refill: 4  - traZODone (DESYREL) 50 MG tablet; TAKE 1-2 TABLETS BY MOUTH  AT BEDTIME  Dispense: 180 tablet; Refill: 4  - CBC and Differential; Future    9. Osteoarthritis of joints of toes of both feet  Needing new referral as she cant' follow up with Dr. Irving/[facility] any longer due to no one being available.  Will wait until Sept when she is on Medicare.  Has her cards now, but unable to use them until 9/1/2020.  Will refer to podiatry in [place], [place]; per her request.  - [facility]  Referral; Future        Video-Visit Details  Type of service:  Video Visit transitioned to phone visit due to inability to connect.  Start: 9:32 AM  Stop: 10:02 AM  30 minutes    Originating Location (pt. Location): Home  Distant Location (provider location):  [facility]   Platform used for Video Visit: Other: NA; unable    No follow-ups on file.       Ella Baptiste, DO   Family Practice

## 2020-07-25 ASSESSMENT — ANXIETY QUESTIONNAIRES: GAD7 TOTAL SCORE: 9

## 2020-07-26 NOTE — TELEPHONE ENCOUNTER
Provider E-Visit time total (minutes): 0.  Had appointment today.  Ella Baptiste,  on 7/26/2020 at 9:32 AM   This encounter was opened in error. Please disregard.

## 2020-09-08 DIAGNOSIS — G47.00 INSOMNIA, UNSPECIFIED TYPE: ICD-10-CM

## 2020-09-08 DIAGNOSIS — F41.3 OTHER MIXED ANXIETY DISORDERS: ICD-10-CM

## 2020-09-08 DIAGNOSIS — E06.3 HYPOTHYROIDISM DUE TO HASHIMOTO'S THYROIDITIS: ICD-10-CM

## 2020-09-08 DIAGNOSIS — E78.2 MIXED HYPERLIPIDEMIA: ICD-10-CM

## 2020-09-08 DIAGNOSIS — I10 ESSENTIAL HYPERTENSION: ICD-10-CM

## 2020-09-08 DIAGNOSIS — F41.1 ANXIETY STATE: ICD-10-CM

## 2020-09-08 LAB
ALBUMIN SERPL-MCNC: 4.5 G/DL (ref 3.5–5.7)
ALP SERPL-CCNC: 67 U/L (ref 34–104)
ALT SERPL W P-5'-P-CCNC: 11 U/L (ref 7–52)
ANION GAP SERPL CALCULATED.3IONS-SCNC: 7 MMOL/L (ref 3–14)
AST SERPL W P-5'-P-CCNC: 17 U/L (ref 13–39)
BASOPHILS # BLD AUTO: 0 10E9/L (ref 0–0.2)
BASOPHILS NFR BLD AUTO: 0.5 %
BILIRUB SERPL-MCNC: 0.8 MG/DL (ref 0.3–1)
BUN SERPL-MCNC: 13 MG/DL (ref 7–25)
CALCIUM SERPL-MCNC: 9.5 MG/DL (ref 8.6–10.3)
CHLORIDE SERPL-SCNC: 91 MMOL/L (ref 98–107)
CHOLEST SERPL-MCNC: 127 MG/DL
CO2 SERPL-SCNC: 30 MMOL/L (ref 21–31)
CREAT SERPL-MCNC: 0.81 MG/DL (ref 0.6–1.2)
DIFFERENTIAL METHOD BLD: NORMAL
EOSINOPHIL # BLD AUTO: 0.1 10E9/L (ref 0–0.7)
EOSINOPHIL NFR BLD AUTO: 1.8 %
ERYTHROCYTE [DISTWIDTH] IN BLOOD BY AUTOMATED COUNT: 12.4 % (ref 10–15)
GFR SERPL CREATININE-BSD FRML MDRD: 71 ML/MIN/{1.73_M2}
GLUCOSE SERPL-MCNC: 98 MG/DL (ref 70–105)
HCT VFR BLD AUTO: 39.2 % (ref 35–47)
HDLC SERPL-MCNC: 43 MG/DL (ref 23–92)
HGB BLD-MCNC: 13.1 G/DL (ref 11.7–15.7)
IMM GRANULOCYTES # BLD: 0 10E9/L (ref 0–0.4)
IMM GRANULOCYTES NFR BLD: 0.5 %
LDLC SERPL CALC-MCNC: 64 MG/DL
LYMPHOCYTES # BLD AUTO: 1.5 10E9/L (ref 0.8–5.3)
LYMPHOCYTES NFR BLD AUTO: 23.2 %
MCH RBC QN AUTO: 28.7 PG (ref 26.5–33)
MCHC RBC AUTO-ENTMCNC: 33.4 G/DL (ref 31.5–36.5)
MCV RBC AUTO: 86 FL (ref 78–100)
MONOCYTES # BLD AUTO: 0.5 10E9/L (ref 0–1.3)
MONOCYTES NFR BLD AUTO: 7.3 %
NEUTROPHILS # BLD AUTO: 4.4 10E9/L (ref 1.6–8.3)
NEUTROPHILS NFR BLD AUTO: 66.7 %
NONHDLC SERPL-MCNC: 84 MG/DL
PLATELET # BLD AUTO: 254 10E9/L (ref 150–450)
POTASSIUM SERPL-SCNC: 3.9 MMOL/L (ref 3.5–5.1)
PROT SERPL-MCNC: 7.4 G/DL (ref 6.4–8.9)
RBC # BLD AUTO: 4.57 10E12/L (ref 3.8–5.2)
SODIUM SERPL-SCNC: 128 MMOL/L (ref 134–144)
T4 FREE SERPL-MCNC: 0.95 NG/DL (ref 0.6–1.6)
TRIGL SERPL-MCNC: 102 MG/DL
TSH SERPL DL<=0.05 MIU/L-ACNC: 1.08 IU/ML (ref 0.34–5.6)
WBC # BLD AUTO: 6.6 10E9/L (ref 4–11)

## 2020-09-08 PROCEDURE — 80053 COMPREHEN METABOLIC PANEL: CPT | Mod: ZL | Performed by: FAMILY MEDICINE

## 2020-09-08 PROCEDURE — 85025 COMPLETE CBC W/AUTO DIFF WBC: CPT | Mod: ZL | Performed by: FAMILY MEDICINE

## 2020-09-08 PROCEDURE — 36415 COLL VENOUS BLD VENIPUNCTURE: CPT | Mod: ZL | Performed by: FAMILY MEDICINE

## 2020-09-08 PROCEDURE — 84443 ASSAY THYROID STIM HORMONE: CPT | Mod: ZL | Performed by: FAMILY MEDICINE

## 2020-09-08 PROCEDURE — 84439 ASSAY OF FREE THYROXINE: CPT | Mod: ZL | Performed by: FAMILY MEDICINE

## 2020-09-08 PROCEDURE — 80061 LIPID PANEL: CPT | Mod: ZL | Performed by: FAMILY MEDICINE

## 2020-10-02 ENCOUNTER — OFFICE VISIT (OUTPATIENT)
Dept: FAMILY MEDICINE | Facility: OTHER | Age: 65
End: 2020-10-02
Attending: FAMILY MEDICINE
Payer: MEDICARE

## 2020-10-02 VITALS
WEIGHT: 187.38 LBS | HEART RATE: 84 BPM | TEMPERATURE: 97.4 F | SYSTOLIC BLOOD PRESSURE: 126 MMHG | BODY MASS INDEX: 32.16 KG/M2 | DIASTOLIC BLOOD PRESSURE: 80 MMHG | RESPIRATION RATE: 16 BRPM

## 2020-10-02 DIAGNOSIS — F41.3 OTHER MIXED ANXIETY DISORDERS: ICD-10-CM

## 2020-10-02 DIAGNOSIS — K59.09 CHRONIC CONSTIPATION: Primary | ICD-10-CM

## 2020-10-02 DIAGNOSIS — E87.1 HYPONATREMIA: ICD-10-CM

## 2020-10-02 DIAGNOSIS — I10 ESSENTIAL HYPERTENSION: ICD-10-CM

## 2020-10-02 PROCEDURE — 99214 OFFICE O/P EST MOD 30 MIN: CPT | Performed by: FAMILY MEDICINE

## 2020-10-02 PROCEDURE — G0463 HOSPITAL OUTPT CLINIC VISIT: HCPCS

## 2020-10-02 RX ORDER — POLYETHYLENE GLYCOL 3350 17 G/17G
1 POWDER, FOR SOLUTION ORAL 2 TIMES DAILY PRN
Qty: 578 G | Refills: 2 | Status: ON HOLD | OUTPATIENT
Start: 2020-10-02 | End: 2023-10-10

## 2020-10-02 RX ORDER — HYDROCHLOROTHIAZIDE 25 MG/1
12.5 TABLET ORAL DAILY
Qty: 90 TABLET | Refills: 4
Start: 2020-10-02 | End: 2021-10-01

## 2020-10-02 RX ORDER — CLONAZEPAM 0.5 MG/1
0.5 TABLET ORAL 2 TIMES DAILY PRN
Qty: 60 TABLET | Refills: 0 | Status: SHIPPED | OUTPATIENT
Start: 2020-10-02 | End: 2021-02-16

## 2020-10-02 RX ORDER — MULTIVITAMIN WITH IRON
1 TABLET ORAL DAILY
COMMUNITY

## 2020-10-02 ASSESSMENT — ENCOUNTER SYMPTOMS
APPETITE CHANGE: 0
COUGH: 0
CHILLS: 0
SHORTNESS OF BREATH: 0
FATIGUE: 0
FEVER: 0
ACTIVITY CHANGE: 0
CHEST TIGHTNESS: 0

## 2020-10-02 ASSESSMENT — PATIENT HEALTH QUESTIONNAIRE - PHQ9: SUM OF ALL RESPONSES TO PHQ QUESTIONS 1-9: 10

## 2020-10-02 ASSESSMENT — PAIN SCALES - GENERAL: PAINLEVEL: SEVERE PAIN (7)

## 2020-10-02 NOTE — PROGRESS NOTES
"  SUBJECTIVE:   Mita Gabriel is a 65 year old female who presents to clinic today for the following health issues:    HPI  Mita is here to discuss hyponatremia; on first review with recent lab results, I did not see hydrochlorothiazide on her medication list - however she is on hydrochlorothiazide 25mg daily.  She also tells me that she threw her back out in August and did a lot of sitting/recuperating.  Appetite had decreased significantly and she does normally drink a lot of water.  She did have an increase in her head tremors; but relates that to the back pain/weakness she was having.     Anxiety is also increased.  Has been on alprazolam or lorazepam for decades, has never tried clonazepam.  Also remains on Effexor at 150mg daily.  No new life stress; just dealing with Covid pandemic.  Unable to visit with her family as she would normally do - seeing grandkids outdoors with masks, but \"not the same.\"  Sleep is 4-5 hours/night; doing okay.  Appetite is increasing from August, almost back to normal.      Patient Active Problem List    Diagnosis Date Noted     Mixed hyperlipidemia 06/12/2018     Priority: Medium     Special screening for malignant neoplasms, colon 06/09/2017     Priority: Medium     H/O non-ST elevation myocardial infarction (NSTEMI) 01/20/2015     Priority: Medium     Senile cataract 07/24/2012     Priority: Medium     Insomnia 02/08/2011     Priority: Medium     Obesity 02/08/2011     Priority: Medium     Motion sickness 02/08/2011     Priority: Medium     Hypothyroidism 12/04/2009     Priority: Medium     Abdominal pain 01/21/2009     Priority: Medium     Osteoarthritis of spine 10/21/2008     Priority: Medium     Dysthymic disorder 10/21/2008     Priority: Medium     Hypertension 10/21/2008     Priority: Medium     Lumbago 10/21/2008     Priority: Medium     History of alcohol abuse 10/16/2008     Priority: Medium     Anxiety state 10/16/2008     Priority: Medium     Past Medical History: "   Diagnosis Date     Anxiety disorder      Closed fracture of left carpal bone      Essential (primary) hypertension      Hyperlipidemia      Hypothyroidism      Major depressive disorder, single episode      Postoperative nausea and vomiting      Transfusion history     with delivery of son      Past Surgical History:   Procedure Laterality Date     CATARACT IOL, RT/LT Bilateral       SECTION       COLONOSCOPY N/A 2017    Follow up   hyperplastic     OPEN REDUCTION INTERNAL FIXATION WRIST      treating a fracture     THYROIDECTOMY       Partial     Family History   Problem Relation Age of Onset     No Known Problems Mother      Genitourinary Problems Father         Genitourinary Disease,kidney disease     No Known Problems Sister      No Known Problems Sister      No Known Problems Brother      No Known Problems Brother      No Known Problems Brother         mild cognitive delay     Cancer Maternal Grandmother         Bladder     Other - See Comments Paternal Grandmother 70        Stroke     Cancer Maternal Grandfather         Throat and lung     Other - See Comments Other         Family history of depression and anxiety     Anesthesia Reaction No family hx of         Anesthesia Problem,Notes no prior complications from anesthesia.     Breast Cancer No family hx of         Cancer-breast     Social History     Tobacco Use     Smoking status: Never Smoker     Smokeless tobacco: Never Used   Substance Use Topics     Alcohol use: No     Alcohol/week: 0.0 standard drinks     Social History     Social History Narrative     with one grown son, two grand children (older granddaughter with brain tumor - she plays hockey, and not intervening with tumor unless things change)  Goes south to [place] for the winter.  Used to golf, but  unable now due to having MS.   was a  but does not fly due to MS  Patient has never smoked.   Regular Exercise - no     Current Outpatient Medications    Medication Sig Dispense Refill     clonazePAM (KLONOPIN) 0.5 MG tablet Take 1 tablet (0.5 mg) by mouth 2 times daily as needed for anxiety 60 tablet 0     hydrochlorothiazide (HYDRODIURIL) 25 MG tablet Take 0.5 tablets (12.5 mg) by mouth daily 90 tablet 4     magnesium 250 MG tablet Take 1 tablet by mouth daily       Multiple Vitamins-Minerals (MULTIVITAMIN ADULT PO)        polyethylene glycol (MIRALAX) 17 GM/Dose powder Take 17 g (1 capful) by mouth 2 times daily as needed for constipation 578 g 2     POTASSIUM CHLORIDE PO        VITAMIN D PO        aspirin EC 81 MG EC tablet Take 81 mg by mouth daily with food       atorvastatin (LIPITOR) 20 MG tablet Take 1 tablet (20 mg) by mouth daily 90 tablet 4     cyclobenzaprine (FLEXERIL) 10 MG tablet TAKE 1 TABLET BY MOUTH TWICE DAILY AS NEEDED FOR MUSCLE SPASMS 60 tablet 5     folic acid (FOLVITE) 1 MG tablet Take 1 mg by mouth daily       gabapentin (NEURONTIN) 300 MG capsule TAKE 1 CAPSULE BY MOUTH THREE TIMES DAILY 270 capsule 4     levothyroxine (SYNTHROID/LEVOTHROID) 50 MCG tablet Take 1 tablet (50 mcg) by mouth every morning (before breakfast) 90 tablet 4     lisinopril (ZESTRIL) 5 MG tablet Take 1 tablet (5 mg) by mouth daily 90 tablet 4     LORazepam (ATIVAN) 0.5 MG tablet TAKE 1 TABLET BY MOUTH THREE TIMES DAILY 90 tablet 5     methocarbamol (ROBAXIN) 500 MG tablet TAKE 1 TO 2 TABLETS BY MOUTH FOUR TIMES DAILY 120 tablet 5     metoprolol succinate ER (TOPROL-XL) 50 MG 24 hr tablet Take 1 tablet (50 mg) by mouth daily 90 tablet 4     scopolamine (TRANSDERM) 1 MG/3DAYS 72 hr patch APPLY 1 PATCH EVERY 72 HOURS FOR MOTION SICKNESS 30 patch 1     thiamine 100 MG tablet Take 100 mg by mouth daily       traZODone (DESYREL) 50 MG tablet TAKE 1-2 TABLETS BY MOUTH AT BEDTIME 180 tablet 4     venlafaxine (EFFEXOR-XR) 150 MG 24 hr capsule Take 1 capsule (150 mg) by mouth every morning 90 capsule 4     zolpidem ER (AMBIEN CR) 6.25 MG CR tablet TAKE 1 TABLET BY MOUTH EVERY  DAY AT BEDTIME 30 tablet 5     No Known Allergies    Review of Systems   Constitutional: Negative for activity change, appetite change, chills, fatigue and fever.   Respiratory: Negative for cough, chest tightness and shortness of breath.    Cardiovascular: Negative for chest pain and peripheral edema.   All other systems reviewed and are negative.     OBJECTIVE:     /80   Pulse 84   Temp 97.4  F (36.3  C) (Tympanic)   Resp 16   Wt 85 kg (187 lb 6 oz)   BMI 32.16 kg/m    Body mass index is 32.16 kg/m .  Physical Exam  Vitals signs and nursing note reviewed.   Constitutional:       Appearance: Normal appearance.   HENT:      Head: Normocephalic and atraumatic.   Neck:      Musculoskeletal: Normal range of motion.   Cardiovascular:      Rate and Rhythm: Normal rate.      Pulses: Normal pulses.   Pulmonary:      Effort: Pulmonary effort is normal.   Skin:     Capillary Refill: Capillary refill takes less than 2 seconds.   Neurological:      Mental Status: She is alert.      Motor: Tremor (head, chronic) present.   Psychiatric:         Mood and Affect: Mood normal.         Behavior: Behavior normal.     Diagnostic Test Results:  No results found for any visits on 10/02/20.    ASSESSMENT/PLAN:     1. Chronic constipation  Has had since she has been a child; but consider trial of miralax - patient instructions provided for dosing.  - polyethylene glycol (MIRALAX) 17 GM/Dose powder; Take 17 g (1 capful) by mouth 2 times daily as needed for constipation  Dispense: 578 g; Refill: 2    2. Essential hypertension  Chronic; will reduce hydrochlorothiazide due to recent worsening of hyponatremia.  Likley multifactorial with decreased diet and high water intake.  Encouraged to be more lenient on her salt intake; limit fluids to 2L per day or less; consider sugar free gatorade or other similar electrolyte beverage.  Recheck in ~4 weeks; if not improved - stop hydrochlorothiazide completely.  - hydrochlorothiazide  (HYDRODIURIL) 25 MG tablet; Take 0.5 tablets (12.5 mg) by mouth daily  Dispense: 90 tablet; Refill: 4    3. Other mixed anxiety disorders  Worsening/unchanged.  Covid pandemic and possible plans of not going south are affecting her.  Discussed therapy/counseling, but patient refusing as that has not been helpful in the past.  Trial of clonazepam BID instead of lorazepam TID.  One month, and follow up pending efficacy.  - clonazePAM (KLONOPIN) 0.5 MG tablet; Take 1 tablet (0.5 mg) by mouth 2 times daily as needed for anxiety  Dispense: 60 tablet; Refill: 0    4. Hyponatremia  See #2.         Ella Baptiste, [facility] AND \[facility] [place] [facility]\""

## 2020-10-02 NOTE — LETTER
My Depression Action Plan  Name: Mita Gabriel   Date of Birth 1955  Date: 10/2/2020    My doctor: Ella Baptiste   My clinic: [facility]  [address]  [place] [id number] [phone]          GREEN    ZONE   Good Control    What it looks like:     Things are going generally well. You have normal ups and downs. You may even feel depressed from time to time, but bad moods usually last less than a day.   What you need to do:  1. Continue to care for yourself (see self care plan)  2. Check your depression survival kit and update it as needed  3. Follow your physician s recommendations including any medication.  4. Do not stop taking medication unless you consult with your physician first.           YELLOW         ZONE Getting Worse    What it looks like:     Depression is starting to interfere with your life.     It may be hard to get out of bed; you may be starting to isolate yourself from others.    Symptoms of depression are starting to last most all day and this has happened for several days.     You may have suicidal thoughts but they are not constant.   What you need to do:     1. Call your care team. Your response to treatment will improve if you keep your care team informed of your progress. Yellow periods are signs an adjustment may need to be made.     2. Continue your self-care.  Just get dressed and ready for the day.  Don't give yourself time to talk yourself out of it.    3. Talk to someone in your support network.    4. Open up your Depression Self-Care Plan/Wellness Kit.           RED    ZONE Medical Alert - Get Help    What it looks like:     Depression is seriously interfering with your life.     You may experience these or other symptoms: You can t get out of bed most days, can t work or engage in other necessary activities, you have trouble taking care of basic hygiene, or basic responsibilities, thoughts of suicide or death that will not  go away, self-injurious behavior.     What you need to do:  1. Call your care team and request a same-day appointment. If they are not available (weekends or after hours) call your local crisis line, emergency room or 911.            Depression Self-Care Plan / Wellness Kit    Self-Care for Depression  Here s the deal. Your body and mind are really not as separate as most people think.  What you do and think affects how you feel and how you feel influences what you do and think. This means if you do things that people who feel good do, it will help you feel better.  Sometimes this is all it takes.  There is also a place for medication and therapy depending on how severe your depression is, so be sure to consult with your medical provider and/ or Behavioral Health Consultant if your symptoms are worsening or not improving.     In order to better manage my stress, I will:    Exercise  Get some form of exercise, every day. This will help reduce pain and release endorphins, the  feel good  chemicals in your brain. This is almost as good as taking antidepressants!  This is not the same as joining a gym and then never going! (they count on that by the way ) It can be as simple as just going for a walk or doing some gardening, anything that will get you moving.      Hygiene   Maintain good hygiene (get out of bed in the morning, make your bed, brush your teeth, take a shower, and get dressed like you were going to work, even if you are unemployed).  If your clothes don't fit try to get ones that do.    Diet  Strive to eat foods that are good for me, drink plenty of water, and avoid excessive sugar, caffeine, alcohol, and other mood-altering substances.  Some foods that are helpful in depression are: complex carbohydrates, B vitamins, flaxseed, fish or fish oil, fresh fruits and vegetables.    Psychotherapy  Agree to participate in Individual Therapy (if recommended).    Medication  If prescribed medications, I agree to  take them.  Missing doses can result in serious side effects.  I understand that drinking alcohol, or other illicit drug use, may cause potential side effects.  I will not stop my medication abruptly without first discussing it with my provider.    Staying Connected With Others  Stay in touch with my friends, family members, and my primary care provider/team.    Use your imagination  Be creative.  We all have a creative side; it doesn t matter if it s oil painting, sand castles, or mud pies! This will also kick up the endorphins.    Witness Beauty  (AKA stop and smell the roses) Take a look outside, even in mid-winter. Notice colors, textures. Watch the squirrels and birds.     Service to others  Be of service to others.  There is always someone else in need.  By helping others we can  get out of ourselves  and remember the really important things.  This also provides opportunities for practicing all the other parts of the program.    Humor  Laugh and be silly!  Adjust your TV habits for less news and crime-drama and more comedy.    Control your stress  Try breathing deep, massage therapy, biofeedback, and meditation. Find time to relax each day.     Crisis Text Line  http://www.crisistextline.org    The Crisis Text Line serves anyone, in any type of crisis, providing access to free, 24/7 support and information via the medium people already use and trust:    Here's how it works:  1.  Text 391-239 from anywhere in the [place], anytime, about any type of crisis.  2.  A live, trained Crisis Counselor receives the text and responds quickly.  3.  The volunteer Crisis Counselor will help you move from a 'hot moment to a cool moment'.    My support system    Clinic Contact:  Phone number:    Contact 1:  Phone number:    Contact 2:  Phone number:    [employer]/:  Phone number:    Therapist:  Phone number:    Local crisis center:    Phone number:    Other community support:  Phone number:

## 2020-10-02 NOTE — PATIENT INSTRUCTIONS
Constipation Trial treatment of Miralax:     -- Start polyethylene glycol (MiraLax) 2 capful two times a day for a few days, then cut back dose.  Most likely you'll be using 1 capful daily after a few days   -- MiraLax is safe for you to adjust the dose yourself at home. Changes in dose take 12-24 hours to show an effect   -- Progression will be small hard pellet stool, hard log stool, soft stool.  Expect some liquid stool as well.  Goal soft formed daily stool (applesauce consistency stools)   -- After 2-3 days, if you still feel constipated the next step up is to add Senna 1-2 tablets twice a day   -- Use MiraLax daily for at least a month to allow colon to regain strength   -- Drink more water   -- Eat more fruits and vegetables   -- No fiber supplements until at least 1 month out.  Fiber containing foods okay.   -- MiraLax is safe to use indefinitely   -- After 1 month, consider switching from MiraLax to daily fiber supplement (eg Citrucel 1 tbsp daily).

## 2020-10-02 NOTE — NURSING NOTE
"Chief Complaint   Patient presents with     RECHECK     f/u lab results       Initial /80   Pulse 84   Temp 97.4  F (36.3  C) (Tympanic)   Resp 16   Wt 85 kg (187 lb 6 oz)   BMI 32.16 kg/m   Estimated body mass index is 32.16 kg/m  as calculated from the following:    Height as of 6/21/19: 1.626 m (5' 4\").    Weight as of this encounter: 85 kg (187 lb 6 oz).  Medication Reconciliation: complete    Kandice Suazo LPN  "

## 2020-10-16 ENCOUNTER — HOSPITAL ENCOUNTER (OUTPATIENT)
Dept: MAMMOGRAPHY | Facility: OTHER | Age: 65
Discharge: HOME | End: 2020-10-16
Attending: FAMILY MEDICINE | Admitting: FAMILY MEDICINE
Payer: MEDICARE

## 2020-10-16 DIAGNOSIS — Z12.31 VISIT FOR SCREENING MAMMOGRAM: ICD-10-CM

## 2020-10-16 PROCEDURE — 77067 SCR MAMMO BI INCL CAD: CPT

## 2020-11-19 DIAGNOSIS — I10 ESSENTIAL HYPERTENSION: ICD-10-CM

## 2020-11-19 DIAGNOSIS — E87.1 HYPONATREMIA: ICD-10-CM

## 2020-11-19 LAB
ANION GAP SERPL CALCULATED.3IONS-SCNC: 6 MMOL/L (ref 3–14)
BUN SERPL-MCNC: 12 MG/DL (ref 7–25)
CALCIUM SERPL-MCNC: 9.3 MG/DL (ref 8.6–10.3)
CHLORIDE SERPL-SCNC: 99 MMOL/L (ref 98–107)
CO2 SERPL-SCNC: 32 MMOL/L (ref 21–31)
CREAT SERPL-MCNC: 0.9 MG/DL (ref 0.6–1.2)
GFR SERPL CREATININE-BSD FRML MDRD: 63 ML/MIN/{1.73_M2}
GLUCOSE SERPL-MCNC: 95 MG/DL (ref 70–105)
POTASSIUM SERPL-SCNC: 4.4 MMOL/L (ref 3.5–5.1)
SODIUM SERPL-SCNC: 137 MMOL/L (ref 134–144)

## 2020-11-19 PROCEDURE — 36415 COLL VENOUS BLD VENIPUNCTURE: CPT | Mod: ZL | Performed by: FAMILY MEDICINE

## 2020-11-19 PROCEDURE — 80048 BASIC METABOLIC PNL TOTAL CA: CPT | Mod: ZL | Performed by: FAMILY MEDICINE

## 2020-12-02 DIAGNOSIS — M62.830 SPASM OF BACK MUSCLES: ICD-10-CM

## 2020-12-02 RX ORDER — METHOCARBAMOL 500 MG/1
TABLET, FILM COATED ORAL
Qty: 120 TABLET | Refills: 5 | Status: SHIPPED | OUTPATIENT
Start: 2020-12-02 | End: 2021-06-16

## 2020-12-02 NOTE — TELEPHONE ENCOUNTER
Routing refill request to provider for review/approval because:  Drug not on the [facility] refill protocol     LOV: 10/20/2020  Clarisse Oscar RN on 12/2/2020 at 11:47 AM

## 2020-12-14 DIAGNOSIS — G47.00 INSOMNIA, UNSPECIFIED TYPE: ICD-10-CM

## 2020-12-15 RX ORDER — ZOLPIDEM TARTRATE 6.25 MG/1
TABLET, FILM COATED, EXTENDED RELEASE ORAL
Qty: 30 TABLET | Refills: 5 | Status: SHIPPED | OUTPATIENT
Start: 2020-12-15 | End: 2021-06-16

## 2020-12-15 NOTE — TELEPHONE ENCOUNTER
Zolpidem ER (AMBIEN CR) 6.25 MG CR tablet  Last Written Prescription Date: 06/23/2020  Last Fill Quantity: 30,   # refills: 5  Last Office Visit: 10/02/2020  Future Office visit:       Routing refill request to provider for review/approval because:  Drug not on the [facility], [facility] or [facility] refill protocol or controlled substance.     Unable to complete prescription refill per RNMedication Refill Policy.................... Karina Greer RN ....................  12/15/2020   10:59 AM

## 2020-12-17 ENCOUNTER — TELEPHONE (OUTPATIENT)
Dept: FAMILY MEDICINE | Facility: OTHER | Age: 65
End: 2020-12-17

## 2020-12-17 NOTE — TELEPHONE ENCOUNTER
Fax from pharmacy states that the zolpidem ER tablets are not covered but the preferred alternative is zolpidem tablets. Upon calling [facility] they stated that it did go thru insurance and to disregard the message.  Kandice Suazo, FATMATAN, LPN  12/17/2020  3:09 PM

## 2021-01-03 ENCOUNTER — HEALTH MAINTENANCE LETTER (OUTPATIENT)
Age: 66
End: 2021-01-03

## 2021-01-11 DIAGNOSIS — M62.830 BACK MUSCLE SPASM: ICD-10-CM

## 2021-01-11 DIAGNOSIS — F41.3 OTHER MIXED ANXIETY DISORDERS: ICD-10-CM

## 2021-01-12 RX ORDER — LORAZEPAM 0.5 MG/1
TABLET ORAL
Qty: 90 TABLET | Refills: 5 | Status: SHIPPED | OUTPATIENT
Start: 2021-01-12 | End: 2021-07-13

## 2021-01-12 RX ORDER — CYCLOBENZAPRINE HCL 10 MG
TABLET ORAL
Qty: 60 TABLET | Refills: 5 | Status: SHIPPED | OUTPATIENT
Start: 2021-01-12 | End: 2021-02-18 | Stop reason: ALTCHOICE

## 2021-01-12 NOTE — TELEPHONE ENCOUNTER
Refill request for LORazepam and cyclobenzaprine from [facility].  Last office visit 10/02/2020, last refills 7/24/2020.  Medication not on RN Refill protocol.  Routing to PCP to address refill.  Unable to complete prescription refill per RN Medication Refill Policy. Disha Pringle RN 1/12/2021 8:54 AM

## 2021-01-29 ENCOUNTER — MYC MEDICAL ADVICE (OUTPATIENT)
Dept: FAMILY MEDICINE | Facility: OTHER | Age: 66
End: 2021-01-29

## 2021-02-09 ENCOUNTER — TELEPHONE (OUTPATIENT)
Dept: FAMILY MEDICINE | Facility: OTHER | Age: 66
End: 2021-02-09

## 2021-02-09 NOTE — TELEPHONE ENCOUNTER
FYI=  Talked with patient and she is wondering why her cyclobenzaprine was discontinued? Did tell her that Dr. Baptiste does not want her taking two different muscle relaxers. She is wondering if she will go through withdrawals?  She is also wondering if there is a different med that she can take along with the methocarbamol that would be safer than taking the methocarbamol and cyclobenzaprine?  Did make her an appointment for next week as she wanted to talk in person and has other questions. Did not request this message to be sent to Dr. Baptiste since she will talk to her at the appointment. She is planning on weaning off of the cyclobenzaprine over the week to see how it affects her back.  Kandice Suazo, MECHELLE, LPN  2/9/2021  2:24 PM

## 2021-02-09 NOTE — TELEPHONE ENCOUNTER
Please call regarding a medication question.  The patient did not want to be specific.      Shobha Dick on 2/9/2021 at 1:30 PM

## 2021-02-16 ENCOUNTER — OFFICE VISIT (OUTPATIENT)
Dept: FAMILY MEDICINE | Facility: OTHER | Age: 66
End: 2021-02-16
Attending: FAMILY MEDICINE
Payer: MEDICARE

## 2021-02-16 VITALS
TEMPERATURE: 97.9 F | HEART RATE: 82 BPM | OXYGEN SATURATION: 100 % | WEIGHT: 193 LBS | SYSTOLIC BLOOD PRESSURE: 124 MMHG | BODY MASS INDEX: 32.95 KG/M2 | RESPIRATION RATE: 20 BRPM | DIASTOLIC BLOOD PRESSURE: 86 MMHG | HEIGHT: 64 IN

## 2021-02-16 DIAGNOSIS — M54.2 CERVICALGIA: ICD-10-CM

## 2021-02-16 DIAGNOSIS — G47.00 INSOMNIA, UNSPECIFIED TYPE: Primary | ICD-10-CM

## 2021-02-16 DIAGNOSIS — F41.1 ANXIETY STATE: ICD-10-CM

## 2021-02-16 PROCEDURE — 99214 OFFICE O/P EST MOD 30 MIN: CPT | Performed by: FAMILY MEDICINE

## 2021-02-16 PROCEDURE — G0463 HOSPITAL OUTPT CLINIC VISIT: HCPCS

## 2021-02-16 ASSESSMENT — ANXIETY QUESTIONNAIRES
7. FEELING AFRAID AS IF SOMETHING AWFUL MIGHT HAPPEN: NOT AT ALL
GAD7 TOTAL SCORE: 4
2. NOT BEING ABLE TO STOP OR CONTROL WORRYING: SEVERAL DAYS
1. FEELING NERVOUS, ANXIOUS, OR ON EDGE: SEVERAL DAYS
6. BECOMING EASILY ANNOYED OR IRRITABLE: NOT AT ALL
5. BEING SO RESTLESS THAT IT IS HARD TO SIT STILL: NOT AT ALL
IF YOU CHECKED OFF ANY PROBLEMS ON THIS QUESTIONNAIRE, HOW DIFFICULT HAVE THESE PROBLEMS MADE IT FOR YOU TO DO YOUR WORK, TAKE CARE OF THINGS AT HOME, OR GET ALONG WITH OTHER PEOPLE: NOT DIFFICULT AT ALL
3. WORRYING TOO MUCH ABOUT DIFFERENT THINGS: SEVERAL DAYS

## 2021-02-16 ASSESSMENT — PATIENT HEALTH QUESTIONNAIRE - PHQ9
SUM OF ALL RESPONSES TO PHQ QUESTIONS 1-9: 8
5. POOR APPETITE OR OVEREATING: SEVERAL DAYS

## 2021-02-16 ASSESSMENT — PAIN SCALES - GENERAL: PAINLEVEL: MODERATE PAIN (5)

## 2021-02-16 ASSESSMENT — MIFFLIN-ST. JEOR: SCORE: 1405.44

## 2021-02-16 NOTE — NURSING NOTE
"Chief Complaint   Patient presents with     Recheck Medication       Initial /86   Pulse 82   Temp 97.9  F (36.6  C) (Tympanic)   Resp 20   Ht 1.626 m (5' 4\")   Wt 87.5 kg (193 lb)   SpO2 100%   BMI 33.13 kg/m   Estimated body mass index is 33.13 kg/m  as calculated from the following:    Height as of this encounter: 1.626 m (5' 4\").    Weight as of this encounter: 87.5 kg (193 lb).  Medication Reconciliation: complete    Kandice Suazo LPN  "

## 2021-02-16 NOTE — PROGRESS NOTES
"  SUBJECTIVE:   Mita Gabriel is a 65 year old female who presents to clinic today for the following health issues:    HPI  Mita is here in follow up to cessation of her cyclobenzaprine.  She has been on that and methocarbamol x 20+ years.  Worried about having \"withdrawal\" without it.  Discussed the risk of muscle relaxers and age; especially two types of muscle relaxant medications her other medications and potential interactions that could cause fall/etc.  She has done quite well within the last ~1-2 weeks without her cyclobenzaprine.  Still using methocarbamol, typically 1 in the day, and 2 at ~dinnertime.   Most of her difficulty is the back of her neck; and radiates up and around the the forehead.    Biggest difference she noted was worsened sleep.  Mita remains on multiple medications for sleep including: Ambien CR 6.25mg at bedtime, Ativan 0.5mg 3 times daily, Trazodone 50-100mg at bedtime.  She also takes gabapentin 300mg 3 times a day, methocarbamol and Effexor 150mg daily  Feels like at times she doesn't get any good sleep; other nights she may get upwards of 4 hours of good sleep.  Did previously do a trial of clonazepam instead of lorazepam and didn't feel it was as helpful.  Notices her anxiety increases in the evening.    Patient Active Problem List    Diagnosis Date Noted     Hyponatremia 10/02/2020     Priority: Medium     Mixed hyperlipidemia 06/12/2018     Priority: Medium     Special screening for malignant neoplasms, colon 06/09/2017     Priority: Medium     H/O non-ST elevation myocardial infarction (NSTEMI) 01/20/2015     Priority: Medium     Senile cataract 07/24/2012     Priority: Medium     Insomnia 02/08/2011     Priority: Medium     Obesity 02/08/2011     Priority: Medium     Motion sickness 02/08/2011     Priority: Medium     Hypothyroidism 12/04/2009     Priority: Medium     Abdominal pain 01/21/2009     Priority: Medium     Osteoarthritis of spine 10/21/2008     Priority: Medium "     Dysthymic disorder 10/21/2008     Priority: Medium     Hypertension 10/21/2008     Priority: Medium     Lumbago 10/21/2008     Priority: Medium     History of alcohol abuse 10/16/2008     Priority: Medium     Anxiety state 10/16/2008     Priority: Medium     Past Medical History:   Diagnosis Date     Anxiety disorder      Closed fracture of left carpal bone      Essential (primary) hypertension      Hyperlipidemia      Hypothyroidism      Major depressive disorder, single episode      Postoperative nausea and vomiting      Transfusion history     with delivery of son      Past Surgical History:   Procedure Laterality Date     CATARACT IOL, RT/LT Bilateral       SECTION       COLONOSCOPY N/A 2017    Follow up   hyperplastic     OPEN REDUCTION INTERNAL FIXATION WRIST      treating a fracture     THYROIDECTOMY       Partial     Family History   Problem Relation Age of Onset     No Known Problems Mother      Genitourinary Problems Father         Genitourinary Disease,kidney disease     No Known Problems Sister      No Known Problems Sister      No Known Problems Brother      No Known Problems Brother      No Known Problems Brother         mild cognitive delay     Cancer Maternal Grandmother         Bladder     Other - See Comments Paternal Grandmother 70        Stroke     Cancer Maternal Grandfather         Throat and lung     Other - See Comments Other         Family history of depression and anxiety     Anesthesia Reaction No family hx of         Anesthesia Problem,Notes no prior complications from anesthesia.     Breast Cancer No family hx of         Cancer-breast     Social History     Tobacco Use     Smoking status: Never Smoker     Smokeless tobacco: Never Used   Substance Use Topics     Alcohol use: No     Alcohol/week: 0.0 standard drinks     Social History     Social History Narrative     with one grown son, two grand children (older granddaughter with brain tumor - she plays  hockey, and not intervening with tumor unless things change)  Goes south to [place] for the winter.  Used to golf, but  unable now due to having MS.   was a  but does not fly due to MS  Patient has never smoked.   Regular Exercise - no     Current Outpatient Medications   Medication Sig Dispense Refill     aspirin EC 81 MG EC tablet Take 81 mg by mouth daily with food       atorvastatin (LIPITOR) 20 MG tablet Take 1 tablet (20 mg) by mouth daily 90 tablet 4     cyclobenzaprine (FLEXERIL) 10 MG tablet TAKE 1 TABLET BY MOUTH TWICE DAILY AS NEEDED FOR MUSCLE SPASMS 60 tablet 5     folic acid (FOLVITE) 1 MG tablet Take 1 mg by mouth daily       gabapentin (NEURONTIN) 300 MG capsule TAKE 1 CAPSULE BY MOUTH THREE TIMES DAILY 270 capsule 4     hydrochlorothiazide (HYDRODIURIL) 25 MG tablet Take 0.5 tablets (12.5 mg) by mouth daily 90 tablet 4     levothyroxine (SYNTHROID/LEVOTHROID) 50 MCG tablet Take 1 tablet (50 mcg) by mouth every morning (before breakfast) 90 tablet 4     lisinopril (ZESTRIL) 5 MG tablet Take 1 tablet (5 mg) by mouth daily 90 tablet 4     LORazepam (ATIVAN) 0.5 MG tablet TAKE 1 TABLET BY MOUTH THREE TIMES DAILY 90 tablet 5     magnesium 250 MG tablet Take 1 tablet by mouth daily       methocarbamol (ROBAXIN) 500 MG tablet TAKE 1-2 TABLET BY MOUTH FOUR TIMES DAILY 120 tablet 5     metoprolol succinate ER (TOPROL-XL) 50 MG 24 hr tablet Take 1 tablet (50 mg) by mouth daily 90 tablet 4     Multiple Vitamins-Minerals (MULTIVITAMIN ADULT PO)        polyethylene glycol (MIRALAX) 17 GM/Dose powder Take 17 g (1 capful) by mouth 2 times daily as needed for constipation 578 g 2     POTASSIUM CHLORIDE PO        scopolamine (TRANSDERM) 1 MG/3DAYS 72 hr patch APPLY 1 PATCH EVERY 72 HOURS FOR MOTION SICKNESS 30 patch 1     thiamine 100 MG tablet Take 100 mg by mouth daily       traZODone (DESYREL) 50 MG tablet TAKE 1-2 TABLETS BY MOUTH AT BEDTIME 180 tablet 4     venlafaxine (EFFEXOR-XR) 150 MG 24 hr  "capsule Take 1 capsule (150 mg) by mouth every morning 90 capsule 4     VITAMIN D PO        zolpidem ER (AMBIEN CR) 6.25 MG CR tablet TAKE 1 TABLET BY MOUTH AT BEDTIME 30 tablet 5     No Known Allergies    OBJECTIVE:     /86   Pulse 82   Temp 97.9  F (36.6  C) (Tympanic)   Resp 20   Ht 1.626 m (5' 4\")   Wt 87.5 kg (193 lb)   SpO2 100%   BMI 33.13 kg/m    Body mass index is 33.13 kg/m .  Physical Exam  Vitals signs and nursing note reviewed.   Constitutional:       Appearance: Normal appearance.   HENT:      Head: Normocephalic and atraumatic.      Comments: Decreased tremor compared to previous visits.  Cardiovascular:      Rate and Rhythm: Normal rate.   Pulmonary:      Effort: Pulmonary effort is normal.   Skin:     Capillary Refill: Capillary refill takes less than 2 seconds.   Neurological:      General: No focal deficit present.      Mental Status: She is alert. Mental status is at baseline.   Psychiatric:         Mood and Affect: Mood normal.     Diagnostic Test Results:  No results found for any visits on 02/16/21.    ASSESSMENT/PLAN:     1. Insomnia, unspecified type  Increase trazodone up to 150mg at bedtime x 2 weeks; if not helpful, discussed ability to go up to 200mg at bedtime.  With goal of taking off of Ambien or other bedtime need of Ativan.    2. Anxiety state  Has ativan; no changes to medication at this time.  Discussed ongoing risks.    3. Cervicalgia  Stable without cyclobenzaprine.  PT for stretching and HEP if worsens.    Follow up prn.    Activity Duration    Chart accessed < 1 minute    Exam room 27 minutes    Exam room 1 minute    Exam room 2 minutes    Total time: 32 minutes*       Ella Baptiste DO  [facility]  "

## 2021-02-16 NOTE — PATIENT INSTRUCTIONS
For sleep:  Increase Trazodone to 150mg at bedtime (3 of your 50mg tablets at one time.)   Do this for 2-4 weeks; and if not helpful; can increase to 200mg at bedtime (4 of your 50mg tablets at one time.)    For morning cough:  Trial of Flonase (nasal spray) - intranasal steroid.  Take as directed for 2-4 weeks to see if we can lessen morning symptoms of cough and chest tightness.    Patient Education      Fluticasone Furoate Inhalation powder    Fluticasone Furoate Nasal spray, suspension    Fluticasone Propionate Inhalation powder    Fluticasone Propionate Nasal spray, suspension    Fluticasone Propionate Pressurized inhalation, suspension    Fluticasone Propionate Topical cream    Fluticasone Propionate Topical lotion    Fluticasone Propionate Topical ointment  Fluticasone Furoate Nasal spray, suspension  What is this medicine?  FLUTICASONE (floo TIK a sone) is a corticosteroid. This medicine is used to treat the symptoms of allergies like sneezing, itchy red eyes, and itchy, runny, or stuffy nose.  This medicine may be used for other purposes; ask your health care provider or pharmacist if you have questions.  What should I tell my health care provider before I take this medicine?  They need to know if you have any of these conditions:    infection, like tuberculosis, herpes, or fungal infection    recent surgery on nose or sinuses    taking corticosteroid by mouth    an unusual or allergic reaction to fluticasone, steroids, other medicines, foods, dyes, or preservatives    pregnant or trying to get pregnant    breast-feeding  How should I use this medicine?  This medicine is for use in the nose. Follow the directions on your product or prescription label. This medicine works best if used at regular intervals. Do not use more often than directed. Make sure that you are using your nasal spray correctly. After 6 months of daily use without a prescription, talk to your doctor or health care professional before  using it for a longer time. Ask your doctor or health care professional if you have any questions.  Talk to your pediatrician regarding the use of this medicine in children. Special care may be needed. This medicine has been used in children as young as 2 years. After two months of daily use without a prescription in a child, talk to your pediatrician before using it for a longer time.  Overdosage: If you think you have taken too much of this medicine contact a poison control center or emergency room at once.  NOTE: This medicine is only for you. Do not share this medicine with others.  What if I miss a dose?  If you miss a dose, use it as soon as you remember. If it is almost time for your next dose, use only that dose and continue with your regular schedule. Do not use double or extra doses.  What may interact with this medicine?    ketoconazole    metyrapone    some medicines for HIV    vaccines  This list may not describe all possible interactions. Give your health care provider a list of all the medicines, herbs, non-prescription drugs, or dietary supplements you use. Also tell them if you smoke, drink alcohol, or use illegal drugs. Some items may interact with your medicine.  What should I watch for while using this medicine?  Visit your doctor or health care professional for regular checks on your progress. Some symptoms may improve within 12 hours after starting use. Check with your doctor or health care professional if there is no improvement in your condition after 3 weeks of use.  Do not come in contact with people who have chickenpox or the measles while you are taking this medicine. If you do, call your doctor right away.  What side effects may I notice from receiving this medicine?  Side effects that you should report to your doctor or health care professional as soon as possible:    allergic reactions like skin rash, itching or hives, swelling of the face, lips, or tongue    changes in  vision    flu-like symptoms    white patches or sores in the mouth or nose  Side effects that usually do not require medical attention (report to your doctor or health care professional if they continue or are bothersome):    burning or irritation inside the nose or throat    cough    headache    nosebleed    unusual taste or smell  This list may not describe all possible side effects. Call your doctor for medical advice about side effects. You may report side effects to FDA at 8-178-WTF-9950.  Where should I keep my medicine?  Keep out of the reach of children.  Store at room temperature between 15 and 30 degrees C (59 and 86 degrees F). Throw away any unused medicine after the expiration date.  NOTE: This sheet is a summary. It may not cover all possible information. If you have questions about this medicine, talk to your doctor, pharmacist, or health care provider.  NOTE:This sheet is a summary. It may not cover all possible information. If you have questions about this medicine, talk to your doctor, pharmacist, or health care provider. Copyright  2016 [employer]

## 2021-02-17 ASSESSMENT — ANXIETY QUESTIONNAIRES: GAD7 TOTAL SCORE: 4

## 2021-03-11 DIAGNOSIS — E78.2 MIXED HYPERLIPIDEMIA: ICD-10-CM

## 2021-03-11 DIAGNOSIS — I10 ESSENTIAL HYPERTENSION: ICD-10-CM

## 2021-03-11 DIAGNOSIS — M54.42 BILATERAL LOW BACK PAIN WITH LEFT-SIDED SCIATICA, UNSPECIFIED CHRONICITY: ICD-10-CM

## 2021-03-11 RX ORDER — ATORVASTATIN CALCIUM 20 MG/1
TABLET, FILM COATED ORAL
Qty: 90 TABLET | Refills: 4 | OUTPATIENT
Start: 2021-03-11

## 2021-03-11 RX ORDER — GABAPENTIN 300 MG/1
CAPSULE ORAL
Qty: 270 CAPSULE | Refills: 4 | OUTPATIENT
Start: 2021-03-11

## 2021-03-11 RX ORDER — HYDROCHLOROTHIAZIDE 25 MG/1
TABLET ORAL
Qty: 90 TABLET | Refills: 4 | OUTPATIENT
Start: 2021-03-11

## 2021-03-11 NOTE — TELEPHONE ENCOUNTER
[facility] sent Rx request for the following:      Requested Prescriptions   Pending Prescriptions Disp Refills     atorvastatin (LIPITOR) 20 MG tablet [Pharmacy Med Name: ATORVASTATIN 20MG TABLETS] 90 tablet 4     Sig: TAKE 1 TABLET(20 MG) BY MOUTH DAILY        Last Prescription Date:   7/24/20  Last Fill Qty/Refills:         90, R-4    Last Office Visit:              2/16/21  Future Office visit:        7/29/21     Routing refill request to provider for review/approval because:    Unable to be refilled per RN refill protocol.Refill to soon.           gabapentin (NEURONTIN) 300 MG capsule [Pharmacy Med Name: GABAPENTIN 300MG CAPSULES] 270 capsule 4     Sig: TAKE 1 CAPSULE BY MOUTH THREE TIMES DAILY       There is no refill protocol information for this order       Last Prescription Date:   7/24/20  Last Fill Qty/Refills:         270, R-4    Last Office Visit:              2/16/21  Future Office visit:        7/29/21     Routing refill request to provider for review/approval because:  Drug not on the [facility], [facility] or [facility] refill protocol or controlled substance  Unable to be refilled per RN refill protocol.Refill to soon.         hydrochlorothiazide (HYDRODIURIL) 25 MG tablet [Pharmacy Med Name: HYDROCHLOROTHIAZIDE 25MG TABLETS] 90 tablet 4     Sig: TAKE 1 TABLET(25 MG) BY MOUTH DAILY          Last Prescription Date:  10/2/20  Last Fill Qty/Refills:         90, R-4   Last Office Visit:              2/16/21  Future Office visit:        7/29/21     Routing refill request to provider for review/approval because:   Unable to be refilled per RN refill protocol.Refill to soon.  Lena Elizondo RN on 3/11/2021 at 2:24 PM

## 2021-05-02 ENCOUNTER — HOSPITAL ENCOUNTER (EMERGENCY)
Facility: OTHER | Age: 66
Discharge: HOME | End: 2021-05-02
Attending: STUDENT IN AN ORGANIZED HEALTH CARE EDUCATION/TRAINING PROGRAM | Admitting: STUDENT IN AN ORGANIZED HEALTH CARE EDUCATION/TRAINING PROGRAM
Payer: MEDICARE

## 2021-05-02 ENCOUNTER — APPOINTMENT (OUTPATIENT)
Dept: CT IMAGING | Facility: OTHER | Age: 66
End: 2021-05-02
Attending: STUDENT IN AN ORGANIZED HEALTH CARE EDUCATION/TRAINING PROGRAM
Payer: MEDICARE

## 2021-05-02 ENCOUNTER — APPOINTMENT (OUTPATIENT)
Dept: GENERAL RADIOLOGY | Facility: OTHER | Age: 66
End: 2021-05-02
Attending: STUDENT IN AN ORGANIZED HEALTH CARE EDUCATION/TRAINING PROGRAM
Payer: MEDICARE

## 2021-05-02 VITALS
DIASTOLIC BLOOD PRESSURE: 83 MMHG | WEIGHT: 175 LBS | TEMPERATURE: 98.2 F | HEART RATE: 65 BPM | SYSTOLIC BLOOD PRESSURE: 164 MMHG | BODY MASS INDEX: 29.88 KG/M2 | HEIGHT: 64 IN | OXYGEN SATURATION: 97 % | RESPIRATION RATE: 18 BRPM

## 2021-05-02 DIAGNOSIS — S52.502A CLOSED FRACTURE OF DISTAL END OF LEFT RADIUS, UNSPECIFIED FRACTURE MORPHOLOGY, INITIAL ENCOUNTER: ICD-10-CM

## 2021-05-02 PROCEDURE — 72125 CT NECK SPINE W/O DYE: CPT | Mod: ME

## 2021-05-02 PROCEDURE — 70450 CT HEAD/BRAIN W/O DYE: CPT | Mod: ME

## 2021-05-02 PROCEDURE — 73110 X-RAY EXAM OF WRIST: CPT | Mod: LT

## 2021-05-02 PROCEDURE — 99283 EMERGENCY DEPT VISIT LOW MDM: CPT | Mod: 25 | Performed by: STUDENT IN AN ORGANIZED HEALTH CARE EDUCATION/TRAINING PROGRAM

## 2021-05-02 PROCEDURE — 29125 APPL SHORT ARM SPLINT STATIC: CPT | Performed by: STUDENT IN AN ORGANIZED HEALTH CARE EDUCATION/TRAINING PROGRAM

## 2021-05-02 PROCEDURE — 250N000013 HC RX MED GY IP 250 OP 250 PS 637: Mod: GY | Performed by: STUDENT IN AN ORGANIZED HEALTH CARE EDUCATION/TRAINING PROGRAM

## 2021-05-02 PROCEDURE — 250N000009 HC RX 250: Performed by: STUDENT IN AN ORGANIZED HEALTH CARE EDUCATION/TRAINING PROGRAM

## 2021-05-02 PROCEDURE — 999N000065 XR WRIST PORTABLE LEFT G/E 3 VIEWS: Mod: LT

## 2021-05-02 PROCEDURE — 29125 APPL SHORT ARM SPLINT STATIC: CPT | Mod: LT | Performed by: STUDENT IN AN ORGANIZED HEALTH CARE EDUCATION/TRAINING PROGRAM

## 2021-05-02 PROCEDURE — 99284 EMERGENCY DEPT VISIT MOD MDM: CPT | Mod: 25 | Performed by: STUDENT IN AN ORGANIZED HEALTH CARE EDUCATION/TRAINING PROGRAM

## 2021-05-02 RX ORDER — OXYCODONE HYDROCHLORIDE 5 MG/1
5 TABLET ORAL ONCE
Status: COMPLETED | OUTPATIENT
Start: 2021-05-02 | End: 2021-05-02

## 2021-05-02 RX ORDER — OXYCODONE HYDROCHLORIDE 5 MG/1
5 TABLET ORAL EVERY 6 HOURS PRN
Qty: 12 TABLET | Refills: 0 | Status: SHIPPED | OUTPATIENT
Start: 2021-05-02 | End: 2021-05-05

## 2021-05-02 RX ORDER — LIDOCAINE HCL/EPINEPHRINE/PF 2%-1:200K
1 VIAL (ML) INJECTION ONCE
Status: DISCONTINUED | OUTPATIENT
Start: 2021-05-02 | End: 2021-05-02

## 2021-05-02 RX ORDER — ACETAMINOPHEN 500 MG
1000 TABLET ORAL ONCE
Status: COMPLETED | OUTPATIENT
Start: 2021-05-02 | End: 2021-05-02

## 2021-05-02 RX ADMIN — LIDOCAINE HYDROCHLORIDE 5 ML: 10 INJECTION, SOLUTION EPIDURAL; INFILTRATION; INTRACAUDAL; PERINEURAL at 11:46

## 2021-05-02 RX ADMIN — OXYCODONE HYDROCHLORIDE 5 MG: 5 TABLET ORAL at 09:26

## 2021-05-02 RX ADMIN — ACETAMINOPHEN 1000 MG: 500 TABLET, FILM COATED ORAL at 11:46

## 2021-05-02 RX ADMIN — OXYCODONE HYDROCHLORIDE 5 MG: 5 TABLET ORAL at 11:46

## 2021-05-02 ASSESSMENT — MIFFLIN-ST. JEOR: SCORE: 1323.79

## 2021-05-02 NOTE — ED TRIAGE NOTES
Pt was working in garden this morning when leg got caught in a tomato cage and landing on wrist and face. Pain to left wrist, abrasion across nose.

## 2021-05-02 NOTE — ED PROVIDER NOTES
History     Chief Complaint   Patient presents with     Wrist Pain       Mita Gabriel is a 65 year old female who presents with left wrist pain. Pain started immediately prior to arrival after tripping in her garden and landing on her ago after left hand and hitting her face on the ground.  Pain in her left wrist is rated 7/10 and was treated with home ibuprofen.  Denies loss of consciousness, headache, vomiting, vision change.  He does have chronic cervical neck pain, but does complain of some possibly new pain in her cervical region.  Denies numbness, tingling, weakness. Denies additional injury or pain in the surrounding joints.     No Known Allergies    Patient Active Problem List    Diagnosis Date Noted     Hyponatremia 10/02/2020     Priority: Medium     Mixed hyperlipidemia 06/12/2018     Priority: Medium     Special screening for malignant neoplasms, colon 06/09/2017     Priority: Medium     H/O non-ST elevation myocardial infarction (NSTEMI) 01/20/2015     Priority: Medium     Senile cataract 07/24/2012     Priority: Medium     Insomnia 02/08/2011     Priority: Medium     Obesity 02/08/2011     Priority: Medium     Motion sickness 02/08/2011     Priority: Medium     Hypothyroidism 12/04/2009     Priority: Medium     Abdominal pain 01/21/2009     Priority: Medium     Osteoarthritis of spine 10/21/2008     Priority: Medium     Dysthymic disorder 10/21/2008     Priority: Medium     Hypertension 10/21/2008     Priority: Medium     Lumbago 10/21/2008     Priority: Medium     History of alcohol abuse 10/16/2008     Priority: Medium     Anxiety state 10/16/2008     Priority: Medium       Past Medical History:   Diagnosis Date     Anxiety disorder      Closed fracture of left carpal bone      Essential (primary) hypertension      Hyperlipidemia      Hypothyroidism      Major depressive disorder, single episode      Postoperative nausea and vomiting      Transfusion history        Past Surgical History:    Procedure Laterality Date     CATARACT IOL, RT/LT Bilateral       SECTION       COLONOSCOPY N/A 2017    Follow up   hyperplastic     OPEN REDUCTION INTERNAL FIXATION WRIST      treating a fracture     THYROIDECTOMY       Partial       Family History   Problem Relation Age of Onset     No Known Problems Mother      Genitourinary Problems Father         Genitourinary Disease,kidney disease     No Known Problems Sister      No Known Problems Sister      No Known Problems Brother      No Known Problems Brother      No Known Problems Brother         mild cognitive delay     Cancer Maternal Grandmother         Bladder     Other - See Comments Paternal Grandmother 70        Stroke     Cancer Maternal Grandfather         Throat and lung     Other - See Comments Other         Family history of depression and anxiety     Anesthesia Reaction No family hx of         Anesthesia Problem,Notes no prior complications from anesthesia.     Breast Cancer No family hx of         Cancer-breast       Social History     Tobacco Use     Smoking status: Never Smoker     Smokeless tobacco: Never Used   Substance Use Topics     Alcohol use: No     Alcohol/week: 0.0 standard drinks     Drug use: No     Comment: Drug use: No       Medications:    aspirin EC 81 MG EC tablet  atorvastatin (LIPITOR) 20 MG tablet  gabapentin (NEURONTIN) 300 MG capsule  hydrochlorothiazide (HYDRODIURIL) 25 MG tablet  levothyroxine (SYNTHROID/LEVOTHROID) 50 MCG tablet  lisinopril (ZESTRIL) 5 MG tablet  LORazepam (ATIVAN) 0.5 MG tablet  methocarbamol (ROBAXIN) 500 MG tablet  metoprolol succinate ER (TOPROL-XL) 50 MG 24 hr tablet  oxyCODONE (ROXICODONE) 5 MG tablet  polyethylene glycol (MIRALAX) 17 GM/Dose powder  traZODone (DESYREL) 50 MG tablet  venlafaxine (EFFEXOR-XR) 150 MG 24 hr capsule  zolpidem ER (AMBIEN CR) 6.25 MG CR tablet  folic acid (FOLVITE) 1 MG tablet  magnesium 250 MG tablet  Multiple Vitamins-Minerals (MULTIVITAMIN ADULT  "PO)  POTASSIUM CHLORIDE PO  scopolamine (TRANSDERM) 1 MG/3DAYS 72 hr patch  thiamine 100 MG tablet  VITAMIN D PO      Review of Systems: See HPI for pertinent negatives and positives.    Physical Exam   BP (!) 164/83   Pulse 65   Temp 98.2  F (36.8  C) (Tympanic)   Resp 18   Ht 1.626 m (5' 4\")   Wt 79.4 kg (175 lb)   SpO2 97%   BMI 30.04 kg/m       General: awake, uncomfortable  HEENT: small abrasion over nose bridge and forehead, no facial bone tenderness  Respiratory: normal effort, clear to auscultation bilaterally  Cardiovascular: left radial and ulnar pulses 2+  Extremities: left wrist tender and with swelling. ROM and strength limited by pain. Wiggles all right hand digits. Adjacent hand and elbow nontender with full ROM.  MSK: trace cervical spine tenderness  Skin: warm, dry, no rashes, abrasions per above  Neuro: alert, sensation intact throughout left hand, CN 2-12 intact, normal FTN, right  and pedal strength 5/5, sensation intact all extremities, interacts appropriately  Psych: appropriate mood and affect    ED Course           Results for orders placed or performed during the hospital encounter of 05/02/21 (from the past 24 hour(s))   XR Wrist Left G/E 3 Views    Narrative    PROCEDURE:  XR WRIST LEFT G/E 3 VIEWS    HISTORY: fall, pain    COMPARISON:  None.    TECHNIQUE:  4 views of the left wrist were obtained.    FINDINGS:  There is an acute impacted fracture of the distal radius.  There is an old ununited ulnar styloid fracture. No carpal or  metacarpal fractures are seen. The articular spaces are normal in  height.       Impression    IMPRESSION: Acute impacted fracture of the distal radial metaphysis.      ANA SMALLWOOD MD   CT Head w/o Contrast    Narrative    PROCEDURE: CT HEAD W/O CONTRAST     HISTORY: Head trauma, minor (Age >= 65y).    COMPARISON: None.    TECHNIQUE:  Helical images of the head from the foramen magnum to the  vertex were obtained without contrast.    FINDINGS: The " ventricles and sulci are normal in volume. No acute  intracranial hemorrhage, mass effect, midline shift, hydrocephalus or  basilar cystern effacement are present.    The grey-white matter interface is preserved.    The calvarium is intact. The mastoid air cells are clear.  The  visualized paranasal sinuses are clear.      Impression    IMPRESSION: Normal brain      ANA SMALLWOOD MD   CT Cervical Spine w/o Contrast    Narrative    PROCEDURE: CT CERVICAL SPINE W/O CONTRAST 5/2/2021 9:16 AM    HISTORY: Neck trauma (Age >= 65y)    COMPARISONS: None.    Meds/Dose Given:    TECHNIQUE: CT scan of the cervical spine with sagittal and coronal  reconstructions    FINDINGS: There is decrease in height in the C3 C4 C4 C5 C5 C6 and  C6-C7 discs. Uncovertebral joint spurs encroaching on the neural  foramina of C4-C5 and C6 on the right is seen. Uncovertebral joint  spurs are seen encroaching on the neural foramina of the C5 and C6  nerve roots on the left. Mild cervical facet joint degenerative  changes are noted.    There are no fractures of the vertebral bodies or arches.    Atherosclerotic plaquing is seen at the carotid bifurcations. The  prevertebral soft tissues are otherwise normal.         Impression    IMPRESSION: Degenerative changes of the cervical spine. No acute  fracture.    ANA SMALLWOOD MD   XR Wrist Port Left G/E 3 Views    Narrative    PROCEDURE:  XR WRIST PORTABLE LEFT G/E 3 VIEWS    HISTORY: radial post-reduction    COMPARISON:  None.    TECHNIQUE:  3 views of the left wrist were obtained.    FINDINGS:  Is a fracture the distal radius. There is approximately 15  degrees of angulation convex toward the palmar surface of the hand.  There is an old ununited fracture of the ulnar styloid.      Impression    IMPRESSION: Distal radial fracture      ANA SMALLWOOD MD       Medications   oxyCODONE (ROXICODONE) tablet 5 mg (5 mg Oral Given 5/2/21 9027)   lidocaine 1 % 5 mL (5 mLs Intradermal Given 5/2/21 3600)    oxyCODONE (ROXICODONE) tablet 5 mg (5 mg Oral Given 5/2/21 1146)   acetaminophen (TYLENOL) tablet 1,000 mg (1,000 mg Oral Given 5/2/21 1146)       Assessments & Plan (with Medical Decision Making)     I have reviewed nursing notes.    Patient evaluated for left wrist pain. CMS intact. Adjacent joints unremarkable on exam. X-ray with distal radial fracture with impaction and what appears to be some dorsal angulation.  Reduction was attempted after a dose of oxycodone and distal radial lidocaine injection without radiographic change.  Sugar tong splint applied.  Ortho referral placed.  CMS remains intact afterwards.  Symptomatic treatment provided along with a short course of narcotics. Patient given instructions on follow-up and warning signs for which to return to ED. The patient was discharged in stable condition.    I have reviewed the findings, diagnosis, plan and need for any follow up with the patient.    Discharge Medication List as of 5/2/2021 11:52 AM      START taking these medications    Details   oxyCODONE (ROXICODONE) 5 MG tablet Take 1 tablet (5 mg) by mouth every 6 hours as needed for pain or severe pain, Disp-12 tablet, R-0, E-Prescribe             Final diagnoses:   Closed fracture of distal end of left radius, unspecified fracture morphology, initial encounter       5/2/2021   [facility]       Jagjit Escobar MD  05/02/21 1300

## 2021-05-02 NOTE — DISCHARGE INSTRUCTIONS
You have a fracture of your radial bone. Please take scheduled tylenol 1000 mg every 6 hours for the next 3 days. Do not exceed 4000 mg in a 24 hour period. Use oxycodone as needed for severe pain, and may be helpful before bedtime. Please follow up with orthopedic surgery referral. If fingers become numb or blue - please return to the ER.

## 2021-05-05 ENCOUNTER — TELEPHONE (OUTPATIENT)
Dept: FAMILY MEDICINE | Facility: OTHER | Age: 66
End: 2021-05-05

## 2021-05-05 DIAGNOSIS — S62.102A FRACTURE OF WRIST, LEFT, CLOSED, INITIAL ENCOUNTER: Primary | ICD-10-CM

## 2021-05-05 RX ORDER — OXYCODONE HYDROCHLORIDE 5 MG/1
5 TABLET ORAL EVERY 6 HOURS PRN
Qty: 12 TABLET | Refills: 0 | Status: SHIPPED | OUTPATIENT
Start: 2021-05-05 | End: 2021-05-11

## 2021-05-05 NOTE — TELEPHONE ENCOUNTER
Patient was seen in ED by Dr. Luz underwood on 5/2/2021. Patient is scheduled to see Orthopedic department on Friday 5/7/2021 and there is discussion of possible surgery on left wrist. Patient states that she will be out of her pain medication as of today. She is wondering about possible refill or to discuss pain management until her appointment date. Please call her back when available.     Caprice Rivers on 5/5/2021 at 10:24 AM

## 2021-05-05 NOTE — TELEPHONE ENCOUNTER
Refilled; would recommend starting to space out doses as she can tolerate.  Incorporating tylenol and ibuprofen more to decrease reliance on the pain medication.  Ella Baptiste, DO

## 2021-05-05 NOTE — TELEPHONE ENCOUNTER
Talked with patient and she is all out of her oxycodone 5 mg tablets. She is wondering if she can get some more before her ortho appointment this Friday?  Kandice Suazo, MECHELLE, LPN  5/5/2021  10:58 AM

## 2021-05-07 ENCOUNTER — OFFICE VISIT (OUTPATIENT)
Dept: ORTHOPEDICS | Facility: OTHER | Age: 66
End: 2021-05-07
Attending: STUDENT IN AN ORGANIZED HEALTH CARE EDUCATION/TRAINING PROGRAM
Payer: MEDICARE

## 2021-05-07 VITALS — DIASTOLIC BLOOD PRESSURE: 76 MMHG | HEART RATE: 64 BPM | SYSTOLIC BLOOD PRESSURE: 124 MMHG

## 2021-05-07 DIAGNOSIS — S52.502A CLOSED FRACTURE OF DISTAL END OF LEFT RADIUS, UNSPECIFIED FRACTURE MORPHOLOGY, INITIAL ENCOUNTER: ICD-10-CM

## 2021-05-07 PROCEDURE — G0463 HOSPITAL OUTPT CLINIC VISIT: HCPCS

## 2021-05-07 PROCEDURE — 99203 OFFICE O/P NEW LOW 30 MIN: CPT | Performed by: SPECIALIST

## 2021-05-07 NOTE — PROGRESS NOTES
Visit Date: 2021    HISTORY OF PRESENT ILLNESS:  Mita is a 65-year-old, right-hand dominant, retired female.  I am seeing today for evaluation of her left wrist.  The patient fell in the garden approximately 2021, sustaining a left distal radius fracture.  Her orthopedic history is complicated by previous left distal radius fracture.  This one sounds like it was an open fracture.  She is seen today for evaluation of this.  No numbness or tingling.    Past medical history, past surgical history, medications, and ALLERGIES were reviewed.    PHYSICAL EXAMINATION:  Shows a 65-year-old female, alert and oriented x 3 and appropriate.  Gait and station are appropriate, well-groomed and well-kempt.  Examination of both upper extremities reveals full and symmetric range of motion, shoulders and left elbow.  Wrist is splinted.  Her sensation is intact.    X-rays, AP, lateral, and oblique views left wrist were viewed and showed displaced extraarticular fracture of the distal radius.  Pre and post-reduction films were reviewed.  They showed no change in position of 30 degrees apex dorsal angulation of the articular surface.    IMPRESSION:  Displaced left distal radius fracture.  We discussed options at length.  Previous history of fracture makes it difficult to tell how much of the deformity is new but based on the position, we recommended operative intervention.  Risks, complications, and benefits were reviewed, and this can be scheduled at her convenience.    Lobo Sky MD        D: 2021   T: 2021   MT: [facility]    Name:     MITA MCKEON  MRN:      [id number]        Account:    [id number]   :      1955           Visit Date: 2021     Document: [id number]

## 2021-05-07 NOTE — PROGRESS NOTES
Patient is here for consult on her left wrist fracture.   Purnima Hernandez LPN .....................5/7/2021 10:08 AM

## 2021-05-10 ENCOUNTER — ALLIED HEALTH/NURSE VISIT (OUTPATIENT)
Dept: FAMILY MEDICINE | Facility: OTHER | Age: 66
End: 2021-05-10
Attending: SPECIALIST
Payer: MEDICARE

## 2021-05-10 DIAGNOSIS — S52.502A CLOSED FRACTURE OF DISTAL END OF LEFT RADIUS, UNSPECIFIED FRACTURE MORPHOLOGY, INITIAL ENCOUNTER: ICD-10-CM

## 2021-05-10 LAB
SARS-COV-2 RNA RESP QL NAA+PROBE: NORMAL
SPECIMEN SOURCE: NORMAL

## 2021-05-10 PROCEDURE — U0005 INFEC AGEN DETEC AMPLI PROBE: HCPCS | Mod: ZL | Performed by: SPECIALIST

## 2021-05-10 PROCEDURE — C9803 HOPD COVID-19 SPEC COLLECT: HCPCS

## 2021-05-10 PROCEDURE — U0003 INFECTIOUS AGENT DETECTION BY NUCLEIC ACID (DNA OR RNA); SEVERE ACUTE RESPIRATORY SYNDROME CORONAVIRUS 2 (SARS-COV-2) (CORONAVIRUS DISEASE [COVID-19]), AMPLIFIED PROBE TECHNIQUE, MAKING USE OF HIGH THROUGHPUT TECHNOLOGIES AS DESCRIBED BY CMS-2020-01-R: HCPCS | Mod: ZL | Performed by: SPECIALIST

## 2021-05-10 NOTE — NURSING NOTE
Patient swabbed for COVID-19 testing.  Karla Diane LPN on 5/10/2021 at 10:58 AM    Surgery  Dr. Jose Daniel COLEMAN

## 2021-05-11 ENCOUNTER — OFFICE VISIT (OUTPATIENT)
Dept: FAMILY MEDICINE | Facility: OTHER | Age: 66
End: 2021-05-11
Attending: PHYSICIAN ASSISTANT
Payer: MEDICARE

## 2021-05-11 VITALS
HEIGHT: 64 IN | DIASTOLIC BLOOD PRESSURE: 88 MMHG | RESPIRATION RATE: 20 BRPM | OXYGEN SATURATION: 98 % | TEMPERATURE: 98.4 F | HEART RATE: 89 BPM | SYSTOLIC BLOOD PRESSURE: 122 MMHG | WEIGHT: 176.2 LBS | BODY MASS INDEX: 30.08 KG/M2

## 2021-05-11 DIAGNOSIS — I10 ESSENTIAL HYPERTENSION: ICD-10-CM

## 2021-05-11 DIAGNOSIS — E03.9 HYPOTHYROIDISM, UNSPECIFIED TYPE: ICD-10-CM

## 2021-05-11 DIAGNOSIS — Z01.818 PREOP GENERAL PHYSICAL EXAM: Primary | ICD-10-CM

## 2021-05-11 DIAGNOSIS — S52.502D CLOSED FRACTURE OF DISTAL END OF LEFT RADIUS WITH ROUTINE HEALING, UNSPECIFIED FRACTURE MORPHOLOGY, SUBSEQUENT ENCOUNTER: ICD-10-CM

## 2021-05-11 PROBLEM — E87.1 HYPONATREMIA: Status: RESOLVED | Noted: 2020-10-02 | Resolved: 2021-05-11

## 2021-05-11 LAB
ANION GAP SERPL CALCULATED.3IONS-SCNC: 8 MMOL/L (ref 3–14)
BUN SERPL-MCNC: 8 MG/DL (ref 7–25)
CALCIUM SERPL-MCNC: 9.9 MG/DL (ref 8.6–10.3)
CHLORIDE SERPL-SCNC: 92 MMOL/L (ref 98–107)
CO2 SERPL-SCNC: 28 MMOL/L (ref 21–31)
CREAT SERPL-MCNC: 0.7 MG/DL (ref 0.6–1.2)
GFR SERPL CREATININE-BSD FRML MDRD: 84 ML/MIN/{1.73_M2}
GLUCOSE SERPL-MCNC: 126 MG/DL (ref 70–105)
HGB BLD-MCNC: 13.1 G/DL (ref 11.7–15.7)
INTERPRETATION ECG - MUSE: NORMAL
LABORATORY COMMENT REPORT: NORMAL
POTASSIUM SERPL-SCNC: 3.9 MMOL/L (ref 3.5–5.1)
SARS-COV-2 RNA RESP QL NAA+PROBE: NEGATIVE
SODIUM SERPL-SCNC: 128 MMOL/L (ref 134–144)
SPECIMEN SOURCE: NORMAL

## 2021-05-11 PROCEDURE — 93010 ELECTROCARDIOGRAM REPORT: CPT | Performed by: INTERNAL MEDICINE

## 2021-05-11 PROCEDURE — G0463 HOSPITAL OUTPT CLINIC VISIT: HCPCS

## 2021-05-11 PROCEDURE — 93005 ELECTROCARDIOGRAM TRACING: CPT

## 2021-05-11 PROCEDURE — 80048 BASIC METABOLIC PNL TOTAL CA: CPT | Mod: ZL | Performed by: PHYSICIAN ASSISTANT

## 2021-05-11 PROCEDURE — 85018 HEMOGLOBIN: CPT | Mod: ZL | Performed by: PHYSICIAN ASSISTANT

## 2021-05-11 PROCEDURE — 36415 COLL VENOUS BLD VENIPUNCTURE: CPT | Mod: ZL | Performed by: PHYSICIAN ASSISTANT

## 2021-05-11 PROCEDURE — 99214 OFFICE O/P EST MOD 30 MIN: CPT | Performed by: PHYSICIAN ASSISTANT

## 2021-05-11 ASSESSMENT — MIFFLIN-ST. JEOR: SCORE: 1329.24

## 2021-05-11 ASSESSMENT — PAIN SCALES - GENERAL: PAINLEVEL: MODERATE PAIN (4)

## 2021-05-11 NOTE — NURSING NOTE
Chief Complaint   Patient presents with     Pre-Op Exam         Medication Reconciliation: complete    Rossana Ortiz, LPN

## 2021-05-11 NOTE — PROGRESS NOTES
[facility] AND [facility]  [address]  [place] [address]  Phone: [phone]  Fax: [phone]  Primary Provider: Ella Baptiste  Pre-op Performing Provider: TENISHA HINES      PREOPERATIVE EVALUATION:  Today's date: 5/11/2021    Mita Gabriel is a 65 year old female who presents for a preoperative evaluation.    Surgical Information:  Surgery/Procedure: Wrist Open Reduction Internal Fixation  Surgery Location: [facility]  Surgeon: Dr. Sky  Surgery Date: 5/14/21  Time of Surgery:   Where patient plans to recover: At home with family  Fax number for surgical facility: Note does not need to be faxed, will be available electronically in Epic.    Type of Anesthesia Anticipated: to be determined    Assessment & Plan     The proposed surgical procedure is considered INTERMEDIATE risk.    Problem List Items Addressed This Visit        Endocrine    Hypothyroidism       Circulatory    Hypertension    Relevant Orders    Hemoglobin (Completed)    Basic Metabolic Panel (Completed)      Other Visit Diagnoses     Preop general physical exam    -  Primary    Relevant Orders    EKG 12-lead, tracing only (Completed)    Hemoglobin (Completed)    Basic Metabolic Panel (Completed)    Closed fracture of distal end of left radius with routine healing, unspecified fracture morphology, subsequent encounter              Completed hemoglobin and BMP for monitoring prior to surgery.  Labs are stable.  Review through previous cardiology notes from 2015.  Completed an EKG which showed normal sinus rhythm.  Unchanged as compared to previous EKGs.  No acute concerns prior to surgery.  Consulted with Dr. Garcia in internal medicine.    Patient sodium level is found to be decreased. This is likely due to recurrent diarrhea episodes. Patient has increased rest currently with the fracture and is nervous about having the surgery. Having diarrhea due to increased stress this week after the injury. Highly  encouraged increased fluid intake with electrolytes in order to normalize her sodium level.    Risks and Recommendations:  The patient has the following additional risks and recommendations for perioperative complications:   - No identified additional risk factors other than previously addressed    Medication Instructions:  Patient Instructions     Patient should take the following medications the morning of surgery with a small sip of water: Hydrochlorothiazide, lisinopril, metoprolol.  Patient was instructed to hold the following medications the morning of surgery: Hold all other medications.     Patient was advised to call our office and the surgical services with any change in condition or new symptoms if they were to develop between today and their surgical date.  Especially any cardiopulmonary symptoms or symptoms concerning for an infection.     Discontinue aspirin, aleve, naproxen and ibuprofen 7 days prior to surgery to reduce bleeding risk.  It is fine to take tylenol the week before surgery.  Hold vitamins and herbal remedies for 7 days before surgery.      Preparing for Your Surgery  Getting started  A nurse will call you to review your health history and instructions. They will give you an arrival time based on your scheduled surgery time.  Please be ready to share the following:    Your doctor's clinic name and phone number    Your medical, surgical and anesthesia history    A list of allergies and sensitivities    A list of medicines, including herbal treatments and over-the-counter drugs    Whether the patient has a legal guardian (ask how to send us the papers in advance)  If you have a child who's having surgery, please ask for a copy of Preparing for Your Child's Surgery.    Preparing for surgery    Within 30 days of surgery: Have a pre-op exam (sometimes called an H&P, or History and Physical). This can be done at a clinic or pre-operative center.  ? If you're having a , you may not need  this exam. Talk to your care team    At your pre-op exam, talk to your care team about all medicines you take. If you need to stop any medicines before surgery, ask when to start taking them again.  ? We do this for your safety. Many medicines can make you bleed too much during surgery. Some change how well surgery (anesthesia) drugs work.    Call your insurance company to let them know you're having surgery. (If you don't have insurance, call [phone].)    Call your clinic if there's any change in your health. This includes signs of a cold or flu (sore throat, runny nose, cough, rash, fever). It also includes a scrape or scratch near the surgery site.    If you have questions on the day of surgery, call your hospital or surgery center.  Eating and drinking guidelines  For your safety: Unless your surgeon tells you otherwise, follow the guidelines below.    Eat and drink as usual until 8 hours before surgery. After that, no food or milk.    Drink clear liquids until 2 hours before surgery. These are liquids you can see through, like water, Gatorade and Propel Water. You may also have black coffee and tea (no cream or milk).    Nothing by mouth within 2 hours of surgery. This includes gum, candy and breath mints.    If you drink, stop drinking alcohol the night before surgery.    If your care team tells you to take medicine on the morning of surgery, it's okay to take it with a sip of water.  Preventing infection    Shower or bathe the night before and morning of your surgery. Follow the instructions your clinic gave you. (If no instructions, use regular soap.)    Don't shave or clip hair near your surgery site. We'll remove the hair if needed.    Don't smoke or vape the morning of surgery. You may chew nicotine gum up to 2 hours before surgery. A nicotine patch is okay.  ? Note: Some surgeries require you to completely quit smoking and nicotine. Check with your surgeon.    Your care team will make every effort  to keep you safe from infection. We will:  ? Clean our hands often with soap and water (or an alcohol-based hand rub).  ? Clean the skin at your surgery site with a special soap that kills germs.  ? Give you a special gown to keep you warm. (Cold raises the risk of infection.)  ? Wear special hair covers, masks, gowns and gloves during surgery.  ? Give antibiotic medicine, if prescribed. Not all surgeries need antibiotics.  What to bring on the day of surgery    Photo ID and insurance card    Copy of your health care directive, if you have one    Glasses and hearing aides (bring cases)  ? You can't wear contacts during surgery    Inhaler and eye drops, if you use them (tell us about these when you arrive)    CPAP machine or breathing device, if you use them    A few personal items, if spending the night    If you have . . .  ? A pacemaker or ICD (cardiac defibrillator): Bring the ID card.  ? An implanted stimulator: Bring the remote control.  ? A legal guardian: Bring a copy of the certified (court-stamped) guardianship papers.  Please remove any jewelry, including body piercings. Leave jewelry and other valuables at home.  If you're going home the day of surgery  Important: If you don't follow the rules below, we must cancel your surgery.     Arrange for someone to drive you home after surgery. You may not drive, take a taxi or take public transportation by yourself (unless you'll have local anesthesia only).    Arrange for a responsible adult to stay with you overnight. If you don't, we may keep you in the hospital overnight, and you may need to pay the costs yourself.  Questions?   If you have any questions for your care team, list them here:  _________________________________________________________________________________________________________________________________________________________________________________________________________________________________________________________________________________________________________________________  For informational purposes only. Not to replace the advice of your health care provider. Copyright   2003, 2019 [facility]. All rights reserved. Clinically reviewed by Amy Louis MD. [employer] 124395 - REV 4/20.       RECOMMENDATION:  APPROVAL GIVEN to proceed with proposed procedure, without further diagnostic evaluation.    Review of external notes as documented above     Discussion of management or test interpretation with external physician/other qualified healthcare professional/appropriate source - Dr. Garcia      37 minutes spent on the date of the encounter doing chart review, history and exam, documentation and further activities per the note        Subjective     HPI related to upcoming procedure: Patient had a fall on Sunday morning 5/9 in the garden. Unfortunately she fractured the distal left radius. Scheduled for surgical repair.    Preop Questions 5/11/2021   1. Have you ever had a heart attack or stroke? YES - micro heart attack in 2015   2. Have you ever had surgery on your heart or blood vessels, such as a stent placement, a coronary artery bypass, or surgery on an artery in your head, neck, heart, or legs? No   3. Do you have chest pain with activity? No   4. Do you have a history of  heart failure? No   5. Do you currently have a cold, bronchitis or symptoms of other infection? No   6. Do you have a cough, shortness of breath, or wheezing? YES -  chronic cough off and on for a few few years, worse for a few months   7. Do you or anyone in your family have previous history of blood clots? No   8. Do you or does anyone in your family have a serious bleeding problem such as  prolonged bleeding following surgeries or cuts? No   9. Have you ever had problems with anemia or been told to take iron pills? No   10. Have you had any abnormal blood loss such as black, tarry or bloody stools, or abnormal vaginal bleeding? No   11. Have you ever had a blood transfusion? YES - with childbirth   11a. Have you ever had a transfusion reaction? No   12. Are you willing to have a blood transfusion if it is medically needed before, during, or after your surgery? Yes   13. Have you or any of your relatives ever had problems with anesthesia? Yes - nauseas with anesthesia   14. Do you have sleep apnea, excessive snoring or daytime drowsiness? No   15. Do you have any artifical heart valves or other implanted medical devices like a pacemaker, defibrillator, or continuous glucose monitor? No   16. Do you have artificial joints? No   17. Are you allergic to latex? No       Health Care Directive:  Patient has a Health Care Directive on file      Preoperative Review of :   reviewed - controlled substances reflected in medication list.      Status of Chronic Conditions:  HYPERLIPIDEMIA - Patient has a long history of significant Hyperlipidemia requiring medication for treatment with recent good control. Patient reports no problems or side effects with the medication.     HYPERTENSION - Patient has longstanding history of HTN , currently denies any symptoms referable to elevated blood pressure. Specifically denies chest pain, palpitations, dyspnea, orthopnea, PND or peripheral edema. Blood pressure readings have been in normal range. Current medication regimen is as listed below. Patient denies any side effects of medication.     HYPOTHYROIDISM - Patient has a longstanding history of chronic Hypothyroidism. Patient has been doing well, noting no tremor, insomnia, hair loss or changes in skin texture. Continues to take medications as directed, without adverse reactions or side effects. Last TSH No results  found for: TSH.       Heart disease: Patient has history of having a non-STEMI in 2015.  Patient had a thorough work-up completed by cardiology.  She ended up having a stress echo completed on 9/22/2015 which was negative for ischemia.  Since then patient has been doing well.  No chest pain, shortness of breath, wheezing.  Patient is very active.  She does not have chest pain or shortness of breath with exercise.  Able to make it up a flight of stairs without having chest pain or time breathing.  She does have occasional palpitations that last for a few seconds.  She has had palpitations since her 20s.  They are less frequent as compared to her 20s.  Patient does have a large history of anxiety which may have attributed to her symptoms in 2015.    Patient has tolerated surgery well in the past.  Patient has no recent cough or cold symptoms.  No recent fevers, chills, sore throat, ear pain, chest pain, palpitations, problems breathing, stomachaches, nausea, vomiting, constipation, blood in stool or urine, dysuria, frequency, urgency.  Patient has noticed some diarrhea since the accident.  She feels increasingly anxious which is upsetting her stomach.    Patient can walk up a flight of stairs without becoming short of breath or having chest pain: YES   Patient is able to tolerate greater than 4 METs of activity without any cardiopulmonary symptoms.        Review of Systems  CONSTITUTIONAL: NEGATIVE for fever, chills, change in weight  INTEGUMENTARY/SKIN: NEGATIVE for worrisome rashes, moles or lesions  EYES: NEGATIVE for vision changes or irritation  ENT/MOUTH: NEGATIVE for ear, mouth and throat problems  RESP: NEGATIVE for significant cough or SOB  BREAST: NEGATIVE for masses, tenderness or discharge  CV: NEGATIVE for chest pain, palpitations or peripheral edema  GI: NEGATIVE for nausea, abdominal pain, heartburn, or change in bowel habits  : NEGATIVE for frequency, dysuria, or hematuria  MUSCULOSKELETAL: NEGATIVE  for significant arthralgias or myalgia  NEURO: NEGATIVE for weakness, dizziness or paresthesias  ENDOCRINE: NEGATIVE for temperature intolerance, skin/hair changes  HEME: NEGATIVE for bleeding problems  PSYCHIATRIC: NEGATIVE for changes in mood or affect    Patient Active Problem List    Diagnosis Date Noted     Mixed hyperlipidemia 2018     Priority: Medium     Special screening for malignant neoplasms, colon 2017     Priority: Medium     H/O non-ST elevation myocardial infarction (NSTEMI) 2015     Priority: Medium     Senile cataract 2012     Priority: Medium     Insomnia 2011     Priority: Medium     Obesity 2011     Priority: Medium     Motion sickness 2011     Priority: Medium     Hypothyroidism 2009     Priority: Medium     Abdominal pain 2009     Priority: Medium     Osteoarthritis of spine 10/21/2008     Priority: Medium     Dysthymic disorder 10/21/2008     Priority: Medium     Hypertension 10/21/2008     Priority: Medium     Lumbago 10/21/2008     Priority: Medium     History of alcohol abuse 10/16/2008     Priority: Medium     Anxiety state 10/16/2008     Priority: Medium      Past Medical History:   Diagnosis Date     Anxiety disorder      Closed fracture of left carpal bone      Essential (primary) hypertension      Hyperlipidemia      Hypothyroidism      Major depressive disorder, single episode      Postoperative nausea and vomiting      Transfusion history     with delivery of son     Past Surgical History:   Procedure Laterality Date     CATARACT IOL, RT/LT Bilateral       SECTION       COLONOSCOPY N/A 2017    Follow up   hyperplastic     OPEN REDUCTION INTERNAL FIXATION WRIST      treating a fracture     THYROIDECTOMY       Partial     Current Outpatient Medications   Medication Sig Dispense Refill     aspirin EC 81 MG EC tablet Take 81 mg by mouth daily with food       atorvastatin (LIPITOR) 20 MG tablet Take 1 tablet (20  mg) by mouth daily 90 tablet 4     folic acid (FOLVITE) 1 MG tablet Take 1 mg by mouth daily       gabapentin (NEURONTIN) 300 MG capsule TAKE 1 CAPSULE BY MOUTH THREE TIMES DAILY 270 capsule 4     hydrochlorothiazide (HYDRODIURIL) 25 MG tablet Take 0.5 tablets (12.5 mg) by mouth daily 90 tablet 4     levothyroxine (SYNTHROID/LEVOTHROID) 50 MCG tablet Take 1 tablet (50 mcg) by mouth every morning (before breakfast) 90 tablet 4     lisinopril (ZESTRIL) 5 MG tablet Take 1 tablet (5 mg) by mouth daily 90 tablet 4     LORazepam (ATIVAN) 0.5 MG tablet TAKE 1 TABLET BY MOUTH THREE TIMES DAILY 90 tablet 5     magnesium 250 MG tablet Take 1 tablet by mouth daily       methocarbamol (ROBAXIN) 500 MG tablet TAKE 1-2 TABLET BY MOUTH FOUR TIMES DAILY 120 tablet 5     metoprolol succinate ER (TOPROL-XL) 50 MG 24 hr tablet Take 1 tablet (50 mg) by mouth daily 90 tablet 4     Multiple Vitamins-Minerals (MULTIVITAMIN ADULT PO)        polyethylene glycol (MIRALAX) 17 GM/Dose powder Take 17 g (1 capful) by mouth 2 times daily as needed for constipation 578 g 2     POTASSIUM CHLORIDE PO        scopolamine (TRANSDERM) 1 MG/3DAYS 72 hr patch APPLY 1 PATCH EVERY 72 HOURS FOR MOTION SICKNESS 30 patch 1     thiamine 100 MG tablet Take 100 mg by mouth daily       traZODone (DESYREL) 50 MG tablet TAKE 1-2 TABLETS BY MOUTH AT BEDTIME 180 tablet 4     venlafaxine (EFFEXOR-XR) 150 MG 24 hr capsule Take 1 capsule (150 mg) by mouth every morning 90 capsule 4     VITAMIN D PO        zolpidem ER (AMBIEN CR) 6.25 MG CR tablet TAKE 1 TABLET BY MOUTH AT BEDTIME 30 tablet 5       No Known Allergies     Social History     Tobacco Use     Smoking status: Never Smoker     Smokeless tobacco: Never Used   Substance Use Topics     Alcohol use: No     Alcohol/week: 0.0 standard drinks     Family History   Problem Relation Age of Onset     Heart Disease Mother      Genitourinary Problems Father         Genitourinary Disease,kidney disease     Heart Disease  "Father         MI     Depression Sister      No Known Problems Sister      Depression Brother      Bipolar Disorder Brother      No Known Problems Brother         mild cognitive delay     Cancer Maternal Grandmother         Bladder     Other - See Comments Paternal Grandmother 70        Stroke     Cancer Maternal Grandfather         Throat and lung     Other - See Comments Other         Family history of depression and anxiety     Anesthesia Reaction No family hx of         Anesthesia Problem,Notes no prior complications from anesthesia.     Breast Cancer No family hx of         Cancer-breast     History   Drug Use No         Objective     /88 (BP Location: Right arm, Patient Position: Sitting, Cuff Size: Adult Regular)   Pulse 89   Temp 98.4  F (36.9  C)   Resp 20   Ht 1.626 m (5' 4\")   Wt 79.9 kg (176 lb 3.2 oz)   SpO2 98%   BMI 30.24 kg/m      Physical Exam    GENERAL APPEARANCE: healthy, alert and no distress     EYES: EOMI, PERRL     HENT: ear canals and TM's normal and nose and mouth without ulcers or lesions     NECK: no adenopathy, no asymmetry, masses, or scars and thyroid normal to palpation     RESP: lungs clear to auscultation - no rales, rhonchi or wheezes     CV: regular rates and rhythm, normal S1 S2, no S3 or S4 and no murmur, click or rub     ABDOMEN:  soft, nontender, no HSM or masses and bowel sounds normal     MS: extremities normal- no gross deformities noted, no evidence of inflammation in joints, FROM in all extremities.     SKIN: no suspicious lesions or rashes     NEURO: Normal strength and tone, sensory exam grossly normal, mentation intact and speech normal     PSYCH: mentation appears normal. and affect normal/bright     LYMPHATICS: No cervical adenopathy    Recent Labs   Lab Test 11/19/20  1241 09/08/20  1334   HGB  --  13.1   PLT  --  254    128*   POTASSIUM 4.4 3.9   CR 0.90 0.81        Diagnostics:  Recent Results (from the past 24 hour(s))   EKG 12-lead, tracing " only    Collection Time: 05/11/21 12:21 PM   Result Value Ref Range    Interpretation ECG Click View Image link to view waveform and result    Hemoglobin    Collection Time: 05/11/21 12:22 PM   Result Value Ref Range    Hemoglobin 13.1 11.7 - 15.7 g/dL   Basic Metabolic Panel    Collection Time: 05/11/21 12:22 PM   Result Value Ref Range    Sodium 128 (L) 134 - 144 mmol/L    Potassium 3.9 3.5 - 5.1 mmol/L    Chloride 92 (L) 98 - 107 mmol/L    Carbon Dioxide 28 21 - 31 mmol/L    Anion Gap 8 3 - 14 mmol/L    Glucose 126 (H) 70 - 105 mg/dL    Urea Nitrogen 8 7 - 25 mg/dL    Creatinine 0.70 0.60 - 1.20 mg/dL    GFR Estimate 84 >60 mL/min/[1.73_m2]    GFR Estimate If Black >90 >60 mL/min/[1.73_m2]    Calcium 9.9 8.6 - 10.3 mg/dL      EKG: Normal Sinus Rhythm, unchanged from previous tracings    Revised Cardiac Risk Index (RCRI):  The patient has the following serious cardiovascular risks for perioperative complications:   - Coronary Artery Disease (MI, positive stress test, angina, Qs on EKG) = 1 point     RCRI Interpretation: 1 point: Class II (low risk - 0.9% complication rate)           Signed Electronically by: Shobha Enamorado PA-C  Copy of this evaluation report is provided to requesting physician.

## 2021-05-11 NOTE — PATIENT INSTRUCTIONS
Patient should take the following medications the morning of surgery with a small sip of water: Hydrochlorothiazide, lisinopril, metoprolol.  Patient was instructed to hold the following medications the morning of surgery: Hold all other medications.     Patient was advised to call our office and the surgical services with any change in condition or new symptoms if they were to develop between today and their surgical date.  Especially any cardiopulmonary symptoms or symptoms concerning for an infection.     Discontinue aspirin, aleve, naproxen and ibuprofen 7 days prior to surgery to reduce bleeding risk.  It is fine to take tylenol the week before surgery.  Hold vitamins and herbal remedies for 7 days before surgery.      Preparing for Your Surgery  Getting started  A nurse will call you to review your health history and instructions. They will give you an arrival time based on your scheduled surgery time.  Please be ready to share the following:    Your doctor's clinic name and phone number    Your medical, surgical and anesthesia history    A list of allergies and sensitivities    A list of medicines, including herbal treatments and over-the-counter drugs    Whether the patient has a legal guardian (ask how to send us the papers in advance)  If you have a child who's having surgery, please ask for a copy of Preparing for Your Child's Surgery.    Preparing for surgery    Within 30 days of surgery: Have a pre-op exam (sometimes called an H&P, or History and Physical). This can be done at a clinic or pre-operative center.  ? If you're having a , you may not need this exam. Talk to your care team    At your pre-op exam, talk to your care team about all medicines you take. If you need to stop any medicines before surgery, ask when to start taking them again.  ? We do this for your safety. Many medicines can make you bleed too much during surgery. Some change how well surgery (anesthesia) drugs work.    Call  your insurance company to let them know you're having surgery. (If you don't have insurance, call [phone].)    Call your clinic if there's any change in your health. This includes signs of a cold or flu (sore throat, runny nose, cough, rash, fever). It also includes a scrape or scratch near the surgery site.    If you have questions on the day of surgery, call your hospital or surgery center.  Eating and drinking guidelines  For your safety: Unless your surgeon tells you otherwise, follow the guidelines below.    Eat and drink as usual until 8 hours before surgery. After that, no food or milk.    Drink clear liquids until 2 hours before surgery. These are liquids you can see through, like water, Gatorade and Propel Water. You may also have black coffee and tea (no cream or milk).    Nothing by mouth within 2 hours of surgery. This includes gum, candy and breath mints.    If you drink, stop drinking alcohol the night before surgery.    If your care team tells you to take medicine on the morning of surgery, it's okay to take it with a sip of water.  Preventing infection    Shower or bathe the night before and morning of your surgery. Follow the instructions your clinic gave you. (If no instructions, use regular soap.)    Don't shave or clip hair near your surgery site. We'll remove the hair if needed.    Don't smoke or vape the morning of surgery. You may chew nicotine gum up to 2 hours before surgery. A nicotine patch is okay.  ? Note: Some surgeries require you to completely quit smoking and nicotine. Check with your surgeon.    Your care team will make every effort to keep you safe from infection. We will:  ? Clean our hands often with soap and water (or an alcohol-based hand rub).  ? Clean the skin at your surgery site with a special soap that kills germs.  ? Give you a special gown to keep you warm. (Cold raises the risk of infection.)  ? Wear special hair covers, masks, gowns and gloves during  surgery.  ? Give antibiotic medicine, if prescribed. Not all surgeries need antibiotics.  What to bring on the day of surgery    Photo ID and insurance card    Copy of your health care directive, if you have one    Glasses and hearing aides (bring cases)  ? You can't wear contacts during surgery    Inhaler and eye drops, if you use them (tell us about these when you arrive)    CPAP machine or breathing device, if you use them    A few personal items, if spending the night    If you have . . .  ? A pacemaker or ICD (cardiac defibrillator): Bring the ID card.  ? An implanted stimulator: Bring the remote control.  ? A legal guardian: Bring a copy of the certified (court-stamped) guardianship papers.  Please remove any jewelry, including body piercings. Leave jewelry and other valuables at home.  If you're going home the day of surgery  Important: If you don't follow the rules below, we must cancel your surgery.     Arrange for someone to drive you home after surgery. You may not drive, take a taxi or take public transportation by yourself (unless you'll have local anesthesia only).    Arrange for a responsible adult to stay with you overnight. If you don't, we may keep you in the hospital overnight, and you may need to pay the costs yourself.  Questions?   If you have any questions for your care team, list them here: _________________________________________________________________________________________________________________________________________________________________________________________________________________________________________________________________________________________________________________________  For informational purposes only. Not to replace the advice of your health care provider. Copyright   2003, 2019 [employer]. All rights reserved. Clinically reviewed by Amy Louis MD. [employer] 087073 - REV 4/20.

## 2021-05-11 NOTE — H&P (VIEW-ONLY)
[facility] AND [facility]  [address]  [place] [address]  Phone: [phone]  Fax: [phone]  Primary Provider: Ella Baptiste  Pre-op Performing Provider: TENISHA HINES      PREOPERATIVE EVALUATION:  Today's date: 5/11/2021    Mita Gabriel is a 65 year old female who presents for a preoperative evaluation.    Surgical Information:  Surgery/Procedure: Wrist Open Reduction Internal Fixation  Surgery Location: [facility]  Surgeon: Dr. Sky  Surgery Date: 5/14/21  Time of Surgery:   Where patient plans to recover: At home with family  Fax number for surgical facility: Note does not need to be faxed, will be available electronically in Epic.    Type of Anesthesia Anticipated: to be determined    Assessment & Plan     The proposed surgical procedure is considered INTERMEDIATE risk.    Problem List Items Addressed This Visit        Endocrine    Hypothyroidism       Circulatory    Hypertension    Relevant Orders    Hemoglobin (Completed)    Basic Metabolic Panel (Completed)      Other Visit Diagnoses     Preop general physical exam    -  Primary    Relevant Orders    EKG 12-lead, tracing only (Completed)    Hemoglobin (Completed)    Basic Metabolic Panel (Completed)    Closed fracture of distal end of left radius with routine healing, unspecified fracture morphology, subsequent encounter              Completed hemoglobin and BMP for monitoring prior to surgery.  Labs are stable.  Review through previous cardiology notes from 2015.  Completed an EKG which showed normal sinus rhythm.  Unchanged as compared to previous EKGs.  No acute concerns prior to surgery.  Consulted with Dr. Garcia in internal medicine.    Patient sodium level is found to be decreased. This is likely due to recurrent diarrhea episodes. Patient has increased rest currently with the fracture and is nervous about having the surgery. Having diarrhea due to increased stress this week after the injury. Highly  encouraged increased fluid intake with electrolytes in order to normalize her sodium level.    Risks and Recommendations:  The patient has the following additional risks and recommendations for perioperative complications:   - No identified additional risk factors other than previously addressed    Medication Instructions:  Patient Instructions     Patient should take the following medications the morning of surgery with a small sip of water: Hydrochlorothiazide, lisinopril, metoprolol.  Patient was instructed to hold the following medications the morning of surgery: Hold all other medications.     Patient was advised to call our office and the surgical services with any change in condition or new symptoms if they were to develop between today and their surgical date.  Especially any cardiopulmonary symptoms or symptoms concerning for an infection.     Discontinue aspirin, aleve, naproxen and ibuprofen 7 days prior to surgery to reduce bleeding risk.  It is fine to take tylenol the week before surgery.  Hold vitamins and herbal remedies for 7 days before surgery.      Preparing for Your Surgery  Getting started  A nurse will call you to review your health history and instructions. They will give you an arrival time based on your scheduled surgery time.  Please be ready to share the following:    Your doctor's clinic name and phone number    Your medical, surgical and anesthesia history    A list of allergies and sensitivities    A list of medicines, including herbal treatments and over-the-counter drugs    Whether the patient has a legal guardian (ask how to send us the papers in advance)  If you have a child who's having surgery, please ask for a copy of Preparing for Your Child's Surgery.    Preparing for surgery    Within 30 days of surgery: Have a pre-op exam (sometimes called an H&P, or History and Physical). This can be done at a clinic or pre-operative center.  ? If you're having a , you may not need  this exam. Talk to your care team    At your pre-op exam, talk to your care team about all medicines you take. If you need to stop any medicines before surgery, ask when to start taking them again.  ? We do this for your safety. Many medicines can make you bleed too much during surgery. Some change how well surgery (anesthesia) drugs work.    Call your insurance company to let them know you're having surgery. (If you don't have insurance, call [phone].)    Call your clinic if there's any change in your health. This includes signs of a cold or flu (sore throat, runny nose, cough, rash, fever). It also includes a scrape or scratch near the surgery site.    If you have questions on the day of surgery, call your hospital or surgery center.  Eating and drinking guidelines  For your safety: Unless your surgeon tells you otherwise, follow the guidelines below.    Eat and drink as usual until 8 hours before surgery. After that, no food or milk.    Drink clear liquids until 2 hours before surgery. These are liquids you can see through, like water, Gatorade and Propel Water. You may also have black coffee and tea (no cream or milk).    Nothing by mouth within 2 hours of surgery. This includes gum, candy and breath mints.    If you drink, stop drinking alcohol the night before surgery.    If your care team tells you to take medicine on the morning of surgery, it's okay to take it with a sip of water.  Preventing infection    Shower or bathe the night before and morning of your surgery. Follow the instructions your clinic gave you. (If no instructions, use regular soap.)    Don't shave or clip hair near your surgery site. We'll remove the hair if needed.    Don't smoke or vape the morning of surgery. You may chew nicotine gum up to 2 hours before surgery. A nicotine patch is okay.  ? Note: Some surgeries require you to completely quit smoking and nicotine. Check with your surgeon.    Your care team will make every effort  to keep you safe from infection. We will:  ? Clean our hands often with soap and water (or an alcohol-based hand rub).  ? Clean the skin at your surgery site with a special soap that kills germs.  ? Give you a special gown to keep you warm. (Cold raises the risk of infection.)  ? Wear special hair covers, masks, gowns and gloves during surgery.  ? Give antibiotic medicine, if prescribed. Not all surgeries need antibiotics.  What to bring on the day of surgery    Photo ID and insurance card    Copy of your health care directive, if you have one    Glasses and hearing aides (bring cases)  ? You can't wear contacts during surgery    Inhaler and eye drops, if you use them (tell us about these when you arrive)    CPAP machine or breathing device, if you use them    A few personal items, if spending the night    If you have . . .  ? A pacemaker or ICD (cardiac defibrillator): Bring the ID card.  ? An implanted stimulator: Bring the remote control.  ? A legal guardian: Bring a copy of the certified (court-stamped) guardianship papers.  Please remove any jewelry, including body piercings. Leave jewelry and other valuables at home.  If you're going home the day of surgery  Important: If you don't follow the rules below, we must cancel your surgery.     Arrange for someone to drive you home after surgery. You may not drive, take a taxi or take public transportation by yourself (unless you'll have local anesthesia only).    Arrange for a responsible adult to stay with you overnight. If you don't, we may keep you in the hospital overnight, and you may need to pay the costs yourself.  Questions?   If you have any questions for your care team, list them here:  _________________________________________________________________________________________________________________________________________________________________________________________________________________________________________________________________________________________________________________________  For informational purposes only. Not to replace the advice of your health care provider. Copyright   2003, 2019 [facility]. All rights reserved. Clinically reviewed by Amy Louis MD. [employer] [id number] - REV 4/20.       RECOMMENDATION:  APPROVAL GIVEN to proceed with proposed procedure, without further diagnostic evaluation.    Review of external notes as documented above     Discussion of management or test interpretation with external physician/other qualified healthcare professional/appropriate source - Dr. Garcia      37 minutes spent on the date of the encounter doing chart review, history and exam, documentation and further activities per the note        Subjective     HPI related to upcoming procedure: Patient had a fall on Sunday morning 5/9 in the garden. Unfortunately she fractured the distal left radius. Scheduled for surgical repair.    Preop Questions 5/11/2021   1. Have you ever had a heart attack or stroke? YES - micro heart attack in 2015   2. Have you ever had surgery on your heart or blood vessels, such as a stent placement, a coronary artery bypass, or surgery on an artery in your head, neck, heart, or legs? No   3. Do you have chest pain with activity? No   4. Do you have a history of  heart failure? No   5. Do you currently have a cold, bronchitis or symptoms of other infection? No   6. Do you have a cough, shortness of breath, or wheezing? YES -  chronic cough off and on for a few few years, worse for a few months   7. Do you or anyone in your family have previous history of blood clots? No   8. Do you or does anyone in your family have a serious bleeding problem such as  prolonged bleeding following surgeries or cuts? No   9. Have you ever had problems with anemia or been told to take iron pills? No   10. Have you had any abnormal blood loss such as black, tarry or bloody stools, or abnormal vaginal bleeding? No   11. Have you ever had a blood transfusion? YES - with childbirth   11a. Have you ever had a transfusion reaction? No   12. Are you willing to have a blood transfusion if it is medically needed before, during, or after your surgery? Yes   13. Have you or any of your relatives ever had problems with anesthesia? Yes - nauseas with anesthesia   14. Do you have sleep apnea, excessive snoring or daytime drowsiness? No   15. Do you have any artifical heart valves or other implanted medical devices like a pacemaker, defibrillator, or continuous glucose monitor? No   16. Do you have artificial joints? No   17. Are you allergic to latex? No       Health Care Directive:  Patient has a Health Care Directive on file      Preoperative Review of :   reviewed - controlled substances reflected in medication list.      Status of Chronic Conditions:  HYPERLIPIDEMIA - Patient has a long history of significant Hyperlipidemia requiring medication for treatment with recent good control. Patient reports no problems or side effects with the medication.     HYPERTENSION - Patient has longstanding history of HTN , currently denies any symptoms referable to elevated blood pressure. Specifically denies chest pain, palpitations, dyspnea, orthopnea, PND or peripheral edema. Blood pressure readings have been in normal range. Current medication regimen is as listed below. Patient denies any side effects of medication.     HYPOTHYROIDISM - Patient has a longstanding history of chronic Hypothyroidism. Patient has been doing well, noting no tremor, insomnia, hair loss or changes in skin texture. Continues to take medications as directed, without adverse reactions or side effects. Last TSH No results  found for: TSH.       Heart disease: Patient has history of having a non-STEMI in 2015.  Patient had a thorough work-up completed by cardiology.  She ended up having a stress echo completed on 9/22/2015 which was negative for ischemia.  Since then patient has been doing well.  No chest pain, shortness of breath, wheezing.  Patient is very active.  She does not have chest pain or shortness of breath with exercise.  Able to make it up a flight of stairs without having chest pain or time breathing.  She does have occasional palpitations that last for a few seconds.  She has had palpitations since her 20s.  They are less frequent as compared to her 20s.  Patient does have a large history of anxiety which may have attributed to her symptoms in 2015.    Patient has tolerated surgery well in the past.  Patient has no recent cough or cold symptoms.  No recent fevers, chills, sore throat, ear pain, chest pain, palpitations, problems breathing, stomachaches, nausea, vomiting, constipation, blood in stool or urine, dysuria, frequency, urgency.  Patient has noticed some diarrhea since the accident.  She feels increasingly anxious which is upsetting her stomach.    Patient can walk up a flight of stairs without becoming short of breath or having chest pain: YES   Patient is able to tolerate greater than 4 METs of activity without any cardiopulmonary symptoms.        Review of Systems  CONSTITUTIONAL: NEGATIVE for fever, chills, change in weight  INTEGUMENTARY/SKIN: NEGATIVE for worrisome rashes, moles or lesions  EYES: NEGATIVE for vision changes or irritation  ENT/MOUTH: NEGATIVE for ear, mouth and throat problems  RESP: NEGATIVE for significant cough or SOB  BREAST: NEGATIVE for masses, tenderness or discharge  CV: NEGATIVE for chest pain, palpitations or peripheral edema  GI: NEGATIVE for nausea, abdominal pain, heartburn, or change in bowel habits  : NEGATIVE for frequency, dysuria, or hematuria  MUSCULOSKELETAL: NEGATIVE  for significant arthralgias or myalgia  NEURO: NEGATIVE for weakness, dizziness or paresthesias  ENDOCRINE: NEGATIVE for temperature intolerance, skin/hair changes  HEME: NEGATIVE for bleeding problems  PSYCHIATRIC: NEGATIVE for changes in mood or affect    Patient Active Problem List    Diagnosis Date Noted     Mixed hyperlipidemia 2018     Priority: Medium     Special screening for malignant neoplasms, colon 2017     Priority: Medium     H/O non-ST elevation myocardial infarction (NSTEMI) 2015     Priority: Medium     Senile cataract 2012     Priority: Medium     Insomnia 2011     Priority: Medium     Obesity 2011     Priority: Medium     Motion sickness 2011     Priority: Medium     Hypothyroidism 2009     Priority: Medium     Abdominal pain 2009     Priority: Medium     Osteoarthritis of spine 10/21/2008     Priority: Medium     Dysthymic disorder 10/21/2008     Priority: Medium     Hypertension 10/21/2008     Priority: Medium     Lumbago 10/21/2008     Priority: Medium     History of alcohol abuse 10/16/2008     Priority: Medium     Anxiety state 10/16/2008     Priority: Medium      Past Medical History:   Diagnosis Date     Anxiety disorder      Closed fracture of left carpal bone      Essential (primary) hypertension      Hyperlipidemia      Hypothyroidism      Major depressive disorder, single episode      Postoperative nausea and vomiting      Transfusion history     with delivery of son     Past Surgical History:   Procedure Laterality Date     CATARACT IOL, RT/LT Bilateral       SECTION       COLONOSCOPY N/A 2017    Follow up   hyperplastic     OPEN REDUCTION INTERNAL FIXATION WRIST      treating a fracture     THYROIDECTOMY       Partial     Current Outpatient Medications   Medication Sig Dispense Refill     aspirin EC 81 MG EC tablet Take 81 mg by mouth daily with food       atorvastatin (LIPITOR) 20 MG tablet Take 1 tablet (20  mg) by mouth daily 90 tablet 4     folic acid (FOLVITE) 1 MG tablet Take 1 mg by mouth daily       gabapentin (NEURONTIN) 300 MG capsule TAKE 1 CAPSULE BY MOUTH THREE TIMES DAILY 270 capsule 4     hydrochlorothiazide (HYDRODIURIL) 25 MG tablet Take 0.5 tablets (12.5 mg) by mouth daily 90 tablet 4     levothyroxine (SYNTHROID/LEVOTHROID) 50 MCG tablet Take 1 tablet (50 mcg) by mouth every morning (before breakfast) 90 tablet 4     lisinopril (ZESTRIL) 5 MG tablet Take 1 tablet (5 mg) by mouth daily 90 tablet 4     LORazepam (ATIVAN) 0.5 MG tablet TAKE 1 TABLET BY MOUTH THREE TIMES DAILY 90 tablet 5     magnesium 250 MG tablet Take 1 tablet by mouth daily       methocarbamol (ROBAXIN) 500 MG tablet TAKE 1-2 TABLET BY MOUTH FOUR TIMES DAILY 120 tablet 5     metoprolol succinate ER (TOPROL-XL) 50 MG 24 hr tablet Take 1 tablet (50 mg) by mouth daily 90 tablet 4     Multiple Vitamins-Minerals (MULTIVITAMIN ADULT PO)        polyethylene glycol (MIRALAX) 17 GM/Dose powder Take 17 g (1 capful) by mouth 2 times daily as needed for constipation 578 g 2     POTASSIUM CHLORIDE PO        scopolamine (TRANSDERM) 1 MG/3DAYS 72 hr patch APPLY 1 PATCH EVERY 72 HOURS FOR MOTION SICKNESS 30 patch 1     thiamine 100 MG tablet Take 100 mg by mouth daily       traZODone (DESYREL) 50 MG tablet TAKE 1-2 TABLETS BY MOUTH AT BEDTIME 180 tablet 4     venlafaxine (EFFEXOR-XR) 150 MG 24 hr capsule Take 1 capsule (150 mg) by mouth every morning 90 capsule 4     VITAMIN D PO        zolpidem ER (AMBIEN CR) 6.25 MG CR tablet TAKE 1 TABLET BY MOUTH AT BEDTIME 30 tablet 5       No Known Allergies     Social History     Tobacco Use     Smoking status: Never Smoker     Smokeless tobacco: Never Used   Substance Use Topics     Alcohol use: No     Alcohol/week: 0.0 standard drinks     Family History   Problem Relation Age of Onset     Heart Disease Mother      Genitourinary Problems Father         Genitourinary Disease,kidney disease     Heart Disease  "Father         MI     Depression Sister      No Known Problems Sister      Depression Brother      Bipolar Disorder Brother      No Known Problems Brother         mild cognitive delay     Cancer Maternal Grandmother         Bladder     Other - See Comments Paternal Grandmother 70        Stroke     Cancer Maternal Grandfather         Throat and lung     Other - See Comments Other         Family history of depression and anxiety     Anesthesia Reaction No family hx of         Anesthesia Problem,Notes no prior complications from anesthesia.     Breast Cancer No family hx of         Cancer-breast     History   Drug Use No         Objective     /88 (BP Location: Right arm, Patient Position: Sitting, Cuff Size: Adult Regular)   Pulse 89   Temp 98.4  F (36.9  C)   Resp 20   Ht 1.626 m (5' 4\")   Wt 79.9 kg (176 lb 3.2 oz)   SpO2 98%   BMI 30.24 kg/m      Physical Exam    GENERAL APPEARANCE: healthy, alert and no distress     EYES: EOMI, PERRL     HENT: ear canals and TM's normal and nose and mouth without ulcers or lesions     NECK: no adenopathy, no asymmetry, masses, or scars and thyroid normal to palpation     RESP: lungs clear to auscultation - no rales, rhonchi or wheezes     CV: regular rates and rhythm, normal S1 S2, no S3 or S4 and no murmur, click or rub     ABDOMEN:  soft, nontender, no HSM or masses and bowel sounds normal     MS: extremities normal- no gross deformities noted, no evidence of inflammation in joints, FROM in all extremities.     SKIN: no suspicious lesions or rashes     NEURO: Normal strength and tone, sensory exam grossly normal, mentation intact and speech normal     PSYCH: mentation appears normal. and affect normal/bright     LYMPHATICS: No cervical adenopathy    Recent Labs   Lab Test 11/19/20  1241 09/08/20  1334   HGB  --  13.1   PLT  --  254    128*   POTASSIUM 4.4 3.9   CR 0.90 0.81        Diagnostics:  Recent Results (from the past 24 hour(s))   EKG 12-lead, tracing " only    Collection Time: 05/11/21 12:21 PM   Result Value Ref Range    Interpretation ECG Click View Image link to view waveform and result    Hemoglobin    Collection Time: 05/11/21 12:22 PM   Result Value Ref Range    Hemoglobin 13.1 11.7 - 15.7 g/dL   Basic Metabolic Panel    Collection Time: 05/11/21 12:22 PM   Result Value Ref Range    Sodium 128 (L) 134 - 144 mmol/L    Potassium 3.9 3.5 - 5.1 mmol/L    Chloride 92 (L) 98 - 107 mmol/L    Carbon Dioxide 28 21 - 31 mmol/L    Anion Gap 8 3 - 14 mmol/L    Glucose 126 (H) 70 - 105 mg/dL    Urea Nitrogen 8 7 - 25 mg/dL    Creatinine 0.70 0.60 - 1.20 mg/dL    GFR Estimate 84 >60 mL/min/[1.73_m2]    GFR Estimate If Black >90 >60 mL/min/[1.73_m2]    Calcium 9.9 8.6 - 10.3 mg/dL      EKG: Normal Sinus Rhythm, unchanged from previous tracings    Revised Cardiac Risk Index (RCRI):  The patient has the following serious cardiovascular risks for perioperative complications:   - Coronary Artery Disease (MI, positive stress test, angina, Qs on EKG) = 1 point     RCRI Interpretation: 1 point: Class II (low risk - 0.9% complication rate)           Signed Electronically by: Shobha Enamorado PA-C  Copy of this evaluation report is provided to requesting physician.

## 2021-05-13 ENCOUNTER — ANESTHESIA EVENT (OUTPATIENT)
Dept: SURGERY | Facility: OTHER | Age: 66
End: 2021-05-13
Payer: MEDICARE

## 2021-05-14 ENCOUNTER — ANESTHESIA (OUTPATIENT)
Dept: SURGERY | Facility: OTHER | Age: 66
End: 2021-05-14
Payer: MEDICARE

## 2021-05-14 ENCOUNTER — HOSPITAL ENCOUNTER (OUTPATIENT)
Facility: OTHER | Age: 66
Discharge: HOME | End: 2021-05-14
Attending: SPECIALIST | Admitting: SPECIALIST
Payer: MEDICARE

## 2021-05-14 ENCOUNTER — HOSPITAL ENCOUNTER (OUTPATIENT)
Dept: GENERAL RADIOLOGY | Facility: OTHER | Age: 66
End: 2021-05-14
Attending: SPECIALIST | Admitting: SPECIALIST
Payer: MEDICARE

## 2021-05-14 VITALS
HEART RATE: 63 BPM | SYSTOLIC BLOOD PRESSURE: 148 MMHG | DIASTOLIC BLOOD PRESSURE: 81 MMHG | HEIGHT: 64 IN | OXYGEN SATURATION: 94 % | WEIGHT: 176 LBS | RESPIRATION RATE: 10 BRPM | BODY MASS INDEX: 30.05 KG/M2 | TEMPERATURE: 97.8 F

## 2021-05-14 DIAGNOSIS — S52.552D OTHER CLOSED EXTRA-ARTICULAR FRACTURE OF DISTAL END OF LEFT RADIUS WITH ROUTINE HEALING, SUBSEQUENT ENCOUNTER: Primary | ICD-10-CM

## 2021-05-14 DIAGNOSIS — S52.502A CLOSED FRACTURE OF DISTAL END OF LEFT RADIUS, UNSPECIFIED FRACTURE MORPHOLOGY, INITIAL ENCOUNTER: ICD-10-CM

## 2021-05-14 PROCEDURE — 272N000001 HC OR GENERAL SUPPLY STERILE: Performed by: SPECIALIST

## 2021-05-14 PROCEDURE — 272N000002 HC OR SUPPLY OTHER OPNP: Performed by: SPECIALIST

## 2021-05-14 PROCEDURE — 76942 ECHO GUIDE FOR BIOPSY: CPT | Mod: 26 | Performed by: NURSE ANESTHETIST, CERTIFIED REGISTERED

## 2021-05-14 PROCEDURE — 258N000003 HC RX IP 258 OP 636: Performed by: NURSE ANESTHETIST, CERTIFIED REGISTERED

## 2021-05-14 PROCEDURE — 710N000012 HC RECOVERY PHASE 2, PER MINUTE: Performed by: SPECIALIST

## 2021-05-14 PROCEDURE — 250N000009 HC RX 250: Performed by: NURSE ANESTHETIST, CERTIFIED REGISTERED

## 2021-05-14 PROCEDURE — 999N000141 HC STATISTIC PRE-PROCEDURE NURSING ASSESSMENT: Performed by: SPECIALIST

## 2021-05-14 PROCEDURE — 360N000084 HC SURGERY LEVEL 4 W/ FLUORO, PER MIN: Performed by: SPECIALIST

## 2021-05-14 PROCEDURE — 250N000011 HC RX IP 250 OP 636: Performed by: NURSE ANESTHETIST, CERTIFIED REGISTERED

## 2021-05-14 PROCEDURE — 25607 OPTX DST RD XARTC FX/EPI SEP: CPT | Performed by: NURSE ANESTHETIST, CERTIFIED REGISTERED

## 2021-05-14 PROCEDURE — 250N000009 HC RX 250: Performed by: SPECIALIST

## 2021-05-14 PROCEDURE — 999N000180 XR SURGERY CARM FLUORO LESS THAN 5 MIN

## 2021-05-14 PROCEDURE — 25607 OPTX DST RD XARTC FX/EPI SEP: CPT | Mod: LT | Performed by: SPECIALIST

## 2021-05-14 PROCEDURE — 250N000011 HC RX IP 250 OP 636: Performed by: SPECIALIST

## 2021-05-14 PROCEDURE — 64415 NJX AA&/STRD BRCH PLXS IMG: CPT | Mod: LT | Performed by: NURSE ANESTHETIST, CERTIFIED REGISTERED

## 2021-05-14 PROCEDURE — C1713 ANCHOR/SCREW BN/BN,TIS/BN: HCPCS | Performed by: SPECIALIST

## 2021-05-14 PROCEDURE — 370N000017 HC ANESTHESIA TECHNICAL FEE, PER MIN: Performed by: SPECIALIST

## 2021-05-14 DEVICE — IMPLANTABLE DEVICE: Type: IMPLANTABLE DEVICE | Site: WRIST | Status: FUNCTIONAL

## 2021-05-14 RX ORDER — NALOXONE HYDROCHLORIDE 0.4 MG/ML
0.4 INJECTION, SOLUTION INTRAMUSCULAR; INTRAVENOUS; SUBCUTANEOUS
Status: DISCONTINUED | OUTPATIENT
Start: 2021-05-14 | End: 2021-05-14 | Stop reason: HOSPADM

## 2021-05-14 RX ORDER — DEXAMETHASONE SODIUM PHOSPHATE 10 MG/ML
INJECTION, SOLUTION INTRAMUSCULAR; INTRAVENOUS PRN
Status: DISCONTINUED | OUTPATIENT
Start: 2021-05-14 | End: 2021-05-14

## 2021-05-14 RX ORDER — HYDROCODONE BITARTRATE AND ACETAMINOPHEN 7.5; 325 MG/15ML; MG/15ML
10 SOLUTION ORAL
Status: DISCONTINUED | OUTPATIENT
Start: 2021-05-14 | End: 2021-05-14 | Stop reason: HOSPADM

## 2021-05-14 RX ORDER — PROPOFOL 10 MG/ML
INJECTION, EMULSION INTRAVENOUS PRN
Status: DISCONTINUED | OUTPATIENT
Start: 2021-05-14 | End: 2021-05-14

## 2021-05-14 RX ORDER — NALOXONE HYDROCHLORIDE 0.4 MG/ML
0.2 INJECTION, SOLUTION INTRAMUSCULAR; INTRAVENOUS; SUBCUTANEOUS
Status: DISCONTINUED | OUTPATIENT
Start: 2021-05-14 | End: 2021-05-14 | Stop reason: HOSPADM

## 2021-05-14 RX ORDER — LIDOCAINE HYDROCHLORIDE 20 MG/ML
INJECTION, SOLUTION INFILTRATION; PERINEURAL PRN
Status: DISCONTINUED | OUTPATIENT
Start: 2021-05-14 | End: 2021-05-14

## 2021-05-14 RX ORDER — FLUMAZENIL 0.1 MG/ML
0.2 INJECTION, SOLUTION INTRAVENOUS
Status: DISCONTINUED | OUTPATIENT
Start: 2021-05-14 | End: 2021-05-14 | Stop reason: HOSPADM

## 2021-05-14 RX ORDER — FENTANYL CITRATE 50 UG/ML
25-50 INJECTION, SOLUTION INTRAMUSCULAR; INTRAVENOUS
Status: DISCONTINUED | OUTPATIENT
Start: 2021-05-14 | End: 2021-05-14 | Stop reason: HOSPADM

## 2021-05-14 RX ORDER — SODIUM CHLORIDE, SODIUM LACTATE, POTASSIUM CHLORIDE, CALCIUM CHLORIDE 600; 310; 30; 20 MG/100ML; MG/100ML; MG/100ML; MG/100ML
INJECTION, SOLUTION INTRAVENOUS CONTINUOUS
Status: DISCONTINUED | OUTPATIENT
Start: 2021-05-14 | End: 2021-05-14 | Stop reason: HOSPADM

## 2021-05-14 RX ORDER — DEXAMETHASONE SODIUM PHOSPHATE 4 MG/ML
INJECTION, SOLUTION INTRA-ARTICULAR; INTRALESIONAL; INTRAMUSCULAR; INTRAVENOUS; SOFT TISSUE PRN
Status: DISCONTINUED | OUTPATIENT
Start: 2021-05-14 | End: 2021-05-14

## 2021-05-14 RX ORDER — KETAMINE HYDROCHLORIDE 10 MG/ML
INJECTION INTRAMUSCULAR; INTRAVENOUS PRN
Status: DISCONTINUED | OUTPATIENT
Start: 2021-05-14 | End: 2021-05-14

## 2021-05-14 RX ORDER — MEPERIDINE HYDROCHLORIDE 50 MG/ML
12.5 INJECTION INTRAMUSCULAR; INTRAVENOUS; SUBCUTANEOUS
Status: DISCONTINUED | OUTPATIENT
Start: 2021-05-14 | End: 2021-05-14 | Stop reason: HOSPADM

## 2021-05-14 RX ORDER — HYDROCODONE BITARTRATE AND ACETAMINOPHEN 5; 325 MG/1; MG/1
1-2 TABLET ORAL EVERY 4 HOURS PRN
Qty: 15 TABLET | Refills: 0 | Status: SHIPPED | OUTPATIENT
Start: 2021-05-14 | End: 2021-06-29

## 2021-05-14 RX ORDER — LIDOCAINE 40 MG/G
CREAM TOPICAL
Status: DISCONTINUED | OUTPATIENT
Start: 2021-05-14 | End: 2021-05-14 | Stop reason: HOSPADM

## 2021-05-14 RX ORDER — ONDANSETRON 4 MG/1
4 TABLET, ORALLY DISINTEGRATING ORAL EVERY 30 MIN PRN
Status: DISCONTINUED | OUTPATIENT
Start: 2021-05-14 | End: 2021-05-14 | Stop reason: HOSPADM

## 2021-05-14 RX ORDER — ONDANSETRON 2 MG/ML
4 INJECTION INTRAMUSCULAR; INTRAVENOUS EVERY 30 MIN PRN
Status: DISCONTINUED | OUTPATIENT
Start: 2021-05-14 | End: 2021-05-14 | Stop reason: HOSPADM

## 2021-05-14 RX ORDER — BUPIVACAINE HYDROCHLORIDE 5 MG/ML
INJECTION, SOLUTION EPIDURAL; INTRACAUDAL PRN
Status: DISCONTINUED | OUTPATIENT
Start: 2021-05-14 | End: 2021-05-14

## 2021-05-14 RX ORDER — ONDANSETRON 2 MG/ML
INJECTION INTRAMUSCULAR; INTRAVENOUS PRN
Status: DISCONTINUED | OUTPATIENT
Start: 2021-05-14 | End: 2021-05-14

## 2021-05-14 RX ORDER — MAGNESIUM HYDROXIDE 1200 MG/15ML
LIQUID ORAL PRN
Status: DISCONTINUED | OUTPATIENT
Start: 2021-05-14 | End: 2021-05-14 | Stop reason: HOSPADM

## 2021-05-14 RX ORDER — CEFAZOLIN SODIUM 2 G/100ML
2 INJECTION, SOLUTION INTRAVENOUS ONCE
Status: COMPLETED | OUTPATIENT
Start: 2021-05-14 | End: 2021-05-14

## 2021-05-14 RX ORDER — PROPOFOL 10 MG/ML
INJECTION, EMULSION INTRAVENOUS CONTINUOUS PRN
Status: DISCONTINUED | OUTPATIENT
Start: 2021-05-14 | End: 2021-05-14

## 2021-05-14 RX ADMIN — MIDAZOLAM 1 MG: 1 INJECTION INTRAMUSCULAR; INTRAVENOUS at 10:30

## 2021-05-14 RX ADMIN — LIDOCAINE HYDROCHLORIDE 40 MG: 20 INJECTION, SOLUTION INFILTRATION; PERINEURAL at 10:34

## 2021-05-14 RX ADMIN — PROPOFOL 140 MCG/KG/MIN: 10 INJECTION, EMULSION INTRAVENOUS at 10:34

## 2021-05-14 RX ADMIN — MIDAZOLAM 2 MG: 1 INJECTION INTRAMUSCULAR; INTRAVENOUS at 09:59

## 2021-05-14 RX ADMIN — Medication 10 MG: at 10:37

## 2021-05-14 RX ADMIN — PROPOFOL 80 MG: 10 INJECTION, EMULSION INTRAVENOUS at 10:34

## 2021-05-14 RX ADMIN — ONDANSETRON 4 MG: 2 INJECTION INTRAMUSCULAR; INTRAVENOUS at 10:40

## 2021-05-14 RX ADMIN — DEXAMETHASONE SODIUM PHOSPHATE 4 MG: 10 INJECTION, SOLUTION INTRAMUSCULAR; INTRAVENOUS at 10:04

## 2021-05-14 RX ADMIN — LIDOCAINE HYDROCHLORIDE 0.1 ML: 10 INJECTION, SOLUTION EPIDURAL; INFILTRATION; INTRACAUDAL; PERINEURAL at 09:51

## 2021-05-14 RX ADMIN — SODIUM CHLORIDE, POTASSIUM CHLORIDE, SODIUM LACTATE AND CALCIUM CHLORIDE 100 ML/HR: 600; 310; 30; 20 INJECTION, SOLUTION INTRAVENOUS at 09:51

## 2021-05-14 RX ADMIN — BUPIVACAINE HYDROCHLORIDE 30 ML: 5 INJECTION, SOLUTION EPIDURAL; INTRACAUDAL; PERINEURAL at 10:04

## 2021-05-14 RX ADMIN — DEXAMETHASONE SODIUM PHOSPHATE 8 MG: 4 INJECTION, SOLUTION INTRA-ARTICULAR; INTRALESIONAL; INTRAMUSCULAR; INTRAVENOUS; SOFT TISSUE at 10:40

## 2021-05-14 RX ADMIN — SODIUM CHLORIDE 40 MCG: 0.9 INJECTION, SOLUTION INTRAVENOUS at 10:04

## 2021-05-14 RX ADMIN — FENTANYL CITRATE 50 MCG: 50 INJECTION, SOLUTION INTRAMUSCULAR; INTRAVENOUS at 12:00

## 2021-05-14 RX ADMIN — CEFAZOLIN SODIUM 2 G: 2 INJECTION, SOLUTION INTRAVENOUS at 10:19

## 2021-05-14 RX ADMIN — Medication 10 MG: at 10:47

## 2021-05-14 ASSESSMENT — MIFFLIN-ST. JEOR: SCORE: 1328.33

## 2021-05-14 NOTE — ANESTHESIA CARE TRANSFER NOTE
Patient: Mita Gabriel    Procedure(s):  Wrist Open Reduction Internal Fixation    Diagnosis: Wrist fracture [S62.109A]  Diagnosis Additional Information: No value filed.    Anesthesia Type:   MAC     Note:      Level of Consciousness: drowsy  Oxygen Supplementation: room air    Independent Airway: airway patency satisfactory and stable  Dentition: dentition unchanged  Vital Signs Stable: post-procedure vital signs reviewed and stable  Report to RN Given: handoff report given  Patient transferred to: Phase II    Handoff Report: Identifed the Patient, Identified the Reponsible Provider, Reviewed the pertinent medical history, Discussed the surgical course, Reviewed Intra-OP anesthesia mangement and issues during anesthesia, Set expectations for post-procedure period and Allowed opportunity for questions and acknowledgement of understanding      Vitals: (Last set prior to Anesthesia Care Transfer)  CRNA VITALS  5/14/2021 1110 - 5/14/2021 1150      5/14/2021             Resp Rate (set):  10        Electronically Signed By: GABRIELA ARAGON CRNA  May 14, 2021  11:50 AM

## 2021-05-14 NOTE — OP NOTE
Procedure Date: 05/14/2021    PREOPERATIVE DIAGNOSIS:  Distal radius fracture.    POSTOPERATIVE DIAGNOSIS:  Distal radius fracture.    PROCEDURE PERFORMED:  ORIF, left distal radius fracture using a Adell locking plate.    ASSISTANT:  Anthony Oscar.    ANESTHESIA:  Block.    INDICATIONS FOR PROCEDURE:  This is a 65-year-old female who fell sustaining a displaced distal radius fracture.  Attempts at closed reduction were unsatisfactory.  He was ultimately offered surgical treatment and elected to proceed.    DESCRIPTION OF PROCEDURE:  Following medical clearance, the patient was brought to the operating room and placed on the operating table.  A block had been placed in preoperative holding area for postoperative analgesia.  The patient's left upper extremity was then elevated, exsanguinated, and the tourniquet was inflated to 250 mmHg.  The left upper extremity was then elevated, exsanguinated, and tourniquet was inflated to 250 mmHg.  Standard volar incision was made at the wrist, carried sharply down through skin and subcutaneous tissue.  Careful spreading was performed.  The FCR fascia was incised and pronator quadratus was elevated.  We identified the fracture site.  This was reduced.  A narrow standard size plate was selected.  This was placed on the volar cortex of the radius.  Position was confirmed and the screw holes were filled with appropriately sized locking and nonlocking screws.  AP, lateral and oblique view showed extraarticular location of all screws were appropriate length and anatomic reduction.  Wounds were then irrigated.  Tourniquet was deflated.  Circulation returned promptly to the hand intrinsic muscles of the pressure.  Skin was closed with Prolene suture.  Sterile dressing applied.  The patient was brought to recovery room in stable condition.    Lobo Sky MD        D: 05/14/2021   T: 05/14/2021   MT: angel    Name:     ROSALBA MCKEON  MRN:      [id number]        Account:         [id number]   :      1955           Procedure Date: 2021     Document: [id number]

## 2021-05-14 NOTE — BRIEF OP NOTE
[facility]    Brief Operative Note    Pre-operative diagnosis: Wrist fracture [S62.109A]  Post-operative diagnosis Same as pre-operative diagnosis    Procedure: Procedure(s):  Wrist Open Reduction Internal Fixation  Surgeon: Surgeon(s) and Role:     * Lobo Sky MD - Primary     * Anthony Oscar PA - Assisting  Anesthesia: MAC with Block   Estimated blood loss: None  Drains: None  Specimens: * No specimens in log *  Findings:   None.  Complications: None.  Implants:   Implant Name Type Inv. Item Serial No.  Lot No. LRB No. Used Action   volar distal radius plates  narrow    SJ  Left 1 Implanted   2.7mm non locking screws    SJ  Left 1 Implanted and Explanted   2.7mm nonlocking screws    SJ  Left 1 Implanted   2.7mm nonlocking screws    SJ  Left 1 Implanted   2.7mm nonlocking screws    SJ  Left 1 Implanted   2.7mm locking screw    SJ  Left 2 Implanted   2.7mm locking dcrew    SJ  Left 1 Implanted   2.7mm lockin screws    SJ  Left 1 Implanted   2.7mm locking screws    SJ  Left 1 Implanted

## 2021-05-14 NOTE — ANESTHESIA POSTPROCEDURE EVALUATION
Patient: Mita Gabriel    Procedure(s):  Wrist Open Reduction Internal Fixation    Diagnosis:Wrist fracture [S62.109A]  Diagnosis Additional Information: No value filed.    Anesthesia Type:  MAC    Note:  Disposition: Outpatient   Postop Pain Control: Uneventful            Sign Out: Well controlled pain   PONV: No   Neuro/Psych: Uneventful            Sign Out: Acceptable/Baseline neuro status   Airway/Respiratory: Uneventful            Sign Out: Acceptable/Baseline resp. status   CV/Hemodynamics: Uneventful            Sign Out: Acceptable CV status; No obvious hypovolemia; No obvious fluid overload   Other NRE: NONE   DID A NON-ROUTINE EVENT OCCUR? No           Last vitals:  Vitals:    05/14/21 1005 05/14/21 1010 05/14/21 1145   BP: (!) 164/99 (!) 172/90 134/89   Pulse: 81 80 72   Resp: 12 10    Temp:   97.8  F (36.6  C)   SpO2: 100% 98% 95%       Last vitals prior to Anesthesia Care Transfer:  CRNA VITALS  5/14/2021 1110 - 5/14/2021 1210      5/14/2021             Resp Rate (set):  10          Electronically Signed By: GABRIELA COVARRUBIAS CRNA  May 14, 2021  12:37 PM

## 2021-05-14 NOTE — ANESTHESIA PREPROCEDURE EVALUATION
Anesthesia Pre-Procedure Evaluation    Patient: Mita Gabriel   MRN: [id number] : 1955        Preoperative Diagnosis: Wrist fracture [S62.109A]   Procedure : Procedure(s):  Wrist Open Reduction Internal Fixation     Past Medical History:   Diagnosis Date     Anxiety disorder      Closed fracture of left carpal bone      Essential (primary) hypertension      Hyperlipidemia      Hypothyroidism      Major depressive disorder, single episode      Postoperative nausea and vomiting      Transfusion history     with delivery of son      Past Surgical History:   Procedure Laterality Date     CATARACT IOL, RT/LT Bilateral       SECTION       COLONOSCOPY N/A 2017    Follow up   hyperplastic     OPEN REDUCTION INTERNAL FIXATION WRIST      treating a fracture     THYROIDECTOMY       Partial      No Known Allergies   Social History     Tobacco Use     Smoking status: Never Smoker     Smokeless tobacco: Never Used   Substance Use Topics     Alcohol use: No     Alcohol/week: 0.0 standard drinks      Wt Readings from Last 1 Encounters:   21 79.9 kg (176 lb 3.2 oz)        Anesthesia Evaluation   Pt has had prior anesthetic.     No history of anesthetic complications       ROS/MED HX  ENT/Pulmonary:  - neg pulmonary ROS     Neurologic:  - neg neurologic ROS     Cardiovascular:     (+) Dyslipidemia hypertension-----    METS/Exercise Tolerance: >4 METS    Hematologic:  - neg hematologic  ROS     Musculoskeletal:   (+) arthritis,     GI/Hepatic:  - neg GI/hepatic ROS     Renal/Genitourinary:  - neg Renal ROS     Endo:     (+) thyroid problem, hypothyroidism,     Psychiatric/Substance Use:     (+) psychiatric history anxiety and depression     Infectious Disease:  - neg infectious disease ROS     Malignancy:  - neg malignancy ROS     Other:  - neg other ROS          Physical Exam    Airway        Mallampati: II   TM distance: > 3 FB   Neck ROM: full   Mouth opening: > 3 cm    Respiratory Devices and  Support         Dental  no notable dental history         Cardiovascular   cardiovascular exam normal       Rhythm and rate: regular and normal     Pulmonary   pulmonary exam normal        breath sounds clear to auscultation           OUTSIDE LABS:  CBC:   Lab Results   Component Value Date    WBC 6.6 09/08/2020    HGB 13.1 05/11/2021    HGB 13.1 09/08/2020    HCT 39.2 09/08/2020    HCT 38.0 05/25/2017     09/08/2020     05/25/2017     BMP:   Lab Results   Component Value Date     (L) 05/11/2021     11/19/2020    POTASSIUM 3.9 05/11/2021    POTASSIUM 4.4 11/19/2020    CHLORIDE 92 (L) 05/11/2021    CHLORIDE 99 11/19/2020    CO2 28 05/11/2021    CO2 32 (H) 11/19/2020    BUN 8 05/11/2021    BUN 12 11/19/2020    CR 0.70 05/11/2021    CR 0.90 11/19/2020     (H) 05/11/2021    GLC 95 11/19/2020     COAGS:   Lab Results   Component Value Date    INR 1.2 01/09/2015     POC: No results found for: BGM, HCG, HCGS  HEPATIC:   Lab Results   Component Value Date    ALBUMIN 4.5 09/08/2020    PROTTOTAL 7.4 09/08/2020    ALT 11 09/08/2020    AST 17 09/08/2020    ALKPHOS 67 09/08/2020    BILITOTAL 0.8 09/08/2020     OTHER:   Lab Results   Component Value Date    CHRISTINA 9.9 05/11/2021    PHOS 3.0 01/23/2015    MAG 1.7 (L) 01/23/2015    T4 0.95 09/08/2020       Anesthesia Plan    ASA Status:  2   NPO Status:  NPO Appropriate    Anesthesia Type: MAC.   Induction: Propofol.           Consents    Anesthesia Plan(s) and associated risks, benefits, and realistic alternatives discussed. Questions answered and patient/representative(s) expressed understanding.     - Discussed with:  Patient      - Extended Intubation/Ventilatory Support Discussed: No.      - Patient is DNR/DNI Status: Yes             Suspend during perioperative period? Yes.             Agree to: chemical resuscitation, chest compression/defibrillation.   Use of blood products discussed: Yes.     - Discussed with: Patient.     - Consented:  consented to blood products            Reason for refusal: other.     Postoperative Care    Pain management: IV analgesics, Peripheral nerve block (Single Shot), Multi-modal analgesia.   PONV prophylaxis: Ondansetron (or other 5HT-3)     Comments:                GABRIELA COVARRUBIAS CRNA

## 2021-05-14 NOTE — BRIEF OP NOTE
[facility]    Brief Operative Note    Pre-operative diagnosis: Wrist fracture [S62.109A]  Post-operative diagnosis Same as pre-operative diagnosis    Procedure: Procedure(s):  Wrist Open Reduction Internal Fixation  Surgeon: Surgeon(s) and Role:     * Lobo Sky MD - Primary  Anesthesia: MAC with Block   Estimated blood loss: None  Drains: None  Specimens: * No specimens in log *  Findings:   None.  Complications: None.  Implants: * No implants in log *

## 2021-05-14 NOTE — ANESTHESIA PROCEDURE NOTES
Infraclavicular Procedure Note  Pre-Procedure   Staff -        CRNA: Samina Parikh APRN CRNA       Performed By: CRNA       Location: pre-op       Procedure Start/Stop Times: 5/14/2021 10:00 AM and 5/14/2021 10:05 AM       Pre-Anesthestic Checklist: patient identified, IV checked, site marked, risks and benefits discussed, informed consent, monitors and equipment checked, pre-op evaluation, at physician/surgeon's request and post-op pain management  Timeout:       Correct Patient: Yes        Correct Procedure: Yes        Correct Site: Yes        Correct Position: Yes        Correct Laterality: Yes        Site Marked: Yes  Procedure Documentation  Procedure: Infraclavicular       Diagnosis: POST OPERATIVE PAIN CONTROL       Laterality: left       Patient Position: supine       Patient Prep/Sterile Barriers: sterile gloves, mask       Skin prep: Chloraprep      Local skin infiltrated with 0.5 mL of. Local skin infiltration: 0.5% Bupivicaine.        Needle Type: insulated and short bevel       Needle Gauge: 20.        Needle Length (Inches): 4        - Ultrasound guided       - Ultrasound used to identify targeted nerve, plexus, vascular marker, or fascial plane and place a needle adjacent to it in real-time       - Ultrasound was used to visualize the spread of anesthetic in close proximity to the above referenced structure       - A permanent image is entered into the patient's record.       - The nerve(s) appeared anatomically normal       - There were no apparent abnormal pathologic findings    Assessment/Narrative         The placement was negative for: blood aspirated, painful injection and site bleeding       Paresthesias: No.     Bolus given via needle..        Secured via.        Insertion/Infusion Method: Single Shot       Complications: none    Comments:  Left INFRACLAVICULAR Brachial Plexus BLOCK FOR POST-OP PAIN MANAGEMENT  [unfilled]  Brachial Plexus Block with Ultrasound Guidance.    Patient: ROSALBA NASCIMETNO  LINDA  Patient : 1955  Date: May 14, 2021  Procedure time:  1000 to 1005  Diagnosis: Left wrist pain.    Surgeon requests infraclavicular block of the brachial plexus for post-op pain management.    The procedure, potential benefits, risks, and alternatives were discussed during the preoperative interview with the patient, who voiced understanding of the information and agreed to proceed with the procedure.    Universal protocol was followed.  TIME OUT conducted just prior to starting procedure confirmed patient identity, site/side, procedure, patient position and availability of correct equipment.    Versed 2mg IV was administered for sedation by the DSU RN prior to the procedure.    The Left infraclavicular area was scanned with US to locate the brachial plexus adjacent to the brachial artery. The area was prepped with Chlordexidine and skin wheal with 0.5%  Bupivicaine. A 20 gauge 4 inch  stimuplex insulated needle was advanced posterior to the artery and adjacent to the plexus, but not within the plexus. A 5 ml bolus ws injected easily and painlessly and demonstrated hydrodissection around the nerve. A total of30 ml of 0.5% Bupivacaine with 4 mg PF dexamethasone and 40 mcg of dexmedetomidine was injected incrementally, with negative aspiration every 5 ml. There were no other immediate complications apparent.  A copy of the ultrasound image was saved to the patient's chart.    Electronically Signed by  GABRIELA COVARRUBIAS CRNA

## 2021-05-20 DIAGNOSIS — Z87.81 S/P ORIF (OPEN REDUCTION INTERNAL FIXATION) FRACTURE: Primary | ICD-10-CM

## 2021-05-20 DIAGNOSIS — Z98.890 S/P ORIF (OPEN REDUCTION INTERNAL FIXATION) FRACTURE: Primary | ICD-10-CM

## 2021-05-21 ENCOUNTER — HOSPITAL ENCOUNTER (OUTPATIENT)
Dept: GENERAL RADIOLOGY | Facility: OTHER | Age: 66
End: 2021-05-21
Attending: SPECIALIST
Payer: MEDICARE

## 2021-05-21 ENCOUNTER — OFFICE VISIT (OUTPATIENT)
Dept: FAMILY MEDICINE | Facility: OTHER | Age: 66
End: 2021-05-21
Attending: NURSE PRACTITIONER
Payer: MEDICARE

## 2021-05-21 ENCOUNTER — HOSPITAL ENCOUNTER (OUTPATIENT)
Dept: GENERAL RADIOLOGY | Facility: OTHER | Age: 66
End: 2021-05-21
Attending: NURSE PRACTITIONER
Payer: MEDICARE

## 2021-05-21 ENCOUNTER — OFFICE VISIT (OUTPATIENT)
Dept: ORTHOPEDICS | Facility: OTHER | Age: 66
End: 2021-05-21
Attending: SPECIALIST
Payer: MEDICARE

## 2021-05-21 VITALS
HEIGHT: 64 IN | DIASTOLIC BLOOD PRESSURE: 102 MMHG | BODY MASS INDEX: 28.85 KG/M2 | HEART RATE: 77 BPM | OXYGEN SATURATION: 99 % | SYSTOLIC BLOOD PRESSURE: 164 MMHG | WEIGHT: 169 LBS | RESPIRATION RATE: 20 BRPM | TEMPERATURE: 96.9 F

## 2021-05-21 DIAGNOSIS — Z98.890 S/P ORIF (OPEN REDUCTION INTERNAL FIXATION) FRACTURE: ICD-10-CM

## 2021-05-21 DIAGNOSIS — Z87.81 S/P ORIF (OPEN REDUCTION INTERNAL FIXATION) FRACTURE: ICD-10-CM

## 2021-05-21 DIAGNOSIS — Z87.81 S/P ORIF (OPEN REDUCTION INTERNAL FIXATION) FRACTURE: Primary | ICD-10-CM

## 2021-05-21 DIAGNOSIS — K59.03 DRUG-INDUCED CONSTIPATION: Primary | ICD-10-CM

## 2021-05-21 DIAGNOSIS — Z98.890 S/P ORIF (OPEN REDUCTION INTERNAL FIXATION) FRACTURE: Primary | ICD-10-CM

## 2021-05-21 PROCEDURE — G0463 HOSPITAL OUTPT CLINIC VISIT: HCPCS

## 2021-05-21 PROCEDURE — G0463 HOSPITAL OUTPT CLINIC VISIT: HCPCS | Mod: 25

## 2021-05-21 PROCEDURE — 73110 X-RAY EXAM OF WRIST: CPT | Mod: LT

## 2021-05-21 PROCEDURE — 99024 POSTOP FOLLOW-UP VISIT: CPT | Performed by: SPECIALIST

## 2021-05-21 PROCEDURE — 99213 OFFICE O/P EST LOW 20 MIN: CPT | Performed by: NURSE PRACTITIONER

## 2021-05-21 PROCEDURE — 74019 RADEX ABDOMEN 2 VIEWS: CPT

## 2021-05-21 ASSESSMENT — PAIN SCALES - GENERAL: PAINLEVEL: WORST PAIN (10)

## 2021-05-21 ASSESSMENT — MIFFLIN-ST. JEOR: SCORE: 1296.58

## 2021-05-21 NOTE — PROGRESS NOTES
Patient is here for follow up on her left wrist ORIF.   Purnima Hernandez LPN .....................5/21/2021 1:02 PM

## 2021-05-21 NOTE — PROGRESS NOTES
Visit Date: 2021    Mita returns for followup 1 week status post open reduction and internal fixation of a comminuted left distal radius fracture.  Overall, she is doing well though she has had some issues with constipation.    PHYSICAL EXAMINATION:  Examination today reveals her incision to heal nicely.  She has mild limitation of digital range of motion.  Pronation is well preserved.  Supination is limited.    IMAGING:  X-rays, AP, lateral, and oblique views of the left wrist reveal the patient is status post ORIF, left distal radius.  No evidence of failure of fixation.    IMPRESSION:  One week status post ORIF, left distal radius.    PLAN:  The plan will be to place her in a removable splint.  We will see her back for followup in about 4 weeks.  If range of motion is not progressing, she will contact us and we will arrange therapy.  She is in agreement with this plan.    Lobo Sky MD        D: 2021   T: 2021   MT: PAKMT    Name:     MITA MCKEON KIEL  MRN:      [id number]        Account:    [id number]   :      1955           Visit Date: 2021     Document: [id number]

## 2021-05-21 NOTE — PROGRESS NOTES
ASSESSMENT/PLAN:  1. Drug-induced constipation    - XR Abdomen 2 Views    Xray films and radiology report were reviewed.     Discussed xray results with the patient and informed her that there was no obstruction or free air seen on xray.     Instructed the patient to try taking OTC milk of magnesia and use an enema today. If this does not help with her constipation she was instructed to try another enema tomorrow and then to be seen in ER if she has had no results after the second enema.     The patient was also instructed to try prune juice, increase her fluid intake, increase her physical activity and fiber intake.     Discussed how narcotics can worsen constipation.        Discussed warning signs/symptoms indicative of need to f/u    Follow up if symptoms persist or worsen or concerns      I explained my diagnostic considerations and recommendations to the patient, who voiced understanding and agreement with the treatment plan. All questions were answered. We discussed potential side effects of any prescribed or recommended therapies, as well as expectations for response to treatments.    Disclaimer:  This note consists of words and symbols derived from keyboarding, dictation, or using voice recognition software. As a result, there may be errors in the script that have gone undetected. Please consider this when interpreting information found in this note.    HPI:    Mita Gabriel is a 65 year old female  who presents to [facility] today for constipation. The patient's last bowel movement was on Monday, which was a hard stool. Last week she had an open reduction for a fracture of her left wrist. She reports that she has been taking Norco daily for her pain and believes this is contributing to her constipation. She states that she is feeling anxious due to the recent surgery and now being unable to have a bowel movement. The patient reports taking magnesium citrate she drank about 3/4 of the bottle on  "Wednesday and reports having a small amount of hard stool that day. Reports taking one senna last week. She has been passing gas. She has a history of constipation. Pain to lower abdomen states that it feels uncomfortable \"like I am full\". Denies blood in stool.      Past Medical History:   Diagnosis Date     Anxiety disorder      Closed fracture of left carpal bone      Essential (primary) hypertension      Hyperlipidemia      Hypothyroidism      Major depressive disorder, single episode      Postoperative nausea and vomiting      Transfusion history     with delivery of son     Past Surgical History:   Procedure Laterality Date     CATARACT IOL, RT/LT Bilateral       SECTION       COLONOSCOPY N/A 2017    Follow up   hyperplastic     OPEN REDUCTION INTERNAL FIXATION WRIST      treating a fracture     OPEN REDUCTION INTERNAL FIXATION WRIST Left 2021    Procedure: Wrist Open Reduction Internal Fixation;  Surgeon: Lobo Sky MD;  Location: [facility] OR     THYROIDECTOMY       Partial     Social History     Tobacco Use     Smoking status: Never Smoker     Smokeless tobacco: Never Used   Substance Use Topics     Alcohol use: No     Alcohol/week: 0.0 standard drinks     Current Outpatient Medications   Medication Sig Dispense Refill     aspirin EC 81 MG EC tablet Take 81 mg by mouth daily with food       atorvastatin (LIPITOR) 20 MG tablet Take 1 tablet (20 mg) by mouth daily 90 tablet 4     folic acid (FOLVITE) 1 MG tablet Take 1 mg by mouth daily       gabapentin (NEURONTIN) 300 MG capsule TAKE 1 CAPSULE BY MOUTH THREE TIMES DAILY 270 capsule 4     hydrochlorothiazide (HYDRODIURIL) 25 MG tablet Take 0.5 tablets (12.5 mg) by mouth daily 90 tablet 4     HYDROcodone-acetaminophen (NORCO) 5-325 MG tablet Take 1-2 tablets by mouth every 4 hours as needed for moderate to severe pain 15 tablet 0     levothyroxine (SYNTHROID/LEVOTHROID) 50 MCG tablet Take 1 tablet (50 mcg) by mouth every morning " "(before breakfast) 90 tablet 4     lisinopril (ZESTRIL) 5 MG tablet Take 1 tablet (5 mg) by mouth daily 90 tablet 4     LORazepam (ATIVAN) 0.5 MG tablet TAKE 1 TABLET BY MOUTH THREE TIMES DAILY 90 tablet 5     magnesium 250 MG tablet Take 1 tablet by mouth daily       methocarbamol (ROBAXIN) 500 MG tablet TAKE 1-2 TABLET BY MOUTH FOUR TIMES DAILY 120 tablet 5     metoprolol succinate ER (TOPROL-XL) 50 MG 24 hr tablet Take 1 tablet (50 mg) by mouth daily 90 tablet 4     Multiple Vitamins-Minerals (MULTIVITAMIN ADULT PO)        polyethylene glycol (MIRALAX) 17 GM/Dose powder Take 17 g (1 capful) by mouth 2 times daily as needed for constipation 578 g 2     POTASSIUM CHLORIDE PO        scopolamine (TRANSDERM) 1 MG/3DAYS 72 hr patch APPLY 1 PATCH EVERY 72 HOURS FOR MOTION SICKNESS 30 patch 1     thiamine 100 MG tablet Take 100 mg by mouth daily as needed       traZODone (DESYREL) 50 MG tablet TAKE 1-2 TABLETS BY MOUTH AT BEDTIME 180 tablet 4     venlafaxine (EFFEXOR-XR) 150 MG 24 hr capsule Take 1 capsule (150 mg) by mouth every morning 90 capsule 4     VITAMIN D PO        zolpidem ER (AMBIEN CR) 6.25 MG CR tablet TAKE 1 TABLET BY MOUTH AT BEDTIME 30 tablet 5     No Known Allergies      Past medical history, past surgical history, current medications and allergies reviewed and accurate to the best of my knowledge.        ROS:  Refer to HPI    BP (!) 164/102 (BP Location: Right arm, Patient Position: Sitting, Cuff Size: Adult Regular)   Pulse 77   Temp 96.9  F (36.1  C) (Tympanic)   Resp 20   Ht 1.626 m (5' 4\")   Wt 76.7 kg (169 lb)   LMP  (LMP Unknown)   SpO2 99%   Breastfeeding No   BMI 29.01 kg/m      EXAM:  General Appearance: Well appearing female, appropriate appearance for age. No acute distress  Abdomen: soft, reports tenderness to her lower mid and lower left quadrant, no rigidity, no rebound tenderness or guarding, normal bowel sounds present  Psychological: Anxious, alert, oriented, and pleasant. "       Xray:  Results for orders placed or performed during the hospital encounter of 05/21/21   XR Wrist Left G/E 3 Views     Status: None    Narrative    PROCEDURE: XR WRIST LEFT G/E 3 VIEWS 5/21/2021 12:57 PM    HISTORY: S/P ORIF (open reduction internal fixation) fracture; S/P  ORIF (open reduction internal fixation) fracture    COMPARISONS: 5/2/2021.    TECHNIQUE: 3 views.    FINDINGS: There has been interval open reduction and internal fixation  of the distal radius fracture with volarly applied plate and multiple  screws. Fracture appears to be healing.    Ulnar styloid fracture is again noted. There is generalized osteopenia  with scattered degenerative changes.         Impression    IMPRESSION: Healing distal radius fracture status post ORIF.    SADA WYNN MD   Results for orders placed or performed in visit on 05/21/21   XR Abdomen 2 Views     Status: None    Narrative    PROCEDURE:  XR ABDOMEN 2VIEWS    HISTORY:  Drug-induced constipation.    TECHNIQUE:  AP and supine radiographs of the abdomen.    COMPARISON:  None.    FINDINGS:     The lung bases are clear. No subdiaphragmatic air is seen.    No dilated loops of small bowel or air-fluid levels are seen.      Impression    IMPRESSION:    No obstruction or free air.    EVANGELISTA CLEMONS MD

## 2021-05-21 NOTE — PATIENT INSTRUCTIONS
Try a fleet enema at home today and milk of magnesia and repeat this tomorrow, if this is not affective then follow up  Patient Education     Treating Constipation  Constipation is a common and often uncomfortable problem. Constipation means you have bowel movements fewer than 3 times per week. Or that you strain to pass hard, dry stool. It can last a short time. Or it can be a problem that never seems to go away. The good news is that it can often be treated and controlled.   Eat more fiber  One of the best ways to help treat constipation is to increase your fiber intake. You can do this either through diet or by using fiber supplements. Fiber (in whole grains, fruits, and vegetables) adds bulk and absorbs water to soften the stool. This helps the stool pass through the colon more easily. When you increase your fiber intake, do it slowly to prevent side effects such as bloating. Also increase the amount of water that you drink. Eating more of these foods can add fiber to your diet:     High-fiber cereals    Whole grains, bran, and brown rice    Vegetables such as carrots, broccoli, and greens    Fresh fruits (especially apples, pears, and dried fruits such as raisins and apricots)    Nuts and legumes (especially beans such as lentils, kidney beans, and lima beans)  Get physically active  Exercise helps improve the working of your colon which helps ease constipation. Try to get some physical activity every day. If you haven t been active for a while, talk with your healthcare provider before starting again.     Laxatives  Your healthcare provider may suggest an over-the-counter product to help ease your constipation. He or she may suggest the use of bulk-forming agents or laxatives. Laxatives, if used as directed, are common and safe. Follow directions carefully when using them. See your provider for new-onset constipation, or long-term constipation, to rule out other causes such as medicines or thyroid disease.    Louis last reviewed this educational content on 6/1/2019 2000-2021 [employer]. All rights reserved. This information is not intended as a substitute for professional medical care. Always follow your healthcare professional's instructions.

## 2021-05-21 NOTE — NURSING NOTE
Patient presents to clinic experiencing constipation, bloating and pressure for past 5 days.  She has been on pain medication after fracture to left wrist on 05/02/21.   Medication Reconciliation: complete    Kelle Wayne LPN

## 2021-06-14 DIAGNOSIS — G47.00 INSOMNIA, UNSPECIFIED TYPE: ICD-10-CM

## 2021-06-14 DIAGNOSIS — M62.830 SPASM OF BACK MUSCLES: ICD-10-CM

## 2021-06-15 NOTE — TELEPHONE ENCOUNTER
[facility] sent Rx request for the following:     Requested Prescriptions   Pending Prescriptions Disp Refills     zolpidem ER (AMBIEN CR) 6.25 MG CR tablet [Pharmacy Med Name: ZOLPIDEM ER 6.25MG TABLETS] 30 tablet      Sig: TAKE 1 TABLET BY MOUTH AT BEDTIME        Last Prescription Date:   12/15/2020  Last Fill Qty/Refills:         30, R-5    Last Office Visit:              5/11/2021 Oja, pre-op  Future Office visit:           6/29/221 Emile    Routing refill request to provider for review/approval because:  Drug not on the [facility], [facility] or [facility] refill protocol or controlled substance     There is no refill protocol information for this order          methocarbamol (ROBAXIN) 500 MG tablet [Pharmacy Med Name: METHOCARBAMOL 500MG TABLETS] 120 tablet 5     Sig: TAKE 1-2 TABLET BY MOUTH FOUR TIMES DAILY         Last Prescription Date:   12/2/2020  Last Fill Qty/Refills:         120, R-5      Routing refill request to provider for review/approval because:  Drug not on the [facility], [facility] or [facility] refill protocol or controlled substance  Drug not on the [facility] refill protocol         There is no refill protocol information for this order        Unable to complete prescription refill per RN Medication Refill Policy.................... Judy Castaneda RN ....................  6/15/2021   2:33 PM

## 2021-06-16 RX ORDER — METHOCARBAMOL 500 MG/1
TABLET, FILM COATED ORAL
Qty: 120 TABLET | Refills: 5 | Status: SHIPPED | OUTPATIENT
Start: 2021-06-16 | End: 2021-12-16

## 2021-06-16 RX ORDER — ZOLPIDEM TARTRATE 6.25 MG/1
TABLET, FILM COATED, EXTENDED RELEASE ORAL
Qty: 30 TABLET | Refills: 5 | Status: SHIPPED | OUTPATIENT
Start: 2021-06-16 | End: 2021-12-21

## 2021-06-17 DIAGNOSIS — Z87.81 S/P ORIF (OPEN REDUCTION INTERNAL FIXATION) FRACTURE: Primary | ICD-10-CM

## 2021-06-17 DIAGNOSIS — Z98.890 S/P ORIF (OPEN REDUCTION INTERNAL FIXATION) FRACTURE: Primary | ICD-10-CM

## 2021-06-18 ENCOUNTER — HOSPITAL ENCOUNTER (OUTPATIENT)
Dept: GENERAL RADIOLOGY | Facility: OTHER | Age: 66
End: 2021-06-18
Attending: SPECIALIST
Payer: MEDICARE

## 2021-06-18 ENCOUNTER — OFFICE VISIT (OUTPATIENT)
Dept: ORTHOPEDICS | Facility: OTHER | Age: 66
End: 2021-06-18
Attending: SPECIALIST
Payer: MEDICARE

## 2021-06-18 DIAGNOSIS — Z98.890 S/P ORIF (OPEN REDUCTION INTERNAL FIXATION) FRACTURE: ICD-10-CM

## 2021-06-18 DIAGNOSIS — Z98.890 S/P ORIF (OPEN REDUCTION INTERNAL FIXATION) FRACTURE: Primary | ICD-10-CM

## 2021-06-18 DIAGNOSIS — Z87.81 S/P ORIF (OPEN REDUCTION INTERNAL FIXATION) FRACTURE: Primary | ICD-10-CM

## 2021-06-18 DIAGNOSIS — Z87.81 S/P ORIF (OPEN REDUCTION INTERNAL FIXATION) FRACTURE: ICD-10-CM

## 2021-06-18 PROCEDURE — G0463 HOSPITAL OUTPT CLINIC VISIT: HCPCS

## 2021-06-18 PROCEDURE — 73110 X-RAY EXAM OF WRIST: CPT | Mod: LT

## 2021-06-18 PROCEDURE — 99024 POSTOP FOLLOW-UP VISIT: CPT | Performed by: SPECIALIST

## 2021-06-18 NOTE — PROGRESS NOTES
Patient is here for follow up on her left wrist ORIF.   Purnima Hernandez LPN .....................6/18/2021 2:03 PM

## 2021-06-19 NOTE — PROGRESS NOTES
Visit Date: 2021    HISTORY OF PRESENT ILLNESS:  The patient returns for followup 5 weeks status post ORIF of a comminuted left distal radius.  She is back today.  Very pleased with the results.  Progressing nicely.    PHYSICAL EXAMINATION:  Physical examination today reveals the patient to be alert, oriented x3, and appropriate.  She is well groomed and well kempt.  Examination of her wrist shows the incision to be healing nicely.  Her digital range of motion is full.  Pronation is full.  She does have a few degrees lack of supination.  Palmar flexion and dorsiflexion are nontender.    IMAGING:  X-rays were reviewed, including three views of the left wrist.  These revealed the patient to be status post ORIF with anatomic reduction.    IMPRESSION:  Five weeks status post ORIF, left distal radius, doing well at this point.     PLAN:  At this point, we are going to wean her from her brace.  We will allow her to advance her activities as tolerated and we will see her back for followup on an as-needed basis.    Lobo Sky MD        D: 2021   T: 2021   MT: DA    Name:     LINDA ROSALBA D.  MRN:      [id number]        Account:    [id number]   :      1955           Visit Date: 2021     Document: [id number]

## 2021-06-29 ENCOUNTER — OFFICE VISIT (OUTPATIENT)
Dept: FAMILY MEDICINE | Facility: OTHER | Age: 66
End: 2021-06-29
Attending: FAMILY MEDICINE
Payer: MEDICARE

## 2021-06-29 VITALS
HEART RATE: 84 BPM | RESPIRATION RATE: 20 BRPM | WEIGHT: 160 LBS | DIASTOLIC BLOOD PRESSURE: 84 MMHG | SYSTOLIC BLOOD PRESSURE: 136 MMHG | BODY MASS INDEX: 27.31 KG/M2 | TEMPERATURE: 97 F | HEIGHT: 64 IN | OXYGEN SATURATION: 98 %

## 2021-06-29 DIAGNOSIS — F41.3 OTHER MIXED ANXIETY DISORDERS: ICD-10-CM

## 2021-06-29 DIAGNOSIS — I10 ESSENTIAL HYPERTENSION: ICD-10-CM

## 2021-06-29 DIAGNOSIS — Z23 NEED FOR PNEUMOCOCCAL VACCINATION: ICD-10-CM

## 2021-06-29 DIAGNOSIS — R10.2 SUPRAPUBIC PAIN: ICD-10-CM

## 2021-06-29 DIAGNOSIS — E03.9 HYPOTHYROIDISM, UNSPECIFIED TYPE: ICD-10-CM

## 2021-06-29 DIAGNOSIS — M54.42 BILATERAL LOW BACK PAIN WITH LEFT-SIDED SCIATICA, UNSPECIFIED CHRONICITY: ICD-10-CM

## 2021-06-29 DIAGNOSIS — F41.1 ANXIETY STATE: ICD-10-CM

## 2021-06-29 DIAGNOSIS — M19.072 OSTEOARTHRITIS OF JOINTS OF TOES OF BOTH FEET: ICD-10-CM

## 2021-06-29 DIAGNOSIS — Z78.0 MENOPAUSE: ICD-10-CM

## 2021-06-29 DIAGNOSIS — G47.00 INSOMNIA, UNSPECIFIED TYPE: ICD-10-CM

## 2021-06-29 DIAGNOSIS — E78.2 MIXED HYPERLIPIDEMIA: ICD-10-CM

## 2021-06-29 DIAGNOSIS — S52.502D CLOSED FRACTURE OF DISTAL END OF LEFT RADIUS WITH ROUTINE HEALING, UNSPECIFIED FRACTURE MORPHOLOGY, SUBSEQUENT ENCOUNTER: ICD-10-CM

## 2021-06-29 DIAGNOSIS — F34.1 DYSTHYMIC DISORDER: ICD-10-CM

## 2021-06-29 DIAGNOSIS — E06.3 HYPOTHYROIDISM DUE TO HASHIMOTO'S THYROIDITIS: ICD-10-CM

## 2021-06-29 DIAGNOSIS — M85.80 OSTEOPENIA, UNSPECIFIED LOCATION: ICD-10-CM

## 2021-06-29 DIAGNOSIS — K59.00 CONSTIPATION, UNSPECIFIED CONSTIPATION TYPE: ICD-10-CM

## 2021-06-29 DIAGNOSIS — Z00.00 WELCOME TO MEDICARE PREVENTIVE VISIT: Primary | ICD-10-CM

## 2021-06-29 DIAGNOSIS — R19.8 SUPRAPUBIC FULLNESS: ICD-10-CM

## 2021-06-29 DIAGNOSIS — M19.071 OSTEOARTHRITIS OF JOINTS OF TOES OF BOTH FEET: ICD-10-CM

## 2021-06-29 LAB
ALBUMIN SERPL-MCNC: 4.2 G/DL (ref 3.5–5.7)
ALP SERPL-CCNC: 72 U/L (ref 34–104)
ALT SERPL W P-5'-P-CCNC: 10 U/L (ref 7–52)
ANION GAP SERPL CALCULATED.3IONS-SCNC: 8 MMOL/L (ref 3–14)
AST SERPL W P-5'-P-CCNC: 12 U/L (ref 13–39)
BASOPHILS # BLD AUTO: 0 10E9/L (ref 0–0.2)
BASOPHILS NFR BLD AUTO: 0.4 %
BILIRUB SERPL-MCNC: 0.6 MG/DL (ref 0.3–1)
BUN SERPL-MCNC: 10 MG/DL (ref 7–25)
CALCIUM SERPL-MCNC: 9.8 MG/DL (ref 8.6–10.3)
CHLORIDE SERPL-SCNC: 96 MMOL/L (ref 98–107)
CHOLEST SERPL-MCNC: 116 MG/DL
CO2 SERPL-SCNC: 31 MMOL/L (ref 21–31)
CREAT SERPL-MCNC: 0.71 MG/DL (ref 0.6–1.2)
DIFFERENTIAL METHOD BLD: NORMAL
EOSINOPHIL # BLD AUTO: 0.1 10E9/L (ref 0–0.7)
EOSINOPHIL NFR BLD AUTO: 0.8 %
ERYTHROCYTE [DISTWIDTH] IN BLOOD BY AUTOMATED COUNT: 12.8 % (ref 10–15)
GFR SERPL CREATININE-BSD FRML MDRD: 83 ML/MIN/{1.73_M2}
GLUCOSE SERPL-MCNC: 113 MG/DL (ref 70–105)
HCT VFR BLD AUTO: 40.6 % (ref 35–47)
HDLC SERPL-MCNC: 40 MG/DL (ref 23–92)
HGB BLD-MCNC: 13.4 G/DL (ref 11.7–15.7)
IMM GRANULOCYTES # BLD: 0 10E9/L (ref 0–0.4)
IMM GRANULOCYTES NFR BLD: 0.5 %
LDLC SERPL CALC-MCNC: 55 MG/DL
LYMPHOCYTES # BLD AUTO: 0.9 10E9/L (ref 0.8–5.3)
LYMPHOCYTES NFR BLD AUTO: 10.8 %
MCH RBC QN AUTO: 28.3 PG (ref 26.5–33)
MCHC RBC AUTO-ENTMCNC: 33 G/DL (ref 31.5–36.5)
MCV RBC AUTO: 86 FL (ref 78–100)
MONOCYTES # BLD AUTO: 0.6 10E9/L (ref 0–1.3)
MONOCYTES NFR BLD AUTO: 7.6 %
NEUTROPHILS # BLD AUTO: 6.7 10E9/L (ref 1.6–8.3)
NEUTROPHILS NFR BLD AUTO: 79.9 %
NONHDLC SERPL-MCNC: 76 MG/DL
PLATELET # BLD AUTO: 280 10E9/L (ref 150–450)
POTASSIUM SERPL-SCNC: 3.6 MMOL/L (ref 3.5–5.1)
PROT SERPL-MCNC: 7.3 G/DL (ref 6.4–8.9)
RBC # BLD AUTO: 4.73 10E12/L (ref 3.8–5.2)
SODIUM SERPL-SCNC: 135 MMOL/L (ref 134–144)
T4 FREE SERPL-MCNC: 0.92 NG/DL (ref 0.6–1.6)
TRIGL SERPL-MCNC: 107 MG/DL
TSH SERPL DL<=0.05 MIU/L-ACNC: 0.59 IU/ML (ref 0.34–5.6)
WBC # BLD AUTO: 8.4 10E9/L (ref 4–11)

## 2021-06-29 PROCEDURE — G0402 INITIAL PREVENTIVE EXAM: HCPCS | Mod: 25 | Performed by: FAMILY MEDICINE

## 2021-06-29 PROCEDURE — G0463 HOSPITAL OUTPT CLINIC VISIT: HCPCS | Mod: 25,27

## 2021-06-29 PROCEDURE — 85025 COMPLETE CBC W/AUTO DIFF WBC: CPT | Mod: ZL | Performed by: FAMILY MEDICINE

## 2021-06-29 PROCEDURE — 36415 COLL VENOUS BLD VENIPUNCTURE: CPT | Mod: ZL | Performed by: FAMILY MEDICINE

## 2021-06-29 PROCEDURE — G0463 HOSPITAL OUTPT CLINIC VISIT: HCPCS

## 2021-06-29 PROCEDURE — 80061 LIPID PANEL: CPT | Mod: ZL | Performed by: FAMILY MEDICINE

## 2021-06-29 PROCEDURE — G0009 ADMIN PNEUMOCOCCAL VACCINE: HCPCS | Performed by: FAMILY MEDICINE

## 2021-06-29 PROCEDURE — 84439 ASSAY OF FREE THYROXINE: CPT | Mod: ZL | Performed by: FAMILY MEDICINE

## 2021-06-29 PROCEDURE — 84443 ASSAY THYROID STIM HORMONE: CPT | Mod: ZL | Performed by: FAMILY MEDICINE

## 2021-06-29 PROCEDURE — G0402 INITIAL PREVENTIVE EXAM: HCPCS | Performed by: FAMILY MEDICINE

## 2021-06-29 PROCEDURE — 99213 OFFICE O/P EST LOW 20 MIN: CPT | Mod: 25 | Performed by: FAMILY MEDICINE

## 2021-06-29 PROCEDURE — 80053 COMPREHEN METABOLIC PANEL: CPT | Mod: ZL | Performed by: FAMILY MEDICINE

## 2021-06-29 RX ORDER — LISINOPRIL 5 MG/1
5 TABLET ORAL DAILY
Qty: 90 TABLET | Refills: 4 | Status: SHIPPED | OUTPATIENT
Start: 2021-06-29 | End: 2022-09-08

## 2021-06-29 RX ORDER — ATORVASTATIN CALCIUM 20 MG/1
20 TABLET, FILM COATED ORAL DAILY
Qty: 90 TABLET | Refills: 4 | Status: SHIPPED | OUTPATIENT
Start: 2021-06-29 | End: 2022-09-07

## 2021-06-29 RX ORDER — TRAZODONE HYDROCHLORIDE 150 MG/1
150 TABLET ORAL AT BEDTIME
Qty: 90 TABLET | Refills: 4 | Status: SHIPPED | OUTPATIENT
Start: 2021-06-29 | End: 2022-09-07

## 2021-06-29 RX ORDER — VENLAFAXINE HYDROCHLORIDE 150 MG/1
150 CAPSULE, EXTENDED RELEASE ORAL EVERY MORNING
Qty: 90 CAPSULE | Refills: 4 | Status: SHIPPED | OUTPATIENT
Start: 2021-06-29 | End: 2022-07-05

## 2021-06-29 RX ORDER — GABAPENTIN 300 MG/1
CAPSULE ORAL
Qty: 270 CAPSULE | Refills: 4 | Status: SHIPPED | OUTPATIENT
Start: 2021-06-29 | End: 2022-09-07

## 2021-06-29 RX ORDER — METOPROLOL SUCCINATE 50 MG/1
50 TABLET, EXTENDED RELEASE ORAL DAILY
Qty: 90 TABLET | Refills: 4 | Status: SHIPPED | OUTPATIENT
Start: 2021-06-29 | End: 2022-09-12

## 2021-06-29 RX ORDER — LEVOTHYROXINE SODIUM 50 UG/1
50 TABLET ORAL
Qty: 90 TABLET | Refills: 4 | Status: SHIPPED | OUTPATIENT
Start: 2021-06-29 | End: 2022-09-07

## 2021-06-29 ASSESSMENT — PATIENT HEALTH QUESTIONNAIRE - PHQ9
5. POOR APPETITE OR OVEREATING: MORE THAN HALF THE DAYS
SUM OF ALL RESPONSES TO PHQ QUESTIONS 1-9: 11

## 2021-06-29 ASSESSMENT — ANXIETY QUESTIONNAIRES
6. BECOMING EASILY ANNOYED OR IRRITABLE: NOT AT ALL
1. FEELING NERVOUS, ANXIOUS, OR ON EDGE: NOT AT ALL
GAD7 TOTAL SCORE: 5
3. WORRYING TOO MUCH ABOUT DIFFERENT THINGS: SEVERAL DAYS
2. NOT BEING ABLE TO STOP OR CONTROL WORRYING: SEVERAL DAYS
IF YOU CHECKED OFF ANY PROBLEMS ON THIS QUESTIONNAIRE, HOW DIFFICULT HAVE THESE PROBLEMS MADE IT FOR YOU TO DO YOUR WORK, TAKE CARE OF THINGS AT HOME, OR GET ALONG WITH OTHER PEOPLE: SOMEWHAT DIFFICULT
5. BEING SO RESTLESS THAT IT IS HARD TO SIT STILL: SEVERAL DAYS
7. FEELING AFRAID AS IF SOMETHING AWFUL MIGHT HAPPEN: NOT AT ALL

## 2021-06-29 ASSESSMENT — PAIN SCALES - GENERAL: PAINLEVEL: MILD PAIN (3)

## 2021-06-29 ASSESSMENT — MIFFLIN-ST. JEOR: SCORE: 1255.76

## 2021-06-29 NOTE — NURSING NOTE
"Chief Complaint   Patient presents with     Medicare Visit       Initial BP (!) 142/94   Pulse 84   Temp 97  F (36.1  C) (Tympanic)   Resp 20   Ht 1.626 m (5' 4\")   Wt 72.6 kg (160 lb)   LMP  (LMP Unknown)   SpO2 98%   BMI 27.46 kg/m   Estimated body mass index is 27.46 kg/m  as calculated from the following:    Height as of this encounter: 1.626 m (5' 4\").    Weight as of this encounter: 72.6 kg (160 lb).  Medication Reconciliation: complete    Kandice Suazo LPN     FOOD SECURITY SCREENING QUESTIONS  Hunger Vital Signs:  Within the past 12 months we worried whether our food would run out before we got money to buy more. Never  Within the past 12 months the food we bought just didn't last and we didn't have money to get more. Never  Kandice Suazo LPN 6/29/2021 1:24 PM    "

## 2021-06-29 NOTE — PROGRESS NOTES
The patient s PHQ-9 score is consistent with moderate depression. She was provided with information regarding depression and was advised to schedule a follow up appointment in 4 weeks to further address this issue.

## 2021-06-29 NOTE — PROGRESS NOTES
SUBJECTIVE:   Mita Gabriel is a 65 year old female who presents for Preventive Visit.      Patient has been advised of split billing requirements and indicates understanding: Yes   Are you in the first 12 months of your Medicare coverage?  Yes,  Visual Acuity:  Right Eye: 20/32   Left Eye: 20/20  Both Eyes: 20/20    HPI  Do you feel safe in your environment? Yes    Have you ever done Advance Care Planning? (For example, a Health Directive, POLST, or a discussion with a medical provider or your loved ones about your wishes): Yes, advance care planning is on file.       Fall risk  Fallen 2 or more times in the past year?: No  Any fall with injury in the past year?: No    Cognitive Screening   1) Repeat 3 items (Leader, Season, Table)    2) Clock draw: NORMAL  3) 3 item recall: Recalls 2 objects   Results: NORMAL clock, 1-2 items recalled: COGNITIVE IMPAIRMENT LESS LIKELY    Mini-CogTM Copyright S Raiza. Licensed by the author for use in [facility]; reprinted with permission ([email or web address]). All rights reserved.      Do you have sleep apnea, excessive snoring or daytime drowsiness?: no    Reviewed and updated as needed this visit by clinical staff  Tobacco  Allergies  Meds      Soc Hx        Reviewed and updated as needed this visit by Provider                Social History     Tobacco Use     Smoking status: Never Smoker     Smokeless tobacco: Never Used   Substance Use Topics     Alcohol use: No     Alcohol/week: 0.0 standard drinks         No flowsheet data found.      Current providers sharing in care for this patient include:   Patient Care Team:  Ella Baptiste DO as PCP - General (Family Practice)  Ella Baptiste DO as Assigned PCP  Lobo Sky MD as Assigned Musculoskeletal Provider    The following health maintenance items are reviewed in Epic and correct as of today:  Health Maintenance Due   Topic Date Due     MEDICARE ANNUAL WELLNESS VISIT  09/16/2020     Pap: 3/15/2016,  "neg co-testing.  NO longer needed.  Mammo: 10/16/2020 (BiRads1).  Dexa: 6/20/2018, with recent distal radial fracture.  Discuss BISPHOSPHONATE  Colonoscopy: 6/9/2017, 10 years.  Tdap 1/16/2019, flu yearly; Shingrix complete.  PCV13 DUE; PPSV23 next year.  Covid complete.    Anxiety/insomnia: worsening.  Has only been able to sleep 3-5 hours a night for the last few months.  She is on trazodone 150mg nightly and Ambien CR 6.25 nightly.  Some nights she claims that she doesn't sleep at all.  Not necessarily napping during the day.  No energy, then anxiety is worsened/heightened with any triggers.    Constipation: worsening.  Has used 1/3 bottle of mag citrate every ~3-4 days.  Doesn't normally go everyday, so this keeps her feeling like she is going.  Tried miralax and \"didn't work.\"  Does have to push/strain at first.  Taking some olive oil and fish oil supplements to help with stools.  Has always had difficulty with BMs.  Normal colonoscopy in 6/2017.  No blood in stool; no shape/caliber change.    Review of Systems  CONSTITUTIONAL: NEGATIVE for fever, chills, change in weight  ENT/MOUTH: NEGATIVE for ear, mouth and throat problems  RESP: NEGATIVE for significant cough or SOB  CV: NEGATIVE for chest pain, palpitations or peripheral edema    OBJECTIVE:   /84   Pulse 84   Temp 97  F (36.1  C) (Tympanic)   Resp 20   Ht 1.626 m (5' 4\")   Wt 72.6 kg (160 lb)   LMP  (LMP Unknown)   SpO2 98%   BMI 27.46 kg/m   Estimated body mass index is 27.46 kg/m  as calculated from the following:    Height as of this encounter: 1.626 m (5' 4\").    Weight as of this encounter: 72.6 kg (160 lb).  Physical Exam  GENERAL APPEARANCE: healthy, alert and no distress  EYES: Eyes grossly normal to inspection, PERRL and conjunctivae and sclerae normal  HENT: ear canals and TM's normal, nose and mouth without ulcers or lesions, oropharynx clear and oral mucous membranes moist  NECK: no adenopathy, no asymmetry, masses, or scars and " thyroid normal to palpation  RESP: lungs clear to auscultation - no rales, rhonchi or wheezes  CV: regular rate and rhythm, normal S1 S2, no S3 or S4, no murmur, click or rub, no peripheral edema and peripheral pulses strong  ABDOMEN: soft, nontender, no hepatosplenomegaly,  bowel sounds normal.  Palpable bulge/subq lump in suprapubic region, at midline point of C/S scar and just inferior.  Causes some TTP.  MS: no musculoskeletal defects are noted and gait is age appropriate without ataxia  SKIN: no suspicious lesions or rashes  NEURO: Normal strength and tone, sensory exam grossly normal, mentation intact and speech normal  PSYCH: mentation appears normal and affect normal/bright    Diagnostic Test Results:  Results for orders placed or performed in visit on 06/29/21   Thyrotropin GH     Status: None   Result Value Ref Range    Thyrotropin 0.59 0.34 - 5.60 IU/mL   Lipid Panel     Status: None   Result Value Ref Range    Cholesterol 116 <200 mg/dL    Triglycerides 107 <150 mg/dL    HDL Cholesterol 40 23 - 92 mg/dL    LDL Cholesterol Calculated 55 <100 mg/dL    Non HDL Cholesterol 76 <130 mg/dL   T4, Free     Status: None   Result Value Ref Range    T4 Free 0.92 0.60 - 1.60 ng/dL   CBC and Differential     Status: None   Result Value Ref Range    WBC 8.4 4.0 - 11.0 10e9/L    RBC Count 4.73 3.8 - 5.2 10e12/L    Hemoglobin 13.4 11.7 - 15.7 g/dL    Hematocrit 40.6 35.0 - 47.0 %    MCV 86 78 - 100 fl    MCH 28.3 26.5 - 33.0 pg    MCHC 33.0 31.5 - 36.5 g/dL    RDW 12.8 10.0 - 15.0 %    Platelet Count 280 150 - 450 10e9/L    Diff Method Automated Method     % Neutrophils 79.9 %    % Lymphocytes 10.8 %    % Monocytes 7.6 %    % Eosinophils 0.8 %    % Basophils 0.4 %    % Immature Granulocytes 0.5 %    Absolute Neutrophil 6.7 1.6 - 8.3 10e9/L    Absolute Lymphocytes 0.9 0.8 - 5.3 10e9/L    Absolute Monocytes 0.6 0.0 - 1.3 10e9/L    Absolute Eosinophils 0.1 0.0 - 0.7 10e9/L    Absolute Basophils 0.0 0.0 - 0.2 10e9/L    Abs  Immature Granulocytes 0.0 0 - 0.4 10e9/L   Comprehensive Metabolic Panel     Status: Abnormal   Result Value Ref Range    Sodium 135 134 - 144 mmol/L    Potassium 3.6 3.5 - 5.1 mmol/L    Chloride 96 (L) 98 - 107 mmol/L    Carbon Dioxide 31 21 - 31 mmol/L    Anion Gap 8 3 - 14 mmol/L    Glucose 113 (H) 70 - 105 mg/dL    Urea Nitrogen 10 7 - 25 mg/dL    Creatinine 0.71 0.60 - 1.20 mg/dL    GFR Estimate 83 >60 mL/min/[1.73_m2]    GFR Estimate If Black >90 >60 mL/min/[1.73_m2]    Calcium 9.8 8.6 - 10.3 mg/dL    Bilirubin Total 0.6 0.3 - 1.0 mg/dL    Albumin 4.2 3.5 - 5.7 g/dL    Protein Total 7.3 6.4 - 8.9 g/dL    Alkaline Phosphatase 72 34 - 104 U/L    ALT 10 7 - 52 U/L    AST 12 (L) 13 - 39 U/L       ASSESSMENT / PLAN:   1. Welcome to Medicare preventive visit  - Comprehensive Metabolic Panel; Future  - CBC and Differential; Future  - Thyrotropin GH  - T4, Free; Future  - Lipid Panel; Future  - GH IMM-  PNEUMOCOCCAL CONJ VACCINE 13 VALENT IM  - Lipid Panel  - T4, Free  - CBC and Differential  - Comprehensive Metabolic Panel    2. Essential hypertension  Chronic; stable.  Monitoring labs today.  If stable will continue to be on ACEI in low dose (would increase next to 10mg daily if BP continues to creep upward).  Refilled ACEI and B-blocker at current doses x 1 year.  - Comprehensive Metabolic Panel; Future  - lisinopril (ZESTRIL) 5 MG tablet; Take 1 tablet (5 mg) by mouth daily  Dispense: 90 tablet; Refill: 4  - metoprolol succinate ER (TOPROL-XL) 50 MG 24 hr tablet; Take 1 tablet (50 mg) by mouth daily  Dispense: 90 tablet; Refill: 4  - Comprehensive Metabolic Panel    3. Hypothyroidism, unspecified type  Chronic, stable.  Monitoring labs due.  Refilled current levothyroxine dose x 1 year.  - Thyrotropin GH  - T4, Free; Future  - T4, Free    4. Closed fracture of distal end of left radius with routine healing, unspecified fracture morphology, subsequent encounter  S/p ORIF.  Progressing well.  Not currently in OT.   If failing to improve over next month - would consider referral for her.    5. Mixed hyperlipidemia  Chronic; stable.  Monitoring labs obtained and continue on current statin dose x 1 year.  No adverse effects of her medications noted today.  - Lipid Panel; Future  - atorvastatin (LIPITOR) 20 MG tablet; Take 1 tablet (20 mg) by mouth daily  Dispense: 90 tablet; Refill: 4  - Lipid Panel    6. Other mixed anxiety disorders  Worsening; but likely multifactorial with poor sleep.  - CBC and Differential; Future  - CBC and Differential    7. Osteoarthritis of joints of toes of both feet  Chronic; no concerns today.    8. Dysthymic disorder  Chronic, stable.  - CBC and Differential; Future  - CBC and Differential    9. Need for pneumococcal vaccination  Updated today.  - GH IMM-  PNEUMOCOCCAL CONJ VACCINE 13 VALENT IM    10. Bilateral low back pain with left-sided sciatica, unspecified chronicity  Chronic; stable.  Limits her ability for activity.  But encouraged to continue to exercise for healthy lifestyle, improvement in sleep/anxiety.  - gabapentin (NEURONTIN) 300 MG capsule; TAKE 1 CAPSULE BY MOUTH THREE TIMES DAILY  Dispense: 270 capsule; Refill: 4    11. Hypothyroidism due to Hashimoto's thyroiditis  See above.  - levothyroxine (SYNTHROID/LEVOTHROID) 50 MCG tablet; Take 1 tablet (50 mcg) by mouth every morning (before breakfast)  Dispense: 90 tablet; Refill: 4    12. Anxiety state  See above.  - traZODone (DESYREL) 150 MG tablet; Take 1 tablet (150 mg) by mouth At Bedtime  Dispense: 90 tablet; Refill: 4  - venlafaxine (EFFEXOR-XR) 150 MG 24 hr capsule; Take 1 capsule (150 mg) by mouth every morning  Dispense: 90 capsule; Refill: 4    13. Menopause  - CBC and Differential; Future  - DX Hip/Pelvis/Spine; Future  - CBC and Differential    14. Osteopenia, unspecified location  Ordered Dexa for fall 2021 with next Mammogram.  - CBC and Differential; Future  - DX Hip/Pelvis/Spine; Future  - CBC and Differential    15.  "Insomnia, unspecified type  Chronic, worsening. Has been on trazodone and Ambien CR.  Rx trial change to Belsomra x 30d and monitor if better or worse.  Discussed sleep hygiene in detail today including limited screen time x 2 hours prior to bed, maintaining a strict routine at bedtime, and healthy exercise levels throughout the day to help improve sleep onset naturally.  - CBC and Differential; Future  - Suvorexant (BELSOMRA) 15 MG tablet; Take 1 tablet (15 mg) by mouth nightly as needed for sleep  Dispense: 30 tablet; Refill: 0  - CBC and Differential    16. Suprapubic fullness  With palpable bulge.  C/S incision hernia vs lipoma vs other.  CT ordered to evaluate.  - CT Abdomen Pelvis w/o Contrast; Future    17. Suprapubic pain  See above.  - CT Abdomen Pelvis w/o Contrast; Future    18. Constipation  Bowel regimen outlined in patient instructions with titration of Miralax +/- Colace or Senna.  Goal: daily/soft BMs x 1 month.  Consideration for Linzess if not improving.      Patient has been advised of split billing requirements and indicates understanding: Yes  COUNSELING:  Reviewed preventive health counseling, as reflected in patient instructions    Estimated body mass index is 27.46 kg/m  as calculated from the following:    Height as of this encounter: 1.626 m (5' 4\").    Weight as of this encounter: 72.6 kg (160 lb).        She reports that she has never smoked. She has never used smokeless tobacco.      Appropriate preventive services were discussed with this patient, including applicable screening as appropriate for cardiovascular disease, diabetes, osteopenia/osteoporosis, and glaucoma.  As appropriate for age/gender, discussed screening for colorectal cancer, prostate cancer, breast cancer, and cervical cancer. Checklist reviewing preventive services available has been given to the patient.    Reviewed patients plan of care and provided an AVS. The Intermediate Care Plan ( asthma action plan, low back " pain action plan, and migraine action plan) for Mita meets the Care Plan requirement. This Care Plan has been established and reviewed with the Patient.    Counseling Resources:  ATP IV Guidelines  Pooled Cohorts Equation Calculator  Breast Cancer Risk Calculator  Breast Cancer: Medication to Reduce Risk  FRAX Risk Assessment  ICSI Preventive Guidelines  Dietary Guidelines for Americans, 2010  USDA's MyPlate  ASA Prophylaxis  Lung CA Screening    Ella Baptiste, [facility] AND [facility]

## 2021-06-29 NOTE — PATIENT INSTRUCTIONS
FOR CONSTIPATION and re-training nerves:   -- Start polyethylene glycol (MiraLax) 2 capful two times a day for a few days, then cut back dose.  Most likely you'll be using 1 capful daily after a few days   -- MiraLax is safe for you to adjust the dose yourself at home. Changes in dose take 12-24 hours to show an effect   -- Progression will be small hard pellet stool, hard log stool, soft stool.  Expect some liquid stool as well.  Goal soft formed daily stool (applesauce consistency stools)   -- After 2-3 days, if you still feel constipated the next step up is to add Senna 1-2 tablets twice a day   -- Use MiraLax daily for at least a month to allow colon to regain strength   -- Drink more water   -- Eat more fruits and vegetables   -- No fiber supplements until at least 1 month out.  Fiber containing foods okay.   -- MiraLax is safe to use indefinitely   -- After 1 month, consider switching from MiraLax to daily fiber supplement (eg Citrucel 1 tbsp daily).    Patient Education   Personalized Prevention Plan  You are due for the preventive services outlined below.  Your care team is available to assist you in scheduling these services.  If you have already completed any of these items, please share that information with your care team to update in your medical record.  There are no preventive care reminders to display for this patient.    Depression and Suicide in Older Adults    Nearly 2 million older Americans have some type of depression. Some of them even take their own lives. Yet depression among older adults is often ignored. Learn the warning signs. You may help spare a loved one needless pain. You may also save a life.   What is depression?  Depression is a common and serious illness that affects the way you think and feel. It is not a normal part of aging, nor is it a sign of weakness, a character flaw, or something you can snap out of. Most people with depression need treatment to get better. The most  "common symptom is a feeling of deep sadness. People who are depressed also may seem tired and listless. And nothing seems to give them pleasure. It s normal to grieve or be sad sometimes. But sadness lessens or passes with time. Depression rarely goes away or improves on its own. A person with clinical depression can't \"snap out of it.\" Other symptoms of depression are:     Sleeping more or less than normal    Eating more or less than normal    Having headaches, stomachaches, or other pains that don t go away    Feeling nervous,  empty,  or worthless    Crying a great deal    Thinking or talking about suicide or death    Loss of interest in activities previously enjoyed    Social isolation    Feeling confused or forgetful  What causes it?  The causes of depression aren t fully known. But it is thought to result from a complex blend of these factors:     Biochemistry. Certain chemicals in the brain play a role.    Genes. Depression does run in families.    Life stress. Life stresses can also trigger depression in some people. Older adults often face many stressors, such as death of friends or a spouse, health problems, and financial concerns.    Chronic conditions. This includes conditions such as diabetes, heart disease, or cancer. These can cause symptoms of depression. Medicine side effects can cause changes in thoughts and behaviors.  How you can help  Often, depressed people may not want to ask for help. When they do, they may be ignored. Or, they may receive the wrong treatment. You can help by showing parents and older friends love and support. If they seem depressed, don t lecture the person, ignore the symptoms, or discount the symptoms as a  normal  part of aging -which they are not. Get involved, listen, and show interest and support.   Help them understand that depression is a treatable illness. Tell them you can help them find the right treatment. Offer to go to their healthcare provider's appointment " with them for support when the symptoms are discussed. With their approval, contact a local mental health center, social service agency, or hospital about services.   You can be an advocate for him or her at healthcare appointments. Many older adults have chronic illnesses that can cause symptoms of depression. Medicine side effects can change thoughts and behaviors. You can help make sure that the healthcare provider looks at all of these factors. He or she should refer your family member or friend to a mental healthcare provider when needed. in some cases, untreated depression can lead to a misdiagnosis. A person may be diagnosed with a brain disorder such as dementia. If the healthcare provider does not take the issue of depression seriously, help your family member or friend to find another provider.   Don't be afraid to ask  If you think an older person you care about could be suicidal, ask,  Have you thought about suicide?  Most people will tell you the truth. If they say  yes,  they may already have a plan for how and when they will attempt it. Find out as much as you can. The more detailed the plan, and the easier it is to carry out, the more danger the person is in right now. Tell the person you are there for them and do not want them to harm him or herself. Don't wait to get help for the person. Call the person's healthcare provider, local hospital, or emergency services.   To learn more    National Suicide Prevention Lifeline (crisis hotline) 742-807-GSYI (815-160-3158)    National [place] of Mental Azioil392-430-7688lod.[facility] Hospitalh.nih.gov    National [place] on Mental Lbpwlmm769-038-7610url.[place].org    Mental Health Wqrbtcr777-243-5956qzs.[facility].org    National Suicide Wrgsvwk902-NESEEPU (661-169-4338)    Call 152  Never leave the person alone. A person who is actively suicidal needs psychiatric care right away. They will need constant supervision. Never leave the person out of sight. Call 911 or the national  24-hour suicide crisis hotline at 775-638-UILP (489-174-1821). You can also take the person to the closest emergency room.   Louis last reviewed this educational content on 5/1/2020 2000-2021 [employer]. All rights reserved. This information is not intended as a substitute for professional medical care. Always follow your healthcare professional's instructions.

## 2021-06-30 ASSESSMENT — ANXIETY QUESTIONNAIRES: GAD7 TOTAL SCORE: 5

## 2021-07-12 DIAGNOSIS — F41.3 OTHER MIXED ANXIETY DISORDERS: ICD-10-CM

## 2021-07-13 RX ORDER — LORAZEPAM 0.5 MG/1
TABLET ORAL
Qty: 90 TABLET | Refills: 5 | Status: SHIPPED | OUTPATIENT
Start: 2021-07-13 | End: 2022-01-07

## 2021-07-13 NOTE — TELEPHONE ENCOUNTER
Taryn JORDAN  sent Rx request for the following:       Requested Prescriptions   Pending Prescriptions Disp Refills     LORazepam (ATIVAN) 0.5 MG tablet [Pharmacy Med Name: LORAZEPAM 0.5MG TABLETS] 90 tablet      Sig: TAKE 1 TABLET BY MOUTH THREE TIMES DAILY     Last Prescription Date:   1/12/2021  Last Fill Qty/Refills:         90, R-5    Last Office Visit:              6/29/2021  Future Office visit:           7/23/2021    Routing refill request to provider for review/approval because:        There is no refill protocol information for this order        Unable to complete prescription refill per RN Medication Refill Policy.................... Judy Castaneda RN ....................  7/13/2021   10:34 AM

## 2021-07-23 ENCOUNTER — VIRTUAL VISIT (OUTPATIENT)
Dept: FAMILY MEDICINE | Facility: OTHER | Age: 66
End: 2021-07-23
Attending: FAMILY MEDICINE
Payer: MEDICARE

## 2021-07-23 DIAGNOSIS — R10.30 LOWER ABDOMINAL PAIN: ICD-10-CM

## 2021-07-23 DIAGNOSIS — K59.03 DRUG INDUCED CONSTIPATION: ICD-10-CM

## 2021-07-23 DIAGNOSIS — G47.00 INSOMNIA, UNSPECIFIED TYPE: Primary | ICD-10-CM

## 2021-07-23 PROCEDURE — 99442 PR PHYSICIAN TELEPHONE EVALUATION 11-20 MIN: CPT | Mod: 95 | Performed by: FAMILY MEDICINE

## 2021-07-23 NOTE — PROGRESS NOTES
Mita is a 65 year old who is being evaluated via a billable telephone visit.     What phone number would you like to be contacted at? [phone]  How would you like to obtain your AVS? Cas    Assessment & Plan     Insomnia, unspecified type  Would like to proceed with another month of treatment with Belsomra as she thinks she is sleeping better overall and adjusting to the new medication.  If still having more groggy effects in another month - encouraged to call back and we can consider Restoril vs Lunesta vs other.   Did send med in with refills; and notified her of this in the event she is happy with the transition over the next month.  - Suvorexant (BELSOMRA) 15 MG tablet; Take 1 tablet (15 mg) by mouth nightly as needed for sleep    Drug induced constipation  Improved with miralax; but then introduced and changed to Senna and Senna-S.  Not taking miralax BID; therefore encouraged up to 2 capfuls BID prn before addition of other medications.  Suspect she will need this for another full month or more to re-train her colon.  Has CT due to lower abdominal pain/bulge scheduled for 8/3/2021.  Follow up prn.    Lower abdominal pain  See above.    Follow up in one month (phone okay); sooner prn.    Ella Baptiste,   [facility]    Subjective   Mita is a 65 year old who presents for the following health issues:    HPI   Follow up to sleep medication changes.  Trial change to Belsomra from Ambien/Trazodone.  Also encouraged good sleep hygiene in detail at our last visit - limiting screen time before bed, bedtime routine consistency and exercise during the day to help improve sleep naturally.  She is sleeping better overall; uncertain if she was having     Constipation - doing a little better.  Using miralax daily.  Wondering about senna (not helpful) or Senna-S.   questions coffee/olive oil.    Review of Systems   CONSTITUTIONAL: NEGATIVE for fever, chills, change in  weight  ENT/MOUTH: NEGATIVE for ear, mouth and throat problems  RESP: NEGATIVE for significant cough or SOB  CV: NEGATIVE for chest pain, palpitations or peripheral edema      Objective    LMP  (LMP Unknown)     Physical Exam   healthy, alert and no distress  PSYCH: Alert and oriented times 3; coherent speech, normal   rate and volume, able to articulate logical thoughts, able   to abstract reason, no tangential thoughts, no hallucinations   or delusions  Her affect is normal  RESP: No cough, no audible wheezing, able to talk in full sentences  Remainder of exam unable to be completed due to telephone visits    No new diagnostics        Phone call duration: 14 minutes 10 seconds  Start: 3:06 PM  Stop: 3:21 PM

## 2021-08-03 ENCOUNTER — TELEPHONE (OUTPATIENT)
Dept: FAMILY MEDICINE | Facility: OTHER | Age: 66
End: 2021-08-03

## 2021-08-03 ENCOUNTER — HOSPITAL ENCOUNTER (OUTPATIENT)
Dept: CT IMAGING | Facility: OTHER | Age: 66
End: 2021-08-03
Attending: FAMILY MEDICINE
Payer: MEDICARE

## 2021-08-03 ENCOUNTER — HOSPITAL ENCOUNTER (OUTPATIENT)
Dept: BONE DENSITY | Facility: OTHER | Age: 66
End: 2021-08-03
Attending: FAMILY MEDICINE
Payer: MEDICARE

## 2021-08-03 DIAGNOSIS — R19.8 SUPRAPUBIC FULLNESS: ICD-10-CM

## 2021-08-03 DIAGNOSIS — M85.80 OSTEOPENIA, UNSPECIFIED LOCATION: ICD-10-CM

## 2021-08-03 DIAGNOSIS — R10.2 SUPRAPUBIC PAIN: ICD-10-CM

## 2021-08-03 DIAGNOSIS — Z78.0 MENOPAUSE: ICD-10-CM

## 2021-08-03 PROCEDURE — 74177 CT ABD & PELVIS W/CONTRAST: CPT | Mod: MG

## 2021-08-03 PROCEDURE — 250N000011 HC RX IP 250 OP 636: Performed by: FAMILY MEDICINE

## 2021-08-03 PROCEDURE — 77080 DXA BONE DENSITY AXIAL: CPT

## 2021-08-03 RX ORDER — IOPAMIDOL 755 MG/ML
93 INJECTION, SOLUTION INTRAVASCULAR ONCE
Status: COMPLETED | OUTPATIENT
Start: 2021-08-03 | End: 2021-08-03

## 2021-08-03 RX ADMIN — IOPAMIDOL 93 ML: 755 INJECTION, SOLUTION INTRAVENOUS at 14:25

## 2021-08-03 NOTE — TELEPHONE ENCOUNTER
Patient was wondering if she could schedule a telephone visit with Dr. Baptiste to follow up on bowel movements. If you could work her in, she would appreciate it. Her phone number is [phone].     Thank you,   Olena Davalos on 8/3/2021 at 12:34 PM

## 2021-08-04 DIAGNOSIS — N83.201 CYSTS OF BOTH OVARIES: Primary | ICD-10-CM

## 2021-08-04 DIAGNOSIS — N83.202 CYSTS OF BOTH OVARIES: Primary | ICD-10-CM

## 2021-08-04 NOTE — PROGRESS NOTES
Result note re: CT abd/pelvis showing ovarian cyst.  Order placed for pelvic US.  Ella Baptiste, DO

## 2021-08-05 DIAGNOSIS — Z98.890 S/P ORIF (OPEN REDUCTION INTERNAL FIXATION) FRACTURE: Primary | ICD-10-CM

## 2021-08-05 DIAGNOSIS — Z87.81 S/P ORIF (OPEN REDUCTION INTERNAL FIXATION) FRACTURE: Primary | ICD-10-CM

## 2021-08-10 ENCOUNTER — TELEPHONE (OUTPATIENT)
Dept: FAMILY MEDICINE | Facility: OTHER | Age: 66
End: 2021-08-10

## 2021-08-10 NOTE — TELEPHONE ENCOUNTER
Tried calling patient with no answer and no voicemail.  Kandice Suazo LPN, LPN  8/10/2021  3:53 PM

## 2021-08-13 NOTE — TELEPHONE ENCOUNTER
The patient knows she needs an ultrasound but is wondering what her next step is to do about her intestinal issue. She does not want to have an accident when she is having her ultrasound. She does not know from day to day how her bowels are going to be. It is like irritable bowel syndrome.   Imani Parrish LPN..................8/13/2021   9:43 AM

## 2021-08-13 NOTE — TELEPHONE ENCOUNTER
After the patient's name and date of birth was verified, the patient was told the below information.  Imani Parrish LPN..................8/13/2021   12:09 PM

## 2021-08-20 ENCOUNTER — VIRTUAL VISIT (OUTPATIENT)
Dept: FAMILY MEDICINE | Facility: OTHER | Age: 66
End: 2021-08-20
Attending: FAMILY MEDICINE
Payer: MEDICARE

## 2021-08-20 VITALS — BODY MASS INDEX: 26.61 KG/M2 | WEIGHT: 155 LBS

## 2021-08-20 DIAGNOSIS — K59.1 FUNCTIONAL DIARRHEA: Primary | ICD-10-CM

## 2021-08-20 PROCEDURE — 99442 PR PHYSICIAN TELEPHONE EVALUATION 11-20 MIN: CPT | Mod: 95 | Performed by: FAMILY MEDICINE

## 2021-08-20 ASSESSMENT — PATIENT HEALTH QUESTIONNAIRE - PHQ9
5. POOR APPETITE OR OVEREATING: NEARLY EVERY DAY
SUM OF ALL RESPONSES TO PHQ QUESTIONS 1-9: 13

## 2021-08-20 ASSESSMENT — ANXIETY QUESTIONNAIRES
3. WORRYING TOO MUCH ABOUT DIFFERENT THINGS: NOT AT ALL
GAD7 TOTAL SCORE: 10
7. FEELING AFRAID AS IF SOMETHING AWFUL MIGHT HAPPEN: SEVERAL DAYS
5. BEING SO RESTLESS THAT IT IS HARD TO SIT STILL: MORE THAN HALF THE DAYS
2. NOT BEING ABLE TO STOP OR CONTROL WORRYING: MORE THAN HALF THE DAYS
IF YOU CHECKED OFF ANY PROBLEMS ON THIS QUESTIONNAIRE, HOW DIFFICULT HAVE THESE PROBLEMS MADE IT FOR YOU TO DO YOUR WORK, TAKE CARE OF THINGS AT HOME, OR GET ALONG WITH OTHER PEOPLE: VERY DIFFICULT
1. FEELING NERVOUS, ANXIOUS, OR ON EDGE: MORE THAN HALF THE DAYS
6. BECOMING EASILY ANNOYED OR IRRITABLE: NOT AT ALL

## 2021-08-20 ASSESSMENT — PAIN SCALES - GENERAL: PAINLEVEL: MODERATE PAIN (4)

## 2021-08-20 NOTE — PROGRESS NOTES
Mita is a 65 year old who is being evaluated via a billable telephone visit.      What phone number would you like to be contacted at? [phone]  How would you like to obtain your AVS? MyChart    Assessment & Plan     Functional diarrhea  Stop Miralax.  Long conversation re: waxing and waning between constipation/loose.  Titrate medication as needed after Wednesday's US.    L adnexal cyst  Further work up with pelvic US on Wednesday.  If further concerns, would refer to OB/GYN.    Ella Baptiste, [facility]    Subjective   Mita is a 65 year old who presents for the following health issues:    HPI   Mita is being contacted for complaints of diarrhea.  Has been taking miralax for constipation.  Hasn't stopped using the medication.    Discussed the CT findings (US scheduled).    Review of Systems   CONSTITUTIONAL: NEGATIVE for fever, chills, change in weight  ENT/MOUTH: NEGATIVE for ear, mouth and throat problems  RESP: NEGATIVE for significant cough or SOB  CV: NEGATIVE for chest pain, palpitations or peripheral edema      Objective     Wt 70.3 kg (155 lb)   LMP  (LMP Unknown)   BMI 26.61 kg/m      Physical Exam   healthy, alert and no distress  PSYCH: Alert and oriented times 3; coherent speech, normal   rate and volume, able to articulate logical thoughts, able   to abstract reason, no tangential thoughts, no hallucinations   or delusions  Her affect is normal  RESP: No cough, no audible wheezing, able to talk in full sentences  Remainder of exam unable to be completed due to telephone visits      Phone call duration: 15 minutes  Start: 2:58 PM  Stop: 3:13 PM

## 2021-08-20 NOTE — NURSING NOTE
"Chief Complaint   Patient presents with     RECHECK     bowel movements       Initial Wt 70.3 kg (155 lb)   LMP  (LMP Unknown)   BMI 26.61 kg/m   Estimated body mass index is 26.61 kg/m  as calculated from the following:    Height as of 6/29/21: 1.626 m (5' 4\").    Weight as of this encounter: 70.3 kg (155 lb).  Medication Reconciliation: complete    Kandice Suazo LPN  "

## 2021-08-21 ASSESSMENT — ANXIETY QUESTIONNAIRES: GAD7 TOTAL SCORE: 10

## 2021-08-25 ENCOUNTER — HOSPITAL ENCOUNTER (OUTPATIENT)
Dept: ULTRASOUND IMAGING | Facility: OTHER | Age: 66
Discharge: HOME | End: 2021-08-25
Attending: FAMILY MEDICINE | Admitting: FAMILY MEDICINE
Payer: MEDICARE

## 2021-08-25 DIAGNOSIS — N83.201 CYSTS OF BOTH OVARIES: ICD-10-CM

## 2021-08-25 DIAGNOSIS — N83.202 CYSTS OF BOTH OVARIES: ICD-10-CM

## 2021-08-25 PROCEDURE — 76830 TRANSVAGINAL US NON-OB: CPT

## 2021-08-26 DIAGNOSIS — N83.202 CYSTS OF BOTH OVARIES: Primary | ICD-10-CM

## 2021-08-26 DIAGNOSIS — N83.201 CYSTS OF BOTH OVARIES: Primary | ICD-10-CM

## 2021-09-29 DIAGNOSIS — I10 ESSENTIAL HYPERTENSION: ICD-10-CM

## 2021-10-01 RX ORDER — HYDROCHLOROTHIAZIDE 25 MG/1
TABLET ORAL
Qty: 90 TABLET | Refills: 4 | Status: SHIPPED | OUTPATIENT
Start: 2021-10-01 | End: 2022-09-21

## 2021-10-01 NOTE — TELEPHONE ENCOUNTER
"Requested Prescriptions   Pending Prescriptions Disp Refills     hydrochlorothiazide (HYDRODIURIL) 25 MG tablet [Pharmacy Med Name: HYDROCHLOROTHIAZIDE 25MG TABLETS] 90 tablet 4     Sig: TAKE 1 TABLET(25 MG) BY MOUTH DAILY       Diuretics (Including Combos) Protocol Passed - 9/29/2021 12:56 PM        Passed - Blood pressure under 140/90 in past 12 months     BP Readings from Last 3 Encounters:   06/29/21 136/84   05/21/21 (!) 164/102   05/14/21 (!) 148/81                 Passed - Recent (12 mo) or future (30 days) visit within the authorizing provider's specialty     Patient has had an office visit with the authorizing provider or a provider within the authorizing providers department within the previous 12 mos or has a future within next 30 days. See \"Patient Info\" tab in inbasket, or \"Choose Columns\" in Meds & Orders section of the refill encounter.              Passed - Medication is active on med list        Passed - Patient is age 18 or older        Passed - No active pregancy on record        Passed - Normal serum creatinine on file in past 12 months     Recent Labs   Lab Test 06/29/21  1442   CR 0.71              Passed - Normal serum potassium on file in past 12 months     Recent Labs   Lab Test 06/29/21  1442   POTASSIUM 3.6                    Passed - Normal serum sodium on file in past 12 months     Recent Labs   Lab Test 06/29/21  1442                 Passed - No positive pregnancy test in past 12 months     Last Written Prescription Date:  10/2/20  Last Fill Quantity: 90,   # refills: 4  Last Office Visit: 8/20/21 [facility]  Future Office visit:   none    Routing refill request to provider for review/approval because:  Unable to fill prescription refills per RN Medical Refill Policy.  Brenda J. Goodell, RN on 10/1/2021 at 3:01 PM      "

## 2021-10-09 ENCOUNTER — HEALTH MAINTENANCE LETTER (OUTPATIENT)
Age: 66
End: 2021-10-09

## 2021-12-08 ENCOUNTER — HOSPITAL ENCOUNTER (OUTPATIENT)
Dept: MAMMOGRAPHY | Facility: OTHER | Age: 66
End: 2021-12-08
Attending: FAMILY MEDICINE
Payer: MEDICARE

## 2021-12-08 ENCOUNTER — HOSPITAL ENCOUNTER (OUTPATIENT)
Dept: ULTRASOUND IMAGING | Facility: OTHER | Age: 66
End: 2021-12-08
Attending: FAMILY MEDICINE
Payer: MEDICARE

## 2021-12-08 DIAGNOSIS — N83.201 CYSTS OF BOTH OVARIES: ICD-10-CM

## 2021-12-08 DIAGNOSIS — N83.202 CYSTS OF BOTH OVARIES: ICD-10-CM

## 2021-12-08 DIAGNOSIS — Z12.31 VISIT FOR SCREENING MAMMOGRAM: ICD-10-CM

## 2021-12-08 PROCEDURE — 77063 BREAST TOMOSYNTHESIS BI: CPT

## 2021-12-08 PROCEDURE — 76830 TRANSVAGINAL US NON-OB: CPT

## 2021-12-13 DIAGNOSIS — M62.830 SPASM OF BACK MUSCLES: ICD-10-CM

## 2021-12-16 RX ORDER — METHOCARBAMOL 500 MG/1
TABLET, FILM COATED ORAL
Qty: 120 TABLET | Refills: 5 | Status: SHIPPED | OUTPATIENT
Start: 2021-12-16 | End: 2022-06-16

## 2021-12-16 NOTE — TELEPHONE ENCOUNTER
[id number]  sent Rx request for the following:      Requested Prescriptions   Pending Prescriptions Disp Refills     methocarbamol (ROBAXIN) 500 MG tablet [Pharmacy Med Name: METHOCARBAMOL 500MG TABLETS] 120 tablet 5     Sig: TAKE 1-2 TABLET BY MOUTH FOUR TIMES DAILY       There is no refill protocol information for this order        Last Prescription Date:   6/16/21  Last Fill Qty/Refills:         120, R-5  Last Office Visit:              080/20/21  Future Office visit:           None    Routing refill request to provider for review/approval because:  Unable to complete prescription refill per RN Medication Refill Policy. Malu Gutierrez RN .............. 12/16/2021  9:53 AM

## 2021-12-19 DIAGNOSIS — G47.00 INSOMNIA, UNSPECIFIED TYPE: ICD-10-CM

## 2021-12-20 NOTE — TELEPHONE ENCOUNTER
Requested Prescriptions   Pending Prescriptions Disp Refills     zolpidem ER (AMBIEN CR) 6.25 MG CR tablet [Pharmacy Med Name: ZOLPIDEM ER 6.25MG TABLETS] 30 tablet      Sig: TAKE 1 TABLET BY MOUTH AT BEDTIME       There is no refill protocol information for this order        Taryn sent Rx request for the following: zolpidem ER (AMBIEN CR) 6.25 MG CR tablet [Pharmacy Med Name: ZOLPIDEM ER 6.25MG TABLETS]  SigTAKE 1 TABLET BY MOUTH AT BEDTIME    Last Prescription Date:   6/6/21  Last Fill Qty/Refills:         30, R-5    Last Office Visit:              7/23/21   Future Office visit:           None     Routing refill request to provider for review/approval because:  Drug not on the [facility], [facility] or [facility] refill protocol or controlled substance        Debra Sims RN on 12/20/2021 at 4:13 PM

## 2021-12-21 DIAGNOSIS — G47.00 INSOMNIA, UNSPECIFIED TYPE: ICD-10-CM

## 2021-12-21 RX ORDER — ZOLPIDEM TARTRATE 6.25 MG/1
TABLET, FILM COATED, EXTENDED RELEASE ORAL
Qty: 30 TABLET | Refills: 5 | Status: SHIPPED | OUTPATIENT
Start: 2021-12-21 | End: 2022-06-29

## 2021-12-21 RX ORDER — ZOLPIDEM TARTRATE 6.25 MG/1
6.25 TABLET, FILM COATED, EXTENDED RELEASE ORAL AT BEDTIME
Qty: 30 TABLET | Refills: 5 | Status: CANCELLED | OUTPATIENT
Start: 2021-12-21

## 2021-12-21 NOTE — TELEPHONE ENCOUNTER
I'm assuming that response is that they will cover:  Zolpidem 5mg tablets and not the Zolpidem CR 6.25mg tablets.  However, patient has been on these for 3+ years and continues to have benefit from the long acting medication.    So does a PA need to be started or what is pharmacy's next step?    Ella Baptiste, DO on 12/21/2021 at 11:47 AM

## 2021-12-21 NOTE — TELEPHONE ENCOUNTER
Note from pharmacy: drug not covered by patient plan. There preferred alternative is ZOLPIDEMTABMG. Please call/fax the pharmacy to change medication along with strength, directions, quantity, and refills.

## 2021-12-21 NOTE — TELEPHONE ENCOUNTER
Will send this to the [facility] to initiate the PA process.  Kandice Suazo LPN, LPN  12/21/2021  3:20 PM

## 2021-12-21 NOTE — TELEPHONE ENCOUNTER
zolpidem ER (AMBIEN CR) 6.25 MG CR tablet  Last Written Prescription Date:  12/21/21  Last Fill Quantity: 30,   # refills: 5  Note from pharmacy: drug not covered by patient plan. There preferred alternative is ZOLPIDEMTABMG. Please call/fax the pharmacy to change medication along with strength, directions, quantity, and refills.   Last Office Visit: 8/20/21  Future Office visit:       Routing refill request to provider for review/approval because:  Drug not on the [facility], [facility] or [facility] refill protocol or controlled substance and see above note from pharmacy. Unable to complete prescription refill per RNMedication Refill Policy.................... Freya Paez RN ....................  12/21/2021   11:16 AM

## 2022-01-06 DIAGNOSIS — F41.3 OTHER MIXED ANXIETY DISORDERS: ICD-10-CM

## 2022-01-07 RX ORDER — LORAZEPAM 0.5 MG/1
TABLET ORAL
Qty: 90 TABLET | Refills: 5 | Status: SHIPPED | OUTPATIENT
Start: 2022-01-07 | End: 2022-07-05

## 2022-01-07 NOTE — TELEPHONE ENCOUNTER
LORAZEPAM 0.5MG TABLETS       Last Written Prescription Date:  7/13/21  Last Fill Quantity: 90,   # refills: 5  Last Office Visit: 8/20/21  Future Office visit:       Routing refill request to provider for review/approval because:  Drug not on the [facility], [facility] or [facility] refill protocol or controlled substance

## 2022-02-18 NOTE — TELEPHONE ENCOUNTER
Received a fax from [employer] again saying that this med has not gone through patient's insurance. Will send to [facility]'s to check on status of PA.  Kandice Suazo LPN, LPN  2/18/2022  1:10 PM

## 2022-05-10 ENCOUNTER — APPOINTMENT (OUTPATIENT)
Dept: GENERAL RADIOLOGY | Facility: OTHER | Age: 67
End: 2022-05-10
Attending: PHYSICIAN ASSISTANT
Payer: MEDICARE

## 2022-05-10 ENCOUNTER — APPOINTMENT (OUTPATIENT)
Dept: CT IMAGING | Facility: OTHER | Age: 67
End: 2022-05-10
Attending: PHYSICIAN ASSISTANT
Payer: MEDICARE

## 2022-05-10 ENCOUNTER — HOSPITAL ENCOUNTER (EMERGENCY)
Facility: OTHER | Age: 67
Discharge: HOME | End: 2022-05-11
Attending: PHYSICIAN ASSISTANT | Admitting: PHYSICIAN ASSISTANT
Payer: MEDICARE

## 2022-05-10 DIAGNOSIS — S82.892A ANKLE FRACTURE, LEFT, CLOSED, INITIAL ENCOUNTER: ICD-10-CM

## 2022-05-10 DIAGNOSIS — E83.42 HYPOMAGNESEMIA: ICD-10-CM

## 2022-05-10 DIAGNOSIS — R79.89 ELEVATED D-DIMER: ICD-10-CM

## 2022-05-10 DIAGNOSIS — N30.00 ACUTE CYSTITIS WITHOUT HEMATURIA: ICD-10-CM

## 2022-05-10 DIAGNOSIS — R55 SYNCOPE, UNSPECIFIED SYNCOPE TYPE: ICD-10-CM

## 2022-05-10 LAB
ALBUMIN SERPL-MCNC: 3.7 G/DL (ref 3.5–5.7)
ALBUMIN UR-MCNC: NEGATIVE MG/DL
ALP SERPL-CCNC: 69 U/L (ref 34–104)
ALT SERPL W P-5'-P-CCNC: 11 U/L (ref 7–52)
ANION GAP SERPL CALCULATED.3IONS-SCNC: 6 MMOL/L (ref 3–14)
APPEARANCE UR: CLEAR
AST SERPL W P-5'-P-CCNC: 12 U/L (ref 13–39)
BASOPHILS # BLD AUTO: 0 10E3/UL (ref 0–0.2)
BASOPHILS NFR BLD AUTO: 0 %
BILIRUB SERPL-MCNC: 0.6 MG/DL (ref 0.3–1)
BILIRUB UR QL STRIP: NEGATIVE
BUN SERPL-MCNC: 13 MG/DL (ref 7–25)
CALCIUM SERPL-MCNC: 8.8 MG/DL (ref 8.6–10.3)
CHLORIDE BLD-SCNC: 98 MMOL/L (ref 98–107)
CO2 SERPL-SCNC: 30 MMOL/L (ref 21–31)
COLOR UR AUTO: ABNORMAL
CREAT SERPL-MCNC: 0.88 MG/DL (ref 0.6–1.2)
CRP SERPL-MCNC: <1 MG/L
D DIMER PPP FEU-MCNC: 7.1 UG/ML FEU (ref 0–0.5)
EOSINOPHIL # BLD AUTO: 0.1 10E3/UL (ref 0–0.7)
EOSINOPHIL NFR BLD AUTO: 2 %
ERYTHROCYTE [DISTWIDTH] IN BLOOD BY AUTOMATED COUNT: 12.2 % (ref 10–15)
ERYTHROCYTE [SEDIMENTATION RATE] IN BLOOD BY WESTERGREN METHOD: 8 MM/HR (ref 0–30)
GFR SERPL CREATININE-BSD FRML MDRD: 72 ML/MIN/1.73M2
GLUCOSE BLD-MCNC: 99 MG/DL (ref 70–105)
GLUCOSE UR STRIP-MCNC: NEGATIVE MG/DL
HCT VFR BLD AUTO: 37.2 % (ref 35–47)
HGB BLD-MCNC: 12.3 G/DL (ref 11.7–15.7)
HGB UR QL STRIP: NEGATIVE
HYALINE CASTS: 1 /LPF
IMM GRANULOCYTES # BLD: 0 10E3/UL
IMM GRANULOCYTES NFR BLD: 0 %
KETONES UR STRIP-MCNC: NEGATIVE MG/DL
LACTATE SERPL-SCNC: 1.1 MMOL/L (ref 0.7–2)
LEUKOCYTE ESTERASE UR QL STRIP: ABNORMAL
LYMPHOCYTES # BLD AUTO: 1.5 10E3/UL (ref 0.8–5.3)
LYMPHOCYTES NFR BLD AUTO: 24 %
MAGNESIUM SERPL-MCNC: 1.6 MG/DL (ref 1.9–2.7)
MCH RBC QN AUTO: 29.4 PG (ref 26.5–33)
MCHC RBC AUTO-ENTMCNC: 33.1 G/DL (ref 31.5–36.5)
MCV RBC AUTO: 89 FL (ref 78–100)
MONOCYTES # BLD AUTO: 0.6 10E3/UL (ref 0–1.3)
MONOCYTES NFR BLD AUTO: 9 %
MUCOUS THREADS #/AREA URNS LPF: PRESENT /LPF
NEUTROPHILS # BLD AUTO: 4 10E3/UL (ref 1.6–8.3)
NEUTROPHILS NFR BLD AUTO: 65 %
NITRATE UR QL: NEGATIVE
NRBC # BLD AUTO: 0 10E3/UL
NRBC BLD AUTO-RTO: 0 /100
NT-PROBNP SERPL-MCNC: 31 PG/ML (ref 0–100)
PH UR STRIP: 7 [PH] (ref 5–9)
PLATELET # BLD AUTO: 237 10E3/UL (ref 150–450)
POTASSIUM BLD-SCNC: 3.7 MMOL/L (ref 3.5–5.1)
PROT SERPL-MCNC: 6.1 G/DL (ref 6.4–8.9)
RBC # BLD AUTO: 4.18 10E6/UL (ref 3.8–5.2)
RBC URINE: 1 /HPF
SODIUM SERPL-SCNC: 134 MMOL/L (ref 134–144)
SP GR UR STRIP: 1.02 (ref 1–1.03)
TROPONIN I SERPL-MCNC: <2.4 PG/ML (ref 0–34)
TSH SERPL DL<=0.005 MIU/L-ACNC: 1.18 MU/L (ref 0.4–4)
UROBILINOGEN UR STRIP-MCNC: NORMAL MG/DL
WBC # BLD AUTO: 6.3 10E3/UL (ref 4–11)
WBC URINE: 6 /HPF

## 2022-05-10 PROCEDURE — 85025 COMPLETE CBC W/AUTO DIFF WBC: CPT | Performed by: PHYSICIAN ASSISTANT

## 2022-05-10 PROCEDURE — 82040 ASSAY OF SERUM ALBUMIN: CPT | Performed by: PHYSICIAN ASSISTANT

## 2022-05-10 PROCEDURE — 93005 ELECTROCARDIOGRAM TRACING: CPT | Performed by: PHYSICIAN ASSISTANT

## 2022-05-10 PROCEDURE — 999N000065 XR ANKLE PORT LEFT G/E 3 VIEWS: Mod: LT

## 2022-05-10 PROCEDURE — 36415 COLL VENOUS BLD VENIPUNCTURE: CPT | Performed by: PHYSICIAN ASSISTANT

## 2022-05-10 PROCEDURE — 73610 X-RAY EXAM OF ANKLE: CPT | Mod: LT

## 2022-05-10 PROCEDURE — 83605 ASSAY OF LACTIC ACID: CPT | Performed by: PHYSICIAN ASSISTANT

## 2022-05-10 PROCEDURE — 86140 C-REACTIVE PROTEIN: CPT | Performed by: PHYSICIAN ASSISTANT

## 2022-05-10 PROCEDURE — 85379 FIBRIN DEGRADATION QUANT: CPT | Performed by: PHYSICIAN ASSISTANT

## 2022-05-10 PROCEDURE — 83735 ASSAY OF MAGNESIUM: CPT | Performed by: PHYSICIAN ASSISTANT

## 2022-05-10 PROCEDURE — 27810 TREATMENT OF ANKLE FRACTURE: CPT | Mod: LT | Performed by: PHYSICIAN ASSISTANT

## 2022-05-10 PROCEDURE — 87086 URINE CULTURE/COLONY COUNT: CPT | Performed by: PHYSICIAN ASSISTANT

## 2022-05-10 PROCEDURE — G1004 CDSM NDSC: HCPCS

## 2022-05-10 PROCEDURE — 250N000011 HC RX IP 250 OP 636: Performed by: PHYSICIAN ASSISTANT

## 2022-05-10 PROCEDURE — 85652 RBC SED RATE AUTOMATED: CPT | Performed by: PHYSICIAN ASSISTANT

## 2022-05-10 PROCEDURE — 81003 URINALYSIS AUTO W/O SCOPE: CPT | Performed by: PHYSICIAN ASSISTANT

## 2022-05-10 PROCEDURE — 999N000186 HC STATISTIC TRAUMA - NO PRIOR: Performed by: PHYSICIAN ASSISTANT

## 2022-05-10 PROCEDURE — 83880 ASSAY OF NATRIURETIC PEPTIDE: CPT | Performed by: PHYSICIAN ASSISTANT

## 2022-05-10 PROCEDURE — 71275 CT ANGIOGRAPHY CHEST: CPT | Mod: TC,MG

## 2022-05-10 PROCEDURE — G1004 CDSM NDSC: HCPCS | Mod: TC

## 2022-05-10 PROCEDURE — 99285 EMERGENCY DEPT VISIT HI MDM: CPT | Mod: 25 | Performed by: PHYSICIAN ASSISTANT

## 2022-05-10 PROCEDURE — 84484 ASSAY OF TROPONIN QUANT: CPT | Performed by: PHYSICIAN ASSISTANT

## 2022-05-10 PROCEDURE — 27810 TREATMENT OF ANKLE FRACTURE: CPT | Mod: 54 | Performed by: PHYSICIAN ASSISTANT

## 2022-05-10 PROCEDURE — 250N000013 HC RX MED GY IP 250 OP 250 PS 637: Performed by: PHYSICIAN ASSISTANT

## 2022-05-10 PROCEDURE — 84443 ASSAY THYROID STIM HORMONE: CPT | Performed by: PHYSICIAN ASSISTANT

## 2022-05-10 PROCEDURE — 99284 EMERGENCY DEPT VISIT MOD MDM: CPT | Mod: 57 | Performed by: PHYSICIAN ASSISTANT

## 2022-05-10 PROCEDURE — 80053 COMPREHEN METABOLIC PANEL: CPT | Performed by: PHYSICIAN ASSISTANT

## 2022-05-10 RX ORDER — IOPAMIDOL 755 MG/ML
100 INJECTION, SOLUTION INTRAVASCULAR ONCE
Status: COMPLETED | OUTPATIENT
Start: 2022-05-10 | End: 2022-05-10

## 2022-05-10 RX ORDER — CIPROFLOXACIN 250 MG/1
250 TABLET, FILM COATED ORAL 2 TIMES DAILY
Qty: 14 TABLET | Refills: 0 | Status: SHIPPED | OUTPATIENT
Start: 2022-05-10 | End: 2022-05-16

## 2022-05-10 RX ORDER — MAGNESIUM OXIDE 400 MG/1
800 TABLET ORAL ONCE
Status: COMPLETED | OUTPATIENT
Start: 2022-05-10 | End: 2022-05-10

## 2022-05-10 RX ORDER — HYDROCODONE BITARTRATE AND ACETAMINOPHEN 5; 325 MG/1; MG/1
1 TABLET ORAL ONCE
Status: COMPLETED | OUTPATIENT
Start: 2022-05-10 | End: 2022-05-10

## 2022-05-10 RX ORDER — HYDROCODONE BITARTRATE AND ACETAMINOPHEN 5; 325 MG/1; MG/1
1 TABLET ORAL EVERY 6 HOURS PRN
Qty: 12 TABLET | Refills: 0 | Status: SHIPPED | OUTPATIENT
Start: 2022-05-10 | End: 2022-05-16

## 2022-05-10 RX ADMIN — Medication 800 MG: at 23:10

## 2022-05-10 RX ADMIN — IOPAMIDOL 100 ML: 755 INJECTION, SOLUTION INTRAVENOUS at 21:52

## 2022-05-10 RX ADMIN — HYDROCODONE BITARTRATE AND ACETAMINOPHEN 1 TABLET: 5; 325 TABLET ORAL at 23:10

## 2022-05-10 ASSESSMENT — ENCOUNTER SYMPTOMS
EYE PAIN: 0
STRIDOR: 0
TREMORS: 0
NAUSEA: 0
FACIAL SWELLING: 0
SORE THROAT: 0
SEIZURES: 0
BACK PAIN: 0
ABDOMINAL PAIN: 0
WHEEZING: 0
FLANK PAIN: 0
VOMITING: 0
AGITATION: 0
NECK PAIN: 0
FEVER: 0

## 2022-05-11 VITALS
OXYGEN SATURATION: 98 % | RESPIRATION RATE: 14 BRPM | SYSTOLIC BLOOD PRESSURE: 151 MMHG | WEIGHT: 150 LBS | TEMPERATURE: 98 F | DIASTOLIC BLOOD PRESSURE: 87 MMHG | BODY MASS INDEX: 25.75 KG/M2 | HEART RATE: 68 BPM

## 2022-05-11 NOTE — ED NOTES
Patient fitted for crutches but is very weak and not having easy time using them.  Recommended her starting with only using them for transfer from chair to chair or commode initially and to go slow with them.  She has a wheelchair at home which she expects to use more often than the crutches.

## 2022-05-11 NOTE — ED NOTES
Partial trauma called. Pt alert denies pain in head. Slight pain in ankle. Went to CT off monitor ok with provider

## 2022-05-11 NOTE — ED PROVIDER NOTES
History     Chief Complaint   Patient presents with     Fall     Ankle Pain     Head Injury     HPI  Mita Gabriel is a 66 year old female who reports that she had been working all day and had not really eaten much.  She got up about an hour ago and felt lightheaded and she twisted her left ankle and fell back hitting her head on the ground.  She reports that she had brief loss of consciousness.  Denies any headache at this time no nausea or vomiting.  Denies any visual changes.  Her main concern is her left ankle.  She has medial swelling and has not been able to put any weight on it since her fall.  She is not on blood thinners, other than a daily aspirin.    Allergies:  No Known Allergies    Problem List:    Patient Active Problem List    Diagnosis Date Noted     Mixed hyperlipidemia 06/12/2018     Priority: Medium     Special screening for malignant neoplasms, colon 06/09/2017     Priority: Medium     H/O non-ST elevation myocardial infarction (NSTEMI) 01/20/2015     Priority: Medium     Senile cataract 07/24/2012     Priority: Medium     Insomnia 02/08/2011     Priority: Medium     Obesity 02/08/2011     Priority: Medium     Motion sickness 02/08/2011     Priority: Medium     Hypothyroidism 12/04/2009     Priority: Medium     Abdominal pain 01/21/2009     Priority: Medium     Osteoarthritis of spine 10/21/2008     Priority: Medium     Dysthymic disorder 10/21/2008     Priority: Medium     Hypertension 10/21/2008     Priority: Medium     Lumbago 10/21/2008     Priority: Medium     History of alcohol abuse 10/16/2008     Priority: Medium     Anxiety state 10/16/2008     Priority: Medium        Past Medical History:    Past Medical History:   Diagnosis Date     Anxiety disorder      Closed fracture of left carpal bone      Essential (primary) hypertension      Hyperlipidemia      Hypothyroidism      Major depressive disorder, single episode      Postoperative nausea and vomiting      Transfusion history         Past Surgical History:    Past Surgical History:   Procedure Laterality Date     CATARACT IOL, RT/LT Bilateral       SECTION       COLONOSCOPY N/A 2017    Follow up   hyperplastic     OPEN REDUCTION INTERNAL FIXATION WRIST  1999    treating a fracture     OPEN REDUCTION INTERNAL FIXATION WRIST Left 2021    Procedure: Wrist Open Reduction Internal Fixation;  Surgeon: Lobo Sky MD;  Location: [facility] OR     THYROIDECTOMY       Partial       Family History:    Family History   Problem Relation Age of Onset     Heart Disease Mother      Genitourinary Problems Father         Genitourinary Disease,kidney disease     Heart Disease Father         MI     Depression Sister      No Known Problems Sister      Depression Brother      Bipolar Disorder Brother      No Known Problems Brother         mild cognitive delay     Cancer Maternal Grandmother         Bladder     Other - See Comments Paternal Grandmother 70        Stroke     Cancer Maternal Grandfather         Throat and lung     Other - See Comments Other         Family history of depression and anxiety     Anesthesia Reaction No family hx of         Anesthesia Problem,Notes no prior complications from anesthesia.     Breast Cancer No family hx of         Cancer-breast       Social History:  Marital Status:   [2]  Social History     Tobacco Use     Smoking status: Never Smoker     Smokeless tobacco: Never Used   Substance Use Topics     Alcohol use: No     Alcohol/week: 0.0 standard drinks     Drug use: No        Medications:    aspirin EC 81 MG EC tablet  atorvastatin (LIPITOR) 20 MG tablet  folic acid (FOLVITE) 1 MG tablet  gabapentin (NEURONTIN) 300 MG capsule  hydrochlorothiazide (HYDRODIURIL) 25 MG tablet  levothyroxine (SYNTHROID/LEVOTHROID) 50 MCG tablet  lisinopril (ZESTRIL) 5 MG tablet  LORazepam (ATIVAN) 0.5 MG tablet  magnesium 250 MG tablet  methocarbamol (ROBAXIN) 500 MG tablet  metoprolol succinate ER (TOPROL-XL) 50 MG  24 hr tablet  Multiple Vitamins-Minerals (MULTIVITAMIN ADULT PO)  polyethylene glycol (MIRALAX) 17 GM/Dose powder  POTASSIUM CHLORIDE PO  scopolamine (TRANSDERM) 1 MG/3DAYS 72 hr patch  Suvorexant (BELSOMRA) 15 MG tablet  Suvorexant (BELSOMRA) 15 MG tablet  thiamine 100 MG tablet  traZODone (DESYREL) 150 MG tablet  venlafaxine (EFFEXOR-XR) 150 MG 24 hr capsule  VITAMIN D PO  zolpidem ER (AMBIEN CR) 6.25 MG CR tablet          Review of Systems   Constitutional: Negative for fever.   HENT: Negative for facial swelling and sore throat.         Close head injury with brief loss of consciousness   Eyes: Negative for pain.   Respiratory: Negative for wheezing and stridor.    Cardiovascular: Negative for chest pain.   Gastrointestinal: Negative for abdominal pain, nausea and vomiting.   Genitourinary: Negative for flank pain.   Musculoskeletal: Negative for back pain and neck pain.        Left ankle injury and swelling   Skin: Negative for pallor.   Neurological: Negative for tremors and seizures.   Psychiatric/Behavioral: Negative for agitation.   All other systems reviewed and are negative.      Physical Exam   BP: 130/70  Pulse: 72  Temp: 98  F (36.7  C)  Resp: 16  Weight: 68 kg (150 lb)  SpO2: 97 %      Physical Exam  Vitals and nursing note reviewed.   Constitutional:       General: She is not in acute distress.     Appearance: Normal appearance. She is not ill-appearing or toxic-appearing.   HENT:      Head: Normocephalic. No raccoon eyes, right periorbital erythema or left periorbital erythema.      Comments: Unable to appreciate any significant posterior scalp swelling or bleeding.  No signs of laceration.     Right Ear: No drainage or tenderness.      Left Ear: No drainage or tenderness.      Nose: Nose normal.   Eyes:      General: Lids are normal. Gaze aligned appropriately. No scleral icterus.     Extraocular Movements: Extraocular movements intact.      Pupils: Pupils are equal, round, and reactive to light.    Neck:      Trachea: No tracheal deviation.   Cardiovascular:      Rate and Rhythm: Normal rate.   Pulmonary:      Effort: Pulmonary effort is normal. No respiratory distress.      Breath sounds: No stridor.   Abdominal:      Tenderness: There is no abdominal tenderness.   Musculoskeletal:         General: No deformity or signs of injury. Normal range of motion.      Cervical back: Normal range of motion. No signs of trauma.      Comments: Left ankle with medial swelling and tenderness.  Otherwise she has good distal capillary refill, good distal pulses and is neurologically intact distally.   Skin:     General: Skin is warm and dry.      Capillary Refill: Capillary refill takes less than 2 seconds.      Coloration: Skin is not jaundiced or pale.   Neurological:      General: No focal deficit present.      Mental Status: She is alert and oriented to person, place, and time.      GCS: GCS eye subscore is 4. GCS verbal subscore is 5. GCS motor subscore is 6.      Motor: No tremor or seizure activity.   Psychiatric:         Attention and Perception: Attention normal.         Mood and Affect: Mood normal.         ED Course     EKG shows sinus rhythm.  Inferior infarct, age undetermined.  T wave abnormality consider anterior ischemia.  Heart rate is 84.  This is essentially unchanged from previous EKG on 8/18/2015.    PROCEDURE: Left ankle fracture manual reduction and splinting.  After verbal consent, patient's left ankle was manually grabbed at the great toe and distal traction was made lightly applied.  Crepitus was noted but her deformed ankle returned to an acceptable alignment.  She was subsequently padded at this time and a posterior Aceves splint was applied.  Postreduction x-rays show good reduction.  No complications from this procedure.  CMS remained x4 before and after splinting.      ED Course as of 05/11/22 1251   Tue May 10, 2022   2053 Protein Total(!): 6.1   Wed May 11, 2022   1250 Nt probnp inpatient  (BNP)   1250 Magnesium(!): 1.6         Results for orders placed or performed during the hospital encounter of 05/10/22 (from the past 24 hour(s))   XR Ankle Port Left G/E 3 Views    Narrative    PROCEDURE:  XR ANKLE PORT LEFT G/E 3 VIEWS    HISTORY: injury and medial swelling.    COMPARISON:  None.    TECHNIQUE:  3 views left ankle.    FINDINGS:  There is an acute bimalleolar fracture of the left ankle  with associated lateral subluxation and slight rotation of the talus  relative to the tibia. No definite posterior malleolar fracture is  identified. No foreign body is seen.       Impression    IMPRESSION: Bimalleolar fracture of the left ankle. Recommend  follow-up.    EVANGELISTA CLEMONS MD         SYSTEM ID:  [id number]   CT Head w/o Contrast    Narrative    PROCEDURE: CT HEAD W/O CONTRAST     HISTORY: Trauma - Head Injury.    COMPARISON: 5/2/21.    TECHNIQUE:  Helical images of the head from the foramen magnum to the  vertex were obtained without contrast.    FINDINGS: The ventricles and sulci are normal in volume. No acute  intracranial hemorrhage, mass effect, midline shift, hydrocephalus or  basilar cystern effacement are present.    The grey-white matter interface is preserved.    The calvarium is intact. The mastoid air cells are clear.  The  visualized paranasal sinuses are clear.      Impression    IMPRESSION: Stable CT appearance of the head.      EVANGELISTA CLEMONS MD         SYSTEM ID:  [id number]   CBC with platelets differential    Narrative    The following orders were created for panel order CBC with platelets differential.  Procedure                               Abnormality         Status                     ---------                               -----------         ------                     CBC with platelets and d...[[id number]]                      Final result                 Please view results for these tests on the individual orders.   D dimer quantitative   Result Value Ref Range     D-Dimer Quantitative 7.10 (H) 0.00 - 0.50 ug/mL FEU    Narrative    This D-dimer assay is intended for use in conjunction with a clinical pretest probability assessment model to exclude pulmonary embolism (PE) and deep venous thrombosis (DVT) in outpatients suspected of PE or DVT. The cut-off value is 0.50 ug/mL FEU.   Comprehensive metabolic panel   Result Value Ref Range    Sodium 134 134 - 144 mmol/L    Potassium 3.7 3.5 - 5.1 mmol/L    Chloride 98 98 - 107 mmol/L    Carbon Dioxide (CO2) 30 21 - 31 mmol/L    Anion Gap 6 3 - 14 mmol/L    Urea Nitrogen 13 7 - 25 mg/dL    Creatinine 0.88 0.60 - 1.20 mg/dL    Calcium 8.8 8.6 - 10.3 mg/dL    Glucose 99 70 - 105 mg/dL    Alkaline Phosphatase 69 34 - 104 U/L    AST 12 (L) 13 - 39 U/L    ALT 11 7 - 52 U/L    Protein Total 6.1 (L) 6.4 - 8.9 g/dL    Albumin 3.7 3.5 - 5.7 g/dL    Bilirubin Total 0.6 0.3 - 1.0 mg/dL    GFR Estimate 72 >60 mL/min/1.73m2   Lactic acid whole blood   Result Value Ref Range    Lactic Acid 1.1 0.7 - 2.0 mmol/L   Troponin I   Result Value Ref Range    Troponin I <2.4 0.0 - 34.0 pg/mL   Magnesium   Result Value Ref Range    Magnesium 1.6 (L) 1.9 - 2.7 mg/dL   TSH Reflex [facility]   Result Value Ref Range    TSH 1.18 0.40 - 4.00 mU/L   CRP inflammation   Result Value Ref Range    CRP Inflammation <1.0 <10.0 mg/L   Nt probnp inpatient (BNP)   Result Value Ref Range    N terminal Pro BNP Inpatient 31 0 - 100 pg/mL   Erythrocyte sedimentation rate auto   Result Value Ref Range    Erythrocyte Sedimentation Rate 8 0 - 30 mm/hr   CBC with platelets and differential   Result Value Ref Range    WBC Count 6.3 4.0 - 11.0 10e3/uL    RBC Count 4.18 3.80 - 5.20 10e6/uL    Hemoglobin 12.3 11.7 - 15.7 g/dL    Hematocrit 37.2 35.0 - 47.0 %    MCV 89 78 - 100 fL    MCH 29.4 26.5 - 33.0 pg    MCHC 33.1 31.5 - 36.5 g/dL    RDW 12.2 10.0 - 15.0 %    Platelet Count 237 150 - 450 10e3/uL    % Neutrophils 65 %    % Lymphocytes 24 %    % Monocytes 9 %    % Eosinophils 2 %    %  Basophils 0 %    % Immature Granulocytes 0 %    NRBCs per 100 WBC 0 <1 /100    Absolute Neutrophils 4.0 1.6 - 8.3 10e3/uL    Absolute Lymphocytes 1.5 0.8 - 5.3 10e3/uL    Absolute Monocytes 0.6 0.0 - 1.3 10e3/uL    Absolute Eosinophils 0.1 0.0 - 0.7 10e3/uL    Absolute Basophils 0.0 0.0 - 0.2 10e3/uL    Absolute Immature Granulocytes 0.0 <=0.4 10e3/uL    Absolute NRBCs 0.0 10e3/uL   XR Ankle Port Left G/E 3 Views    Narrative    PROCEDURE:  XR ANKLE PORT LEFT G/E 3 VIEWS    HISTORY: post reduction.    COMPARISON:  5/10/2022    TECHNIQUE:  3 views left ankle.    FINDINGS:  The previously described bimalleolar fracture of the left  ankle has been reduced. A splint is in place. Recommend continued  follow up.    EVANGELISTA CLEMONS MD         SYSTEM ID:  [id number]   UA with Microscopic reflex to Culture    Specimen: Urine, Clean Catch   Result Value Ref Range    Color Urine Light Yellow Colorless, Straw, Light Yellow, Yellow    Appearance Urine Clear Clear    Glucose Urine Negative Negative mg/dL    Bilirubin Urine Negative Negative    Ketones Urine Negative Negative mg/dL    Specific Gravity Urine 1.018 1.000 - 1.030    Blood Urine Negative Negative    pH Urine 7.0 5.0 - 9.0    Protein Albumin Urine Negative Negative mg/dL    Urobilinogen Urine Normal Normal, 2.0 mg/dL    Nitrite Urine Negative Negative    Leukocyte Esterase Urine Moderate (A) Negative    Mucus Urine Present (A) None Seen /LPF    RBC Urine 1 <=2 /HPF    WBC Urine 6 (H) <=5 /HPF    Hyaline Casts Urine 1 <=2 /LPF    Narrative    Urine Culture ordered based on laboratory criteria   CTA Chest Abdomen Pelvis w Contrast    Narrative    PROCEDURE INFORMATION:   Exam: CTA Abdomen and Pelvis With Contrast   Exam date and time: 5/10/2022 9:21 PM   Age: 66 years old   Clinical indication: Abnormal findings; Abnormal lab test; Elevated d-dimer;   Other: Syncope; Patient HX: Fall, elevated d dimer     TECHNIQUE:   Imaging protocol: Computed tomographic  angiography of the abdomen and pelvis   with contrast material.   3D rendering (Not supervised by radiologist): MIP and/or 3D reconstructed   images were created by the technologist.   Radiation optimization: All CT scans at this facility use at least one of these   dose optimization techniques: automated exposure control; mA and/or kV   adjustment per patient size (includes targeted exams where dose is matched to   clinical indication); or iterative reconstruction.   Contrast material: ISOVUE 370; Contrast volume: 100 ml; Contrast route:   INTRAVENOUS (IV);      COMPARISON:   CT ABDOMEN PELVIS W CONTRAST 8/3/2021 1:57 PM     FINDINGS:   Aorta: Atherosclerotic change of the abdominal aorta. No aneurysm or dissection   of the abdominal aorta.   Celiac trunk and mesenteric arteries: No occlusion or significant stenosis.   Renal arteries: No occlusion or significant stenosis.   Right iliac arteries: No occlusion or significant stenosis.  Moderate   atherosclerotic changes.    Left iliac arteries: No occlusion or significant stenosis.  Moderate   atherosclerotic changes.    Liver: No mass.   Gallbladder and bile ducts: Unremarkable. No calcified stones. No ductal   dilation.   Pancreas: Unremarkable. No mass. No ductal dilation.   Spleen: Unremarkable. No splenomegaly.   Adrenal glands: Unremarkable. No mass.   Kidneys and ureters: Unremarkable. No solid mass. No hydronephrosis.   Stomach and bowel: Unremarkable. No obstruction. No mucosal thickening.   Appendix: No evidence of appendicitis.   Intraperitoneal space: Unremarkable. No free air. No significant fluid   collection.   Lymph nodes: Unremarkable. No enlarged lymph nodes.     Urinary bladder: Unremarkable. No mass.   Reproductive: 6.3 x 6.4 cm cyst of the left ovary. 2 cm cyst of the right   ovary.   Bones/joints: No acute fracture. No dislocation.   Soft tissues: Unremarkable.       Impression    IMPRESSION:   Moderate atherosclerotic change of the abdominal  aorta and iliac arteries.  No   aneurysm or dissection of the aorta.    All visceral arteries are normal.    Large cyst of the left ovary.  Cannot exclude a low-grade malignancy.    Recommend correlation with pelvic ultrasound as an outpatient.    THIS DOCUMENT HAS BEEN ELECTRONICALLY SIGNED BY JIMENEZ GERONIMO MD       Medications   HYDROcodone-acetaminophen (NORCO) 5-325 MG per tablet 1 tablet (1 tablet Oral Given 5/10/22 2310)   iopamidol (ISOVUE-370) solution 100 mL (100 mLs Intravenous Given 5/10/22 2152)   magnesium oxide (MAG-OX) tablet 800 mg (800 mg Oral Given 5/10/22 2310)       Assessments & Plan (with Medical Decision Making)     I have reviewed the nursing notes.    I have reviewed the findings, diagnosis, plan and need for follow up with the patient.      Discharge Medication List as of 5/10/2022 11:26 PM      START taking these medications    Details   ciprofloxacin (CIPRO) 250 MG tablet Take 1 tablet (250 mg) by mouth 2 times daily, Disp-14 tablet, R-0, [facility]      HYDROcodone-acetaminophen (NORCO) 5-325 MG tablet Take 1 tablet by mouth every 6 hours as needed for severe pain, Disp-12 tablet, R-0, [facility]             Final diagnoses:   Ankle fracture, left, closed, initial encounter   Syncope, unspecified syncope type   Elevated d-dimer   Acute cystitis without hematuria   Hypomagnesemia     Afebrile.  Vital signs stable.  Orthostatic vitals appear to be unremarkable.  Patient reports having eaten very little yesterday and was very active.  She had an episode where she got up quickly and became lightheaded and had a syncopal episode where she fell backwards and hit her head on the ground.  She had a brief loss of consciousness from this.  And declines any real injury to her head however she did twist her ankle when she fell.  Now she is complaining of left ankle pain with deformity.  Patient was given a Norco orally for pain control.  Differential diagnosis for syncopal episode includes but  is not limited to arrhythmias, MI, hypertrophic obstructive cardiomyopathy, pulmonary hypertension, orthostatic hypotension, hypovolemia, carotid sinus hypersensitivity, TIA or stroke.   EKG shows sinus rhythm.  Inferior infarct, age undetermined.  T wave abnormality consider anterior ischemia.  Heart rate is 84.  This is essentially unchanged from previous EKG on 8/18/2015.  Patient was placed on cardiac telemetry.  Her initial troponin is normal.  BNP is normal.  TSH is normal.  Her magnesium returns decreased at 1.6 and replacement was initiated.  D-dimer returned significantly elevated at 7.10.  This could be from her injury to her ankle and soft tissue damage.  However given her syncope cannot rule out PE versus vascular dissection.  . No previous for comparison.  CRP is normal.  CBC shows normal white blood cells no left shift.  CMP is unremarkable.  Lactic acid is normal.  IV was established and the patient was given fluids.  CT of the patient's head shows no acute findings which is reassuring.  Initial left ankle x-rays shows a Bimalleolar fracture of the left ankle.   This was reduced as above and splinted post reduction and splinting x-rays show The previously described bimalleolar fracture of the left ankle has been reduced. A splint is in place.  CTA of the patient's chest abdomen pelvis with contrast shows a Moderate atherosclerotic change of the abdominal aorta and iliac arteries.  No aneurysm or dissection of the aorta.   All visceral arteries are normal.  A Large cyst of the left ovary. Cannot exclude a low-grade malignancy.  No signs of PE or dissecting aneurysms.  Most likely her elevated D-dimer is due to soft tissue injury in her ankle.  The patient finally was able to produce a UA and this shows a moderate amount of leukocyte Estrace as well as 6 white blood cells.  It is felt that this combined with her dehydration and certain movements most likely contributed to her syncopal episode.  She was  observed over extended timeframe with cardiac telemetry with no signs of cardiac arrhythmias.  Orthopedic referral was made to be seen in the next 3-5 days.  I discussed continued rice to help reduce pain and swelling.  She will be placed on Cipro for her UTI as well as Norco for pain relief.  I left a message on Purnima's phone for this patient to be seen.  Follow-up sooner if there is any other concerns problems or questions.  I explained my diagnostic considerations and recommendations and the patient voiced an understanding and was in agreement with the treatment plan. All questions were answered to the best of my ability.  We discussed potential side effects of any prescribed or recommended therapies, as well as expectations for response to treatments.         Ortho referral  .  5/10/2022   [facility]     Fidel Hull PA-C  05/11/22 1395

## 2022-05-11 NOTE — ED TRIAGE NOTES
ED Nursing Triage Note (General)   ________________________________    Mita NASCIMENTO Harvey is a 66 year old Female that presents to triage private car  With history of falling about forty five minutes ago, twisting left ankle and hitting back of head on ground. Lost consciousness for a few seconds, denies being on blood thinners.  /70   Pulse 72   Temp 98  F (36.7  C) (Tympanic)   Resp 16   Wt 68 kg (150 lb)   LMP  (LMP Unknown)   SpO2 97%   BMI 25.75 kg/m  t  Patient appears oriented, in no acute distress., and cooperative behavior.    GCS Total = 15  Airway: intact  Breathing noted as Normal  Circulation Normal  Skin:  Normal      PRE HOSPITAL PRIOR LIVING SITUATION Spouse

## 2022-05-11 NOTE — PHARMACY-ADMISSION MEDICATION HISTORY
Pharmacy -- Admission Medication Reconciliation IN PROGRESS  Prior to admission (PTA) medications were reviewed and the patient's PTA medication list was updated.    Sources Consulted: [employer], chart notes,     The reliability of this Medication Reconciliation is: Reliability: Borderline reliable    The following significant changes were made:  Removed suvorexant (not filled in over 6 months per surescripts and )  Added directions to multivitamin      In addition, the patient's allergies were reviewed with the patient's records and left  as follows:   Allergies: Patient has no known allergies.    The pharmacist will continue to gather information as needed.  Era Ferguson [facility], 5/10/2022,  7:21 PM

## 2022-05-12 ENCOUNTER — OFFICE VISIT (OUTPATIENT)
Dept: ORTHOPEDICS | Facility: OTHER | Age: 67
End: 2022-05-12
Attending: PODIATRIST
Payer: MEDICARE

## 2022-05-12 ENCOUNTER — HOSPITAL ENCOUNTER (OUTPATIENT)
Dept: GENERAL RADIOLOGY | Facility: OTHER | Age: 67
Discharge: HOME | End: 2022-05-12
Attending: PODIATRIST
Payer: MEDICARE

## 2022-05-12 DIAGNOSIS — M79.672 LEFT FOOT PAIN: Primary | ICD-10-CM

## 2022-05-12 DIAGNOSIS — M79.672 LEFT FOOT PAIN: ICD-10-CM

## 2022-05-12 DIAGNOSIS — S82.892A CLOSED FRACTURE OF LEFT ANKLE, INITIAL ENCOUNTER: ICD-10-CM

## 2022-05-12 LAB — BACTERIA UR CULT: NORMAL

## 2022-05-12 PROCEDURE — 73630 X-RAY EXAM OF FOOT: CPT | Mod: LT

## 2022-05-12 PROCEDURE — 99205 OFFICE O/P NEW HI 60 MIN: CPT | Performed by: PODIATRIST

## 2022-05-12 PROCEDURE — G0463 HOSPITAL OUTPT CLINIC VISIT: HCPCS

## 2022-05-12 NOTE — PROGRESS NOTES
Patient is here for consult on her left ankle fracture.   Purnima Hernandez LPN .....................5/12/2022 1:39 PM

## 2022-05-12 NOTE — PROGRESS NOTES
Visit Date: 05/12/2022    HISTORY OF PRESENT ILLNESS:  Mita is a 66-year-old female here to see me regarding a left ankle bimalleolar fracture that happened a couple of days ago.  She got lightheaded standing up, ended up walking into a chair and then fell on her way and ended up with this fracture.  It is an unstable fracture, which was discussed.  There is an anticipating need for surgery.  Incidentally, she also inquires about her bunions.  She had this arranged to be fixed a couple years ago and then ended up being canceled due to the COVID pandemic.  She would like to get this evaluated and discuss options here.  If she has had the ankle fixed, she would like to consider getting the bunion done, which is painful and has been for some time as well.  She states previous doctor was going to address this.  She is unsure what he was going to do, but again due to needing to be canceled and him moving, was going to be seen for this in the near future anyway.        HISTORY REVIEW:  I have reviewed this patient's past medical history, family history, social history as well as medications and allergies.  Any changes/additions were appropriately charted in the patient's electronic medical record.      PHYSICAL EXAMINATION:   CONSTITUTIONAL:  The patient is alert and oriented x3, well appearing and in no apparent distress.  Affect is pleasant and answers questions appropriately.  VASCULAR:  Circulation is intact with palpable pedal pulses and adequate capillary fill time to all digits.  Hair growth is present and appropriate to mid foot and digits.  NEUROLOGIC:  Light touch sensation is intact to digits.  There is a negative Tinel sign.  There are no signs of apparent nerve entrapment of superficial peroneal or common peroneal nerves.  INTEGUMENT:  No abnormal dermatologic lesions are noted.  Skin has normal texture and turgor.    MUSCULOSKELETAL:  Limited exam to the ankle.  She is swollen and bruised.  No open  lesions.  There are no fracture blisters apparent at this time.  She was in a splint coming in.  This was removed, and we did evaluate today.  She does have fairly significant bunion that is track bound.  It is not reducible.  No obvious crepitus within the joint but does have fairly significant deformity, a very prominent medial eminence tenderness within the joint.  Tenderness associated with the medial eminence.  No weightbearing or stance exam done today.    IMAGING STUDIES:  Ankle x-rays, both injury and reduction films, were reviewed.  It was well-reduced.  She has a bimalleolar ankle fracture with ankle mortise intact after reduction and appropriately splinted.  Injury films show a displaced bimalleolar ankle fracture, which is an unstable fracture.  Foot films taken today were also reviewed.  She has a significant bunion deformity and malpositioned sesamoids.  It is not an overly arthritic first MPJ, though does have some evident osteopenia here.    ASSESSMENT:    1.  Trimalleolar equivalent left ankle fracture, which is well-reduced and splinted.  2.  Painful bunion.    PLAN:  I discussed condition and treatment with the patient today.  We did discuss surgery today in detail, as ankle fracture is a surgical fracture that needs stabilization.  I discussed this in detail.  She will need a fibular ORIF medial malleolar fixation, possible syndesmosis, which we discussed.  We also discussed the bunion.  The bunion, given her age and some osteopenia, I think would be appropriately addressed with a first MTPJ fusion and I think can be safely addressed at the same time as this given her history with pain associated with this.  It was going to be fixed.  I think this is appropriate.  We will get this scheduled here in the coming week or so.  She will get a preop prior to proceeding, followup accordingly postoperatively.  I would anticipate a couple of weeks of nonweightbearing and then progressing to a boot and  weightbear as tolerated in the boot.  She will need the boot for about 8 weeks or so and likely some therapy thereafter.    Sixty minutes on consultation today.    Radu Ruvalcaba DPM        D: 2022   T: 2022   MT: LIZZETTE    Name:     ROSALBA MCKEON  MRN:      [id number]        Account:    [id number]   :      1955           Visit Date: 2022     Document: [id number]

## 2022-05-12 NOTE — RESULT ENCOUNTER NOTE
Final urine culture report is negative.  Adult Negative Urine culture parameters per protocol: Any # Urogenital single or mixed organism, <10,000 col/ml single organism (cath/midstream), and > 3 organisms (No susceptibilities performed).  [facility] discharge antibiotic prescribed (If applicable): Ciprofloxacin  Treatment recommendations per [facility] ED Lab Result Urine Culture protocol.

## 2022-05-16 ENCOUNTER — OFFICE VISIT (OUTPATIENT)
Dept: FAMILY MEDICINE | Facility: OTHER | Age: 67
End: 2022-05-16
Attending: PHYSICIAN ASSISTANT
Payer: MEDICARE

## 2022-05-16 VITALS
HEART RATE: 80 BPM | RESPIRATION RATE: 20 BRPM | DIASTOLIC BLOOD PRESSURE: 84 MMHG | SYSTOLIC BLOOD PRESSURE: 126 MMHG | TEMPERATURE: 99 F | OXYGEN SATURATION: 96 %

## 2022-05-16 DIAGNOSIS — S82.892A ANKLE FRACTURE, LEFT, CLOSED, INITIAL ENCOUNTER: ICD-10-CM

## 2022-05-16 DIAGNOSIS — E78.2 MIXED HYPERLIPIDEMIA: ICD-10-CM

## 2022-05-16 DIAGNOSIS — F34.1 DYSTHYMIC DISORDER: ICD-10-CM

## 2022-05-16 DIAGNOSIS — I10 PRIMARY HYPERTENSION: ICD-10-CM

## 2022-05-16 DIAGNOSIS — E03.9 HYPOTHYROIDISM, UNSPECIFIED TYPE: ICD-10-CM

## 2022-05-16 DIAGNOSIS — Z01.818 PREOP GENERAL PHYSICAL EXAM: Primary | ICD-10-CM

## 2022-05-16 DIAGNOSIS — H61.21 IMPACTED CERUMEN OF RIGHT EAR: ICD-10-CM

## 2022-05-16 PROCEDURE — 93010 ELECTROCARDIOGRAM REPORT: CPT | Performed by: INTERNAL MEDICINE

## 2022-05-16 PROCEDURE — C9803 HOPD COVID-19 SPEC COLLECT: HCPCS | Performed by: PHYSICIAN ASSISTANT

## 2022-05-16 PROCEDURE — G0463 HOSPITAL OUTPT CLINIC VISIT: HCPCS

## 2022-05-16 PROCEDURE — 93005 ELECTROCARDIOGRAM TRACING: CPT | Performed by: PHYSICIAN ASSISTANT

## 2022-05-16 PROCEDURE — 99214 OFFICE O/P EST MOD 30 MIN: CPT | Performed by: PHYSICIAN ASSISTANT

## 2022-05-16 PROCEDURE — U0003 INFECTIOUS AGENT DETECTION BY NUCLEIC ACID (DNA OR RNA); SEVERE ACUTE RESPIRATORY SYNDROME CORONAVIRUS 2 (SARS-COV-2) (CORONAVIRUS DISEASE [COVID-19]), AMPLIFIED PROBE TECHNIQUE, MAKING USE OF HIGH THROUGHPUT TECHNOLOGIES AS DESCRIBED BY CMS-2020-01-R: HCPCS | Mod: ZL | Performed by: PHYSICIAN ASSISTANT

## 2022-05-16 RX ORDER — HYDROCODONE BITARTRATE AND ACETAMINOPHEN 5; 325 MG/1; MG/1
1 TABLET ORAL EVERY 6 HOURS PRN
Qty: 12 TABLET | Refills: 0 | Status: SHIPPED | OUTPATIENT
Start: 2022-05-16 | End: 2022-07-05

## 2022-05-16 ASSESSMENT — PAIN SCALES - GENERAL: PAINLEVEL: MODERATE PAIN (4)

## 2022-05-16 ASSESSMENT — PATIENT HEALTH QUESTIONNAIRE - PHQ9
SUM OF ALL RESPONSES TO PHQ QUESTIONS 1-9: 5
10. IF YOU CHECKED OFF ANY PROBLEMS, HOW DIFFICULT HAVE THESE PROBLEMS MADE IT FOR YOU TO DO YOUR WORK, TAKE CARE OF THINGS AT HOME, OR GET ALONG WITH OTHER PEOPLE: NOT DIFFICULT AT ALL
SUM OF ALL RESPONSES TO PHQ QUESTIONS 1-9: 5

## 2022-05-16 NOTE — PROGRESS NOTES
[facility]  [address]  [place] [address]  Phone: [phone]  Fax: [phone]  Primary Provider: Ella Baptiste  Pre-op Performing Provider: PATIENCE SELBY      PREOPERATIVE EVALUATION:  Today's date: 5/16/2022    Mita Gabriel is a 66 year old female who presents for a preoperative evaluation.    Surgical Information:  Surgery/Procedure: Repair left ankle fracture and bunionectomy  Surgery Location: [facility]  Surgeon: Reinking  Surgery Date: 5/19/22  Time of Surgery: TBD  Where patient plans to recover: At home with family  Fax number for surgical facility: Note does not need to be faxed, will be available electronically in Epic.    Type of Anesthesia Anticipated: General    Assessment & Plan     The proposed surgical procedure is considered INTERMEDIATE risk.    1. Preop general physical exam    2. Ankle fracture, left, closed, initial encounter    3. Mixed hyperlipidemia    4. Hypothyroidism, unspecified type    5. Primary hypertension    6. Dysthymic disorder    7. Impacted cerumen of right ear      Lab work stable on 5/10/2022, not repeated today.  Preoperative EKG stable.  Preoperative COVID testing completed today, results pending.    Assess for flush of impacted cerumen of right ear.  Follow-up as needed.    Risks and Recommendations:  The patient has the following additional risks and recommendations for perioperative complications:   - No identified additional risk factors other than previously addressed    Medication Instructions:  Patient is to take all scheduled medications on the day of surgery    RECOMMENDATION:  APPROVAL GIVEN to proceed with proposed procedure, without further diagnostic evaluation.      Subjective     HPI related to upcoming procedure: Recently fractured ankle.  Met with orthopedics who recommended ORIF.  Also will proceed with fixing left bunion during surgery as well.      Preop Questions 5/11/2021   1. Have you ever had a heart  attack or stroke? No   2. Have you ever had surgery on your heart or blood vessels, such as a stent placement, a coronary artery bypass, or surgery on an artery in your head, neck, heart, or legs? No   3. Do you have chest pain with activity? No   4. Do you have a history of  heart failure? No   5. Do you currently have a cold, bronchitis or symptoms of other infection? YES - right ear bothersome   6. Do you have a cough, shortness of breath, or wheezing? No   7. Do you or anyone in your family have previous history of blood clots? No   8. Do you or does anyone in your family have a serious bleeding problem such as prolonged bleeding following surgeries or cuts? No   9. Have you ever had problems with anemia or been told to take iron pills? No   10. Have you had any abnormal blood loss such as black, tarry or bloody stools, or abnormal vaginal bleeding? No   11. Have you ever had a blood transfusion? YES -   11a. Have you ever had a transfusion reaction? No   12. Are you willing to have a blood transfusion if it is medically needed before, during, or after your surgery? Yes   13. Have you or any of your relatives ever had problems with anesthesia? YES    14. Do you have sleep apnea, excessive snoring or daytime drowsiness? No   15. Do you have any artifical heart valves or other implanted medical devices like a pacemaker, defibrillator, or continuous glucose monitor? No   16. Do you have artificial joints? No   17. Are you allergic to latex? No     Health Care Directive:  Patient has a Health Care Directive on file      Preoperative Review of :   reviewed - controlled substances reflected in medication list.      Status of Chronic Conditions:  See problem list for active medical problems.  Problems all longstanding and stable, except as noted/documented.  See ROS for pertinent symptoms related to these conditions.      Review of Systems  CONSTITUTIONAL: NEGATIVE for fever, chills, change in  weight  INTEGUMENTARY/SKIN: NEGATIVE for worrisome rashes, moles or lesions  EYES: NEGATIVE for vision changes or irritation  ENT/MOUTH: NEGATIVE for ear, mouth and throat problems  RESP: NEGATIVE for significant cough or SOB  CV: NEGATIVE for chest pain, palpitations or peripheral edema  GI: NEGATIVE for nausea, abdominal pain, heartburn, or change in bowel habits  : NEGATIVE for frequency, dysuria, or hematuria  MUSCULOSKELETAL: NEGATIVE for significant arthralgias or myalgia  NEURO: NEGATIVE for weakness, dizziness or paresthesias  ENDOCRINE: NEGATIVE for temperature intolerance, skin/hair changes  HEME: NEGATIVE for bleeding problems  PSYCHIATRIC: NEGATIVE for changes in mood or affect    Patient Active Problem List    Diagnosis Date Noted     Mixed hyperlipidemia 06/12/2018     Priority: Medium     Special screening for malignant neoplasms, colon 06/09/2017     Priority: Medium     H/O non-ST elevation myocardial infarction (NSTEMI) 01/20/2015     Priority: Medium     Senile cataract 07/24/2012     Priority: Medium     Insomnia 02/08/2011     Priority: Medium     Obesity 02/08/2011     Priority: Medium     Motion sickness 02/08/2011     Priority: Medium     Hypothyroidism 12/04/2009     Priority: Medium     Abdominal pain 01/21/2009     Priority: Medium     Osteoarthritis of spine 10/21/2008     Priority: Medium     Dysthymic disorder 10/21/2008     Priority: Medium     Hypertension 10/21/2008     Priority: Medium     Lumbago 10/21/2008     Priority: Medium     History of alcohol abuse 10/16/2008     Priority: Medium     Anxiety state 10/16/2008     Priority: Medium      Past Medical History:   Diagnosis Date     Anxiety disorder      Closed fracture of left carpal bone      Essential (primary) hypertension      Hyperlipidemia      Hypothyroidism      Major depressive disorder, single episode      Postoperative nausea and vomiting      Transfusion history     with delivery of son     Past Surgical History:    Procedure Laterality Date     CATARACT IOL, RT/LT Bilateral       SECTION       COLONOSCOPY N/A 2017    Follow up   hyperplastic     OPEN REDUCTION INTERNAL FIXATION WRIST  1999    treating a fracture     OPEN REDUCTION INTERNAL FIXATION WRIST Left 2021    Procedure: Wrist Open Reduction Internal Fixation;  Surgeon: Lobo Sky MD;  Location: [facility] OR     THYROIDECTOMY       Partial     Current Outpatient Medications   Medication Sig Dispense Refill     aspirin EC 81 MG EC tablet Take 81 mg by mouth daily with food       atorvastatin (LIPITOR) 20 MG tablet Take 1 tablet (20 mg) by mouth daily 90 tablet 4     folic acid (FOLVITE) 1 MG tablet Take 1 mg by mouth daily       gabapentin (NEURONTIN) 300 MG capsule TAKE 1 CAPSULE BY MOUTH THREE TIMES DAILY 270 capsule 4     hydrochlorothiazide (HYDRODIURIL) 25 MG tablet TAKE 1 TABLET(25 MG) BY MOUTH DAILY 90 tablet 4     HYDROcodone-acetaminophen (NORCO) 5-325 MG tablet Take 1 tablet by mouth every 6 hours as needed for severe pain 12 tablet 0     levothyroxine (SYNTHROID/LEVOTHROID) 50 MCG tablet Take 1 tablet (50 mcg) by mouth every morning (before breakfast) 90 tablet 4     lisinopril (ZESTRIL) 5 MG tablet Take 1 tablet (5 mg) by mouth daily 90 tablet 4     LORazepam (ATIVAN) 0.5 MG tablet TAKE 1 TABLET BY MOUTH THREE TIMES DAILY 90 tablet 5     magnesium 250 MG tablet Take 1 tablet by mouth daily       methocarbamol (ROBAXIN) 500 MG tablet TAKE 1-2 TABLET BY MOUTH FOUR TIMES DAILY 120 tablet 5     metoprolol succinate ER (TOPROL-XL) 50 MG 24 hr tablet Take 1 tablet (50 mg) by mouth daily 90 tablet 4     Multiple Vitamins-Minerals (MULTIVITAMIN ADULT PO) Take 1 tablet by mouth daily       polyethylene glycol (MIRALAX) 17 GM/Dose powder Take 17 g (1 capful) by mouth 2 times daily as needed for constipation 578 g 2     thiamine 100 MG tablet Take 100 mg by mouth daily as needed       traZODone (DESYREL) 150 MG tablet Take 1 tablet (150 mg) by  mouth At Bedtime 90 tablet 4     venlafaxine (EFFEXOR-XR) 150 MG 24 hr capsule Take 1 capsule (150 mg) by mouth every morning 90 capsule 4     zolpidem ER (AMBIEN CR) 6.25 MG CR tablet TAKE 1 TABLET BY MOUTH AT BEDTIME 30 tablet 5     ciprofloxacin (CIPRO) 250 MG tablet Take 1 tablet (250 mg) by mouth 2 times daily 14 tablet 0     POTASSIUM CHLORIDE PO        scopolamine (TRANSDERM) 1 MG/3DAYS 72 hr patch APPLY 1 PATCH EVERY 72 HOURS FOR MOTION SICKNESS 30 patch 1     VITAMIN D PO          No Known Allergies     Social History     Tobacco Use     Smoking status: Never Smoker     Smokeless tobacco: Never Used   Substance Use Topics     Alcohol use: No     Alcohol/week: 0.0 standard drinks     Family History   Problem Relation Age of Onset     Heart Disease Mother      Genitourinary Problems Father         Genitourinary Disease,kidney disease     Heart Disease Father         MI     Depression Sister      No Known Problems Sister      Depression Brother      Bipolar Disorder Brother      No Known Problems Brother         mild cognitive delay     Cancer Maternal Grandmother         Bladder     Other - See Comments Paternal Grandmother 70        Stroke     Cancer Maternal Grandfather         Throat and lung     Other - See Comments Other         Family history of depression and anxiety     Anesthesia Reaction No family hx of         Anesthesia Problem,Notes no prior complications from anesthesia.     Breast Cancer No family hx of         Cancer-breast     History   Drug Use No         Objective     /84   Pulse 80   Temp 99  F (37.2  C) (Tympanic)   Resp 20   LMP  (LMP Unknown)   SpO2 96%     Physical Exam    GENERAL APPEARANCE: healthy, alert and no distress     EYES: EOMI, PERRL     HENT: Right ear canal with impacted cerumen and nose and mouth without ulcers or lesions     NECK: no adenopathy, no asymmetry, masses, or scars and thyroid normal to palpation     RESP: lungs clear to auscultation - no rales,  rhonchi or wheezes     CV: regular rates and rhythm, normal S1 S2, no S3 or S4 and no murmur, click or rub     MS: left lower extremity in splint, wheelchair for mobilization     SKIN: no suspicious lesions or rashes     NEURO: Normal strength and tone, sensory exam grossly normal, mentation intact and speech normal     PSYCH: mentation appears normal. and affect normal/bright     LYMPHATICS: No cervical adenopathy    Recent Labs   Lab Test 05/10/22  2025 06/29/21  1442   HGB 12.3 13.4    280    135   POTASSIUM 3.7 3.6   CR 0.88 0.71        Diagnostics:  No labs were ordered during this visit.   EKG: appears normal, NSR, Nonspecific T wave abnormality, no LVH by voltage criteria, unchanged from previous tracings    Revised Cardiac Risk Index (RCRI):  The patient has the following serious cardiovascular risks for perioperative complications:   - No serious cardiac risks = 0 points     RCRI Interpretation: 0 points: Class I (very low risk - 0.4% complication rate)           Signed Electronically by: Joellen Romero PA-C  Copy of this evaluation report is provided to requesting physician.      Answers for HPI/ROS submitted by the patient on 5/16/2022  If you checked off any problems, how difficult have these problems made it for you to do your work, take care of things at home, or get along with other people?: Not difficult at all  PHQ9 TOTAL SCORE: 5  How many servings of fruits and vegetables do you eat daily?: 2-3  On average, how many sweetened beverages do you drink each day (Examples: soda, juice, sweet tea, etc.  Do NOT count diet or artificially sweetened beverages)?: 0  How many minutes a day do you exercise enough to make your heart beat faster?: 20 to 29  How many days a week do you exercise enough to make your heart beat faster?: 4  How many days per week do you miss taking your medication?: 0  What is the reason for your visit today?: Preop  When did your symptoms begin?: 3-7 days ago  What  are your symptoms?: Pain  How would you describe these symptoms?: Moderate  Are your symptoms:: Staying the same  Have you had these symptoms before?: No  Is there anything that makes you feel worse?: Moving around  Is there anything that makes you feel better?: Elevating

## 2022-05-16 NOTE — H&P (VIEW-ONLY)
[facility]  [address]  [place] [address]  Phone: [phone]  Fax: [phone]  Primary Provider: Ella Baptiste  Pre-op Performing Provider: PATIENCE SELBY      PREOPERATIVE EVALUATION:  Today's date: 5/16/2022    Mita Gabriel is a 66 year old female who presents for a preoperative evaluation.    Surgical Information:  Surgery/Procedure: Repair left ankle fracture and bunionectomy  Surgery Location: [facility]  Surgeon: Reinking  Surgery Date: 5/19/22  Time of Surgery: TBD  Where patient plans to recover: At home with family  Fax number for surgical facility: Note does not need to be faxed, will be available electronically in Epic.    Type of Anesthesia Anticipated: General    Assessment & Plan     The proposed surgical procedure is considered INTERMEDIATE risk.    1. Preop general physical exam    2. Ankle fracture, left, closed, initial encounter    3. Mixed hyperlipidemia    4. Hypothyroidism, unspecified type    5. Primary hypertension    6. Dysthymic disorder    7. Impacted cerumen of right ear      Lab work stable on 5/10/2022, not repeated today.  Preoperative EKG stable.  Preoperative COVID testing completed today, results pending.    Assess for flush of impacted cerumen of right ear.  Follow-up as needed.    Risks and Recommendations:  The patient has the following additional risks and recommendations for perioperative complications:   - No identified additional risk factors other than previously addressed    Medication Instructions:  Patient is to take all scheduled medications on the day of surgery    RECOMMENDATION:  APPROVAL GIVEN to proceed with proposed procedure, without further diagnostic evaluation.      Subjective     HPI related to upcoming procedure: Recently fractured ankle.  Met with orthopedics who recommended ORIF.  Also will proceed with fixing left bunion during surgery as well.      Preop Questions 5/11/2021   1. Have you ever had a heart  attack or stroke? No   2. Have you ever had surgery on your heart or blood vessels, such as a stent placement, a coronary artery bypass, or surgery on an artery in your head, neck, heart, or legs? No   3. Do you have chest pain with activity? No   4. Do you have a history of  heart failure? No   5. Do you currently have a cold, bronchitis or symptoms of other infection? YES - right ear bothersome   6. Do you have a cough, shortness of breath, or wheezing? No   7. Do you or anyone in your family have previous history of blood clots? No   8. Do you or does anyone in your family have a serious bleeding problem such as prolonged bleeding following surgeries or cuts? No   9. Have you ever had problems with anemia or been told to take iron pills? No   10. Have you had any abnormal blood loss such as black, tarry or bloody stools, or abnormal vaginal bleeding? No   11. Have you ever had a blood transfusion? YES -   11a. Have you ever had a transfusion reaction? No   12. Are you willing to have a blood transfusion if it is medically needed before, during, or after your surgery? Yes   13. Have you or any of your relatives ever had problems with anesthesia? YES    14. Do you have sleep apnea, excessive snoring or daytime drowsiness? No   15. Do you have any artifical heart valves or other implanted medical devices like a pacemaker, defibrillator, or continuous glucose monitor? No   16. Do you have artificial joints? No   17. Are you allergic to latex? No     Health Care Directive:  Patient has a Health Care Directive on file      Preoperative Review of :   reviewed - controlled substances reflected in medication list.      Status of Chronic Conditions:  See problem list for active medical problems.  Problems all longstanding and stable, except as noted/documented.  See ROS for pertinent symptoms related to these conditions.      Review of Systems  CONSTITUTIONAL: NEGATIVE for fever, chills, change in  weight  INTEGUMENTARY/SKIN: NEGATIVE for worrisome rashes, moles or lesions  EYES: NEGATIVE for vision changes or irritation  ENT/MOUTH: NEGATIVE for ear, mouth and throat problems  RESP: NEGATIVE for significant cough or SOB  CV: NEGATIVE for chest pain, palpitations or peripheral edema  GI: NEGATIVE for nausea, abdominal pain, heartburn, or change in bowel habits  : NEGATIVE for frequency, dysuria, or hematuria  MUSCULOSKELETAL: NEGATIVE for significant arthralgias or myalgia  NEURO: NEGATIVE for weakness, dizziness or paresthesias  ENDOCRINE: NEGATIVE for temperature intolerance, skin/hair changes  HEME: NEGATIVE for bleeding problems  PSYCHIATRIC: NEGATIVE for changes in mood or affect    Patient Active Problem List    Diagnosis Date Noted     Mixed hyperlipidemia 06/12/2018     Priority: Medium     Special screening for malignant neoplasms, colon 06/09/2017     Priority: Medium     H/O non-ST elevation myocardial infarction (NSTEMI) 01/20/2015     Priority: Medium     Senile cataract 07/24/2012     Priority: Medium     Insomnia 02/08/2011     Priority: Medium     Obesity 02/08/2011     Priority: Medium     Motion sickness 02/08/2011     Priority: Medium     Hypothyroidism 12/04/2009     Priority: Medium     Abdominal pain 01/21/2009     Priority: Medium     Osteoarthritis of spine 10/21/2008     Priority: Medium     Dysthymic disorder 10/21/2008     Priority: Medium     Hypertension 10/21/2008     Priority: Medium     Lumbago 10/21/2008     Priority: Medium     History of alcohol abuse 10/16/2008     Priority: Medium     Anxiety state 10/16/2008     Priority: Medium      Past Medical History:   Diagnosis Date     Anxiety disorder      Closed fracture of left carpal bone      Essential (primary) hypertension      Hyperlipidemia      Hypothyroidism      Major depressive disorder, single episode      Postoperative nausea and vomiting      Transfusion history     with delivery of son     Past Surgical History:    Procedure Laterality Date     CATARACT IOL, RT/LT Bilateral       SECTION       COLONOSCOPY N/A 2017    Follow up   hyperplastic     OPEN REDUCTION INTERNAL FIXATION WRIST  1999    treating a fracture     OPEN REDUCTION INTERNAL FIXATION WRIST Left 2021    Procedure: Wrist Open Reduction Internal Fixation;  Surgeon: Lobo Sky MD;  Location: [facility] OR     THYROIDECTOMY       Partial     Current Outpatient Medications   Medication Sig Dispense Refill     aspirin EC 81 MG EC tablet Take 81 mg by mouth daily with food       atorvastatin (LIPITOR) 20 MG tablet Take 1 tablet (20 mg) by mouth daily 90 tablet 4     folic acid (FOLVITE) 1 MG tablet Take 1 mg by mouth daily       gabapentin (NEURONTIN) 300 MG capsule TAKE 1 CAPSULE BY MOUTH THREE TIMES DAILY 270 capsule 4     hydrochlorothiazide (HYDRODIURIL) 25 MG tablet TAKE 1 TABLET(25 MG) BY MOUTH DAILY 90 tablet 4     HYDROcodone-acetaminophen (NORCO) 5-325 MG tablet Take 1 tablet by mouth every 6 hours as needed for severe pain 12 tablet 0     levothyroxine (SYNTHROID/LEVOTHROID) 50 MCG tablet Take 1 tablet (50 mcg) by mouth every morning (before breakfast) 90 tablet 4     lisinopril (ZESTRIL) 5 MG tablet Take 1 tablet (5 mg) by mouth daily 90 tablet 4     LORazepam (ATIVAN) 0.5 MG tablet TAKE 1 TABLET BY MOUTH THREE TIMES DAILY 90 tablet 5     magnesium 250 MG tablet Take 1 tablet by mouth daily       methocarbamol (ROBAXIN) 500 MG tablet TAKE 1-2 TABLET BY MOUTH FOUR TIMES DAILY 120 tablet 5     metoprolol succinate ER (TOPROL-XL) 50 MG 24 hr tablet Take 1 tablet (50 mg) by mouth daily 90 tablet 4     Multiple Vitamins-Minerals (MULTIVITAMIN ADULT PO) Take 1 tablet by mouth daily       polyethylene glycol (MIRALAX) 17 GM/Dose powder Take 17 g (1 capful) by mouth 2 times daily as needed for constipation 578 g 2     thiamine 100 MG tablet Take 100 mg by mouth daily as needed       traZODone (DESYREL) 150 MG tablet Take 1 tablet (150 mg) by  mouth At Bedtime 90 tablet 4     venlafaxine (EFFEXOR-XR) 150 MG 24 hr capsule Take 1 capsule (150 mg) by mouth every morning 90 capsule 4     zolpidem ER (AMBIEN CR) 6.25 MG CR tablet TAKE 1 TABLET BY MOUTH AT BEDTIME 30 tablet 5     ciprofloxacin (CIPRO) 250 MG tablet Take 1 tablet (250 mg) by mouth 2 times daily 14 tablet 0     POTASSIUM CHLORIDE PO        scopolamine (TRANSDERM) 1 MG/3DAYS 72 hr patch APPLY 1 PATCH EVERY 72 HOURS FOR MOTION SICKNESS 30 patch 1     VITAMIN D PO          No Known Allergies     Social History     Tobacco Use     Smoking status: Never Smoker     Smokeless tobacco: Never Used   Substance Use Topics     Alcohol use: No     Alcohol/week: 0.0 standard drinks     Family History   Problem Relation Age of Onset     Heart Disease Mother      Genitourinary Problems Father         Genitourinary Disease,kidney disease     Heart Disease Father         MI     Depression Sister      No Known Problems Sister      Depression Brother      Bipolar Disorder Brother      No Known Problems Brother         mild cognitive delay     Cancer Maternal Grandmother         Bladder     Other - See Comments Paternal Grandmother 70        Stroke     Cancer Maternal Grandfather         Throat and lung     Other - See Comments Other         Family history of depression and anxiety     Anesthesia Reaction No family hx of         Anesthesia Problem,Notes no prior complications from anesthesia.     Breast Cancer No family hx of         Cancer-breast     History   Drug Use No         Objective     /84   Pulse 80   Temp 99  F (37.2  C) (Tympanic)   Resp 20   LMP  (LMP Unknown)   SpO2 96%     Physical Exam    GENERAL APPEARANCE: healthy, alert and no distress     EYES: EOMI, PERRL     HENT: Right ear canal with impacted cerumen and nose and mouth without ulcers or lesions     NECK: no adenopathy, no asymmetry, masses, or scars and thyroid normal to palpation     RESP: lungs clear to auscultation - no rales,  rhonchi or wheezes     CV: regular rates and rhythm, normal S1 S2, no S3 or S4 and no murmur, click or rub     MS: left lower extremity in splint, wheelchair for mobilization     SKIN: no suspicious lesions or rashes     NEURO: Normal strength and tone, sensory exam grossly normal, mentation intact and speech normal     PSYCH: mentation appears normal. and affect normal/bright     LYMPHATICS: No cervical adenopathy    Recent Labs   Lab Test 05/10/22  2025 06/29/21  1442   HGB 12.3 13.4    280    135   POTASSIUM 3.7 3.6   CR 0.88 0.71        Diagnostics:  No labs were ordered during this visit.   EKG: appears normal, NSR, Nonspecific T wave abnormality, no LVH by voltage criteria, unchanged from previous tracings    Revised Cardiac Risk Index (RCRI):  The patient has the following serious cardiovascular risks for perioperative complications:   - No serious cardiac risks = 0 points     RCRI Interpretation: 0 points: Class I (very low risk - 0.4% complication rate)           Signed Electronically by: Joellen Romero PA-C  Copy of this evaluation report is provided to requesting physician.      Answers for HPI/ROS submitted by the patient on 5/16/2022  If you checked off any problems, how difficult have these problems made it for you to do your work, take care of things at home, or get along with other people?: Not difficult at all  PHQ9 TOTAL SCORE: 5  How many servings of fruits and vegetables do you eat daily?: 2-3  On average, how many sweetened beverages do you drink each day (Examples: soda, juice, sweet tea, etc.  Do NOT count diet or artificially sweetened beverages)?: 0  How many minutes a day do you exercise enough to make your heart beat faster?: 20 to 29  How many days a week do you exercise enough to make your heart beat faster?: 4  How many days per week do you miss taking your medication?: 0  What is the reason for your visit today?: Preop  When did your symptoms begin?: 3-7 days ago  What  are your symptoms?: Pain  How would you describe these symptoms?: Moderate  Are your symptoms:: Staying the same  Have you had these symptoms before?: No  Is there anything that makes you feel worse?: Moving around  Is there anything that makes you feel better?: Elevating

## 2022-05-16 NOTE — PATIENT INSTRUCTIONS
Preparing for Your Surgery  Getting started  A nurse will call you to review your health history and instructions. They will give you an arrival time based on your scheduled surgery time. Please be ready to share:    Your doctor's clinic name and phone number    Your medical, surgical and anesthesia history    A list of allergies and sensitivities    A list of medicines, including herbal treatments and over-the-counter drugs    Whether the patient has a legal guardian (ask how to send us the papers in advance)  Please tell us if you're pregnant--or if there's any chance you might be pregnant. Some surgeries may injure a fetus (unborn baby), so they require a pregnancy test. Surgeries that are safe for a fetus don't always need a test, and you can choose whether to have one.   If you have a child who's having surgery, please ask for a copy of Preparing for Your Child's Surgery.    Preparing for surgery    Within 30 days of surgery: Have a pre-op exam (sometimes called an H&P, or History and Physical). This can be done at a clinic or pre-operative center.  ? If you're having a , you may not need this exam. Talk to your care team.    At your pre-op exam, talk to your care team about all medicines you take. If you need to stop any medicines before surgery, ask when to start taking them again.  ? We do this for your safety. Many medicines can make you bleed too much during surgery. Some change how well surgery (anesthesia) drugs work.    Call your insurance company to let them know you're having surgery. (If you don't have insurance, call [phone].)    Call your clinic if there's any change in your health. This includes signs of a cold or flu (sore throat, runny nose, cough, rash, fever). It also includes a scrape or scratch near the surgery site.    If you have questions on the day of surgery, call your hospital or surgery center.  COVID testing  You may need to be tested for COVID-19 before having  surgery. If so, we will give you instructions.  Eating and drinking guidelines  For your safety: Unless your surgeon tells you otherwise, follow the guidelines below.    Eat and drink as usual until 8 hours before surgery. After that, no food or milk.    Drink clear liquids until 2 hours before surgery. These are liquids you can see through, like water, Gatorade and Propel Water. You may also have black coffee and tea (no cream or milk).    Nothing by mouth within 2 hours of surgery. This includes gum, candy and breath mints.    If you drink alcohol: Stop drinking it the night before surgery.    If your care team tells you to take medicine on the morning of surgery, it's okay to take it with a sip of water.  Preventing infection    Shower or bathe the night before and morning of your surgery. Follow the instructions your clinic gave you. (If no instructions, use regular soap.)    Don't shave or clip hair near your surgery site. We'll remove the hair if needed.    Don't smoke or vape the morning of surgery. You may chew nicotine gum up to 2 hours before surgery. A nicotine patch is okay.  ? Note: Some surgeries require you to completely quit smoking and nicotine. Check with your surgeon.    Your care team will make every effort to keep you safe from infection. We will:  ? Clean our hands often with soap and water (or an alcohol-based hand rub).  ? Clean the skin at your surgery site with a special soap that kills germs.  ? Give you a special gown to keep you warm. (Cold raises the risk of infection.)  ? Wear special hair covers, masks, gowns and gloves during surgery.  ? Give antibiotic medicine, if prescribed. Not all surgeries need antibiotics.  What to bring on the day of surgery    Photo ID and insurance card    Copy of your health care directive, if you have one    Glasses and hearing aides (bring cases)  ? You can't wear contacts during surgery    Inhaler and eye drops, if you use them (tell us about these when  you arrive)    CPAP machine or breathing device, if you use them    A few personal items, if spending the night    If you have . . .  ? A pacemaker, ICD (cardiac defibrillator) or other implant: Bring the ID card.  ? An implanted stimulator: Bring the remote control.  ? A legal guardian: Bring a copy of the certified (court-stamped) guardianship papers.  Please remove any jewelry, including body piercings. Leave jewelry and other valuables at home.  If you're going home the day of surgery    You must have a responsible adult drive you home. They should stay with you overnight as well.    If you don't have someone to stay with you, and you aren't safe to go home alone, we may keep you overnight. Insurance often won't pay for this.  After surgery  If it's hard to control your pain or you need more pain medicine, please call your surgeon's office.  Questions?   If you have any questions for your care team, list them here: _________________________________________________________________________________________________________________________________________________________________________ ____________________________________ ____________________________________ ____________________________________  For informational purposes only. Not to replace the advice of your health care provider. Copyright   2003, 2019 [facility]. All rights reserved. Clinically reviewed by Amy Louis MD. [employer] [id number] - REV 07/21.

## 2022-05-16 NOTE — NURSING NOTE
Chief Complaint   Patient presents with     Pre-Op Exam     Preop:  5/19 Left ankle & bunion, Reinking at [facility]     Medication Reconciliation: complete    FOOD SECURITY SCREENING QUESTIONS:    The next two questions are to help us understand your food security.  If you are feeling you need any assistance in this area, we have resources available to support you today.    Hunger Vital Signs:  Within the past 12 months we worried whether our food would run out before we got money to buy more. Never  Within the past 12 months the food we bought just didn't last and we didn't have money to get more. Never    Sharla Palencia CMA ([facility])

## 2022-05-17 LAB
ATRIAL RATE - MUSE: 75 BPM
DIASTOLIC BLOOD PRESSURE - MUSE: NORMAL MMHG
INTERPRETATION ECG - MUSE: NORMAL
P AXIS - MUSE: 33 DEGREES
PR INTERVAL - MUSE: 182 MS
QRS DURATION - MUSE: 78 MS
QT - MUSE: 396 MS
QTC - MUSE: 442 MS
R AXIS - MUSE: 13 DEGREES
SYSTOLIC BLOOD PRESSURE - MUSE: NORMAL MMHG
T AXIS - MUSE: 20 DEGREES
VENTRICULAR RATE- MUSE: 75 BPM

## 2022-05-18 ENCOUNTER — ANESTHESIA EVENT (OUTPATIENT)
Dept: SURGERY | Facility: OTHER | Age: 67
End: 2022-05-18
Payer: MEDICARE

## 2022-05-18 LAB — SARS-COV-2 RNA RESP QL NAA+PROBE: NEGATIVE

## 2022-05-19 ENCOUNTER — HOSPITAL ENCOUNTER (OUTPATIENT)
Facility: OTHER | Age: 67
Discharge: HOME | End: 2022-05-19
Attending: PODIATRIST | Admitting: PODIATRIST
Payer: MEDICARE

## 2022-05-19 ENCOUNTER — HOSPITAL ENCOUNTER (OUTPATIENT)
Dept: GENERAL RADIOLOGY | Facility: OTHER | Age: 67
Discharge: HOME | End: 2022-05-19
Attending: PODIATRIST | Admitting: PODIATRIST
Payer: MEDICARE

## 2022-05-19 ENCOUNTER — ANESTHESIA (OUTPATIENT)
Dept: SURGERY | Facility: OTHER | Age: 67
End: 2022-05-19
Payer: MEDICARE

## 2022-05-19 VITALS
TEMPERATURE: 98.1 F | SYSTOLIC BLOOD PRESSURE: 154 MMHG | RESPIRATION RATE: 6 BRPM | OXYGEN SATURATION: 93 % | WEIGHT: 150 LBS | HEIGHT: 64 IN | BODY MASS INDEX: 25.61 KG/M2 | HEART RATE: 79 BPM | DIASTOLIC BLOOD PRESSURE: 80 MMHG

## 2022-05-19 DIAGNOSIS — G89.18 POST-OP PAIN: Primary | ICD-10-CM

## 2022-05-19 DIAGNOSIS — S82.892A ANKLE FRACTURE, LEFT, CLOSED, INITIAL ENCOUNTER: ICD-10-CM

## 2022-05-19 LAB — GLUCOSE BLDC GLUCOMTR-MCNC: 92 MG/DL (ref 70–99)

## 2022-05-19 PROCEDURE — 258N000003 HC RX IP 258 OP 636: Performed by: NURSE ANESTHETIST, CERTIFIED REGISTERED

## 2022-05-19 PROCEDURE — 250N000009 HC RX 250: Performed by: NURSE ANESTHETIST, CERTIFIED REGISTERED

## 2022-05-19 PROCEDURE — 250N000011 HC RX IP 250 OP 636: Performed by: NURSE ANESTHETIST, CERTIFIED REGISTERED

## 2022-05-19 PROCEDURE — 76942 ECHO GUIDE FOR BIOPSY: CPT | Mod: 26 | Performed by: NURSE ANESTHETIST, CERTIFIED REGISTERED

## 2022-05-19 PROCEDURE — 272N000001 HC OR GENERAL SUPPLY STERILE: Performed by: PODIATRIST

## 2022-05-19 PROCEDURE — 64447 NJX AA&/STRD FEMORAL NRV IMG: CPT | Mod: LT | Performed by: NURSE ANESTHETIST, CERTIFIED REGISTERED

## 2022-05-19 PROCEDURE — 64445 NJX AA&/STRD SCIATIC NRV IMG: CPT | Mod: LT | Performed by: NURSE ANESTHETIST, CERTIFIED REGISTERED

## 2022-05-19 PROCEDURE — C1713 ANCHOR/SCREW BN/BN,TIS/BN: HCPCS | Performed by: PODIATRIST

## 2022-05-19 PROCEDURE — 710N000012 HC RECOVERY PHASE 2, PER MINUTE: Performed by: PODIATRIST

## 2022-05-19 PROCEDURE — 360N000084 HC SURGERY LEVEL 4 W/ FLUORO, PER MIN: Performed by: PODIATRIST

## 2022-05-19 PROCEDURE — 27822 TREATMENT OF ANKLE FRACTURE: CPT | Mod: LT | Performed by: PODIATRIST

## 2022-05-19 PROCEDURE — 370N000017 HC ANESTHESIA TECHNICAL FEE, PER MIN: Performed by: PODIATRIST

## 2022-05-19 PROCEDURE — C1769 GUIDE WIRE: HCPCS | Performed by: PODIATRIST

## 2022-05-19 PROCEDURE — 710N000010 HC RECOVERY PHASE 1, LEVEL 2, PER MIN: Performed by: PODIATRIST

## 2022-05-19 PROCEDURE — 999N000180 XR SURGERY CARM FLUORO LESS THAN 5 MIN: Mod: TC

## 2022-05-19 PROCEDURE — 250N000025 HC SEVOFLURANE, PER MIN: Performed by: PODIATRIST

## 2022-05-19 PROCEDURE — 250N000013 HC RX MED GY IP 250 OP 250 PS 637: Performed by: NURSE ANESTHETIST, CERTIFIED REGISTERED

## 2022-05-19 PROCEDURE — 28750 FUSION OF BIG TOE JOINT: CPT | Mod: LT | Performed by: PODIATRIST

## 2022-05-19 PROCEDURE — 250N000011 HC RX IP 250 OP 636: Performed by: PODIATRIST

## 2022-05-19 PROCEDURE — 82962 GLUCOSE BLOOD TEST: CPT

## 2022-05-19 PROCEDURE — 999N000141 HC STATISTIC PRE-PROCEDURE NURSING ASSESSMENT: Performed by: PODIATRIST

## 2022-05-19 PROCEDURE — 27822 TREATMENT OF ANKLE FRACTURE: CPT | Performed by: NURSE ANESTHETIST, CERTIFIED REGISTERED

## 2022-05-19 DEVICE — LO-PRO LOCK SCRW,SS 2.7X 14MM
Type: IMPLANTABLE DEVICE | Site: ANKLE | Status: FUNCTIONAL
Brand: ARTHREX®

## 2022-05-19 DEVICE — 3.0 X 14MM VAL SCREW, TI
Type: IMPLANTABLE DEVICE | Site: TOE | Status: FUNCTIONAL
Brand: ARTHREX®

## 2022-05-19 DEVICE — LO-PRO STR MTP PLT,SHT,TI
Type: IMPLANTABLE DEVICE | Site: TOE | Status: FUNCTIONAL
Brand: ARTHREX®

## 2022-05-19 DEVICE — LOCK DISTAL FIBULA PLT,SS LFT,5H
Type: IMPLANTABLE DEVICE | Site: ANKLE | Status: FUNCTIONAL
Brand: ARTHREX®

## 2022-05-19 DEVICE — 3.0 X 16MM VAL SCREW, TI
Type: IMPLANTABLE DEVICE | Site: TOE | Status: FUNCTIONAL
Brand: ARTHREX®

## 2022-05-19 DEVICE — LOW PROF SCRW,SS 4.0X 46MMCANN,SHT THD
Type: IMPLANTABLE DEVICE | Site: ANKLE | Status: FUNCTIONAL
Brand: ARTHREX®

## 2022-05-19 DEVICE — QCKFIX SCRW,TI,CANC.,PT,3.0X 26MM
Type: IMPLANTABLE DEVICE | Site: TOE | Status: FUNCTIONAL
Brand: ARTHREX®

## 2022-05-19 DEVICE — LO-PRO SCRW TM SS 2.7X 14MMCORTEX
Type: IMPLANTABLE DEVICE | Site: ANKLE | Status: FUNCTIONAL
Brand: ARTHREX®

## 2022-05-19 DEVICE — LO-PRO LOCK SCRW,SS 2.7X 16MM
Type: IMPLANTABLE DEVICE | Site: ANKLE | Status: FUNCTIONAL
Brand: ARTHREX®

## 2022-05-19 DEVICE — LO-PRO SCRW TM,SS 3.5X 22MMCORT
Type: IMPLANTABLE DEVICE | Site: ANKLE | Status: FUNCTIONAL
Brand: ARTHREX®

## 2022-05-19 DEVICE — LO-PRO SCRW,TI,3.0MMX 18MMCORT
Type: IMPLANTABLE DEVICE | Site: TOE | Status: FUNCTIONAL
Brand: ARTHREX®

## 2022-05-19 RX ORDER — FENTANYL CITRATE 50 UG/ML
25-50 INJECTION, SOLUTION INTRAMUSCULAR; INTRAVENOUS
Status: DISCONTINUED | OUTPATIENT
Start: 2022-05-19 | End: 2022-05-19 | Stop reason: HOSPADM

## 2022-05-19 RX ORDER — SODIUM CHLORIDE, SODIUM LACTATE, POTASSIUM CHLORIDE, CALCIUM CHLORIDE 600; 310; 30; 20 MG/100ML; MG/100ML; MG/100ML; MG/100ML
INJECTION, SOLUTION INTRAVENOUS CONTINUOUS
Status: DISCONTINUED | OUTPATIENT
Start: 2022-05-19 | End: 2022-05-19 | Stop reason: HOSPADM

## 2022-05-19 RX ORDER — FENTANYL CITRATE 50 UG/ML
INJECTION, SOLUTION INTRAMUSCULAR; INTRAVENOUS PRN
Status: DISCONTINUED | OUTPATIENT
Start: 2022-05-19 | End: 2022-05-19

## 2022-05-19 RX ORDER — PROPOFOL 10 MG/ML
INJECTION, EMULSION INTRAVENOUS PRN
Status: DISCONTINUED | OUTPATIENT
Start: 2022-05-19 | End: 2022-05-19

## 2022-05-19 RX ORDER — ACETAMINOPHEN 500 MG
1000 TABLET ORAL EVERY 8 HOURS
Qty: 30 TABLET | Refills: 0 | Status: SHIPPED | OUTPATIENT
Start: 2022-05-19 | End: 2022-05-24

## 2022-05-19 RX ORDER — IBUPROFEN 600 MG/1
600 TABLET, FILM COATED ORAL EVERY 6 HOURS
Qty: 12 TABLET | Refills: 0 | Status: SHIPPED | OUTPATIENT
Start: 2022-05-19 | End: 2022-05-22

## 2022-05-19 RX ORDER — NALOXONE HYDROCHLORIDE 0.4 MG/ML
0.4 INJECTION, SOLUTION INTRAMUSCULAR; INTRAVENOUS; SUBCUTANEOUS
Status: DISCONTINUED | OUTPATIENT
Start: 2022-05-19 | End: 2022-05-19 | Stop reason: HOSPADM

## 2022-05-19 RX ORDER — CEFAZOLIN SODIUM/WATER 2 G/20 ML
2 SYRINGE (ML) INTRAVENOUS
Status: COMPLETED | OUTPATIENT
Start: 2022-05-19 | End: 2022-05-19

## 2022-05-19 RX ORDER — ONDANSETRON 4 MG/1
4 TABLET, ORALLY DISINTEGRATING ORAL EVERY 30 MIN PRN
Status: DISCONTINUED | OUTPATIENT
Start: 2022-05-19 | End: 2022-05-19 | Stop reason: HOSPADM

## 2022-05-19 RX ORDER — NALOXONE HYDROCHLORIDE 0.4 MG/ML
0.2 INJECTION, SOLUTION INTRAMUSCULAR; INTRAVENOUS; SUBCUTANEOUS
Status: DISCONTINUED | OUTPATIENT
Start: 2022-05-19 | End: 2022-05-19 | Stop reason: HOSPADM

## 2022-05-19 RX ORDER — HYDROMORPHONE HCL IN WATER/PF 6 MG/30 ML
0.4 PATIENT CONTROLLED ANALGESIA SYRINGE INTRAVENOUS EVERY 5 MIN PRN
Status: DISCONTINUED | OUTPATIENT
Start: 2022-05-19 | End: 2022-05-19 | Stop reason: HOSPADM

## 2022-05-19 RX ORDER — MEPERIDINE HYDROCHLORIDE 50 MG/ML
12.5 INJECTION INTRAMUSCULAR; INTRAVENOUS; SUBCUTANEOUS
Status: DISCONTINUED | OUTPATIENT
Start: 2022-05-19 | End: 2022-05-19 | Stop reason: HOSPADM

## 2022-05-19 RX ORDER — CEFAZOLIN SODIUM/WATER 2 G/20 ML
2 SYRINGE (ML) INTRAVENOUS
Status: DISCONTINUED | OUTPATIENT
Start: 2022-05-19 | End: 2022-05-19 | Stop reason: HOSPADM

## 2022-05-19 RX ORDER — KETOROLAC TROMETHAMINE 30 MG/ML
INJECTION, SOLUTION INTRAMUSCULAR; INTRAVENOUS PRN
Status: DISCONTINUED | OUTPATIENT
Start: 2022-05-19 | End: 2022-05-19

## 2022-05-19 RX ORDER — ONDANSETRON 2 MG/ML
INJECTION INTRAMUSCULAR; INTRAVENOUS PRN
Status: DISCONTINUED | OUTPATIENT
Start: 2022-05-19 | End: 2022-05-19

## 2022-05-19 RX ORDER — DEXAMETHASONE SODIUM PHOSPHATE 10 MG/ML
INJECTION, SOLUTION INTRAMUSCULAR; INTRAVENOUS PRN
Status: DISCONTINUED | OUTPATIENT
Start: 2022-05-19 | End: 2022-05-19

## 2022-05-19 RX ORDER — OXYCODONE HYDROCHLORIDE 5 MG/1
5 TABLET ORAL EVERY 4 HOURS PRN
Status: DISCONTINUED | OUTPATIENT
Start: 2022-05-19 | End: 2022-05-19 | Stop reason: HOSPADM

## 2022-05-19 RX ORDER — FENTANYL CITRATE 50 UG/ML
50 INJECTION, SOLUTION INTRAMUSCULAR; INTRAVENOUS
Status: DISCONTINUED | OUTPATIENT
Start: 2022-05-19 | End: 2022-05-19 | Stop reason: HOSPADM

## 2022-05-19 RX ORDER — FENTANYL CITRATE 50 UG/ML
25 INJECTION, SOLUTION INTRAMUSCULAR; INTRAVENOUS EVERY 5 MIN PRN
Status: DISCONTINUED | OUTPATIENT
Start: 2022-05-19 | End: 2022-05-19 | Stop reason: HOSPADM

## 2022-05-19 RX ORDER — LIDOCAINE 40 MG/G
CREAM TOPICAL
Status: DISCONTINUED | OUTPATIENT
Start: 2022-05-19 | End: 2022-05-19 | Stop reason: HOSPADM

## 2022-05-19 RX ORDER — OXYCODONE HYDROCHLORIDE 5 MG/1
5 TABLET ORAL
Status: DISCONTINUED | OUTPATIENT
Start: 2022-05-19 | End: 2022-05-19 | Stop reason: HOSPADM

## 2022-05-19 RX ORDER — LIDOCAINE HYDROCHLORIDE 20 MG/ML
INJECTION, SOLUTION INFILTRATION; PERINEURAL PRN
Status: DISCONTINUED | OUTPATIENT
Start: 2022-05-19 | End: 2022-05-19

## 2022-05-19 RX ORDER — KETAMINE HYDROCHLORIDE 10 MG/ML
INJECTION INTRAMUSCULAR; INTRAVENOUS PRN
Status: DISCONTINUED | OUTPATIENT
Start: 2022-05-19 | End: 2022-05-19

## 2022-05-19 RX ORDER — BUPIVACAINE HYDROCHLORIDE 2.5 MG/ML
INJECTION, SOLUTION EPIDURAL; INFILTRATION; INTRACAUDAL PRN
Status: DISCONTINUED | OUTPATIENT
Start: 2022-05-19 | End: 2022-05-19

## 2022-05-19 RX ORDER — ONDANSETRON 2 MG/ML
4 INJECTION INTRAMUSCULAR; INTRAVENOUS EVERY 30 MIN PRN
Status: DISCONTINUED | OUTPATIENT
Start: 2022-05-19 | End: 2022-05-19 | Stop reason: HOSPADM

## 2022-05-19 RX ORDER — CEFAZOLIN SODIUM/WATER 2 G/20 ML
2 SYRINGE (ML) INTRAVENOUS SEE ADMIN INSTRUCTIONS
Status: DISCONTINUED | OUTPATIENT
Start: 2022-05-19 | End: 2022-05-19 | Stop reason: HOSPADM

## 2022-05-19 RX ORDER — FLUMAZENIL 0.1 MG/ML
0.2 INJECTION, SOLUTION INTRAVENOUS
Status: DISCONTINUED | OUTPATIENT
Start: 2022-05-19 | End: 2022-05-19 | Stop reason: HOSPADM

## 2022-05-19 RX ORDER — ACETAMINOPHEN 10 MG/ML
INJECTION, SOLUTION INTRAVENOUS PRN
Status: DISCONTINUED | OUTPATIENT
Start: 2022-05-19 | End: 2022-05-19

## 2022-05-19 RX ORDER — HYDROXYZINE HYDROCHLORIDE 10 MG/1
10 TABLET, FILM COATED ORAL
Status: DISCONTINUED | OUTPATIENT
Start: 2022-05-19 | End: 2022-05-19 | Stop reason: HOSPADM

## 2022-05-19 RX ORDER — ASPIRIN 81 MG/1
81 TABLET ORAL 2 TIMES DAILY
Qty: 60 TABLET | Refills: 0 | Status: SHIPPED | OUTPATIENT
Start: 2022-05-19

## 2022-05-19 RX ORDER — OXYCODONE HYDROCHLORIDE 5 MG/1
5 TABLET ORAL EVERY 6 HOURS PRN
Qty: 20 TABLET | Refills: 0 | Status: SHIPPED | OUTPATIENT
Start: 2022-05-19 | End: 2022-07-05

## 2022-05-19 RX ORDER — LABETALOL 20 MG/4 ML (5 MG/ML) INTRAVENOUS SYRINGE
PRN
Status: DISCONTINUED | OUTPATIENT
Start: 2022-05-19 | End: 2022-05-19

## 2022-05-19 RX ADMIN — DEXAMETHASONE SODIUM PHOSPHATE 5 MG: 10 INJECTION, SOLUTION INTRAMUSCULAR; INTRAVENOUS at 11:28

## 2022-05-19 RX ADMIN — MIDAZOLAM 2 MG: 1 INJECTION INTRAMUSCULAR; INTRAVENOUS at 11:17

## 2022-05-19 RX ADMIN — Medication 10 MG: at 11:47

## 2022-05-19 RX ADMIN — Medication 10 MG: at 12:03

## 2022-05-19 RX ADMIN — ONDANSETRON HYDROCHLORIDE 4 MG: 2 SOLUTION INTRAMUSCULAR; INTRAVENOUS at 11:45

## 2022-05-19 RX ADMIN — ACETAMINOPHEN 1000 MG: 10 INJECTION, SOLUTION INTRAVENOUS at 11:54

## 2022-05-19 RX ADMIN — SODIUM CHLORIDE, SODIUM LACTATE, POTASSIUM CHLORIDE, AND CALCIUM CHLORIDE: 600; 310; 30; 20 INJECTION, SOLUTION INTRAVENOUS at 12:53

## 2022-05-19 RX ADMIN — LIDOCAINE HYDROCHLORIDE 40 MG: 20 INJECTION, SOLUTION INFILTRATION; PERINEURAL at 11:40

## 2022-05-19 RX ADMIN — LABETALOL 20 MG/4 ML (5 MG/ML) INTRAVENOUS SYRINGE 1 MG: at 12:32

## 2022-05-19 RX ADMIN — LABETALOL 20 MG/4 ML (5 MG/ML) INTRAVENOUS SYRINGE 1.5 MG: at 12:50

## 2022-05-19 RX ADMIN — Medication 2 G: at 11:31

## 2022-05-19 RX ADMIN — KETOROLAC TROMETHAMINE 30 MG: 30 INJECTION, SOLUTION INTRAMUSCULAR at 12:51

## 2022-05-19 RX ADMIN — DEXAMETHASONE SODIUM PHOSPHATE 5 MG: 10 INJECTION, SOLUTION INTRAMUSCULAR; INTRAVENOUS at 11:22

## 2022-05-19 RX ADMIN — LABETALOL 20 MG/4 ML (5 MG/ML) INTRAVENOUS SYRINGE 2.5 MG: at 12:45

## 2022-05-19 RX ADMIN — OXYCODONE HYDROCHLORIDE 5 MG: 5 TABLET ORAL at 14:25

## 2022-05-19 RX ADMIN — PROPOFOL 100 MG: 10 INJECTION, EMULSION INTRAVENOUS at 11:40

## 2022-05-19 RX ADMIN — FENTANYL CITRATE 25 MCG: 50 INJECTION INTRAMUSCULAR; INTRAVENOUS at 13:19

## 2022-05-19 RX ADMIN — FENTANYL CITRATE 25 MCG: 50 INJECTION INTRAMUSCULAR; INTRAVENOUS at 13:38

## 2022-05-19 RX ADMIN — FENTANYL CITRATE 25 MCG: 50 INJECTION, SOLUTION INTRAMUSCULAR; INTRAVENOUS at 12:03

## 2022-05-19 RX ADMIN — FENTANYL CITRATE 25 MCG: 50 INJECTION, SOLUTION INTRAMUSCULAR; INTRAVENOUS at 11:40

## 2022-05-19 RX ADMIN — LIDOCAINE HYDROCHLORIDE 0.1 ML: 10 INJECTION, SOLUTION EPIDURAL; INFILTRATION; INTRACAUDAL; PERINEURAL at 09:10

## 2022-05-19 RX ADMIN — FENTANYL CITRATE 25 MCG: 50 INJECTION INTRAMUSCULAR; INTRAVENOUS at 13:31

## 2022-05-19 RX ADMIN — BUPIVACAINE HYDROCHLORIDE 20 ML: 2.5 INJECTION, SOLUTION EPIDURAL; INFILTRATION; INTRACAUDAL; PERINEURAL at 11:22

## 2022-05-19 RX ADMIN — SODIUM CHLORIDE, SODIUM LACTATE, POTASSIUM CHLORIDE, AND CALCIUM CHLORIDE 100 ML/HR: 600; 310; 30; 20 INJECTION, SOLUTION INTRAVENOUS at 09:10

## 2022-05-19 RX ADMIN — LABETALOL 20 MG/4 ML (5 MG/ML) INTRAVENOUS SYRINGE 2.5 MG: at 13:06

## 2022-05-19 NOTE — ANESTHESIA POSTPROCEDURE EVALUATION
Patient: Mita Gabriel    Procedure: Procedure(s):  Open Reduction Internal Fixation Fracture Ankle, 1st Metatarsal phalangeal joint fusion       Anesthesia Type:  General    Note:  Disposition: Outpatient   Postop Pain Control: Uneventful            Sign Out: Well controlled pain   PONV: No   Neuro/Psych: Uneventful            Sign Out: Acceptable/Baseline neuro status   Airway/Respiratory: Uneventful            Sign Out: Acceptable/Baseline resp. status   CV/Hemodynamics: Uneventful            Sign Out: Acceptable CV status; No obvious hypovolemia; No obvious fluid overload   Other NRE: NONE   DID A NON-ROUTINE EVENT OCCUR? No           Last vitals:  Vitals Value Taken Time   /83 05/19/22 1345   Temp 98.1  F (36.7  C) 05/19/22 1340   Pulse 73 05/19/22 1345   Resp 6 05/19/22 1345   SpO2 95 % 05/19/22 1345       Electronically Signed By: GABRIELA MCKEON CRNA  May 19, 2022  3:11 PM

## 2022-05-19 NOTE — ANESTHESIA CARE TRANSFER NOTE
Patient: Mita Gabriel    Procedure: Procedure(s):  Open Reduction Internal Fixation Fracture Ankle, 1st Metatarsal phalangeal joint fusion       Diagnosis: Left foot pain [M79.672]  Closed fracture of left ankle, initial encounter [S83.135X]  Diagnosis Additional Information: No value filed.    Anesthesia Type:   General     Note:    Oropharynx: spontaneously breathing  Level of Consciousness: awake  Oxygen Supplementation: face mask  Level of Supplemental Oxygen (L/min / FiO2): 6  Independent Airway: airway patency satisfactory and stable  Dentition: dentition unchanged  Vital Signs Stable: post-procedure vital signs reviewed and stable  Report to RN Given: handoff report given  Patient transferred to: Phase II    Handoff Report: Identifed the Patient, Identified the Reponsible Provider, Reviewed the pertinent medical history, Discussed the surgical course, Reviewed Intra-OP anesthesia mangement and issues during anesthesia, Set expectations for post-procedure period and Allowed opportunity for questions and acknowledgement of understanding      Vitals:  Vitals Value Taken Time   BP     Temp     Pulse     Resp     SpO2         Electronically Signed By: GABRIELA Tamayo CRNA  May 19, 2022  1:14 PM

## 2022-05-19 NOTE — ANESTHESIA PREPROCEDURE EVALUATION
"Anesthesia Pre-Procedure Evaluation    Patient: Mita Gabriel   MRN: [id number] : 1955        Procedure : Procedure(s):  Open Reduction Internal Fixation Fracture Ankle, 1st Metatarsal phalangeal joint fusion          Past Medical History:   Diagnosis Date     Anxiety disorder      Closed fracture of left carpal bone      Essential (primary) hypertension      Hyperlipidemia      Hypothyroidism      Major depressive disorder, single episode      Postoperative nausea and vomiting      Transfusion history     with delivery of son      Past Surgical History:   Procedure Laterality Date     CATARACT IOL, RT/LT Bilateral       SECTION       COLONOSCOPY N/A 2017    Follow up   hyperplastic     OPEN REDUCTION INTERNAL FIXATION WRIST      treating a fracture     OPEN REDUCTION INTERNAL FIXATION WRIST Left 2021    Procedure: Wrist Open Reduction Internal Fixation;  Surgeon: Lobo Sky MD;  Location: [facility]     THYROIDECTOMY       Partial      No Known Allergies   Social History     Tobacco Use     Smoking status: Never Smoker     Smokeless tobacco: Never Used   Substance Use Topics     Alcohol use: No     Alcohol/week: 0.0 standard drinks      Wt Readings from Last 1 Encounters:   22 68 kg (150 lb)        Anesthesia Evaluation   Pt has had prior anesthetic.     History of anesthetic complications  - motion sickness and PONV.      ROS/MED HX  ENT/Pulmonary:  - neg pulmonary ROS     Neurologic:  - neg neurologic ROS     Cardiovascular: Comment: Had a \"mini heart attack about 5 years ago\" no intervention that I can see. No recurrence of chest pain    (+) hypertension-----    METS/Exercise Tolerance: 4 - Raking leaves, gardening    Hematologic:  - neg hematologic  ROS     Musculoskeletal: Comment: Back pain      GI/Hepatic:  - neg GI/hepatic ROS     Renal/Genitourinary:  - neg Renal ROS     Endo:     (+) thyroid problem, hypothyroidism,     Psychiatric/Substance Use:     (+) " psychiatric history anxiety and depression     Infectious Disease:       Malignancy:  - neg malignancy ROS     Other:  - neg other ROS    (+) , H/O Chronic Pain,        Physical Exam    Airway        Mallampati: III   TM distance: < 3 FB   Neck ROM: limited   Mouth opening: < 3 cm    Respiratory Devices and Support         Dental  no notable dental history         Cardiovascular   cardiovascular exam normal          Pulmonary   pulmonary exam normal                OUTSIDE LABS:  CBC:   Lab Results   Component Value Date    WBC 6.3 05/10/2022    WBC 8.4 06/29/2021    HGB 12.3 05/10/2022    HGB 13.4 06/29/2021    HCT 37.2 05/10/2022    HCT 40.6 06/29/2021     05/10/2022     06/29/2021     BMP:   Lab Results   Component Value Date     05/10/2022     06/29/2021    POTASSIUM 3.7 05/10/2022    POTASSIUM 3.6 06/29/2021    CHLORIDE 98 05/10/2022    CHLORIDE 96 (L) 06/29/2021    CO2 30 05/10/2022    CO2 31 06/29/2021    BUN 13 05/10/2022    BUN 10 06/29/2021    CR 0.88 05/10/2022    CR 0.71 06/29/2021    GLC 99 05/10/2022     (H) 06/29/2021     COAGS:   Lab Results   Component Value Date    INR 1.2 01/09/2015     POC: No results found for: BGM, HCG, HCGS  HEPATIC:   Lab Results   Component Value Date    ALBUMIN 3.7 05/10/2022    PROTTOTAL 6.1 (L) 05/10/2022    ALT 11 05/10/2022    AST 12 (L) 05/10/2022    ALKPHOS 69 05/10/2022    BILITOTAL 0.6 05/10/2022     OTHER:   Lab Results   Component Value Date    LACT 1.1 05/10/2022    CHRISTINA 8.8 05/10/2022    PHOS 3.0 01/23/2015    MAG 1.6 (L) 05/10/2022    TSH 1.18 05/10/2022    T4 0.92 06/29/2021    CRP <1.0 05/10/2022    SED 8 05/10/2022       Anesthesia Plan    ASA Status:  2   NPO Status:  NPO Appropriate    Anesthesia Type: General.     - Airway: LMA              Consents    Anesthesia Plan(s) and associated risks, benefits, and realistic alternatives discussed. Questions answered and patient/representative(s) expressed understanding.     -  Discussed: Risks, Benefits and Alternatives for BOTH SEDATION and the PROCEDURE were discussed     - Discussed with:  Patient      - Extended Intubation/Ventilatory Support Discussed: No.      - Patient is DNR/DNI Status: No    Use of blood products discussed: No .     Postoperative Care    Pain management: IV analgesics, Multi-modal analgesia, Peripheral nerve block (Single Shot) (popliteal and adductor canal nerve block).   PONV prophylaxis: Ondansetron (or other 5HT-3), Dexamethasone or Solumedrol     Comments:                GABRIELA MCKEON CRNA

## 2022-05-19 NOTE — OR NURSING
patient has been discharged to home at 1556 via wheelchair accompanied by     Written discharge instructions were provided to patient.  Prescriptions were escribed to [facility] pharmacy.      Patient and adult caring for them verbalize understanding of discharge instructions including no driving until tomorrow and no longer taking narcotic pain medications - no operating mechanical equipment and no making any important decisions.They understand reason for discharge, and necessary follow-up appointments.

## 2022-05-19 NOTE — BRIEF OP NOTE
[facility]    Brief Operative Note    Pre-operative diagnosis: Left foot pain [M79.672]  Closed fracture of left ankle, initial encounter [K36.786B]  Post-operative diagnosis Same as pre-operative diagnosis    Procedure: Procedure(s):  Open Reduction Internal Fixation Fracture Ankle, 1st Metatarsal phalangeal joint fusion  Surgeon: Surgeon(s) and Role:     * Radu Ruvalcaba DPM - Primary  Anesthesia: Combined General with Popliteal Block   Estimated Blood Loss: Minimal    Drains: None  Specimens: * No specimens in log *  Findings:   None.  Complications: None.  Implants:   Implant Name Type Inv. Item Serial No.  Lot No. LRB No. Used Action   IMP PLATE ARTHREX LOCKING DIST FIBULA LT 5H SS [id number] - [id number] Metallic Hardware/[place] IMP PLATE ARTHREX LOCKING DIST FIBULA LT 5H SS [id number]  ARTHREX  Left 1 Implanted   IMP SCR ARTHREX LP LOCKING 2.7X14MM SS AR-8827L-14 - [id number] Metallic Hardware/[place] IMP SCR ARTHREX LP LOCKING 2.7X14MM SS [id number]-14  ARTHREX  Left 1 Implanted   IMP SCR ARTHREX LP LOCKING 2.7X16MM SS AR-8827L-16 - [id number] Metallic Hardware/[place] IMP SCR ARTHREX LP LOCKING 2.7X16MM SS [id number]-16  ARTHREX  Left 3 Implanted   LOW PROFILE SCREW 2.7MM CORTICAL 14MM    ARTHREX  Left 3 Implanted   IMP SCR ARTHREX LP CAMILLE TM 3.5X22MM SS AR-8835-22 - [id number] Metallic Hardware/[place] IMP SCR ARTHREX LP CAMILLE TM 3.5X22MM SS AR-8835-22  ARTHREX  Left 1 Implanted   IMP SCR ARTHREX LP CAN 4.0X46MM SHORT THRD SS AR-8840C-46 - [id number] Metallic Hardware/[place] IMP SCR ARTHREX LP CAN 4.0X46MM SHORT THRD SS [id number]  ARTHREX  Left 2 Implanted   IMP PLATE ARTHREX LOW PRO MTP SHORT 1.5MM STR TI AR-8944-P - [id number] Metallic Hardware/[place] IMP PLATE ARTHREX LOW PRO MTP SHORT 1.5MM STR TI AR-8944-P  ARTHREX  Left 1 Implanted   IMP SCR ARTHREX CORTICAL LP 3X18MM TI AR-8933-18 - [id number] Metallic Hardware/[place] IMP SCR ARTHREX CORTICAL LP 3X18MM TI  AR-8933-18  ARTHREX  Left 1 Implanted   IMP SCR ARTHREX QUICKFIX CANC PT 3.0X26MM TI [id number] - [id number] Metallic Hardware/[place] IMP SCR ARTHREX QUICKFIX CANC PT 3.0X26MM TI [id number]  ARTHREX  Left 1 Implanted   IMP SCR ARTHREX BANDAR 3.0X14MM TI AR-8933V-14 - [id number] Metallic Hardware/[place] IMP SCR ARTHREX BANDAR 3.0X14MM TI AR-8933V-14  ARTHREX  Left 2 Implanted   IMP SCR ARTHREX BANDAR 3.0X16MM TI AR-8933V-16 - [id number] Metallic Hardware/[place] IMP SCR ARTHREX BANDAR 3.0X16MM TI AR-8933V-16  ARTHREX  Left 1 Implanted

## 2022-05-19 NOTE — PROGRESS NOTES
PACU Transfer Note    Mita Gabriel was transferred to DSU via cart.  Equipment used for transport:  none.  Accompanied by:  ROBERTA Pascual RN  Prescriptions were: none    PACU Respiratory Event Documentation     1) Episodes of Apnea greater than or equal to 10 seconds: no    2) Bradypnea - less than 8 breaths per minute: no    3) Pain score on 0 to 10 scale: 4/10 tolerable    4) Pain-sedation mismatch (yes or no): no    5) Repeated 02 desaturation less than 90% (yes or no): no    Anesthesia notified? (yes or no): no    Any of the above events occuring repeatedly in separate 30 minute intervals may be considered recurrent PACU respiratory events.    Patient stable and meets phase 1 discharge criteria for transport from PACU.

## 2022-05-19 NOTE — ANESTHESIA PROCEDURE NOTES
Adductor canal Procedure Note    Pre-Procedure   Staff -        CRNA: Shannan Palm APRN CRNA       Performed By: CRNA       Location: pre-op       Procedure Start/Stop Times: 5/19/2022 11:23 AM and 5/19/2022 11:28 AM       Pre-Anesthestic Checklist: patient identified, IV checked, site marked, risks and benefits discussed, informed consent, monitors and equipment checked, pre-op evaluation, at physician/surgeon's request and post-op pain management  Timeout:       Correct Patient: Yes        Correct Procedure: Yes        Correct Site: Yes        Correct Position: Yes        Correct Laterality: Yes        Site Marked: Yes  Procedure Documentation  Procedure: Adductor canal       Diagnosis: LEFT ANKLE ORIF       Laterality: left       Patient Position: supine       Patient Prep/Sterile Barriers: sterile gloves, mask       Skin prep: Chloraprep       Needle Type: insulated       Needle Gauge: 22.        Needle Length (Inches): 4        Ultrasound guided       1. Ultrasound was used to identify targeted nerve, plexus, vascular marker, or fascial plane and place a needle adjacent to it in real-time.       2. Ultrasound was used to visualize the spread of anesthetic in close proximity to the above referenced structure.       3. A permanent image is entered into the patient's record.       4. The visualized anatomic structures appeared normal.       5. There were no apparent abnormal pathologic findings.    Assessment/Narrative         The placement was negative for: blood aspirated, painful injection and site bleeding       Paresthesias: No.       Bolus given via needle..        Secured via.        Insertion/Infusion Method: Single Shot       Complications: none    Medication(s) Administered   Medication Administration Time: 5/19/2022 11:23 AM

## 2022-05-19 NOTE — BRIEF OP NOTE
[facility]    Brief Operative Note    Pre-operative diagnosis: Left foot pain [M79.672]  Closed fracture of left ankle, initial encounter [N21.886R]  Post-operative diagnosis Same as pre-operative diagnosis    Procedure: Procedure(s):  Open Reduction Internal Fixation Fracture Ankle, 1st Metatarsal phalangeal joint fusion  Surgeon: Surgeon(s) and Role:     * Radu Ruvalcaba DPM - Primary  Anesthesia: Combined General with Popliteal Block   Estimated Blood Loss: Minimal    Drains: None  Specimens: * No specimens in log *  Findings:   None.  Complications: None.  Implants:   Implant Name Type Inv. Item Serial No.  Lot No. LRB No. Used Action   IMP PLATE ARTHREX LOCKING DIST FIBULA LT 5H SS [id number] - [id number] Metallic Hardware/[place] IMP PLATE ARTHREX LOCKING DIST FIBULA LT 5H SS [id number]  ARTHREX  Left 1 Implanted   IMP SCR ARTHREX LP LOCKING 2.7X14MM SS AR-8827L-14 - [id number] Metallic Hardware/[place] IMP SCR ARTHREX LP LOCKING 2.7X14MM SS AR-8827L-14  ARTHREX  Left 1 Implanted   IMP SCR ARTHREX LP LOCKING 2.7X16MM SS AR-8827L-16 - [id number] Metallic Hardware/[place] IMP SCR ARTHREX LP LOCKING 2.7X16MM SS AR-8827L-16  ARTHREX  Left 3 Implanted   LOW PROFILE SCREW 2.7MM CORTICAL 14MM    ARTHREX  Left 3 Implanted   IMP SCR ARTHREX LP CAMILLE TM 3.5X22MM SS AR-8835-22 - [id number] Metallic Hardware/[place] IMP SCR ARTHREX LP CAMILLE TM 3.5X22MM SS AR-8835-22  ARTHREX  Left 1 Implanted   IMP SCR ARTHREX LP CAN 4.0X46MM SHORT THRD SS AR-8840C-46 - [id number] Metallic Hardware/[place] IMP SCR ARTHREX LP CAN 4.0X46MM SHORT THRD SS [id number]-46  ARTHREX  Left 2 Implanted   IMP PLATE ARTHREX LOW PRO MTP SHORT 1.5MM STR TI AR-8944-P - [id number] Metallic Hardware/[place] IMP PLATE ARTHREX LOW PRO MTP SHORT 1.5MM STR TI AR-8944-P  ARTHREX  Left 1 Implanted   IMP SCR ARTHREX CORTICAL LP 3X18MM TI AR-8933-18 - [id number] Metallic Hardware/[place] IMP SCR ARTHREX CORTICAL LP 3X18MM TI  AR-8933-18  ARTHREX  Left 1 Implanted   IMP SCR ARTHREX QUICKFIX CANC PT 3.0X26MM TI [id number] - [id number] Metallic Hardware/[place] IMP SCR ARTHREX QUICKFIX CANC PT 3.0X26MM TI [id number]  ARTHREX  Left 1 Implanted   IMP SCR ARTHREX BANDAR 3.0X14MM TI AR-8933V-14 - [id number] Metallic Hardware/[place] IMP SCR ARTHREX BANDAR 3.0X14MM TI AR-8933V-14  ARTHREX  Left 2 Implanted   IMP SCR ARTHREX BANDAR 3.0X16MM TI AR-8933V-16 - [id number] Metallic Hardware/[place] IMP SCR ARTHREX BANDAR 3.0X16MM TI AR-8933V-16  ARTHREX  Left 1 Implanted

## 2022-05-19 NOTE — INTERVAL H&P NOTE
"I have reviewed the surgical (or preoperative) H&P that is linked to this encounter, and examined the patient. There are no significant changes    Clinical Conditions Present on Arrival:  Clinically Significant Risk Factors Present on Admission                  # Platelet Defect: home medication list includes an antiplatelet medication  # Overweight: Estimated body mass index is 25.75 kg/m  as calculated from the following:    Height as of this encounter: 1.626 m (5' 4\").    Weight as of this encounter: 68 kg (150 lb).       "

## 2022-05-19 NOTE — ANESTHESIA PROCEDURE NOTES
Other (popliteal) Procedure Note    Pre-Procedure   Staff -        CRNA: Shannan Palm APRN CRNA       Performed By: CRNA       Location: pre-op       Procedure Start/Stop Times: 5/19/2022 11:17 AM and 5/19/2022 11:22 AM       Pre-Anesthestic Checklist: patient identified, IV checked, site marked, risks and benefits discussed, informed consent, monitors and equipment checked, pre-op evaluation, at physician/surgeon's request and post-op pain management  Timeout:       Correct Patient: Yes        Correct Procedure: Yes        Correct Site: Yes        Correct Position: Yes        Correct Laterality: Yes        Site Marked: Yes  Procedure Documentation  Procedure: Other (popliteal)       Diagnosis: LEFT ANKLE ORIF       Laterality: left       Patient Position: supine       Patient Prep/Sterile Barriers: sterile gloves, mask       Skin prep: Chloraprep       Needle Type: insulated       Needle Gauge: 22.        Needle Length (Inches): 4        Ultrasound guided       1. Ultrasound was used to identify targeted nerve, plexus, vascular marker, or fascial plane and place a needle adjacent to it in real-time.       2. Ultrasound was used to visualize the spread of anesthetic in close proximity to the above referenced structure.       3. A permanent image is entered into the patient's record.       4. The visualized anatomic structures appeared normal.       5. There were no apparent abnormal pathologic findings.    Assessment/Narrative         The placement was negative for: blood aspirated, painful injection and site bleeding       Paresthesias: No.       Bolus given via needle..        Secured via.        Insertion/Infusion Method: Single Shot       Complications: none    Medication(s) Administered   Medication Administration Time: 5/19/2022 11:17 AM

## 2022-05-19 NOTE — DISCHARGE INSTRUCTIONS
[facility]  Adult Discharge Orders & Instructions      For 24 hours after surgery:  Get plenty of rest.  A responsible adult must stay with you for at least 24 hours after you leave the hospital.   You may feel lightheaded.  IF so, sit for a few minutes before standing.  Have someone help you get up.   You may have a slight fever. Call the doctor if your fever is over 101 F (38.3 C) (taken under the tongue) or lasts longer than 24 hours.  You may have a dry mouth, a sore throat, muscle aches or trouble sleeping.  These should go away after 24 hours.  Do not make important or legal decisions.  6.   Do not drive or use heavy equipment.  If you have weakness or tingling, don't drive or use heavy equipment until this feeling goes away.                                                                                                                                                                         To contact a doctor, call    [phone]______________

## 2022-05-20 NOTE — OP NOTE
Procedure Date: 05/19/2022    SURGEON:  Radu Ruvalcaba MD    ASSISTANT: Maddy Lane PA-C.  A skilled first assistant was necessary due to technical complexity of the procedure and for the patient's safety.  The assistant helped with positioning, retraction, visualization of the operative field and moreover helped to complete the procedure in a technically safe and efficient manner.      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    PROCEDURE:    1.  ORIF trimalleolar equivalent ankle fracture without posterior fixation.  2.  Left first MTPJ fusion for bunion arthrosis.    ANESTHESIA:  General with popliteal saphenous.    HEMOSTASIS:  Thigh tourniquet, electrocautery.    ESTIMATED BLOOD LOSS:  Minimal.    MATERIALS:  Arthrex precontoured fibular locking plate with 2.7 distal screws 3.5 proximal screws, Arthrex 4.0 cannulated screw, Arthrex first MPJ fusion plate with 3.0 cortical screws and one 4.0 cannulated screw.    INDICATIONS FOR PROCEDURE:  The patient has an unstable ankle fracture that has needed to be fixed. In consultation to fix this, we discussed her bunion.  She actually had this scheduled to be fixed a couple of years ago and was canceled due to COVID.  Has let it go.  It is painful and symptomatic and given undergoing surgery, she wanted this addressed as well.  She elected to proceed with surgery for both of these after detailed discussion of surgery, recovery, risks, potential complications, alternatives and benefits.    DESCRIPTION OF PROCEDURE:  Supine position utilized, general anesthesia, popliteal saphenous block performed.  Left lower extremity prepped and draped in the usual sterile fashion.  Left thigh tourniquet utilized for duration  of the procedure.    Attention directed to lateral ankle. Incision made in expansile fashion exposing the fibular fracture, deepened using blunt and sharp dissection.  Periosteal tissue incised around the incision. Nonviable hematoma and tissue debrided.  Anatomic reduction achieved and held in place with a point-to-point forceps.  A lag screw from anterior proximal and distal plantar was thrown, achieving compression at the fracture site with good fixation.  This was then augmented with a precontoured fibular locking plate fixed distally with 2.7 screws, proximally 3.5 cortical screws.  Had excellent alignment and reduction of deformity.  Flushed with normal saline.  Deep tissue repaired with 2-0 Vicryl and skin with staples.  Attention then directed medial. Proximal distal incision made overlying the medial malleolar fracture.  It was carefully identified. Anatomic reduction achieved and held with a point-to-point forceps, two 4-0 cannulated screws were then thrown under fluoroscopic guidance, achieving fixation of this medial malleolus fracture.  Good stability, good alignment.  Ankle mortise was intact.  This was flushed with normal saline.  Deep tissue repaired with 2-0 Vicryl and skin with staples.  Had a small posterior malleolar fragment, which was well under 5% of the joint, not really involved in much of the joint, and that was reduced, so no fixation performed here.    Procedure 2 involved a bunion, which was quite large and painful.  We discussed a midfoot fusion versus a first MPJ fusion.  She had actually inquired specifically about a lapiplasty or midfoot fusion type procedure. Given arthritis in the joint, we had settled on a first MPJ fusion being her best bet.  We exposed this joint through a medial incision.  Careful dissection carried out and sure enough, she did have a fairly significantly arthritic joint, so this first MPJ fusion should be the right choice for her.  Medial eminence was resected  with a power saw.  Joint was resected with flat cuts and minimal resection utilized, but the joint was resected and had good bleeding bone on both sides of the fusion.  The hallux was positioned appropriately with good correction of the bunion fixed with a distal medial and proximal lateral 4.0 cannulated screw, augmented with a dorsal locking plate with 3.0 cortical and locking screws.  Excellent alignment and fixation.  This was flushed with normal saline. Deep tissue repaired with 2-0 Vicryl and skin with nonabsorbable suture, placed in sterile dressing and a posterior sugar tong splint.  She will be nonweightbearing and follow up as scheduled.    Radu Ruvalcaba DPM        D: 2022   T: 2022   MT: [id number]    Name:     ROSALBA MCKEON  MRN:      [id number]        Account:        [id number]   :      1955           Procedure Date: 2022     Document: [id number]

## 2022-05-25 DIAGNOSIS — S82.892A ANKLE FRACTURE, LEFT, CLOSED, INITIAL ENCOUNTER: Primary | ICD-10-CM

## 2022-05-26 ENCOUNTER — OFFICE VISIT (OUTPATIENT)
Dept: ORTHOPEDICS | Facility: OTHER | Age: 67
End: 2022-05-26
Attending: PODIATRIST
Payer: MEDICARE

## 2022-05-26 ENCOUNTER — HOSPITAL ENCOUNTER (OUTPATIENT)
Dept: GENERAL RADIOLOGY | Facility: OTHER | Age: 67
Discharge: HOME | End: 2022-05-26
Attending: PODIATRIST
Payer: MEDICARE

## 2022-05-26 DIAGNOSIS — S82.892A ANKLE FRACTURE, LEFT, CLOSED, INITIAL ENCOUNTER: ICD-10-CM

## 2022-05-26 DIAGNOSIS — S82.892A ANKLE FRACTURE, LEFT, CLOSED, INITIAL ENCOUNTER: Primary | ICD-10-CM

## 2022-05-26 PROCEDURE — 73630 X-RAY EXAM OF FOOT: CPT | Mod: LT

## 2022-05-26 PROCEDURE — 73610 X-RAY EXAM OF ANKLE: CPT | Mod: LT

## 2022-05-26 PROCEDURE — 99024 POSTOP FOLLOW-UP VISIT: CPT | Performed by: PHYSICIAN ASSISTANT

## 2022-05-26 RX ORDER — OXYCODONE HYDROCHLORIDE 5 MG/1
5 TABLET ORAL EVERY 6 HOURS PRN
Qty: 12 TABLET | Refills: 0 | Status: SHIPPED | OUTPATIENT
Start: 2022-05-26 | End: 2022-07-05

## 2022-05-26 NOTE — NURSING NOTE
"Chief Complaint   Patient presents with     RECHECK     Left ankle fracture      Patient is here today for recheck following a left ankle orif.     Lety Vinson LPN on 5/26/2022 at 12:51 PM     Initial LMP  (LMP Unknown)  Estimated body mass index is 25.75 kg/m  as calculated from the following:    Height as of 5/19/22: 1.626 m (5' 4\").    Weight as of 5/19/22: 68 kg (150 lb).  Medication Reconciliation: complete    Lety Vinson LPN  "

## 2022-05-26 NOTE — PROGRESS NOTES
Visit Date: 2022    HISTORY:  Ms. Mckeon comes in today.  She is a followup of a left ankle fracture and left MTPJ fusion performed by Dr. Ruvalcaba on 2022.  The patient states that she is doing fairly well.  She has been nonweightbearing.  She has a postoperative splint, and she has been getting around with a wheelchair.  She does have some pain or discomfort in the morning and at night.  Otherwise, no other issues.  No acute problems at this time.    PHYSICAL EXAMINATION:  On exam today, the postoperative splint was removed.  The staples are intact from the ankle fracture, and the sutures are intact of the MTPJ fusion.  The sites are clean and dry.  No signs of infection, no drainage, no redness.  She denies any numbness or tingling.  Denies any calf pain.  She does have some moderate amount of swelling in her foot and ankle.     X-RAYS TAKEN TODAY:  Three views of the left ankle show well-fixed, well-maintained hardware in the fibula and medial malleolus in proper alignment, and the mortise is in normal alignment.  On the left MTPJ fusion, the plate is in good alignment.  There are no signs of loosening.    ASSESSMENT:  Status post open reduction and internal fixation of a trimalleolar ankle fracture and left first metatarsophalangeal joint fusion for bunion arthrosis, doing well.    PLAN:  At this time, we will leave the staples and the sutures in for another week, place her into a nonweightbearing short-leg cast.  Instructions were given to elevate and ice.  A new prescription for oxycodone was sent to her pharmacy.  She will follow up in 1 week.  We will remove the cast and remove the staples and sutures at that time.    Radu Ruvalcaba DPM    As Dictated by LYNDSAY PULLIAM        D: 2022   T: 2022   MT: [email or web address]    Name:     ROSALBA MCKEON  MRN:      [id number]        Account:    [id number]   :      1955           Visit Date: 2022     Document: [id number]

## 2022-05-26 NOTE — PROGRESS NOTES
Patient is here today for a recheck following orif left trimalleolar ankle fracture.       Lety Vinson LPN on 5/26/2022 at 12:15 PM

## 2022-05-26 NOTE — NURSING NOTE
"Chief Complaint   Patient presents with     RECHECK     Left ankle fracture      Follow up left trimalleolar ankle fracture.     Lety Vinson LPN on 5/26/2022 at 12:14 PM       Initial LMP  (LMP Unknown)  Estimated body mass index is 25.75 kg/m  as calculated from the following:    Height as of 5/19/22: 1.626 m (5' 4\").    Weight as of 5/19/22: 68 kg (150 lb).  Medication Reconciliation: complete    Lety Vinson LPN  "

## 2022-06-01 ENCOUNTER — TELEPHONE (OUTPATIENT)
Dept: FAMILY MEDICINE | Facility: OTHER | Age: 67
End: 2022-06-01
Payer: MEDICARE

## 2022-06-01 DIAGNOSIS — R30.0 DYSURIA: Primary | ICD-10-CM

## 2022-06-01 NOTE — TELEPHONE ENCOUNTER
Talked with patient and she has been having cloudy urine, dysuria and would like an order to leave a urine sample. She finished her antibiotic two weeks ago.  Kandice Suazo, FATMATAN, LPN  6/1/2022  4:00 PM

## 2022-06-01 NOTE — TELEPHONE ENCOUNTER
Urine culture from ER did not grow any bacteria; therefore not true UTI.  I would recommend being seen if she has any further concerns.  Ella Baptiste, DO

## 2022-06-01 NOTE — TELEPHONE ENCOUNTER
When patient was in the ED they found a UTI.  Patient thinking that it is still lingering. And would like to leave a urine sample tomorrow.  Please call patient back    Thank you   Rachel Keith on 6/1/2022 at 2:41 PM

## 2022-06-02 ENCOUNTER — OFFICE VISIT (OUTPATIENT)
Dept: ORTHOPEDICS | Facility: OTHER | Age: 67
End: 2022-06-02
Attending: PODIATRIST
Payer: MEDICARE

## 2022-06-02 DIAGNOSIS — Z09 POSTOPERATIVE FOLLOW-UP: Primary | ICD-10-CM

## 2022-06-02 PROCEDURE — 99024 POSTOP FOLLOW-UP VISIT: CPT | Performed by: PODIATRIST

## 2022-06-02 NOTE — TELEPHONE ENCOUNTER
Patient notified and transferred to the appointment line.  Kandice Suazo LPN, LPN  6/2/2022  8:08 AM

## 2022-06-02 NOTE — PROGRESS NOTES
Visit Date: 2022    HISTORY:  Mita is here postoperatively for quick wound check.  She recalls she had a trimalleolar fracture we fixed and did not feel comfortable removing the sutures last week, so here for a quick wound check and progression out of the cast and staple removal.    PHYSICAL EXAMINATION:  Surgical site well coapted.  Staples removed.  Steri-Strips applied.  CNS is intact.  Minimal swelling.  No concerns here clinically.    ASSESSMENT:  Status post ankle fracture open reduction and internal fixation.    PLAN:  I discussed progression of treatment.  She will continue to be very minimally weightbearing, can weightbear short distances around the house to get from couch to bathroom in her boot only.  Otherwise, she should stay in a wheelchair, come out for range of motion exercises, which we discussed, and she should do this routinely.  I will see her back in a month, repeat x-rays and progress into therapy and weightbearing at that time if doing well.    Radu Ruvalcaba DPM        D: 2022   T: 2022   MT: LIZZETTE    Name:     MITA MCKEON KIEL  MRN:      [id number]        Account:    [id number]   :      1955           Visit Date: 2022     Document: [id number]

## 2022-06-15 DIAGNOSIS — M62.830 SPASM OF BACK MUSCLES: ICD-10-CM

## 2022-06-16 RX ORDER — METHOCARBAMOL 500 MG/1
TABLET, FILM COATED ORAL
Qty: 120 TABLET | Refills: 5 | Status: SHIPPED | OUTPATIENT
Start: 2022-06-16 | End: 2022-12-29

## 2022-06-16 NOTE — TELEPHONE ENCOUNTER
[facility] sent Rx request for the following:   methocarbamol (ROBAXIN) 500 MG tablet   Sig: TAKE 1 TO 2 TABLETS BY MOUTH FOUR TIMES DAILY    Last Prescription Date:   12/16/2021  Last Fill Qty/Refills:         120, R-5    Last Office Visit:              05/16/2022 ([place])   Future Office visit:           07/05/2022 ([place])    Routing refill request to provider for review/approval because:  Drug not on the [facility], [facility] or [facility] refill protocol or controlled substance    Unable to complete prescription refill per RN Medication Refill Policy.................... Digna Swanson RN ....................  6/16/2022   10:33 AM

## 2022-06-23 DIAGNOSIS — Z09 POSTOPERATIVE FOLLOW-UP: Primary | ICD-10-CM

## 2022-06-29 ENCOUNTER — TELEPHONE (OUTPATIENT)
Dept: FAMILY MEDICINE | Facility: OTHER | Age: 67
End: 2022-06-29

## 2022-06-29 DIAGNOSIS — G47.00 INSOMNIA, UNSPECIFIED TYPE: ICD-10-CM

## 2022-06-29 RX ORDER — ZOLPIDEM TARTRATE 6.25 MG/1
TABLET, FILM COATED, EXTENDED RELEASE ORAL
Qty: 30 TABLET | Refills: 5 | Status: SHIPPED | OUTPATIENT
Start: 2022-06-29 | End: 2022-12-27

## 2022-06-29 NOTE — TELEPHONE ENCOUNTER
[facility] sent Rx request for the following:   zolpidem ER (AMBIEN CR) 6.25 MG CR tablet  SigTAKE 1 TABLET BY MOUTH AT BEDTIME    Last Prescription Date:   12/21/2021  Last Fill Qty/Refills:         30, R-5    Last Office Visit:              05/16/2022 ([place])   Future Office visit:           07/05/2022 ([place])    Routing refill request to provider for review/approval because:  Drug not on the [facility], [facility] or [facility] refill protocol or controlled substance    Unable to complete prescription refill per RN Medication Refill Policy.................... Digna Swanson RN ....................  6/29/2022   12:18 PM

## 2022-06-29 NOTE — TELEPHONE ENCOUNTER
Message from pharmacy:  Drug not covered by patient plan. The preferred alternative is ZOLPIDEM tab mg. Please call/fax the pharmacy to change medication along with strength, directions, quantity, and refills.    Original prescription:  zolpidem ER (AMBIEN CR) 6.25 MG CR tablet 30 tablet 5 2022  No   Sig: TAKE 1 TABLET BY MOUTH AT BEDTIME     Called and spoke with Familia, pharmacist at [facility], after verifying Pt's last name and . He states a prior authorization is needed for the extended release version.    Routing to PCP for review. Kelle Bazan RN .............. 2022  2:22 PM

## 2022-06-30 ENCOUNTER — HOSPITAL ENCOUNTER (OUTPATIENT)
Dept: GENERAL RADIOLOGY | Facility: OTHER | Age: 67
Discharge: HOME | End: 2022-06-30
Attending: PODIATRIST
Payer: MEDICARE

## 2022-06-30 ENCOUNTER — OFFICE VISIT (OUTPATIENT)
Dept: ORTHOPEDICS | Facility: OTHER | Age: 67
End: 2022-06-30
Attending: PODIATRIST
Payer: MEDICARE

## 2022-06-30 VITALS — RESPIRATION RATE: 16 BRPM | HEIGHT: 64 IN | BODY MASS INDEX: 25.75 KG/M2

## 2022-06-30 DIAGNOSIS — Z09 POSTOPERATIVE FOLLOW-UP: ICD-10-CM

## 2022-06-30 DIAGNOSIS — M79.672 LEFT FOOT PAIN: ICD-10-CM

## 2022-06-30 DIAGNOSIS — S82.892A ANKLE FRACTURE, LEFT, CLOSED, INITIAL ENCOUNTER: Primary | ICD-10-CM

## 2022-06-30 PROCEDURE — G0463 HOSPITAL OUTPT CLINIC VISIT: HCPCS

## 2022-06-30 PROCEDURE — 73610 X-RAY EXAM OF ANKLE: CPT | Mod: LT

## 2022-06-30 PROCEDURE — 99024 POSTOP FOLLOW-UP VISIT: CPT | Performed by: SPECIALIST/TECHNOLOGIST

## 2022-06-30 PROCEDURE — 73630 X-RAY EXAM OF FOOT: CPT | Mod: LT

## 2022-06-30 ASSESSMENT — PAIN SCALES - GENERAL: PAINLEVEL: NO PAIN (0)

## 2022-06-30 NOTE — PROGRESS NOTES
SUBJECTIVE:  Mita returns for 6 week follow-up of left ankle ORIF and left first MPJ fusion.  Doing well. Has been WB short distances in her boot otherwise using a wheelchair. Pain is managed. She elevates occasionally and has not been icing. No acute concerns today.     ROS: Musculoskeletal and general review of systems are negative, per review of previous clinic questionnaire.  Denies SOB and calf pain.    EXAM:   PHYSICAL EXAMINATION:   CONSTITUTIONAL:  The patient is alert and oriented x 3, well appearing and in no apparent distress.  Affect is pleasant and answers questions appropriately.  VASCULAR:  Circulation is intact with palpable pedal pulses and adequate capillary fill time to all digits.  Hair growth is present and appropriate to mid foot and digits. Calf nontender.  NEUROLOGIC:  Light touch sensation is intact to digits.  There is a negative Tinel sign.    INTEGUMENT:  No abnormal dermatologic lesions are noted.  Skin has normal texture and turgor.  Incision CDI.   MUSCULOSKELETAL:  Swelling:minimal, wiggles toes and ankle.  Range of motion appropriate for this recovery time.       ASSESSMENT: s/p left ankle fracture ORIF and left first MPJ fusion on  with Dr. Ruvalcaba    PLAN OF CARE: Discussed progression of treatment. She was given PT orders today. She can WBAT in her boot. I did recommend she continue elevating and ice if needed for pain and swelling. Follow up 4 weeks. With xray of left ankle and foot WB.    Maddy Lane PA-C

## 2022-07-05 ENCOUNTER — OFFICE VISIT (OUTPATIENT)
Dept: FAMILY MEDICINE | Facility: OTHER | Age: 67
End: 2022-07-05
Attending: PHYSICIAN ASSISTANT
Payer: MEDICARE

## 2022-07-05 VITALS
WEIGHT: 140.4 LBS | HEART RATE: 88 BPM | RESPIRATION RATE: 14 BRPM | SYSTOLIC BLOOD PRESSURE: 138 MMHG | TEMPERATURE: 97.8 F | HEIGHT: 64 IN | OXYGEN SATURATION: 94 % | BODY MASS INDEX: 23.97 KG/M2 | DIASTOLIC BLOOD PRESSURE: 84 MMHG

## 2022-07-05 DIAGNOSIS — F41.3 OTHER MIXED ANXIETY DISORDERS: ICD-10-CM

## 2022-07-05 DIAGNOSIS — Z00.00 ENCOUNTER FOR MEDICARE ANNUAL WELLNESS EXAM: Primary | ICD-10-CM

## 2022-07-05 DIAGNOSIS — E03.9 HYPOTHYROIDISM, UNSPECIFIED TYPE: ICD-10-CM

## 2022-07-05 DIAGNOSIS — I10 PRIMARY HYPERTENSION: ICD-10-CM

## 2022-07-05 DIAGNOSIS — E78.2 MIXED HYPERLIPIDEMIA: ICD-10-CM

## 2022-07-05 DIAGNOSIS — F41.1 ANXIETY STATE: ICD-10-CM

## 2022-07-05 PROCEDURE — G0439 PPPS, SUBSEQ VISIT: HCPCS | Performed by: PHYSICIAN ASSISTANT

## 2022-07-05 RX ORDER — LORAZEPAM 0.5 MG/1
0.5 TABLET ORAL EVERY 8 HOURS PRN
Qty: 90 TABLET | Refills: 5 | Status: SHIPPED | OUTPATIENT
Start: 2022-07-05 | End: 2022-11-08

## 2022-07-05 RX ORDER — VENLAFAXINE HYDROCHLORIDE 150 MG/1
150 CAPSULE, EXTENDED RELEASE ORAL EVERY MORNING
Qty: 90 CAPSULE | Refills: 4 | Status: SHIPPED | OUTPATIENT
Start: 2022-07-05 | End: 2023-07-12

## 2022-07-05 ASSESSMENT — ENCOUNTER SYMPTOMS
COUGH: 0
PARESTHESIAS: 0
FREQUENCY: 0
FEVER: 0
BREAST MASS: 0
SHORTNESS OF BREATH: 0
PALPITATIONS: 0
EYE PAIN: 0
NAUSEA: 0
HEMATOCHEZIA: 0
JOINT SWELLING: 1
WEAKNESS: 0
SORE THROAT: 0
HEMATURIA: 0
ARTHRALGIAS: 1
CHILLS: 0
HEADACHES: 0
DIZZINESS: 0
CONSTIPATION: 1
DYSURIA: 0
HEARTBURN: 0
ABDOMINAL PAIN: 0
MYALGIAS: 1
NERVOUS/ANXIOUS: 0
DIARRHEA: 0

## 2022-07-05 ASSESSMENT — ACTIVITIES OF DAILY LIVING (ADL): CURRENT_FUNCTION: NO ASSISTANCE NEEDED

## 2022-07-05 ASSESSMENT — PAIN SCALES - GENERAL: PAINLEVEL: MILD PAIN (2)

## 2022-07-05 NOTE — NURSING NOTE
Patient presents to clinic for wellness visit.  Elena Ye LPN ....................  7/5/2022   10:18 AM

## 2022-07-05 NOTE — PROGRESS NOTES
"SUBJECTIVE:   Mita Gabriel is a 66 year old female who presents for Preventive Visit.    Review current opioid prescriptions    For a patient with a current opioid prescription:    Reviewed potential Opioid Use Disorder (OUD) risk factors: No not currently on opioid    Evaluate their pain severity and current treatment plan:     Provide information on non-opioid treatment options:    Refer to a specialist, as appropriate:    Get more information on pain management in the [facility] Pain Management Best Practices Inter-agency Task Force Report    Screen for potential Substance Use Disorders (SUDs)    Reviewed the patient s potential risk factors for SUDs: Not currently on opioid    Refer to treatment or specialist, as appropriate:     A screening tool isn t required but you may use one:  Find more information in the National [place] on Drug Abuse Screening and Assessment Tools Chart        Patient has been advised of split billing requirements and indicates understanding: No  Are you in the first 12 months of your Medicare coverage?  No    Healthy Habits:     In general, how would you rate your overall health?  Good    Frequency of exercise:  2-3 days/week    Duration of exercise:  30-45 minutes    Do you usually eat at least 4 servings of fruit and vegetables a day, include whole grains    & fiber and avoid regularly eating high fat or \"junk\" foods?  Yes    Taking medications regularly:  Yes    Medication side effects:  None    Ability to successfully perform activities of daily living:  No assistance needed    Home Safety:  No safety concerns identified    Hearing Impairment:  No hearing concerns    In the past 6 months, have you been bothered by leaking of urine? Yes    In general, how would you rate your overall mental or emotional health?  Good      PHQ-2 Total Score: 1    Additional concerns today:  No    Do you feel safe in your environment? Yes    Have you ever done Advance Care Planning? (For example, a " Health Directive, POLST, or a discussion with a medical provider or your loved ones about your wishes): Yes, advance care planning is on file.       Fall risk  Fallen 2 or more times in the past year?: No  Any fall with injury in the past year?: Yes    Cognitive Screening   1) Repeat 3 items (Leader, Season, Table)    2) Clock draw: NORMAL  3) 3 item recall: Recalls 3 objects  Results: 3 items recalled: COGNITIVE IMPAIRMENT LESS LIKELY    Mini-CogTM Copyright HERNANDEZ Eastman. Licensed by the author for use in [facility]; reprinted with permission (waldemar@[facility]). All rights reserved.      Do you have sleep apnea, excessive snoring or daytime drowsiness?: no    Reviewed and updated as needed this visit by clinical staff   Tobacco  Allergies  Meds  Problems  Med Hx  Surg Hx  Fam Hx  Soc   Hx          Reviewed and updated as needed this visit by Provider   Tobacco  Allergies  Meds  Problems  Med Hx  Surg Hx  Fam Hx           Social History     Tobacco Use     Smoking status: Never Smoker     Smokeless tobacco: Never Used   Substance Use Topics     Alcohol use: No     Alcohol/week: 0.0 standard drinks     If you drink alcohol do you typically have >3 drinks per day or >7 drinks per week? Not applicable    Alcohol Use 7/5/2022   Prescreen: >3 drinks/day or >7 drinks/week? No   Prescreen: >3 drinks/day or >7 drinks/week? -           PROBLEMS TO ADD ON...  Longstanding history of hypertension and hyperlipidemia. Doing well with medications. Most recent lab work completed 5/10/2022 in ED and were within normal limits other than lower magnesium. Continues on daily magnesium supplement. TSH within normal limits at that time. Doing well with levothyroxine. Is needing refills of venlafaxine and lorazepam for management of anxiety.  reviewed and appears appropriate.     Patient does note other concerns including constipation but reports she will schedule a follow up visit regarding this with her PCP as  today is her wellness exam.       Current providers sharing in care for this patient include:   Patient Care Team:  Ella Baptiste DO as PCP - General (Family Practice)  Ella Baptiste DO as Assigned PCP  Radu Ruvalcaba DPM as Assigned Musculoskeletal Provider    The following health maintenance items are reviewed in Epic and correct as of today:  Health Maintenance Due   Topic Date Due     COVID-19 Vaccine (4 - Booster for Moderna series) 02/26/2022     Labs reviewed in EPIC    Any new diagnosis of family breast, ovarian, or bowel cancer? No    FHS-7:   Breast CA Risk Assessment (FHS-7) 12/8/2021   Did any of your first-degree relatives have breast or ovarian cancer? No   Did any of your relatives have bilateral breast cancer? No   Did any man in your family have breast cancer? No   Did any woman in your family have breast and ovarian cancer? No   Did any woman in your family have breast cancer before age 50 y? No   Do you have 2 or more relatives with breast and/or ovarian cancer? No   Do you have 2 or more relatives with breast and/or bowel cancer? No     Mammogram Screening: Recommended mammography every 1-2 years with patient discussion and risk factor consideration  Pertinent mammograms are reviewed under the imaging tab.    Review of Systems   Constitutional: Negative for chills and fever.   HENT: Positive for hearing loss. Negative for congestion, ear pain and sore throat.    Eyes: Negative for pain and visual disturbance.   Respiratory: Negative for cough and shortness of breath.    Cardiovascular: Positive for peripheral edema. Negative for chest pain and palpitations.   Gastrointestinal: Positive for constipation. Negative for abdominal pain, diarrhea, heartburn, hematochezia and nausea.   Breasts:  Negative for tenderness, breast mass and discharge.   Genitourinary: Positive for urgency. Negative for dysuria, frequency, genital sores, hematuria, pelvic pain, vaginal bleeding and vaginal discharge.  "  Musculoskeletal: Positive for arthralgias, joint swelling and myalgias.   Skin: Negative for rash.   Neurological: Negative for dizziness, weakness, headaches and paresthesias.   Psychiatric/Behavioral: Negative for mood changes. The patient is not nervous/anxious.      Constitutional, HEENT, cardiovascular, pulmonary, gi and gu systems are negative, except as otherwise noted.    OBJECTIVE:   /84   Pulse 88   Temp 97.8  F (36.6  C)   Resp 14   Ht 1.626 m (5' 4\")   Wt 63.7 kg (140 lb 6.4 oz)   LMP  (LMP Unknown)   SpO2 94%   Breastfeeding No   BMI 24.10 kg/m   Estimated body mass index is 24.1 kg/m  as calculated from the following:    Height as of this encounter: 1.626 m (5' 4\").    Weight as of this encounter: 63.7 kg (140 lb 6.4 oz).  Physical Exam  GENERAL: healthy, alert and no distress  EYES: Eyes grossly normal to inspection, PERRL and conjunctivae and sclerae normal  HENT: ear canals and TM's normal, nose and mouth without ulcers or lesions  NECK: no adenopathy, no asymmetry, masses, or scars and thyroid normal to palpation  RESP: lungs clear to auscultation - no rales, rhonchi or wheezes  CV: regular rate and rhythm, normal S1 S2, no S3 or S4, no murmur, click or rub, no peripheral edema and peripheral pulses strong  NEURO: Normal strength and tone, mentation intact and speech normal  PSYCH: mentation appears normal, affect normal/bright    Diagnostic Test Results:  Labs reviewed in Epic    ASSESSMENT / PLAN:       ICD-10-CM    1. Encounter for Medicare annual wellness exam  Z00.00    2. Anxiety state  F41.1 venlafaxine (EFFEXOR XR) 150 MG 24 hr capsule   3. Other mixed anxiety disorders  F41.3 LORazepam (ATIVAN) 0.5 MG tablet   4. Hypothyroidism, unspecified type  E03.9    5. Mixed hyperlipidemia  E78.2    6. Primary hypertension  I10      Refilled anxiety medications as above. Recent lab work from ED visit 5/10/2022 reviewed and stable other than hypomagnesemia. Continue with magnesium " "supplement. UTD on screenings. Patient will consider scheduling appointment for COVID booster, otherwise UTD on immunizations.       COUNSELING:  Reviewed preventive health counseling, as reflected in patient instructions    Estimated body mass index is 24.1 kg/m  as calculated from the following:    Height as of this encounter: 1.626 m (5' 4\").    Weight as of this encounter: 63.7 kg (140 lb 6.4 oz).        She reports that she has never smoked. She has never used smokeless tobacco.      Appropriate preventive services were discussed with this patient, including applicable screening as appropriate for cardiovascular disease, diabetes, osteopenia/osteoporosis, and glaucoma.  As appropriate for age/gender, discussed screening for colorectal cancer, prostate cancer, breast cancer, and cervical cancer. Checklist reviewing preventive services available has been given to the patient.    Reviewed patients plan of care and provided an AVS. The Basic Care Plan (routine screening as documented in Health Maintenance) for Mita meets the Care Plan requirement. This Care Plan has been established and reviewed with the Patient.    Counseling Resources:  ATP IV Guidelines  Pooled Cohorts Equation Calculator  Breast Cancer Risk Calculator  Breast Cancer: Medication to Reduce Risk  FRAX Risk Assessment  ICSI Preventive Guidelines  Dietary Guidelines for Americans, 2010  [employer]'s MyPlate  ASA Prophylaxis  Lung CA Screening    Joellen Romero PA-C  [facility]    Identified Health Risks:  "

## 2022-07-27 ENCOUNTER — HOSPITAL ENCOUNTER (OUTPATIENT)
Dept: PHYSICAL THERAPY | Facility: OTHER | Age: 67
Setting detail: THERAPIES SERIES
Discharge: HOME | End: 2022-07-27
Attending: SPECIALIST/TECHNOLOGIST
Payer: MEDICARE

## 2022-07-27 DIAGNOSIS — M79.672 LEFT FOOT PAIN: ICD-10-CM

## 2022-07-27 DIAGNOSIS — S82.892A ANKLE FRACTURE, LEFT, CLOSED, INITIAL ENCOUNTER: ICD-10-CM

## 2022-07-27 PROCEDURE — 97110 THERAPEUTIC EXERCISES: CPT | Mod: GP

## 2022-07-27 PROCEDURE — 97016 VASOPNEUMATIC DEVICE THERAPY: CPT | Mod: GP

## 2022-07-27 PROCEDURE — 97161 PT EVAL LOW COMPLEX 20 MIN: CPT | Mod: GP

## 2022-07-28 NOTE — PROGRESS NOTES
07/27/22 1500   General Information   Type of Visit Initial OP Ortho PT Evaluation   Start of Care Date 07/27/22   Referring Physician Dr. Ruvalcaba/ DIOMEDES Lane   Patient/Family Goals Statement pt would like to return to walking 1 hour a day in preperation for her return to [place].   Orders Evaluate and Treat   Date of Order 06/30/22   Certification Required? Yes   Medical Diagnosis L ankle fx, s/p MPJ fusion   Surgical/Medical history reviewed Yes   Precautions/Limitations other (see comments)   Special Instructions WBAT in boot   General Information Comments Pt is a 66 year old female referred to skilled PT services following a fusion of the MPG in her ankle after a fall on 5/9 on tile in her home. while pt was getting her ankle surgery she also got her big toe bunion repaired. pt reports recovery has been limiting but going well. not a lot of pain at this time but does have some concerns over swelling.   Presentation and Etiology   Pertinent history of current problem (include personal factors and/or comorbidities that impact the POC) PMH: broken bones, depression, heart disease, HTN   Impairments C. Swelling;D. Decreased ROM;E. Decreased flexibility;F. Decreased strength and endurance;G. Impaired balance;H. Impaired gait   Functional Limitations perform activities of daily living   Symptom Location L ankle   How/Where did it occur With a fall   Onset date of current episode/exacerbation 05/09/22   Chronicity New   Pain rating (0-10 point scale) Best (/10);Worst (/10)   Best (/10) 2/10   Worst (/10) 6/10   Pain quality B. Dull   Frequency of pain/symptoms C. With activity   Pain/symptoms are: The same all the time   Pain/symptoms exacerbated by A. Sitting;B. Walking;G. Certain positions;I. Bending;J. ADL;K. Home tasks   Pain/symptoms eased by C. Rest;E. Changing positions   Progression of symptoms since onset: Improved   Current / Previous Interventions   Diagnostic Tests: X-ray   X-ray Results  unremarkable  (healing well)   Prior Level of Function   Prior Level of Function-Mobility independent   Prior Level of Function-ADLs independent   Functional Level Prior Comment pt usually cares for home and yard in [place]. walks for an hour a day in [place]   Current Level of Function   Current Community Support Family/friend caregiver   Patient role/employment history F. Retired   Living environment House[facility]   Home/community accessibility pt lives in a single story home with a ramp. everything is on one level. [place] in [place]   Current equipment-ADL Shower/tub chair   Fall Risk Screen   Fall screen completed by PT   Have you fallen 2 or more times in the past year? No   Have you fallen and had an injury in the past year? Yes   Is patient a fall risk? Yes;Department fall risk interventions implemented   Fall screen comments pt is at a risk of falling due to walking boot   Abuse Screen (yes response referral indicated)   Feels Unsafe at Home or Work/School no   Feels Threatened by Someone no   Does Anyone Try to Keep You From Having Contact with Others or Doing Things Outside Your Home? no   Physical Signs of Abuse Present no   Patient needs abuse support services and resources No   Ankle/Foot Objective Findings   Side (if bilateral, select both right and left) Left   Observation increased edema and foot peeling   Integumentary incisions pink and healing well   Posture Protracted shoulders;Forward head position   Gait/Locomotion decreased stance time on the L, wearing a walking boot with increased lateral sway   Balance/ Proprioception (Single Leg Stance) unable to fully WB on the L LE   Ankle/Foot ROM Comment R ankle 50 deg plantar, 20 deg dorsi, 45 deg inversion, 30 deg eversion   Anterior Drawer Test negative   Posterior Drawer Test negative   Palpation high tissue tension in the L gastroc   Accessory Motion/Joint Mobility good midfoot and rearfoot motion, all ROM limitations coming from ankle   Left Gastroc  (in WB) Flexibility limited   Left Soleus (in WB) Flexibility limited   Left PF/Inversion Strength 3/5   Left PF/Eversion Strength 3/5   Left DF (Knee Ext) AROM 4 deg   Left PF AROM 38 deg   Left Calcanceal Inversion AROM 30 deg   Left Calcaneal Eversion AROM 10 deg   Left Great Toe Flexion AROM fused   Left DF/Inversion Strength 3-/5   Left DF/Eversion Strength 3-/5   Planned Therapy Interventions   Planned Therapy Interventions balance training;bed mobility training;gait training;joint mobilization;manual therapy;neuromuscular re-education;motor coordination training;ROM;strengthening;stretching   Planned Modality Interventions   Planned Modality Interventions Cryotherapy;Hot packs;TENS;Ultrasound;Electrical stimulation   Planned Modality Interventions Comments vasopneumatic compression   Clinical Impression   Criteria for Skilled Therapeutic Interventions Met yes, treatment indicated   PT Diagnosis L ankle instability s/p fusion   Influenced by the following impairments weakness, decreased motor control, fatigue, pain, decreased ROM   Functional limitations due to impairments walking, transfers, stairs, lifting, sleeping   Clinical Presentation Stable/Uncomplicated   Clinical Presentation Rationale symptoms consistent with ankle fusion following fx.   Clinical Decision Making (Complexity) Low complexity   Therapy Frequency 2 times/Week   Predicted Duration of Therapy Intervention (days/wks) 10 weeks   Risk & Benefits of therapy have been explained Yes   Patient, Family & other staff in agreement with plan of care Yes   Clinical Impression Comments Pt is a 66 year old female referred to skilled PT services following a L ankle fx after a fall in her kitchen in may, pt had great toe bunion repair and fusion of the MPG in her ankle following. Pt presents to PT services in walking boot with decreased ROM, pain, and weakness impacting her ability to walk and complete transfers. Pt can benefit from skilled PT services  to address these deficits and return to normal walking function.   Education Assessment   Preferred Learning Style Listening;Reading   Barriers to Learning No barriers   ORTHO GOALS   PT Ortho Eval Goals 1;2;3   Ortho Goal 1   Goal Identifier pain   Goal Description Pt will report a 4/10 pain or less in L ankle with gait without walking boot on in order to progress tolerance to outdoor mobility.   Target Date 09/08/22   Ortho Goal 2   Goal Identifier ROM   Goal Description Pt will present with 15 deg PROM L ankle dorsiflexion in order to improve ROM for gait and stairs.   Target Date 09/22/22   Ortho Goal 3   Goal Identifier Gait   Goal Description Pt will ambulate for 200 feet with no walking boot with step through pattern and B foot clearance to progress to normalized walking pattern   Target Date 10/06/22   Total Evaluation Time   PT Florencia, Low Complexity Minutes (86371) 20   Therapy Certification   Certification date from 07/27/22   Certification date to 10/05/22   Medical Diagnosis L ankle fx, s/p MPJ fusion

## 2022-07-28 NOTE — PROGRESS NOTES
[facility]    OUTPATIENT PHYSICAL THERAPY ORTHOPEDIC EVALUATION  PLAN OF TREATMENT FOR OUTPATIENT REHABILITATION  (COMPLETE FOR INITIAL CLAIMS ONLY)  Patient's Last Name, First Name, M.I.  YOB: 1955  Mita Gabriel    Provider s Name:  [facility]   Medical Record No.  [id number]   Start of Care Date:  07/27/22   Onset Date:  05/09/22   Type:     _X__PT   ___OT   ___SLP Medical Diagnosis:  L ankle fx, s/p MPJ fusion     PT Diagnosis:  L ankle instability s/p fusion   Visits from SOC:  1      _________________________________________________________________________________  Plan of Treatment/Functional Goals:  balance training, bed mobility training, gait training, joint mobilization, manual therapy, neuromuscular re-education, motor coordination training, ROM, strengthening, stretching     Cryotherapy, Hot packs, TENS, Ultrasound, Electrical stimulation  vasopneumatic compression    Goals  Goal Identifier: pain  Goal Description: Pt will report a 4/10 pain or less in L ankle with gait without walking boot on in order to progress tolerance to outdoor mobility.  Target Date: 09/08/22    Goal Identifier: ROM  Goal Description: Pt will present with 15 deg PROM L ankle dorsiflexion in order to improve ROM for gait and stairs.  Target Date: 09/22/22    Goal Identifier: Gait  Goal Description: Pt will ambulate for 200 feet with no walking boot with step through pattern and B foot clearance to progress to normalized walking pattern  Target Date: 10/06/22      Therapy Frequency:  2 times/Week  Predicted Duration of Therapy Intervention:  10 weeks    Saadia Moe, PT                 I CERTIFY THE NEED FOR THESE SERVICES FURNISHED UNDER        THIS PLAN OF TREATMENT AND WHILE UNDER MY CARE     (Physician co-signature of this document indicates review and certification of  the therapy plan).                     Certification Date From:  07/27/22   Certification Date To:  10/05/22    Referring Provider:  Dr. Ruvalcaba/ DIOMEDES Lane    Initial Assessment        See Epic Evaluation Start of Care Date: 07/27/22

## 2022-08-04 ENCOUNTER — HOSPITAL ENCOUNTER (OUTPATIENT)
Dept: PHYSICAL THERAPY | Facility: OTHER | Age: 67
Setting detail: THERAPIES SERIES
Discharge: HOME | End: 2022-08-04
Attending: SPECIALIST/TECHNOLOGIST
Payer: MEDICARE

## 2022-08-04 PROCEDURE — 97035 APP MDLTY 1+ULTRASOUND EA 15: CPT | Mod: GP

## 2022-08-04 PROCEDURE — 97110 THERAPEUTIC EXERCISES: CPT | Mod: GP

## 2022-08-08 ENCOUNTER — HOSPITAL ENCOUNTER (OUTPATIENT)
Dept: PHYSICAL THERAPY | Facility: OTHER | Age: 67
Setting detail: THERAPIES SERIES
Discharge: HOME | End: 2022-08-08
Attending: SPECIALIST/TECHNOLOGIST
Payer: MEDICARE

## 2022-08-08 PROCEDURE — 97110 THERAPEUTIC EXERCISES: CPT | Mod: GP

## 2022-08-08 PROCEDURE — 97016 VASOPNEUMATIC DEVICE THERAPY: CPT | Mod: GP

## 2022-08-09 DIAGNOSIS — M79.672 LEFT FOOT PAIN: Primary | ICD-10-CM

## 2022-08-09 DIAGNOSIS — Z98.890 S/P ORIF (OPEN REDUCTION INTERNAL FIXATION) FRACTURE: ICD-10-CM

## 2022-08-09 DIAGNOSIS — Z87.81 S/P ORIF (OPEN REDUCTION INTERNAL FIXATION) FRACTURE: ICD-10-CM

## 2022-08-09 DIAGNOSIS — Z09 POSTOPERATIVE FOLLOW-UP: ICD-10-CM

## 2022-08-11 ENCOUNTER — HOSPITAL ENCOUNTER (OUTPATIENT)
Dept: PHYSICAL THERAPY | Facility: OTHER | Age: 67
Setting detail: THERAPIES SERIES
Discharge: HOME | End: 2022-08-11
Attending: SPECIALIST/TECHNOLOGIST
Payer: MEDICARE

## 2022-08-11 ENCOUNTER — HOSPITAL ENCOUNTER (OUTPATIENT)
Dept: GENERAL RADIOLOGY | Facility: OTHER | Age: 67
Discharge: HOME | End: 2022-08-11
Attending: PODIATRIST
Payer: MEDICARE

## 2022-08-11 ENCOUNTER — OFFICE VISIT (OUTPATIENT)
Dept: ORTHOPEDICS | Facility: OTHER | Age: 67
End: 2022-08-11
Attending: PODIATRIST
Payer: MEDICARE

## 2022-08-11 DIAGNOSIS — Z87.81 S/P ORIF (OPEN REDUCTION INTERNAL FIXATION) FRACTURE: ICD-10-CM

## 2022-08-11 DIAGNOSIS — Z98.890 S/P ORIF (OPEN REDUCTION INTERNAL FIXATION) FRACTURE: ICD-10-CM

## 2022-08-11 DIAGNOSIS — M79.672 LEFT FOOT PAIN: ICD-10-CM

## 2022-08-11 DIAGNOSIS — Z98.890 S/P ORIF (OPEN REDUCTION INTERNAL FIXATION) FRACTURE: Primary | ICD-10-CM

## 2022-08-11 DIAGNOSIS — Z09 POSTOPERATIVE FOLLOW-UP: ICD-10-CM

## 2022-08-11 DIAGNOSIS — Z87.81 S/P ORIF (OPEN REDUCTION INTERNAL FIXATION) FRACTURE: Primary | ICD-10-CM

## 2022-08-11 PROCEDURE — 99024 POSTOP FOLLOW-UP VISIT: CPT | Performed by: SPECIALIST/TECHNOLOGIST

## 2022-08-11 PROCEDURE — G0463 HOSPITAL OUTPT CLINIC VISIT: HCPCS

## 2022-08-11 PROCEDURE — 97110 THERAPEUTIC EXERCISES: CPT | Mod: GP,CQ

## 2022-08-11 PROCEDURE — 73610 X-RAY EXAM OF ANKLE: CPT | Mod: LT

## 2022-08-11 PROCEDURE — 73630 X-RAY EXAM OF FOOT: CPT | Mod: LT

## 2022-08-11 NOTE — PROGRESS NOTES
Patient is here for follow up on her left ankle ORIF and foot fusion.  Purnima Hernandez LPN .....................8/11/2022 8:48 AM

## 2022-08-11 NOTE — NURSING NOTE
"Chief Complaint   Patient presents with     RECHECK     Lt ankle and foot     Left foot and ankle recheck     Initial LMP  (LMP Unknown)  Estimated body mass index is 24.1 kg/m  as calculated from the following:    Height as of 7/5/22: 1.626 m (5' 4\").    Weight as of 7/5/22: 63.7 kg (140 lb 6.4 oz).  Medication Reconciliation: complete    Lety Vinson LPN  "

## 2022-08-11 NOTE — PROGRESS NOTES
SUBJECTIVE:  Mita returns for 12 week follow-up of left ankle ORIF and first MPJ fusion.  Overall doing well. She did struggle to get into PT as they were quite busy but has gone a few times now. She continues to WBAT in a boot.  No acute concerns today.     ROS: Musculoskeletal and general review of systems are negative, per review of previous clinic questionnaire.  Denies SOB and calf pain.    EXAM:   PHYSICAL EXAMINATION:   CONSTITUTIONAL:  The patient is alert and oriented x 3, well appearing and in no apparent distress.  Affect is pleasant and answers questions appropriately.  VASCULAR:  Circulation is intact with palpable pedal pulses and adequate capillary fill time to all digits.  Hair growth is present and appropriate to mid foot and digits. Calf nontender.  NEUROLOGIC:  Light touch sensation is intact to digits.  There is a negative Tinel sign.    INTEGUMENT:  No abnormal dermatologic lesions are noted.  Skin has normal texture and turgor.  Incision well healed.   MUSCULOSKELETAL:  Swelling:minimal  Range of motion appropriate for this recovery time.     IMAGING: three views left ankle and left foot demonstrate good healing through fusion and fracture sites, no signs of hardware loosening    ASSESSMENT: s/p left ankle ORIF and first MPJ fusion on 5/19/22 with Dr. Ruvalcaba    PLAN OF CARE: Discussed progression of treatment. Continue to progress in therapy. She was given a lace up ankle brace and can progress out of boot into brace with supportive shoe.  Follow up PRN. She would like to have her right bunion corrected in the future and will schedule to see Dr. Ruvalcaba with xray of right foot when she feels she is ready for that.     Maddy Lane PA-C

## 2022-08-15 ENCOUNTER — HOSPITAL ENCOUNTER (OUTPATIENT)
Dept: PHYSICAL THERAPY | Facility: OTHER | Age: 67
Setting detail: THERAPIES SERIES
Discharge: HOME | End: 2022-08-15
Attending: SPECIALIST/TECHNOLOGIST
Payer: MEDICARE

## 2022-08-15 PROCEDURE — 97110 THERAPEUTIC EXERCISES: CPT | Mod: GP

## 2022-08-15 PROCEDURE — 97016 VASOPNEUMATIC DEVICE THERAPY: CPT | Mod: GP

## 2022-09-03 DIAGNOSIS — E78.2 MIXED HYPERLIPIDEMIA: ICD-10-CM

## 2022-09-03 DIAGNOSIS — F41.1 ANXIETY STATE: ICD-10-CM

## 2022-09-03 DIAGNOSIS — M54.42 BILATERAL LOW BACK PAIN WITH LEFT-SIDED SCIATICA, UNSPECIFIED CHRONICITY: ICD-10-CM

## 2022-09-03 DIAGNOSIS — E06.3 HYPOTHYROIDISM DUE TO HASHIMOTO'S THYROIDITIS: ICD-10-CM

## 2022-09-07 DIAGNOSIS — I10 ESSENTIAL HYPERTENSION: ICD-10-CM

## 2022-09-07 RX ORDER — GABAPENTIN 300 MG/1
CAPSULE ORAL
Qty: 270 CAPSULE | Refills: 2 | Status: SHIPPED | OUTPATIENT
Start: 2022-09-07 | End: 2023-03-15

## 2022-09-07 RX ORDER — ATORVASTATIN CALCIUM 20 MG/1
TABLET, FILM COATED ORAL
Qty: 90 TABLET | Refills: 2 | Status: SHIPPED | OUTPATIENT
Start: 2022-09-07 | End: 2023-06-15

## 2022-09-07 RX ORDER — TRAZODONE HYDROCHLORIDE 150 MG/1
TABLET ORAL
Qty: 90 TABLET | Refills: 2 | Status: SHIPPED | OUTPATIENT
Start: 2022-09-07 | End: 2023-06-15

## 2022-09-07 RX ORDER — LEVOTHYROXINE SODIUM 50 UG/1
TABLET ORAL
Qty: 90 TABLET | Refills: 2 | Status: SHIPPED | OUTPATIENT
Start: 2022-09-07 | End: 2023-03-15

## 2022-09-07 NOTE — TELEPHONE ENCOUNTER
[facility] sent Rx request for the following:      Requested Prescriptions   Pending Prescriptions Disp Refills     atorvastatin (LIPITOR) 20 MG tablet [Pharmacy Med Name: ATORVASTATIN 20MG TABLETS] 90 tablet 4     Sig: TAKE 1 TABLET(20 MG) BY MOUTH DAILY   Last Prescription Date:   6/29/21  Last Fill Qty/Refills:         90, R-4      Statins Protocol Failed - 9/7/2022  1:02 PM        Failed - LDL on file in past 12 months     Recent Labs   Lab Test 06/29/21  1442   LDL 55           gabapentin (NEURONTIN) 300 MG capsule [Pharmacy Med Name: GABAPENTIN 300MG CAPSULES] 270 capsule 4     Sig: TAKE 1 CAPSULE BY MOUTH THREE TIMES DAILY   Last Prescription Date:   6/29/21  Last Fill Qty/Refills:         270, R-4         traZODone (DESYREL) 150 MG tablet [Pharmacy Med Name: TRAZODONE 150MG (HUNDRED-FIFTY) TAB] 90 tablet 4     Sig: TAKE 1 TABLET(150 MG) BY MOUTH AT BEDTIME   Last Prescription Date:   6/29/21  Last Fill Qty/Refills:         90, R-4         levothyroxine (SYNTHROID/LEVOTHROID) 50 MCG tablet [Pharmacy Med Name: LEVOTHYROXINE 0.05MG (50MCG) TAB] 90 tablet 4     Sig: TAKE 1 TABLET(50 MCG) BY MOUTH EVERY MORNING BEFORE BREAKFAST   Last Prescription Date:   6/29/21  Last Fill Qty/Refills:         90, R-4      Last Office Visit:              7/5/22   Future Office visit:           None    Kelle Bazan RN .............. 9/7/2022  1:04 PM

## 2022-09-08 DIAGNOSIS — I10 ESSENTIAL HYPERTENSION: ICD-10-CM

## 2022-09-08 RX ORDER — LISINOPRIL 5 MG/1
TABLET ORAL
Qty: 90 TABLET | Refills: 4 | OUTPATIENT
Start: 2022-09-08

## 2022-09-08 NOTE — TELEPHONE ENCOUNTER
[facility] sent Rx request for the following:      Requested Prescriptions   Pending Prescriptions Disp Refills     lisinopril (ZESTRIL) 5 MG tablet [Pharmacy Med Name: LISINOPRIL 5MG TABLETS] 90 tablet 4     Sig: TAKE 1 TABLET(5 MG) BY MOUTH DAILY   Last Prescription Date:   6/29/21  Last Fill Qty/Refills:         90, R-4    Last Office Visit:              7/5/22   Future Office visit:           None    Kelle Bazan RN .............. 9/8/2022  4:34 PM

## 2022-09-09 DIAGNOSIS — I10 ESSENTIAL HYPERTENSION: ICD-10-CM

## 2022-09-09 RX ORDER — LISINOPRIL 5 MG/1
TABLET ORAL
Qty: 90 TABLET | Refills: 2 | Status: SHIPPED | OUTPATIENT
Start: 2022-09-09 | End: 2023-03-15

## 2022-09-09 RX ORDER — LISINOPRIL 5 MG/1
TABLET ORAL
Qty: 90 TABLET | Refills: 4 | OUTPATIENT
Start: 2022-09-09

## 2022-09-09 NOTE — TELEPHONE ENCOUNTER
Filled 09/09/22 #90 x 2. Pharmacy alerted. Unable to complete prescription refill per RNMedication Refill Policy.................... Karina Greer RN ....................  9/9/2022   1:44 PM      lisinopril (ZESTRIL) 5 MG tablet 90 tablet 2 9/9/2022  No   Sig: TAKE 1 TABLET(5 MG) BY MOUTH DAILY   Sent to pharmacy as: Lisinopril 5 MG Oral Tablet (ZESTRIL)   Class: E-Prescribe   Order: [id number]   E-Prescribing Status: Receipt confirmed by pharmacy (9/9/2022  7:16 AM CDT)       [id number] - [place], [place] - [address]

## 2022-09-12 NOTE — TELEPHONE ENCOUNTER
"     Disp Refills Start End NAOMI   metoprolol succinate ER (TOPROL-XL) 50 MG 24 hr tablet 90 tablet 4 6/29/2021  No   Sig - Route: Take 1 tablet (50 mg) by mouth daily - Oral   Sent to pharmacy as: Metoprolol Succinate ER 50 MG Oral Tablet Extended Release 24 Hour (TOPROL-XL)   Class: E-Prescribe     Last Office Visit: 07/05/2022- [facility]  Future Office visit:         Requested Prescriptions   Pending Prescriptions Disp Refills     metoprolol succinate ER (TOPROL XL) 50 MG 24 hr tablet [Pharmacy Med Name: METOPROLOL ER SUCCINATE 50MG TABS] 90 tablet 4     Sig: TAKE 1 TABLET(50 MG) BY MOUTH DAILY       Beta-Blockers Protocol Passed - 9/9/2022 12:18 PM        Passed - Blood pressure under 140/90 in past 12 months     BP Readings from Last 3 Encounters:   07/05/22 138/84   05/19/22 (!) 154/80   05/16/22 126/84                 Passed - Patient is age 6 or older        Passed - Recent (12 mo) or future (30 days) visit within the authorizing provider's specialty     Patient has had an office visit with the authorizing provider or a provider within the authorizing providers department within the previous 12 mos or has a future within next 30 days. See \"Patient Info\" tab in inbasket, or \"Choose Columns\" in Meds & Orders section of the refill encounter.              Passed - Medication is active on med list             Routing refill request to provider for review/approval.     Unable to complete prescription refill per RNMedication Refill Policy.................... Karina Greer RN ....................  9/12/2022   2:28 PM          "

## 2022-09-13 ENCOUNTER — DOCUMENTATION ONLY (OUTPATIENT)
Dept: ORTHOPEDICS | Facility: OTHER | Age: 67
End: 2022-09-13

## 2022-09-13 DIAGNOSIS — S82.892A CLOSED FRACTURE OF LEFT ANKLE, INITIAL ENCOUNTER: Primary | ICD-10-CM

## 2022-09-13 RX ORDER — METOPROLOL SUCCINATE 50 MG/1
TABLET, EXTENDED RELEASE ORAL
Qty: 90 TABLET | Refills: 2 | Status: SHIPPED | OUTPATIENT
Start: 2022-09-13 | End: 2023-06-16

## 2022-09-17 ENCOUNTER — HEALTH MAINTENANCE LETTER (OUTPATIENT)
Age: 67
End: 2022-09-17

## 2022-09-20 DIAGNOSIS — I10 ESSENTIAL HYPERTENSION: ICD-10-CM

## 2022-09-21 RX ORDER — HYDROCHLOROTHIAZIDE 25 MG/1
TABLET ORAL
Qty: 90 TABLET | Refills: 4 | Status: SHIPPED | OUTPATIENT
Start: 2022-09-21 | End: 2023-07-14

## 2022-09-21 NOTE — TELEPHONE ENCOUNTER
"  Last Prescription Date: 10/1/21  Last Qty/Refills: 90 / 4  Last Office Visit: 7/5/22  Future Office Visit: None     Requested Prescriptions   Pending Prescriptions Disp Refills     hydrochlorothiazide (HYDRODIURIL) 25 MG tablet [Pharmacy Med Name: HYDROCHLOROTHIAZIDE 25MG TABLETS] 90 tablet 4     Sig: TAKE 1 TABLET(25 MG) BY MOUTH DAILY       Diuretics (Including Combos) Protocol Passed - 9/20/2022  8:06 AM        Passed - Blood pressure under 140/90 in past 12 months     BP Readings from Last 3 Encounters:   07/05/22 138/84   05/19/22 (!) 154/80   05/16/22 126/84                 Passed - Recent (12 mo) or future (30 days) visit within the authorizing provider's specialty     Patient has had an office visit with the authorizing provider or a provider within the authorizing providers department within the previous 12 mos or has a future within next 30 days. See \"Patient Info\" tab in inbasket, or \"Choose Columns\" in Meds & Orders section of the refill encounter.              Passed - Medication is active on med list        Passed - Patient is age 18 or older        Passed - No active pregancy on record        Passed - Normal serum creatinine on file in past 12 months     Recent Labs   Lab Test 05/10/22  2025   CR 0.88              Passed - Normal serum potassium on file in past 12 months     Recent Labs   Lab Test 05/10/22  2025   POTASSIUM 3.7                    Passed - Normal serum sodium on file in past 12 months     Recent Labs   Lab Test 05/10/22  2025                 Passed - No positive pregnancy test in past 12 months             "

## 2022-10-11 NOTE — PROGRESS NOTES
[facility]    Outpatient Physical Therapy Discharge Note  Patient: Mita Gabriel  : 1955    Beginning/End Dates of Reporting Period:  22 to 8/15/22    Referring Provider: DIOMEDES Lane    Therapy Diagnosis: L ankle instability s/p fusion     Client Self Report: pt reports she is walking in her soft brace now, and is having a slight increase in pain. Pt did not return for further therapy sessions.     Goals:  Goal Identifier pain   Goal Description Pt will report a 4/10 pain or less in L ankle with gait without walking boot on in order to progress tolerance to outdoor mobility.   Target Date 22   Date Met      Progress (detail required for progress note):  Goals not assessed, pt did not return for further therapy sessions.      Goal Identifier ROM   Goal Description Pt will present with 15 deg PROM L ankle dorsiflexion in order to improve ROM for gait and stairs.   Target Date 22   Date Met      Progress (detail required for progress note):  Goals not assessed, pt did not return for further therapy sessions.      Goal Identifier Gait   Goal Description Pt will ambulate for 200 feet with no walking boot with step through pattern and B foot clearance to progress to normalized walking pattern   Target Date 10/06/22   Date Met   Goals not assessed, pt did not return for further therapy sessions.        Plan:  Discharge from therapy.    Discharge:    Reason for Discharge: Patient chooses to discontinue therapy.  Patient has failed to schedule further appointments.    Equipment Issued: HEP    Discharge Plan: Patient to continue home program.

## 2022-10-31 ENCOUNTER — OFFICE VISIT (OUTPATIENT)
Dept: FAMILY MEDICINE | Facility: OTHER | Age: 67
End: 2022-10-31
Attending: PHYSICIAN ASSISTANT
Payer: MEDICARE

## 2022-10-31 VITALS
BODY MASS INDEX: 22.61 KG/M2 | HEART RATE: 98 BPM | WEIGHT: 132.4 LBS | DIASTOLIC BLOOD PRESSURE: 84 MMHG | TEMPERATURE: 97.2 F | OXYGEN SATURATION: 96 % | RESPIRATION RATE: 12 BRPM | SYSTOLIC BLOOD PRESSURE: 136 MMHG | HEIGHT: 64 IN

## 2022-10-31 DIAGNOSIS — S09.93XA FACIAL INJURY, INITIAL ENCOUNTER: Primary | ICD-10-CM

## 2022-10-31 PROCEDURE — 99214 OFFICE O/P EST MOD 30 MIN: CPT | Performed by: PHYSICIAN ASSISTANT

## 2022-10-31 PROCEDURE — G0463 HOSPITAL OUTPT CLINIC VISIT: HCPCS

## 2022-10-31 RX ORDER — HYDROCODONE BITARTRATE AND ACETAMINOPHEN 5; 325 MG/1; MG/1
1 TABLET ORAL EVERY 6 HOURS PRN
Qty: 10 TABLET | Refills: 0 | Status: SHIPPED | OUTPATIENT
Start: 2022-10-31 | End: 2022-11-03

## 2022-10-31 ASSESSMENT — PATIENT HEALTH QUESTIONNAIRE - PHQ9
SUM OF ALL RESPONSES TO PHQ QUESTIONS 1-9: 16
SUM OF ALL RESPONSES TO PHQ QUESTIONS 1-9: 16
10. IF YOU CHECKED OFF ANY PROBLEMS, HOW DIFFICULT HAVE THESE PROBLEMS MADE IT FOR YOU TO DO YOUR WORK, TAKE CARE OF THINGS AT HOME, OR GET ALONG WITH OTHER PEOPLE: VERY DIFFICULT

## 2022-10-31 ASSESSMENT — PAIN SCALES - GENERAL: PAINLEVEL: SEVERE PAIN (7)

## 2022-10-31 NOTE — NURSING NOTE
Patient presents to clinic with concerns about a possible broken nose that happened on 10/20/2022 after falling to ground due to a muscle spasm in back.    Elena Ye LPN ....................  10/31/2022   2:37 PM

## 2022-10-31 NOTE — PROGRESS NOTES
Assessment & Plan     1. Facial injury, initial encounter  Patient with significant facial injury after a fall at home 11 days ago.  Exam today does show possible signs of fracture nasal bridge.  Discussed with patient that current symptoms, exam, timeframe since injury this is not likely an emergent case.  Discussed that we do not often reduce nasal bridge fractures.  Due to her significant swelling and tenderness throughout her nose as well as her forehead patient is requesting imaging, CT facial bones, order placed as below.  Discussed conservative management in the meantime with over-the-counter analgesics, icing, rest.  Due to significant continued pain despite optimal use of Tylenol and ibuprofen did give one-time short refill of Norco as patient has tolerated this well in the past for management of breakthrough pain.  Consider ENT follow-up depending on CT results.  - HYDROcodone-acetaminophen (NORCO) 5-325 MG tablet; Take 1 tablet by mouth every 6 hours as needed for severe pain  Dispense: 10 tablet; Refill: 0  - CT Facial Bones without Contrast; Future      Return if symptoms worsen or fail to improve.    Joellen Romero PA-C  [facility]    Subjective   Mita is a 67 year old accompanied by her spouse, presenting for the following health issues:  Nose Problem      History of Present Illness       Reason for visit:  Injury  Symptom onset:  1-2 weeks ago    She eats 2-3 servings of fruits and vegetables daily.She consumes 0 sweetened beverage(s) daily.She exercises with enough effort to increase her heart rate 20 to 29 minutes per day.  She exercises with enough effort to increase her heart rate 3 or less days per week.   She is taking medications regularly.    Today's PHQ-9         PHQ-9 Total Score: 16    PHQ-9 Q9 Thoughts of better off dead/self-harm past 2 weeks :   Not at all    How difficult have these problems made it for you to do your work, take care of things at home, or  get along with other people: Very difficult     Here for evaluation of a facial injury.  Patient reports that she had a back spasm on 10/20/2022 when she subsequently fell to the floor.  She hit her face on the ground and she was wearing glasses at the time.  Reports that there was initial swelling and bruising to her forehead, nose, bilateral eyes that she thought was improving as the swelling was improving.  More recently she notes a significant increase in the pain through her nasal bridge region is concerned that this could be fractured.  She has not been evaluated in the clinic since this injury.  Has been managing with Tylenol and ibuprofen, has not tried ice.  No associated bleeding or drainage, fever/chills, visual changes, numbness or tingling, significant headaches.  Patient is quite concerned that her injuries could be an emergent case and that she needs to have her nasal fracture reduced today.    PAST MEDICAL HISTORY:   Past Medical History:   Diagnosis Date     Anxiety disorder      Closed fracture of left carpal bone      Essential (primary) hypertension      Hyperlipidemia      Hypothyroidism      Major depressive disorder, single episode      Postoperative nausea and vomiting      Transfusion history     with delivery of son       PAST SURGICAL HISTORY:   Past Surgical History:   Procedure Laterality Date     CATARACT IOL, RT/LT Bilateral       SECTION       COLONOSCOPY N/A 2017    Follow up   hyperplastic     OPEN REDUCTION INTERNAL FIXATION ANKLE Left 2022    Procedure: Open Reduction Internal Fixation Fracture Ankle, 1st Metatarsal phalangeal joint fusion;  Surgeon: Radu Ruvalcaba DPM;  Location:  OR     OPEN REDUCTION INTERNAL FIXATION WRIST      treating a fracture     OPEN REDUCTION INTERNAL FIXATION WRIST Left 2021    Procedure: Wrist Open Reduction Internal Fixation;  Surgeon: Lobo Sky MD;  Location:  OR     THYROIDECTOMY       Partial        FAMILY HISTORY:   Family History   Problem Relation Age of Onset     Heart Disease Mother      Genitourinary Problems Father         Genitourinary Disease,kidney disease     Heart Disease Father         MI     Depression Sister      No Known Problems Sister      Depression Brother      Bipolar Disorder Brother      No Known Problems Brother         mild cognitive delay     Cancer Maternal Grandmother         Bladder     Other - See Comments Paternal Grandmother 70        Stroke     Cancer Maternal Grandfather         Throat and lung     Other - See Comments Other         Family history of depression and anxiety     Anesthesia Reaction No family hx of         Anesthesia Problem,Notes no prior complications from anesthesia.     Breast Cancer No family hx of         Cancer-breast       SOCIAL HISTORY:   Social History     Tobacco Use     Smoking status: Never     Smokeless tobacco: Never   Substance Use Topics     Alcohol use: No     Alcohol/week: 0.0 standard drinks      No Known Allergies  Current Outpatient Medications   Medication     aspirin 81 MG EC tablet     atorvastatin (LIPITOR) 20 MG tablet     folic acid (FOLVITE) 1 MG tablet     gabapentin (NEURONTIN) 300 MG capsule     hydrochlorothiazide (HYDRODIURIL) 25 MG tablet     HYDROcodone-acetaminophen (NORCO) 5-325 MG tablet     levothyroxine (SYNTHROID/LEVOTHROID) 50 MCG tablet     lisinopril (ZESTRIL) 5 MG tablet     LORazepam (ATIVAN) 0.5 MG tablet     magnesium 250 MG tablet     methocarbamol (ROBAXIN) 500 MG tablet     metoprolol succinate ER (TOPROL XL) 50 MG 24 hr tablet     Multiple Vitamins-Minerals (MULTIVITAMIN ADULT PO)     polyethylene glycol (MIRALAX) 17 GM/Dose powder     scopolamine (TRANSDERM) 1 MG/3DAYS 72 hr patch     thiamine 100 MG tablet     traZODone (DESYREL) 150 MG tablet     venlafaxine (EFFEXOR XR) 150 MG 24 hr capsule     zolpidem ER (AMBIEN CR) 6.25 MG CR tablet     No current facility-administered medications for this visit.  "        Review of Systems   Per HPI        Objective    /84   Pulse 98   Temp 97.2  F (36.2  C)   Resp 12   Ht 1.626 m (5' 4\")   Wt 60.1 kg (132 lb 6.4 oz)   LMP  (LMP Unknown)   SpO2 96%   Breastfeeding No   BMI 22.73 kg/m    Body mass index is 22.73 kg/m .  Physical Exam   General: Pleasant, in no apparent distress.  Eyes: Sclera are white and conjunctiva are clear bilaterally. Lacrimal apparatus free of erythema, edema, and discharge bilaterally. EOM intact bilaterally.   Musculoskeletal: Significant tenderness palpation throughout nasal bridge, significant swelling to left side.  Tenderness palpation over frontal sinus region.  Skin: Significant healing ecchymoses surrounding forehead, bilateral eyes and nasal bridge.  No erythema, bleeding or drainage.  Psych: Appropriate mood and affect.      "

## 2022-11-01 ENCOUNTER — APPOINTMENT (OUTPATIENT)
Dept: CT IMAGING | Facility: OTHER | Age: 67
End: 2022-11-01
Attending: STUDENT IN AN ORGANIZED HEALTH CARE EDUCATION/TRAINING PROGRAM
Payer: MEDICARE

## 2022-11-01 ENCOUNTER — APPOINTMENT (OUTPATIENT)
Dept: MRI IMAGING | Facility: OTHER | Age: 67
End: 2022-11-01
Attending: STUDENT IN AN ORGANIZED HEALTH CARE EDUCATION/TRAINING PROGRAM
Payer: MEDICARE

## 2022-11-01 ENCOUNTER — HOSPITAL ENCOUNTER (EMERGENCY)
Facility: OTHER | Age: 67
Discharge: HOME | End: 2022-11-01
Attending: STUDENT IN AN ORGANIZED HEALTH CARE EDUCATION/TRAINING PROGRAM | Admitting: STUDENT IN AN ORGANIZED HEALTH CARE EDUCATION/TRAINING PROGRAM
Payer: MEDICARE

## 2022-11-01 VITALS
TEMPERATURE: 98 F | HEART RATE: 88 BPM | DIASTOLIC BLOOD PRESSURE: 111 MMHG | HEIGHT: 63 IN | RESPIRATION RATE: 18 BRPM | OXYGEN SATURATION: 98 % | SYSTOLIC BLOOD PRESSURE: 161 MMHG | WEIGHT: 132.5 LBS | BODY MASS INDEX: 23.48 KG/M2

## 2022-11-01 DIAGNOSIS — R25.1 TREMOR: ICD-10-CM

## 2022-11-01 LAB
ALBUMIN SERPL-MCNC: 4.1 G/DL (ref 3.5–5.7)
ALP SERPL-CCNC: 98 U/L (ref 34–104)
ALT SERPL W P-5'-P-CCNC: 14 U/L (ref 7–52)
ANION GAP SERPL CALCULATED.3IONS-SCNC: 13 MMOL/L (ref 3–14)
AST SERPL W P-5'-P-CCNC: 14 U/L (ref 13–39)
BASOPHILS # BLD AUTO: 0 10E3/UL (ref 0–0.2)
BASOPHILS NFR BLD AUTO: 0 %
BILIRUB SERPL-MCNC: 0.8 MG/DL (ref 0.3–1)
BUN SERPL-MCNC: 15 MG/DL (ref 7–25)
CALCIUM SERPL-MCNC: 9.4 MG/DL (ref 8.6–10.3)
CHLORIDE BLD-SCNC: 94 MMOL/L (ref 98–107)
CO2 SERPL-SCNC: 24 MMOL/L (ref 21–31)
CREAT SERPL-MCNC: 0.69 MG/DL (ref 0.6–1.2)
EOSINOPHIL # BLD AUTO: 0 10E3/UL (ref 0–0.7)
EOSINOPHIL NFR BLD AUTO: 0 %
ERYTHROCYTE [DISTWIDTH] IN BLOOD BY AUTOMATED COUNT: 12.5 % (ref 10–15)
ETHANOL SERPL-MCNC: <0.01 G/DL
GFR SERPL CREATININE-BSD FRML MDRD: >90 ML/MIN/1.73M2
GLUCOSE BLD-MCNC: 111 MG/DL (ref 70–105)
HCT VFR BLD AUTO: 37.3 % (ref 35–47)
HGB BLD-MCNC: 12.9 G/DL (ref 11.7–15.7)
HOLD SPECIMEN: NORMAL
IMM GRANULOCYTES # BLD: 0 10E3/UL
IMM GRANULOCYTES NFR BLD: 1 %
LYMPHOCYTES # BLD AUTO: 0.8 10E3/UL (ref 0.8–5.3)
LYMPHOCYTES NFR BLD AUTO: 12 %
MAGNESIUM SERPL-MCNC: 1.5 MG/DL (ref 1.9–2.7)
MCH RBC QN AUTO: 28.7 PG (ref 26.5–33)
MCHC RBC AUTO-ENTMCNC: 34.6 G/DL (ref 31.5–36.5)
MCV RBC AUTO: 83 FL (ref 78–100)
MONOCYTES # BLD AUTO: 0.4 10E3/UL (ref 0–1.3)
MONOCYTES NFR BLD AUTO: 6 %
NEUTROPHILS # BLD AUTO: 5.3 10E3/UL (ref 1.6–8.3)
NEUTROPHILS NFR BLD AUTO: 81 %
NRBC # BLD AUTO: 0 10E3/UL
NRBC BLD AUTO-RTO: 0 /100
PLATELET # BLD AUTO: 293 10E3/UL (ref 150–450)
POTASSIUM BLD-SCNC: 3.3 MMOL/L (ref 3.5–5.1)
PROT SERPL-MCNC: 6.8 G/DL (ref 6.4–8.9)
RBC # BLD AUTO: 4.49 10E6/UL (ref 3.8–5.2)
SODIUM SERPL-SCNC: 131 MMOL/L (ref 134–144)
TSH SERPL DL<=0.005 MIU/L-ACNC: 0.57 MU/L (ref 0.4–4)
WBC # BLD AUTO: 6.5 10E3/UL (ref 4–11)

## 2022-11-01 PROCEDURE — 250N000011 HC RX IP 250 OP 636: Performed by: STUDENT IN AN ORGANIZED HEALTH CARE EDUCATION/TRAINING PROGRAM

## 2022-11-01 PROCEDURE — 84443 ASSAY THYROID STIM HORMONE: CPT | Performed by: STUDENT IN AN ORGANIZED HEALTH CARE EDUCATION/TRAINING PROGRAM

## 2022-11-01 PROCEDURE — G1010 CDSM STANSON: HCPCS

## 2022-11-01 PROCEDURE — 80053 COMPREHEN METABOLIC PANEL: CPT | Performed by: STUDENT IN AN ORGANIZED HEALTH CARE EDUCATION/TRAINING PROGRAM

## 2022-11-01 PROCEDURE — 96360 HYDRATION IV INFUSION INIT: CPT | Mod: XU | Performed by: STUDENT IN AN ORGANIZED HEALTH CARE EDUCATION/TRAINING PROGRAM

## 2022-11-01 PROCEDURE — 99285 EMERGENCY DEPT VISIT HI MDM: CPT | Mod: 25 | Performed by: STUDENT IN AN ORGANIZED HEALTH CARE EDUCATION/TRAINING PROGRAM

## 2022-11-01 PROCEDURE — 82077 ASSAY SPEC XCP UR&BREATH IA: CPT | Performed by: STUDENT IN AN ORGANIZED HEALTH CARE EDUCATION/TRAINING PROGRAM

## 2022-11-01 PROCEDURE — 96361 HYDRATE IV INFUSION ADD-ON: CPT | Performed by: STUDENT IN AN ORGANIZED HEALTH CARE EDUCATION/TRAINING PROGRAM

## 2022-11-01 PROCEDURE — 99284 EMERGENCY DEPT VISIT MOD MDM: CPT | Performed by: STUDENT IN AN ORGANIZED HEALTH CARE EDUCATION/TRAINING PROGRAM

## 2022-11-01 PROCEDURE — 96374 THER/PROPH/DIAG INJ IV PUSH: CPT | Mod: XU | Performed by: STUDENT IN AN ORGANIZED HEALTH CARE EDUCATION/TRAINING PROGRAM

## 2022-11-01 PROCEDURE — A9575 INJ GADOTERATE MEGLUMI 0.1ML: HCPCS | Performed by: STUDENT IN AN ORGANIZED HEALTH CARE EDUCATION/TRAINING PROGRAM

## 2022-11-01 PROCEDURE — 255N000002 HC RX 255 OP 636: Performed by: STUDENT IN AN ORGANIZED HEALTH CARE EDUCATION/TRAINING PROGRAM

## 2022-11-01 PROCEDURE — 83735 ASSAY OF MAGNESIUM: CPT | Performed by: STUDENT IN AN ORGANIZED HEALTH CARE EDUCATION/TRAINING PROGRAM

## 2022-11-01 PROCEDURE — 85004 AUTOMATED DIFF WBC COUNT: CPT | Performed by: STUDENT IN AN ORGANIZED HEALTH CARE EDUCATION/TRAINING PROGRAM

## 2022-11-01 PROCEDURE — 36415 COLL VENOUS BLD VENIPUNCTURE: CPT | Performed by: STUDENT IN AN ORGANIZED HEALTH CARE EDUCATION/TRAINING PROGRAM

## 2022-11-01 PROCEDURE — 96375 TX/PRO/DX INJ NEW DRUG ADDON: CPT | Performed by: STUDENT IN AN ORGANIZED HEALTH CARE EDUCATION/TRAINING PROGRAM

## 2022-11-01 RX ORDER — LORAZEPAM 2 MG/ML
1 INJECTION INTRAMUSCULAR ONCE
Status: COMPLETED | OUTPATIENT
Start: 2022-11-01 | End: 2022-11-01

## 2022-11-01 RX ORDER — POTASSIUM CHLORIDE 7.45 MG/ML
10 INJECTION INTRAVENOUS ONCE
Status: COMPLETED | OUTPATIENT
Start: 2022-11-01 | End: 2022-11-01

## 2022-11-01 RX ORDER — SODIUM CHLORIDE 9 MG/ML
INJECTION, SOLUTION INTRAVENOUS CONTINUOUS
Status: DISCONTINUED | OUTPATIENT
Start: 2022-11-01 | End: 2022-11-01 | Stop reason: HOSPADM

## 2022-11-01 RX ORDER — LORAZEPAM 1 MG/1
1 TABLET ORAL
Status: DISCONTINUED | OUTPATIENT
Start: 2022-11-01 | End: 2022-11-01 | Stop reason: HOSPADM

## 2022-11-01 RX ORDER — GADOTERATE MEGLUMINE 376.9 MG/ML
15 INJECTION INTRAVENOUS ONCE
Status: COMPLETED | OUTPATIENT
Start: 2022-11-01 | End: 2022-11-01

## 2022-11-01 RX ORDER — MAGNESIUM SULFATE HEPTAHYDRATE 40 MG/ML
2 INJECTION, SOLUTION INTRAVENOUS ONCE
Status: COMPLETED | OUTPATIENT
Start: 2022-11-01 | End: 2022-11-01

## 2022-11-01 RX ADMIN — MAGNESIUM SULFATE HEPTAHYDRATE 2 G: 40 INJECTION, SOLUTION INTRAVENOUS at 13:45

## 2022-11-01 RX ADMIN — POTASSIUM CHLORIDE 10 MEQ: 7.46 INJECTION, SOLUTION INTRAVENOUS at 13:45

## 2022-11-01 RX ADMIN — GADOTERATE MEGLUMINE 11 ML: 376.9 INJECTION INTRAVENOUS at 16:29

## 2022-11-01 RX ADMIN — LORAZEPAM 1 MG: 2 INJECTION, SOLUTION INTRAMUSCULAR; INTRAVENOUS at 15:37

## 2022-11-01 ASSESSMENT — PATIENT HEALTH QUESTIONNAIRE - PHQ9
10. IF YOU CHECKED OFF ANY PROBLEMS, HOW DIFFICULT HAVE THESE PROBLEMS MADE IT FOR YOU TO DO YOUR WORK, TAKE CARE OF THINGS AT HOME, OR GET ALONG WITH OTHER PEOPLE: VERY DIFFICULT
SUM OF ALL RESPONSES TO PHQ QUESTIONS 1-9: 16

## 2022-11-01 ASSESSMENT — ACTIVITIES OF DAILY LIVING (ADL)
ADLS_ACUITY_SCORE: 35

## 2022-11-01 NOTE — ED PROVIDER NOTES
History     Chief Complaint   Patient presents with     Neurologic Problem     Fall       Mita Gabriel is a 67 year old female who presents with seizure-like activity in her arms.  Majority of history obtained by EMS as patient is mumbling and difficult to comprehend at most times.  EMS called for seizure-like activity in right upper extremity which later involved the left upper extremity.  Given Versed 5 mg x 2 with resolution of symptoms.  Recent history remarkable for fall with head trauma with bilateral infraorbital ecchymosis.  Chronic unchanged neck pain.  No no fever, chills, nausea, vomiting, numbness or weakness in arm or leg, cough, dyspnea, diarrhea, dysuria.  She does endorse weakness over the past 1 to 2 weeks.  Not on blood thinners.    No Known Allergies    Patient Active Problem List    Diagnosis Date Noted     Mixed hyperlipidemia 06/12/2018     Priority: Medium     Special screening for malignant neoplasms, colon 06/09/2017     Priority: Medium     H/O non-ST elevation myocardial infarction (NSTEMI) 01/20/2015     Priority: Medium     Senile cataract 07/24/2012     Priority: Medium     Insomnia 02/08/2011     Priority: Medium     Obesity 02/08/2011     Priority: Medium     Motion sickness 02/08/2011     Priority: Medium     Hypothyroidism 12/04/2009     Priority: Medium     Abdominal pain 01/21/2009     Priority: Medium     Osteoarthritis of spine 10/21/2008     Priority: Medium     Dysthymic disorder 10/21/2008     Priority: Medium     Hypertension 10/21/2008     Priority: Medium     Lumbago 10/21/2008     Priority: Medium     History of alcohol abuse 10/16/2008     Priority: Medium     Anxiety state 10/16/2008     Priority: Medium       Past Medical History:   Diagnosis Date     Anxiety disorder      Closed fracture of left carpal bone      Essential (primary) hypertension      Hyperlipidemia      Hypothyroidism      Major depressive disorder, single episode      Postoperative nausea and  vomiting      Transfusion history        Past Surgical History:   Procedure Laterality Date     CATARACT IOL, RT/LT Bilateral       SECTION       COLONOSCOPY N/A 2017    Follow up   hyperplastic     OPEN REDUCTION INTERNAL FIXATION ANKLE Left 2022    Procedure: Open Reduction Internal Fixation Fracture Ankle, 1st Metatarsal phalangeal joint fusion;  Surgeon: Radu Ruvalcaba DPM;  Location: [facility]     OPEN REDUCTION INTERNAL FIXATION WRIST      treating a fracture     OPEN REDUCTION INTERNAL FIXATION WRIST Left 2021    Procedure: Wrist Open Reduction Internal Fixation;  Surgeon: Lobo Sky MD;  Location: [facility] OR     THYROIDECTOMY       Partial       Family History   Problem Relation Age of Onset     Heart Disease Mother      Genitourinary Problems Father         Genitourinary Disease,kidney disease     Heart Disease Father         MI     Depression Sister      No Known Problems Sister      Depression Brother      Bipolar Disorder Brother      No Known Problems Brother         mild cognitive delay     Cancer Maternal Grandmother         Bladder     Other - See Comments Paternal Grandmother 70        Stroke     Cancer Maternal Grandfather         Throat and lung     Other - See Comments Other         Family history of depression and anxiety     Anesthesia Reaction No family hx of         Anesthesia Problem,Notes no prior complications from anesthesia.     Breast Cancer No family hx of         Cancer-breast       Social History     Tobacco Use     Smoking status: Never     Smokeless tobacco: Never   Vaping Use     Vaping Use: Never used   Substance Use Topics     Alcohol use: No     Alcohol/week: 0.0 standard drinks     Drug use: No       Medications:    aspirin 81 MG EC tablet  atorvastatin (LIPITOR) 20 MG tablet  folic acid (FOLVITE) 1 MG tablet  gabapentin (NEURONTIN) 300 MG capsule  hydrochlorothiazide (HYDRODIURIL) 25 MG tablet  HYDROcodone-acetaminophen (NORCO) 5-325 MG  "tablet  levothyroxine (SYNTHROID/LEVOTHROID) 50 MCG tablet  lisinopril (ZESTRIL) 5 MG tablet  LORazepam (ATIVAN) 0.5 MG tablet  magnesium 250 MG tablet  methocarbamol (ROBAXIN) 500 MG tablet  metoprolol succinate ER (TOPROL XL) 50 MG 24 hr tablet  Multiple Vitamins-Minerals (MULTIVITAMIN ADULT PO)  polyethylene glycol (MIRALAX) 17 GM/Dose powder  scopolamine (TRANSDERM) 1 MG/3DAYS 72 hr patch  thiamine 100 MG tablet  traZODone (DESYREL) 150 MG tablet  venlafaxine (EFFEXOR XR) 150 MG 24 hr capsule  zolpidem ER (AMBIEN CR) 6.25 MG CR tablet        Review of Systems: See HPI for pertinent negatives and positives. All other systems reviewed and found to be negative.    Physical Exam   BP (!) 161/111   Pulse 88   Temp 98  F (36.7  C) (Tympanic)   Resp 18   Ht 1.6 m (5' 3\")   Wt 60.1 kg (132 lb 7.9 oz)   LMP  (LMP Unknown)   SpO2 98%   BMI 23.47 kg/m       General: awake, comfortable  HEENT: atraumatic  Respiratory: normal effort, clear to auscultation bilaterally  Cardiovascular: regular rate and rhythm, no murmurs  Abdomen: soft, nondistended, nontender  Extremities: no deformities, edema, or tenderness  Skin: warm, dry, no rashes, bilateral infraorbital ecchymosis  Back: no spinal tenderness  Neuro: alert, incoherent speech, follows interaction appropriately, no focal deficits with no tremor, CN 2-12 intact, normal FTN, no pronator drift,  and pedal strength 5/5, sensation intact all extremities, no tremor  Psych: appropriate mood and affect    ED Course      ED Course as of 11/01/22 1947 Tue Nov 01, 2022   1312 Fell about 12 days ago. Patient in CT scanner wondering why cervical spine was not ordered. She reports chronic neck pain unchanged since fall with no focal spinal findings on exam. However since she is in CT scanner and concerned about neck will add on this scan.   1518 Reviewed reassuring evaluation with patient.  Requests admission request admission stating in the old days she would be admitted " for more comprehensive evaluation.  Plan to add MR scan along with orthostatics and 1 L bolus of fluids.  Still awaiting UA originally ordered.   1645 Negative MR brain.       Results for orders placed or performed during the hospital encounter of 11/01/22 (from the past 24 hour(s))   CBC with platelets differential    Narrative    The following orders were created for panel order CBC with platelets differential.  Procedure                               Abnormality         Status                     ---------                               -----------         ------                     CBC with platelets and d...[[id number]]                      Final result                 Please view results for these tests on the individual orders.   Comprehensive metabolic panel   Result Value Ref Range    Sodium 131 (L) 134 - 144 mmol/L    Potassium 3.3 (L) 3.5 - 5.1 mmol/L    Chloride 94 (L) 98 - 107 mmol/L    Carbon Dioxide (CO2) 24 21 - 31 mmol/L    Anion Gap 13 3 - 14 mmol/L    Urea Nitrogen 15 7 - 25 mg/dL    Creatinine 0.69 0.60 - 1.20 mg/dL    Calcium 9.4 8.6 - 10.3 mg/dL    Glucose 111 (H) 70 - 105 mg/dL    Alkaline Phosphatase 98 34 - 104 U/L    AST 14 13 - 39 U/L    ALT 14 7 - 52 U/L    Protein Total 6.8 6.4 - 8.9 g/dL    Albumin 4.1 3.5 - 5.7 g/dL    Bilirubin Total 0.8 0.3 - 1.0 mg/dL    GFR Estimate >90 >60 mL/min/1.73m2   Magnesium   Result Value Ref Range    Magnesium 1.5 (L) 1.9 - 2.7 mg/dL   TSH Reflex [facility]   Result Value Ref Range    TSH 0.57 0.40 - 4.00 mU/L   Ethanol [facility]   Result Value Ref Range    Alcohol ethyl <0.01 <=0.01 g/dL   CBC with platelets and differential   Result Value Ref Range    WBC Count 6.5 4.0 - 11.0 10e3/uL    RBC Count 4.49 3.80 - 5.20 10e6/uL    Hemoglobin 12.9 11.7 - 15.7 g/dL    Hematocrit 37.3 35.0 - 47.0 %    MCV 83 78 - 100 fL    MCH 28.7 26.5 - 33.0 pg    MCHC 34.6 31.5 - 36.5 g/dL    RDW 12.5 10.0 - 15.0 %    Platelet Count 293 150 - 450 10e3/uL    % Neutrophils 81 %    % Lymphocytes  12 %    % Monocytes 6 %    % Eosinophils 0 %    % Basophils 0 %    % Immature Granulocytes 1 %    NRBCs per 100 WBC 0 <1 /100    Absolute Neutrophils 5.3 1.6 - 8.3 10e3/uL    Absolute Lymphocytes 0.8 0.8 - 5.3 10e3/uL    Absolute Monocytes 0.4 0.0 - 1.3 10e3/uL    Absolute Eosinophils 0.0 0.0 - 0.7 10e3/uL    Absolute Basophils 0.0 0.0 - 0.2 10e3/uL    Absolute Immature Granulocytes 0.0 <=0.4 10e3/uL    Absolute NRBCs 0.0 10e3/uL   Extra Tube    Narrative    The following orders were created for panel order Extra Tube.  Procedure                               Abnormality         Status                     ---------                               -----------         ------                     Extra Blood Culture Bottle[[id number]]                       Final result               Extra Blue Top Tube[[id number]]                              Final result               Extra Serum Separator Tu...[[id number]]                      Final result               Extra Purple Top Tube[[id number]]                            Final result               Extra Green Top (Lithium...[[id number]]                      Final result                 Please view results for these tests on the individual orders.   Extra Serum Separator Tube (SST)   Result Value Ref Range    Hold Specimen JIC    Extra Blood Culture Bottle   Result Value Ref Range    Hold Specimen JIC    Extra Blue Top Tube   Result Value Ref Range    Hold Specimen JIC    Extra Purple Top Tube   Result Value Ref Range    Hold Specimen JIC    Extra Green Top (Lithium Heparin) ON ICE   Result Value Ref Range    Hold Specimen JIC    Extra Tube    Narrative    The following orders were created for panel order Extra Tube.  Procedure                               Abnormality         Status                     ---------                               -----------         ------                     Extra Blood Culture Bottle[[id number]]                       Final result                 Please  view results for these tests on the individual orders.   Extra Blood Culture Bottle   Result Value Ref Range    Hold Specimen JIC    CT Head w/o Contrast    Narrative    Exam: CT HEAD W/O CONTRAST      Exam reason: fall 2 weeks ago with b/l upper extremity seizure type  activity today    Technique:   Axial images of the head obtained without contrast. Coronal and  sagittal reformations were generated. This CT was performed using one  or more of the following dose reduction techniques: automated exposure  control, adjustment of the mA and/or kV according to patient size,  and/or use of iterative reconstruction technique.    Comparison: 5/10/2022, 5/2/2021       Findings:      Parenchyma: No evidence of intraparenchymal hemorrhage, mass, acute  cortical infarct or prior infarct in a major vascular territory.      No midline shift. The basilar cisterns are patent.    Extra-axial spaces: No extra-axial fluid collection or hemorrhage.     Ventricles: Normal.  Paranasal sinuses: Clear.   Mastoid air cells: Clear.    Osseous: No acute findings.  Orbits: Normal.    Soft tissues: Unremarkable.       Impression    Impression:  No acute intracranial abnormality.      CHELO HANNA MD         SYSTEM ID:  [id number]   CT Facial Bones without Contrast    Narrative    Exam: CT FACIAL BONES WITHOUT CONTRAST    Exam reason: bilateral infraorbital ecchymosis, recent fall 12 days  ago    Technique: Thin section axial images of the maxillofacial region were  acquired without IV contrast. Coronal and sagittal reformations were  generated. This CT was performed using one or more of the following  dose reduction techniques: automated exposure control, adjustment of  the mA and/or kV according to patient size, and/or use of iterative  reconstruction technique.    Comparison: None.    Findings:    Orbits: Orbital walls intact.  Globes are unremarkable.  No  retrobulbar hematoma or fat stranding.      Paranasal sinuses: No fracture.  Clear.      Nasal bones: There is an acute mildly displaced right nasal bone  fracture with associated soft tissue edema/hematoma.    Zygomatic arches: Intact.    Pterygoids: Intact.    Maxilla: Intact.    Mandible: Intact. Mandibular condyles are normal in location.    Intracranial contents: Visualized portions show no acute findings.      Impression    Impression:  There is an acute mildly displaced right nasal bone fracture with  associated soft tissue swelling.    CHELO HANNA MD         SYSTEM ID:  [id number]   CT Cervical Spine w/o Contrast    Narrative    Exam: CT CERVICAL SPINE W/O CONTRAST    Exam reason: Neck pain; Trauma; Significant trauma    Technique: Using helical technique, axial images of the cervical spine  were obtained.  Coronal and sagittal reformations were generated.  No  IV contrast. This CT was performed using one or more of the following  dose reduction techniques: automated exposure control, adjustment of  the mA and/or kV according to patient size, and/or use of iterative  reconstruction technique.    Comparison: 5/2/2021    FINDINGS:    Osseous:     Normal spinal alignment.    No acute fracture or subluxation.    There are scattered multilevel degenerative changes of the cervical  spine.     Prevertebral soft tissues: Unremarkable    Lung apices: No pneumothorax or consolidation in visualized portions.      Impression    IMPRESSION:    No acute fracture or subluxation of the cervical spine.    CHELO HANNA MD         SYSTEM ID:  [id number]   MR Brain w/o & w Contrast    Narrative    EXAM:  MR BRAIN W/O & W CONTRAST    HISTORY: possible seizure with extremity tremor resolving with versed.    TECHNIQUE:  Multiplanar, multisequence MR imaging of the head obtained  prior to, and after, intravenous contrast administration    MEDS/CONTRAST: 11ml dotarem    COMPARISON:  CT head 11/1/2022, 5/10/2022     FINDINGS:    There is no mass effect, midline shift or evidence of acute  intracranial hemorrhage.  The ventricles are proportionate to the  cerebral sulci. Scattered foci of T2 hyperintense FLAIR signal in the  periventricular and supraventricular white matter, which is  nonspecific, likely related to chronic small vessel ischemic disease  given the patient's age. Normal parenchymal volume. Normal major  intracranial vascular flow voids.    Postcontrast images demonstrate no abnormal intracranial enhancement.    No suspicious abnormality of the skull marrow signal. No paranasal  sinus mucosal thickening. Right mastoid effusion. Left mastoid air  cells are clear. The orbits are grossly unremarkable.      Impression    IMPRESSION:  1. No acute intracranial abnormality  2. No focal lesion or structural abnormality to explain patient's  symptoms.    SARA MCKEE MD         SYSTEM ID:  [id number]       Medications   magnesium sulfate 2 g in water intermittent infusion (0 g Intravenous Stopped 11/1/22 1704)   potassium chloride 10 mEq in 100 mL sterile water infusion (0 mEq Intravenous Stopped 11/1/22 1704)   LORazepam (ATIVAN) injection 1 mg (1 mg Intravenous Given 11/1/22 1537)   gadoterate meglumine (DOTAREM) injection 15 mL (11 mLs Intravenous Given 11/1/22 1629)       Assessments & Plan (with Medical Decision Making)     I have reviewed the nursing notes.    67 year old female evaluated for tremor in upper extremities after falling 12 days ago. Evaluation remarkable for nasal bone fracture with already planned surgery for this. Arrived partially sedated with versed with mostly incoherent speech. Returned to baseline and was able to interact and ambulate normally. Neurologically intact. Patient is very concerned for seizure and feeling like more workup is needed after head/neck CT imaging, and therefore MR scan done and unremarkable. Patient was not willing to wait to provide urine sample for ordered UA, and therefore this was not completed (she may have urinated once while in ED but if so this was not collected).  No clear etiology for her tremors today. Reassurance at this time offered along with close PCP follow up recommended.    I have reviewed the findings, diagnosis, plan, and need for any follow up with the patient.    Patient instructions:   Reassuring evaluation here today. Recommend close follow up with PCP. You can try a dose of lorazepam if this happens again. Return for worsening symptoms.     Discharge Medication List as of 11/1/2022  5:05 PM          Final diagnoses:   Tremor       11/1/2022   [facility] AND [facility]     Jagjit Escobar MD  11/01/22 1949

## 2022-11-01 NOTE — DISCHARGE INSTRUCTIONS
Reassuring evaluation here today. Recommend close follow up with PCP. You can try a dose of lorazepam if this happens again. Return for worsening symptoms.

## 2022-11-01 NOTE — ED TRIAGE NOTES
"Pt brought in by EMS- Meds 1 today with c/o tremors in her arms and increased neck pain, Pt fell approx 12 days ago landing on her face and was not seen for this fall. Pt has old bruising and swelling noted to face. Pt received a total of 10mg midazolam by EMS. BP (!) 111/96   Pulse 98   Temp 97.3  F (36.3  C) (Tympanic)   Resp 18   Ht 1.6 m (5' 3\")   Wt 60.1 kg (132 lb 7.9 oz)   LMP  (LMP Unknown)   SpO2 95%   BMI 23.47 kg/m         Triage Assessment     Row Name 11/01/22 1243       Triage Assessment (Adult)    Airway WDL WDL       Respiratory WDL    Respiratory WDL WDL       Skin Circulation/Temperature WDL    Skin Circulation/Temperature WDL WDL       Cardiac WDL    Cardiac WDL WDL       Peripheral/Neurovascular WDL    Peripheral Neurovascular WDL WDL       Cognitive/Neuro/Behavioral WDL    Cognitive/Neuro/Behavioral WDL WDL              "

## 2022-11-02 ENCOUNTER — TELEPHONE (OUTPATIENT)
Dept: FAMILY MEDICINE | Facility: OTHER | Age: 67
End: 2022-11-02

## 2022-11-02 NOTE — TELEPHONE ENCOUNTER
Ativan is designed to treat anxiety that can worsen or contribute to more symptoms of tremor, but is not an appropriate treatment for tremor of itself.    I would need to see her to assess her tremor concerns/ER follow up - was it worsened in light of pain/stress, injury, etc and is further treatment required.    Ella Baptiste, DO

## 2022-11-02 NOTE — TELEPHONE ENCOUNTER
Notified patient of providers note, transferred to scheduling.  Elena Ye LPN ....................  11/2/2022   12:22 PM

## 2022-11-02 NOTE — TELEPHONE ENCOUNTER
Please call the patient.  She was in the ER last night and wants to talk with PCP or Joellen Romero.  She has medication questions.  She did not want an appointment.  She would like a call back this morning.        Shobha Dick on 11/2/2022 at 8:16 AM

## 2022-11-03 ENCOUNTER — NURSE TRIAGE (OUTPATIENT)
Dept: FAMILY MEDICINE | Facility: OTHER | Age: 67
End: 2022-11-03

## 2022-11-03 ENCOUNTER — HOSPITAL ENCOUNTER (EMERGENCY)
Facility: OTHER | Age: 67
Discharge: HOME | End: 2022-11-03
Attending: STUDENT IN AN ORGANIZED HEALTH CARE EDUCATION/TRAINING PROGRAM | Admitting: STUDENT IN AN ORGANIZED HEALTH CARE EDUCATION/TRAINING PROGRAM
Payer: MEDICARE

## 2022-11-03 VITALS
BODY MASS INDEX: 22.53 KG/M2 | HEART RATE: 69 BPM | WEIGHT: 132 LBS | RESPIRATION RATE: 11 BRPM | OXYGEN SATURATION: 97 % | TEMPERATURE: 97.7 F | SYSTOLIC BLOOD PRESSURE: 183 MMHG | DIASTOLIC BLOOD PRESSURE: 98 MMHG | HEIGHT: 64 IN

## 2022-11-03 DIAGNOSIS — R25.1 TREMOR: ICD-10-CM

## 2022-11-03 LAB
ANION GAP SERPL CALCULATED.3IONS-SCNC: 11 MMOL/L (ref 3–14)
BASOPHILS # BLD AUTO: 0 10E3/UL (ref 0–0.2)
BASOPHILS NFR BLD AUTO: 0 %
BUN SERPL-MCNC: 8 MG/DL (ref 7–25)
CALCIUM SERPL-MCNC: 9.7 MG/DL (ref 8.6–10.3)
CHLORIDE BLD-SCNC: 93 MMOL/L (ref 98–107)
CO2 SERPL-SCNC: 28 MMOL/L (ref 21–31)
CREAT SERPL-MCNC: 0.72 MG/DL (ref 0.6–1.2)
EOSINOPHIL # BLD AUTO: 0 10E3/UL (ref 0–0.7)
EOSINOPHIL NFR BLD AUTO: 1 %
ERYTHROCYTE [DISTWIDTH] IN BLOOD BY AUTOMATED COUNT: 12.6 % (ref 10–15)
ETHANOL SERPL-MCNC: <0.01 G/DL
GFR SERPL CREATININE-BSD FRML MDRD: >90 ML/MIN/1.73M2
GLUCOSE BLD-MCNC: 118 MG/DL (ref 70–105)
HCT VFR BLD AUTO: 38.3 % (ref 35–47)
HGB BLD-MCNC: 13.1 G/DL (ref 11.7–15.7)
IMM GRANULOCYTES # BLD: 0 10E3/UL
IMM GRANULOCYTES NFR BLD: 0 %
LACTATE SERPL-SCNC: 1.3 MMOL/L (ref 0.7–2)
LYMPHOCYTES # BLD AUTO: 1.2 10E3/UL (ref 0.8–5.3)
LYMPHOCYTES NFR BLD AUTO: 16 %
MAGNESIUM SERPL-MCNC: 2.1 MG/DL (ref 1.9–2.7)
MCH RBC QN AUTO: 28.7 PG (ref 26.5–33)
MCHC RBC AUTO-ENTMCNC: 34.2 G/DL (ref 31.5–36.5)
MCV RBC AUTO: 84 FL (ref 78–100)
MONOCYTES # BLD AUTO: 0.5 10E3/UL (ref 0–1.3)
MONOCYTES NFR BLD AUTO: 7 %
NEUTROPHILS # BLD AUTO: 5.8 10E3/UL (ref 1.6–8.3)
NEUTROPHILS NFR BLD AUTO: 76 %
NRBC # BLD AUTO: 0 10E3/UL
NRBC BLD AUTO-RTO: 0 /100
PLATELET # BLD AUTO: 337 10E3/UL (ref 150–450)
POTASSIUM BLD-SCNC: 3.3 MMOL/L (ref 3.5–5.1)
RBC # BLD AUTO: 4.57 10E6/UL (ref 3.8–5.2)
SODIUM SERPL-SCNC: 132 MMOL/L (ref 134–144)
TSH SERPL DL<=0.005 MIU/L-ACNC: 1.25 MU/L (ref 0.4–4)
WBC # BLD AUTO: 7.7 10E3/UL (ref 4–11)

## 2022-11-03 PROCEDURE — 80048 BASIC METABOLIC PNL TOTAL CA: CPT | Performed by: STUDENT IN AN ORGANIZED HEALTH CARE EDUCATION/TRAINING PROGRAM

## 2022-11-03 PROCEDURE — 82077 ASSAY SPEC XCP UR&BREATH IA: CPT | Performed by: EMERGENCY MEDICINE

## 2022-11-03 PROCEDURE — 84443 ASSAY THYROID STIM HORMONE: CPT | Performed by: STUDENT IN AN ORGANIZED HEALTH CARE EDUCATION/TRAINING PROGRAM

## 2022-11-03 PROCEDURE — 250N000013 HC RX MED GY IP 250 OP 250 PS 637: Performed by: EMERGENCY MEDICINE

## 2022-11-03 PROCEDURE — 83605 ASSAY OF LACTIC ACID: CPT | Performed by: STUDENT IN AN ORGANIZED HEALTH CARE EDUCATION/TRAINING PROGRAM

## 2022-11-03 PROCEDURE — 83735 ASSAY OF MAGNESIUM: CPT | Performed by: STUDENT IN AN ORGANIZED HEALTH CARE EDUCATION/TRAINING PROGRAM

## 2022-11-03 PROCEDURE — 99283 EMERGENCY DEPT VISIT LOW MDM: CPT | Performed by: EMERGENCY MEDICINE

## 2022-11-03 PROCEDURE — 85025 COMPLETE CBC W/AUTO DIFF WBC: CPT | Performed by: STUDENT IN AN ORGANIZED HEALTH CARE EDUCATION/TRAINING PROGRAM

## 2022-11-03 PROCEDURE — 36415 COLL VENOUS BLD VENIPUNCTURE: CPT | Performed by: STUDENT IN AN ORGANIZED HEALTH CARE EDUCATION/TRAINING PROGRAM

## 2022-11-03 RX ORDER — LORAZEPAM 1 MG/1
1 TABLET ORAL ONCE
Status: COMPLETED | OUTPATIENT
Start: 2022-11-03 | End: 2022-11-03

## 2022-11-03 RX ADMIN — LORAZEPAM 1 MG: 1 TABLET ORAL at 12:30

## 2022-11-03 ASSESSMENT — ENCOUNTER SYMPTOMS
CHILLS: 0
ABDOMINAL DISTENTION: 0
BACK PAIN: 1
DYSURIA: 0
SHORTNESS OF BREATH: 0
AGITATION: 0
CHEST TIGHTNESS: 0
ARTHRALGIAS: 0
TREMORS: 1
FEVER: 0

## 2022-11-03 ASSESSMENT — ACTIVITIES OF DAILY LIVING (ADL)
ADLS_ACUITY_SCORE: 35
ADLS_ACUITY_SCORE: 35

## 2022-11-03 NOTE — ED PROVIDER NOTES
History     Chief Complaint   Patient presents with     Tremors     HPI  Mita Gabriel is a 67 year old female who comes into the emergency department by private car complaining of back pain and tremors.  Was seen here 3 days ago for similar complaint, however that time she came in by ambulance.  She has been falling a lot at home, she struck her head about 2 weeks prior to her last visit.  She was extensively evaluated with CT and MRI which were reassuring for any intracranial hemorrhage.  There was a small facial bone fracture.  She has been having episodes of these tremors and they describe them as mostly right arm movement but also the left arm and less so the extremities.  She is alert and is able to communicate during these episodes.  She does feel very anxious during them.  She does tell me that during one of them she had what she is not sure if she should call hallucination or an out of body experience.  She says she is floating up toward the ceiling and she can see a tunnel of light.  I did not see any of her tremors, however when she came in the nursing staff said she was doing.  They said she was sitting in the wheelchair both of her arms raised and tremoring in her head going back and forth and talking to them throughout the entire episode.  Has been eating and drinking but not as much is normal and she has lost a significant amount of weight recently.  She complains of increasing chronic back pain.  Been falling a lot at home.    Allergies:  No Known Allergies    Problem List:    Patient Active Problem List    Diagnosis Date Noted     Mixed hyperlipidemia 06/12/2018     Priority: Medium     Special screening for malignant neoplasms, colon 06/09/2017     Priority: Medium     H/O non-ST elevation myocardial infarction (NSTEMI) 01/20/2015     Priority: Medium     Senile cataract 07/24/2012     Priority: Medium     Insomnia 02/08/2011     Priority: Medium     Obesity 02/08/2011     Priority: Medium      Motion sickness 2011     Priority: Medium     Hypothyroidism 2009     Priority: Medium     Abdominal pain 2009     Priority: Medium     Osteoarthritis of spine 10/21/2008     Priority: Medium     Dysthymic disorder 10/21/2008     Priority: Medium     Hypertension 10/21/2008     Priority: Medium     Lumbago 10/21/2008     Priority: Medium     History of alcohol abuse 10/16/2008     Priority: Medium     Anxiety state 10/16/2008     Priority: Medium        Past Medical History:    Past Medical History:   Diagnosis Date     Anxiety disorder      Closed fracture of left carpal bone      Essential (primary) hypertension      Hyperlipidemia      Hypothyroidism      Major depressive disorder, single episode      Postoperative nausea and vomiting      Transfusion history        Past Surgical History:    Past Surgical History:   Procedure Laterality Date     CATARACT IOL, RT/LT Bilateral       SECTION       COLONOSCOPY N/A 2017    Follow up   hyperplastic     OPEN REDUCTION INTERNAL FIXATION ANKLE Left 2022    Procedure: Open Reduction Internal Fixation Fracture Ankle, 1st Metatarsal phalangeal joint fusion;  Surgeon: Radu Ruvalcaba DPM;  Location:  OR     OPEN REDUCTION INTERNAL FIXATION WRIST      treating a fracture     OPEN REDUCTION INTERNAL FIXATION WRIST Left 2021    Procedure: Wrist Open Reduction Internal Fixation;  Surgeon: Lobo Sky MD;  Location: [facility] OR     THYROIDECTOMY       Partial       Family History:    Family History   Problem Relation Age of Onset     Heart Disease Mother      Genitourinary Problems Father         Genitourinary Disease,kidney disease     Heart Disease Father         MI     Depression Sister      No Known Problems Sister      Depression Brother      Bipolar Disorder Brother      No Known Problems Brother         mild cognitive delay     Cancer Maternal Grandmother         Bladder     Other - See Comments Paternal Grandmother 70         Stroke     Cancer Maternal Grandfather         Throat and lung     Other - See Comments Other         Family history of depression and anxiety     Anesthesia Reaction No family hx of         Anesthesia Problem,Notes no prior complications from anesthesia.     Breast Cancer No family hx of         Cancer-breast       Social History:  Marital Status:   [2]  Social History     Tobacco Use     Smoking status: Never     Smokeless tobacco: Never   Vaping Use     Vaping Use: Never used   Substance Use Topics     Alcohol use: No     Alcohol/week: 0.0 standard drinks     Drug use: No        Medications:    aspirin 81 MG EC tablet  atorvastatin (LIPITOR) 20 MG tablet  folic acid (FOLVITE) 1 MG tablet  gabapentin (NEURONTIN) 300 MG capsule  hydrochlorothiazide (HYDRODIURIL) 25 MG tablet  HYDROcodone-acetaminophen (NORCO) 5-325 MG tablet  levothyroxine (SYNTHROID/LEVOTHROID) 50 MCG tablet  lisinopril (ZESTRIL) 5 MG tablet  LORazepam (ATIVAN) 0.5 MG tablet  magnesium 250 MG tablet  methocarbamol (ROBAXIN) 500 MG tablet  metoprolol succinate ER (TOPROL XL) 50 MG 24 hr tablet  Multiple Vitamins-Minerals (MULTIVITAMIN ADULT PO)  polyethylene glycol (MIRALAX) 17 GM/Dose powder  scopolamine (TRANSDERM) 1 MG/3DAYS 72 hr patch  thiamine 100 MG tablet  traZODone (DESYREL) 150 MG tablet  venlafaxine (EFFEXOR XR) 150 MG 24 hr capsule  zolpidem ER (AMBIEN CR) 6.25 MG CR tablet          Review of Systems   Constitutional: Negative for chills and fever.   HENT: Negative for congestion.    Eyes: Negative for visual disturbance.   Respiratory: Negative for chest tightness and shortness of breath.    Cardiovascular: Negative for chest pain.   Gastrointestinal: Negative for abdominal distention.   Genitourinary: Negative for dysuria.   Musculoskeletal: Positive for back pain. Negative for arthralgias.   Skin: Negative for rash.   Neurological: Positive for tremors.   Psychiatric/Behavioral: Negative for agitation.       Physical  "Exam   BP: (!) 165/102  Pulse: 86  Temp: 97.7  F (36.5  C)  Resp: 18  Height: 162.6 cm (5' 4\")  Weight: 59.9 kg (132 lb)  SpO2: 98 %  Lying Orthostatic BP: 183/89  Lying Orthostatic Pulse: 70 bpm  Sitting Orthostatic BP: 162/99  Sitting Orthostatic Pulse: 80 bpm  Standing Orthostatic BP: 133/82  Standing Orthostatic Pulse: 92 bpm      Physical Exam  Vitals and nursing note reviewed.   Constitutional:       Appearance: Normal appearance.   HENT:      Head: Normocephalic and atraumatic.      Mouth/Throat:      Mouth: Mucous membranes are moist.   Eyes:      Conjunctiva/sclera: Conjunctivae normal.   Cardiovascular:      Rate and Rhythm: Normal rate and regular rhythm.      Heart sounds: Normal heart sounds.   Pulmonary:      Effort: Pulmonary effort is normal.      Breath sounds: Normal breath sounds.   Abdominal:      General: Abdomen is flat. Bowel sounds are normal.   Skin:     General: Skin is warm and dry.   Neurological:      General: No focal deficit present.      Mental Status: She is alert.      GCS: GCS eye subscore is 4. GCS verbal subscore is 5. GCS motor subscore is 6.      Motor: Tremor present.      Comments: Does have a fine intention tremor involving her upper extremities and her head.   Psychiatric:         Behavior: Behavior normal.         ED Course                 Procedures                Results for orders placed or performed during the hospital encounter of 11/03/22 (from the past 24 hour(s))   CBC with platelets differential    Narrative    The following orders were created for panel order CBC with platelets differential.  Procedure                               Abnormality         Status                     ---------                               -----------         ------                     CBC with platelets and d...[[id number]]                      Final result                 Please view results for these tests on the individual orders.   Basic metabolic panel   Result Value Ref Range    " Sodium 132 (L) 134 - 144 mmol/L    Potassium 3.3 (L) 3.5 - 5.1 mmol/L    Chloride 93 (L) 98 - 107 mmol/L    Carbon Dioxide (CO2) 28 21 - 31 mmol/L    Anion Gap 11 3 - 14 mmol/L    Urea Nitrogen 8 7 - 25 mg/dL    Creatinine 0.72 0.60 - 1.20 mg/dL    Calcium 9.7 8.6 - 10.3 mg/dL    Glucose 118 (H) 70 - 105 mg/dL    GFR Estimate >90 >60 mL/min/1.73m2   Lactic acid whole blood   Result Value Ref Range    Lactic Acid 1.3 0.7 - 2.0 mmol/L   Magnesium   Result Value Ref Range    Magnesium 2.1 1.9 - 2.7 mg/dL   TSH Reflex [facility]   Result Value Ref Range    TSH 1.25 0.40 - 4.00 mU/L   CBC with platelets and differential   Result Value Ref Range    WBC Count 7.7 4.0 - 11.0 10e3/uL    RBC Count 4.57 3.80 - 5.20 10e6/uL    Hemoglobin 13.1 11.7 - 15.7 g/dL    Hematocrit 38.3 35.0 - 47.0 %    MCV 84 78 - 100 fL    MCH 28.7 26.5 - 33.0 pg    MCHC 34.2 31.5 - 36.5 g/dL    RDW 12.6 10.0 - 15.0 %    Platelet Count 337 150 - 450 10e3/uL    % Neutrophils 76 %    % Lymphocytes 16 %    % Monocytes 7 %    % Eosinophils 1 %    % Basophils 0 %    % Immature Granulocytes 0 %    NRBCs per 100 WBC 0 <1 /100    Absolute Neutrophils 5.8 1.6 - 8.3 10e3/uL    Absolute Lymphocytes 1.2 0.8 - 5.3 10e3/uL    Absolute Monocytes 0.5 0.0 - 1.3 10e3/uL    Absolute Eosinophils 0.0 0.0 - 0.7 10e3/uL    Absolute Basophils 0.0 0.0 - 0.2 10e3/uL    Absolute Immature Granulocytes 0.0 <=0.4 10e3/uL    Absolute NRBCs 0.0 10e3/uL   Ethanol [facility]   Result Value Ref Range    Alcohol ethyl <0.01 <=0.01 g/dL       Medications   LORazepam (ATIVAN) tablet 1 mg (1 mg Oral Given 11/3/22 1230)       Assessments & Plan (with Medical Decision Making)     I have reviewed the nursing notes.    I have reviewed the findings, diagnosis, plan and need for follow up with the patient.  Patient does not have any recurrence of her tremor after I see her.  She does have Ativan that she has been taking for a number of years at 0.5 mg 3 times daily.  She did feel that after she took it this  morning it was somewhat better for a little while.  When the ambulance brought her in the other day they had given her a bunch of Versed which completely resolved the tremor for a while.  I reviewed CT and MRI from her recent visit.  Labs today do show mild hyponatremia and hypokalemia but insignificant and I do not believe it is contributing to her symptoms.  Magnesium is normal.  I do not believe these are seizures based on her history.  I believe this could be combination of increased pain from her falls and her chronic back pain and some anxiety.  I did give her a milligram dose of Ativan while she is here and she is feeling better.  I am going to have her increase her dose of Ativan temporarily to 1 mg every 6 hours as needed.  I we will try to get her to follow-up in clinic with her primary provider soon as possible.  I do believe it could be beneficial for a neurology consult as well.    New Prescriptions    No medications on file       Final diagnoses:   Tremor       11/3/2022   [facility] [place]     Charlie Auguste MD  11/03/22 [id number]

## 2022-11-03 NOTE — DISCHARGE INSTRUCTIONS
You can increase your Ativan to 1 mg every 6 hours temporarily until you speak with your primary provider.  Return if worse.

## 2022-11-03 NOTE — TELEPHONE ENCOUNTER
Patients  calling and states that patient is having bad tremors right now.   states they were seen in ED yesterday for these.     states that patient has a 9:20 with Joellen Romero today but wondering what they could do until then.    Patient sates tremors started about 3 this morning and have been up and down but are at there worse now and wondering what else they can do.  Patient wondering if she can take another Lorazepam?    Patient able to answers all questions.  Denies any loss of bowel or bladder control.  Denies any difficulty breathing.    Reviewed with Joellen Romero and ED visit was reviewed.  Joellen does not want patient to take another Lorazepam due to risk of falls and for patient to come in at 9:20 for appointment.    Called and informed patients  and he states his son will be coming to bring patient in to appointment.  Clarisse Oscar RN on 11/3/2022 at 8:19 AM

## 2022-11-03 NOTE — PROGRESS NOTES
:    Met with patient, her spouse, and son in room.     Offered patient SNF options and she declined.    Offered patient home care or outpatient therapy services.  Patient and her family agreed on outpatient therapy at [facility].    Faxed over orders to [facility] and they will be calling family to schedule appointments.      Patient and family her did not have any more questions or concerns at this time.      LANE Mott on 11/3/2022 at 1:42 PM

## 2022-11-03 NOTE — ED TRIAGE NOTES
"Pt c/o neck and back pain with tremors in both arms. Pt was seen Tuesday with same symptoms and had complete work up. Pulse 86   Temp 97.7  F (36.5  C) (Tympanic)   Resp 18   Ht 1.626 m (5' 4\")   Wt 59.9 kg (132 lb)   LMP  (LMP Unknown)   SpO2 98%   BMI 22.66 kg/m         Triage Assessment     Row Name 11/03/22 0919       Triage Assessment (Adult)    Airway WDL WDL       Respiratory WDL    Respiratory WDL WDL       Skin Circulation/Temperature WDL    Skin Circulation/Temperature WDL WDL       Cardiac WDL    Cardiac WDL WDL       Peripheral/Neurovascular WDL    Peripheral Neurovascular WDL WDL       Cognitive/Neuro/Behavioral WDL    Cognitive/Neuro/Behavioral WDL WDL              "

## 2022-11-03 NOTE — ED NOTES
Patient resting at times without tremors and other times she has repetitive movements with hands and arm are noted.

## 2022-11-08 ENCOUNTER — TELEPHONE (OUTPATIENT)
Dept: FAMILY MEDICINE | Facility: OTHER | Age: 67
End: 2022-11-08

## 2022-11-08 DIAGNOSIS — F41.3 OTHER MIXED ANXIETY DISORDERS: ICD-10-CM

## 2022-11-08 DIAGNOSIS — G25.0 ESSENTIAL TREMOR: Primary | ICD-10-CM

## 2022-11-08 RX ORDER — LORAZEPAM 0.5 MG/1
0.5 TABLET ORAL EVERY 8 HOURS PRN
Qty: 90 TABLET | Refills: 5 | Status: SHIPPED | OUTPATIENT
Start: 2022-11-08 | End: 2022-11-08

## 2022-11-08 RX ORDER — LORAZEPAM 1 MG/1
1 TABLET ORAL EVERY 8 HOURS PRN
Qty: 90 TABLET | Refills: 1 | Status: SHIPPED | OUTPATIENT
Start: 2022-11-08 | End: 2023-01-05

## 2022-11-08 RX ORDER — PRIMIDONE 50 MG/1
TABLET ORAL
Qty: 30 TABLET | Refills: 1 | Status: SHIPPED | OUTPATIENT
Start: 2022-11-08 | End: 2023-07-14

## 2022-11-08 NOTE — TELEPHONE ENCOUNTER
"Routing refill request to provider for review/approval because:    LOV: 10/31/22    Per patient in phone note below Ativan was increased in ED so patient will be out before appointment on 11/22    Per ED visit note on 11/3/22    \"  I am going to have her increase her dose of Ativan temporarily to 1 mg every 6 hours as needed.\"    Patient noted to have an appointment on 11/22 with Joellen Romero.    Clarisse Oscar RN on 11/8/2022 at 10:10 AM          "

## 2022-11-08 NOTE — TELEPHONE ENCOUNTER
Reason for call: Medication or medication refill    Name of medication requested: Lorazepam    Are you out of the medication? Yes    What pharmacy do you use? [facility]    Preferred method for responding to this message: Telephone Call    Phone number patient can be reached at: Home number on file [phone] (home)    If we cannot reach you directly, may we leave a detailed response at the number you provided? Yes     ER doctor raised medication dosage.  Due to this patient will be out before her visit on 11/22/2022 with Joellen Romero.    Pippa Jones on 11/8/2022 at 9:45 AM'

## 2022-11-08 NOTE — TELEPHONE ENCOUNTER
"Called and discussed medications with Mita and her , Dayo.  She states she has tremors for 4-6 hours, and then when she takes a Lorazepam it stops her tremor.  She has been using 1mg every 6 hours since leaving the ER; and was reluctant to change because it is helping.  However, the tremor returns after each dose.  She has not left the house much, she was able to shower yesterday.  Severely limiting her ability to be active.    Prior to her ER visits last week, she was using lorazepam 0.5mg TID, so this is a significant increase.   Neurology referral was placed at time of ER visit, they have not heard yet from [facility] to make an appointment ( sees Neurology in [place] every 6 months).  There is a [phone] number to be given at her upcoming appointment if they still have not yet had any information/contact re: referral status.    After reviewing indications for Ativan, the risks of this medication and her dosage increase along with her age and risk for falls - I have recommended to start Primidone 25mg daily x 7 days; then BID x 7 days - has follow up with Joellen Romero PA-C at that time and can discuss further dosing of both Primidone and Ativan.  In the meantime, she will reduce her Ativan to 1mg every 8 hours; and encourage continued reduction over time.  She may need to consider adjuvant medication vs therapy (non medication option) for anxiety in the meantime.  Does have family history of tremor.   Has not had any alcohol in a \"long time.\"  Recent brain MRI normal.    She reports some back pain, which also can make her tremors worse.  Inquired about pain medication re: that, but has not had adequate work up/therapy, etc of her back to know what we are treating.  They were made aware that pain medication is not indicated for long standing back pain and we would need to evaluate/image to determine focus of care.      Follow up prn.  Ella Baptiste, DO    "

## 2022-11-08 NOTE — TELEPHONE ENCOUNTER
Talked with patient to let her know to return to previous dosing and she is not happy with that idea and says that the ER doctor told her to take it every 6 hours (four times a day).    She states that it will not work with her taking one every 8 hours as her tremors will he out of control. She would like to discuss this over the phone with Dr. Baptiste.  Will send this message back to Dr. Baptiste to call her when she has time in between seeing patients in the clinic.  Kandice Suazo LPN  11/8/2022  11:19 AM

## 2022-11-21 NOTE — PROGRESS NOTES
Assessment & Plan     1. Tremor  Chronic, stable. Continue with primidone as prescribed by PCP and neurology. Follow up as needed going forward.     2. Chronic bilateral low back pain without sciatica  Chronic, progressive. Discussed options including imaging, PT, chiropractic care, medication, etc. Patient is interested in pursuing imaging at some point but declines further evaluation today. Declined trying an oral medication out of concern for side effects and interactions with other current medications. Prescription for topical Voltaren gel to trial as below. Follow up when wanting to pursue additional evaluation.   - diclofenac (VOLTAREN) 1 % topical gel; Apply 4 g topically 4 times daily  Dispense: 350 g; Refill: 0    3. Needs flu shot  - FLU SHOT 65+ (FLUZONE HD)      Return if symptoms worsen or fail to improve.    Joellen Romero PA-C  [facility]    Subjective   Mita is a 67 year old accompanied by her spouse, presenting for the following health issues:  ER F/U      History of Present Illness       Reason for visit:  Follow up  Symptom onset:  3-4 weeks ago    She eats 2-3 servings of fruits and vegetables daily.She consumes 0 sweetened beverage(s) daily.She exercises with enough effort to increase her heart rate 10 to 19 minutes per day.  She exercises with enough effort to increase her heart rate 3 or less days per week.   She is taking medications regularly.     Here for follow-up on tremors and back pain.    Tremor:  Patient with longstanding history of tremor for which she has been in the emergency department recently.  She had been treated with Ativan which did improve tremors.  PCP followed up discussing that this is not appropriate treatment of tremors and recommended starting primidone and following up with neurology.  Patient had neurology follow-up on 11/17/2022 her primidone dose was increased with an eventual taper up to 100 mg daily.  Has been tolerating medication  well.  Has not noticed much of improvement in tremors as of yet.     Back:  Patient also has history of chronic upper and lower back pain.  Reports she has tried physical therapy, chiropractic care, muscle relaxer, multiple pain medications throughout the years with minimal improvement.  Has not had any back imaging completed in several years.  Reports that she would like to have this followed up on the near future about is not interested in pursuing imaging until her tremor is under better control.        PAST MEDICAL HISTORY:   Past Medical History:   Diagnosis Date     Anxiety disorder      Closed fracture of left carpal bone      Essential (primary) hypertension      Hyperlipidemia      Hypothyroidism      Major depressive disorder, single episode      Postoperative nausea and vomiting      Transfusion history     with delivery of son       PAST SURGICAL HISTORY:   Past Surgical History:   Procedure Laterality Date     CATARACT IOL, RT/LT Bilateral       SECTION       COLONOSCOPY N/A 2017    Follow up   hyperplastic     OPEN REDUCTION INTERNAL FIXATION ANKLE Left 2022    Procedure: Open Reduction Internal Fixation Fracture Ankle, 1st Metatarsal phalangeal joint fusion;  Surgeon: Radu Ruvalcaba DPM;  Location: [facility] OR     OPEN REDUCTION INTERNAL FIXATION WRIST      treating a fracture     OPEN REDUCTION INTERNAL FIXATION WRIST Left 2021    Procedure: Wrist Open Reduction Internal Fixation;  Surgeon: Lobo Sky MD;  Location: [facility] OR     THYROIDECTOMY       Partial       FAMILY HISTORY:   Family History   Problem Relation Age of Onset     Heart Disease Mother      Genitourinary Problems Father         Genitourinary Disease,kidney disease     Heart Disease Father         MI     Depression Sister      No Known Problems Sister      Depression Brother      Bipolar Disorder Brother      No Known Problems Brother         mild cognitive delay     Cancer Maternal Grandmother          "Bladder     Other - See Comments Paternal Grandmother 70        Stroke     Cancer Maternal Grandfather         Throat and lung     Other - See Comments Other         Family history of depression and anxiety     Anesthesia Reaction No family hx of         Anesthesia Problem,Notes no prior complications from anesthesia.     Breast Cancer No family hx of         Cancer-breast       SOCIAL HISTORY:   Social History     Tobacco Use     Smoking status: Never     Smokeless tobacco: Never   Substance Use Topics     Alcohol use: No     Alcohol/week: 0.0 standard drinks      No Known Allergies  Current Outpatient Medications   Medication     aspirin 81 MG EC tablet     atorvastatin (LIPITOR) 20 MG tablet     diclofenac (VOLTAREN) 1 % topical gel     folic acid (FOLVITE) 1 MG tablet     gabapentin (NEURONTIN) 300 MG capsule     hydrochlorothiazide (HYDRODIURIL) 25 MG tablet     levothyroxine (SYNTHROID/LEVOTHROID) 50 MCG tablet     lisinopril (ZESTRIL) 5 MG tablet     LORazepam (ATIVAN) 1 MG tablet     magnesium 250 MG tablet     methocarbamol (ROBAXIN) 500 MG tablet     metoprolol succinate ER (TOPROL XL) 50 MG 24 hr tablet     Multiple Vitamins-Minerals (MULTIVITAMIN ADULT PO)     polyethylene glycol (MIRALAX) 17 GM/Dose powder     primidone (MYSOLINE) 50 MG tablet     scopolamine (TRANSDERM) 1 MG/3DAYS 72 hr patch     thiamine 100 MG tablet     traZODone (DESYREL) 150 MG tablet     venlafaxine (EFFEXOR XR) 150 MG 24 hr capsule     zolpidem ER (AMBIEN CR) 6.25 MG CR tablet     No current facility-administered medications for this visit.         Review of Systems   Per HPI        Objective    /68   Pulse 78   Temp 97.6  F (36.4  C)   Resp 12   Ht 1.626 m (5' 4\")   Wt 59.1 kg (130 lb 3.2 oz)   LMP  (LMP Unknown)   SpO2 98%   BMI 22.35 kg/m    Body mass index is 22.35 kg/m .  Physical Exam   General: Pleasant, in no apparent distress.  Neurologic Exam: Visible tremor  Psych: Appropriate mood and affect.      "

## 2022-11-22 ENCOUNTER — OFFICE VISIT (OUTPATIENT)
Dept: FAMILY MEDICINE | Facility: OTHER | Age: 67
End: 2022-11-22
Attending: PHYSICIAN ASSISTANT
Payer: MEDICARE

## 2022-11-22 VITALS
WEIGHT: 130.2 LBS | RESPIRATION RATE: 12 BRPM | SYSTOLIC BLOOD PRESSURE: 112 MMHG | TEMPERATURE: 97.6 F | HEIGHT: 64 IN | OXYGEN SATURATION: 98 % | BODY MASS INDEX: 22.23 KG/M2 | DIASTOLIC BLOOD PRESSURE: 68 MMHG | HEART RATE: 78 BPM

## 2022-11-22 DIAGNOSIS — Z23 NEEDS FLU SHOT: ICD-10-CM

## 2022-11-22 DIAGNOSIS — G89.29 CHRONIC BILATERAL LOW BACK PAIN WITHOUT SCIATICA: ICD-10-CM

## 2022-11-22 DIAGNOSIS — R25.1 TREMOR: Primary | ICD-10-CM

## 2022-11-22 DIAGNOSIS — M54.50 CHRONIC BILATERAL LOW BACK PAIN WITHOUT SCIATICA: ICD-10-CM

## 2022-11-22 PROCEDURE — G0463 HOSPITAL OUTPT CLINIC VISIT: HCPCS

## 2022-11-22 PROCEDURE — G0463 HOSPITAL OUTPT CLINIC VISIT: HCPCS | Mod: 25

## 2022-11-22 PROCEDURE — 99214 OFFICE O/P EST MOD 30 MIN: CPT | Performed by: PHYSICIAN ASSISTANT

## 2022-11-22 PROCEDURE — G0008 ADMIN INFLUENZA VIRUS VAC: HCPCS

## 2022-11-22 ASSESSMENT — PAIN SCALES - GENERAL: PAINLEVEL: SEVERE PAIN (6)

## 2022-11-22 NOTE — NURSING NOTE
Patient presents to clinic for ER follow up.  Elena Ye LPN ....................  11/22/2022   10:52 AM

## 2022-12-06 ENCOUNTER — TELEPHONE (OUTPATIENT)
Dept: FAMILY MEDICINE | Facility: OTHER | Age: 67
End: 2022-12-06

## 2022-12-06 DIAGNOSIS — R39.89 SUSPECTED UTI: Primary | ICD-10-CM

## 2022-12-06 NOTE — TELEPHONE ENCOUNTER
Pt has a possible UTI and would like to get an UA.  She does not want to go into the [facility].  Please call.    Michele Lucas on 12/6/2022 at 1:12 PM

## 2022-12-06 NOTE — TELEPHONE ENCOUNTER
I can place UA orders; but I have many patients to see in person, phone call requests, medical message requests and commonly is not the most efficient way to have a UTI evaluated.  If still desiring evaluation tonight - would need [facility].  Otherwise can come tomorrow to leave a urine sample.  Ella Baptiste, DO

## 2022-12-06 NOTE — TELEPHONE ENCOUNTER
Patient notified. She will make a lab only appointment tomorrow.  Kandice Suazo LPN   12/6/2022  4:55 PM

## 2022-12-07 ENCOUNTER — LAB (OUTPATIENT)
Dept: LAB | Facility: OTHER | Age: 67
End: 2022-12-07
Attending: FAMILY MEDICINE
Payer: MEDICARE

## 2022-12-07 DIAGNOSIS — R39.89 SUSPECTED UTI: ICD-10-CM

## 2022-12-07 DIAGNOSIS — R39.89 SUSPECTED UTI: Primary | ICD-10-CM

## 2022-12-07 LAB
ALBUMIN UR-MCNC: NEGATIVE MG/DL
APPEARANCE UR: CLEAR
BACTERIA #/AREA URNS HPF: ABNORMAL /HPF
BILIRUB UR QL STRIP: NEGATIVE
COLOR UR AUTO: ABNORMAL
GLUCOSE UR STRIP-MCNC: NEGATIVE MG/DL
HGB UR QL STRIP: NEGATIVE
KETONES UR STRIP-MCNC: NEGATIVE MG/DL
LEUKOCYTE ESTERASE UR QL STRIP: ABNORMAL
MUCOUS THREADS #/AREA URNS LPF: PRESENT /LPF
NITRATE UR QL: NEGATIVE
PH UR STRIP: 7.5 [PH] (ref 5–9)
RBC URINE: 2 /HPF
SP GR UR STRIP: 1.01 (ref 1–1.03)
UROBILINOGEN UR STRIP-MCNC: NORMAL MG/DL
WBC URINE: 56 /HPF

## 2022-12-07 PROCEDURE — 87086 URINE CULTURE/COLONY COUNT: CPT | Mod: ZL

## 2022-12-07 PROCEDURE — 81001 URINALYSIS AUTO W/SCOPE: CPT | Mod: ZL

## 2022-12-07 RX ORDER — SULFAMETHOXAZOLE/TRIMETHOPRIM 800-160 MG
1 TABLET ORAL 2 TIMES DAILY
Qty: 14 TABLET | Refills: 0 | Status: SHIPPED | OUTPATIENT
Start: 2022-12-07 | End: 2022-12-14

## 2022-12-09 LAB — BACTERIA UR CULT: NORMAL

## 2022-12-26 DIAGNOSIS — G47.00 INSOMNIA, UNSPECIFIED TYPE: ICD-10-CM

## 2022-12-27 RX ORDER — ZOLPIDEM TARTRATE 6.25 MG/1
TABLET, FILM COATED, EXTENDED RELEASE ORAL
Qty: 30 TABLET | Refills: 5 | Status: SHIPPED | OUTPATIENT
Start: 2022-12-27 | End: 2023-06-15

## 2022-12-27 NOTE — TELEPHONE ENCOUNTER
[facility] sent Rx request for the following:      Requested Prescriptions   Pending Prescriptions Disp Refills     zolpidem ER (AMBIEN CR) 6.25 MG CR tablet [Pharmacy Med Name: ZOLPIDEM ER 6.25MG TABLETS] 30 tablet      Sig: TAKE 1 TABLET BY MOUTH AT BEDTIME       There is no refill protocol information for this order     Last Prescription Date:   6/29/22  Last Fill Qty/Refills:         30, R-5    Last Office Visit:              11/22/22   Future Office visit:           None    Imani Pate RN on 12/27/2022 at 4:32 PM

## 2022-12-28 DIAGNOSIS — M62.830 SPASM OF BACK MUSCLES: ICD-10-CM

## 2022-12-29 ENCOUNTER — TELEPHONE (OUTPATIENT)
Dept: FAMILY MEDICINE | Facility: OTHER | Age: 67
End: 2022-12-29

## 2022-12-29 RX ORDER — METHOCARBAMOL 500 MG/1
TABLET, FILM COATED ORAL
Qty: 120 TABLET | Refills: 5 | Status: SHIPPED | OUTPATIENT
Start: 2022-12-29 | End: 2023-06-27

## 2022-12-29 NOTE — TELEPHONE ENCOUNTER
"Fax received from [facility], with message regarding:    methocarbamol (ROBAXIN) 500 MG tablet 120 tablet 5 12/29/2022  No   Sig: TAKE 1 TO 2 TABLETS BY MOUTH FOUR TIMES DAILY     \"Drug not covered by patient plan. The preferred alternative is:      Tizanidine tab mg    Cyclobenzaprine tab mg    Please call/fax the pharmacy to change medication along with strength, directions, quantity, and refills.\"    Kelle Bazan RN .............. 12/29/2022  8:20 AM  "

## 2022-12-29 NOTE — TELEPHONE ENCOUNTER
Tried calling patient with no answer and no voicemail to leave a message.  Kandice Suazo LPN 12/29/2022  9:40 AM

## 2022-12-29 NOTE — TELEPHONE ENCOUNTER
Talked with patient and she does know about her insurance not covering the methocarbamol.  She has decided to stay on it and pay the $25 out of pocket.   She also wants Dr. Baptiste to know that she has been tolerating the lorazepam just fine and that her body is getting used to it.  Kandice Suazo, MECHELLE  12/29/2022  3:15 PM

## 2022-12-29 NOTE — TELEPHONE ENCOUNTER
Mita's insurance is no longer going to cover the methocarbamol (muscle relaxer) - has preferred medications of: tizanidine or cyclobenzaprine.    Call the patient and see which of the preferred meds she would like sent into the pharmacy.  If none, may have to proceed with PA process.    Ella Baptiste, DO

## 2023-01-03 DIAGNOSIS — F41.3 OTHER MIXED ANXIETY DISORDERS: ICD-10-CM

## 2023-01-05 RX ORDER — LORAZEPAM 1 MG/1
TABLET ORAL
Qty: 90 TABLET | Refills: 5 | Status: SHIPPED | OUTPATIENT
Start: 2023-01-05 | End: 2023-06-20

## 2023-01-05 NOTE — TELEPHONE ENCOUNTER
Call Center staff states Pt called to check on refill status!    [facility] sent Rx request for the following:      Requested Prescriptions   Pending Prescriptions Disp Refills     LORazepam (ATIVAN) 1 MG tablet [Pharmacy Med Name: LORAZEPAM 1MG TABLETS] 90 tablet      Sig: TAKE 1 TABLET(1 MG) BY MOUTH EVERY 8 HOURS AS NEEDED FOR ANXIETY   Last Prescription Date:   11/8/22  Last Fill Qty/Refills:         90, R-1    Last Office Visit:              11/22/22   Future Office visit:           None    Last filled in telephone encounter, with instructions to follow up prn.    Kelle Bazan RN .............. 1/5/2023  1:47 PM

## 2023-06-12 DIAGNOSIS — G47.00 INSOMNIA, UNSPECIFIED TYPE: ICD-10-CM

## 2023-06-12 DIAGNOSIS — F41.1 ANXIETY STATE: ICD-10-CM

## 2023-06-12 DIAGNOSIS — E78.2 MIXED HYPERLIPIDEMIA: ICD-10-CM

## 2023-06-14 DIAGNOSIS — I10 ESSENTIAL HYPERTENSION: ICD-10-CM

## 2023-06-15 DIAGNOSIS — F41.3 OTHER MIXED ANXIETY DISORDERS: ICD-10-CM

## 2023-06-15 RX ORDER — ATORVASTATIN CALCIUM 20 MG/1
TABLET, FILM COATED ORAL
Qty: 90 TABLET | Refills: 0 | Status: SHIPPED | OUTPATIENT
Start: 2023-06-15 | End: 2023-07-12

## 2023-06-15 RX ORDER — TRAZODONE HYDROCHLORIDE 150 MG/1
TABLET ORAL
Qty: 90 TABLET | Refills: 0 | Status: SHIPPED | OUTPATIENT
Start: 2023-06-15 | End: 2023-09-12

## 2023-06-15 RX ORDER — ZOLPIDEM TARTRATE 6.25 MG/1
TABLET, FILM COATED, EXTENDED RELEASE ORAL
Qty: 30 TABLET | Refills: 0 | Status: SHIPPED | OUTPATIENT
Start: 2023-06-15 | End: 2023-07-12

## 2023-06-15 NOTE — TELEPHONE ENCOUNTER
[facility] sent Rx request for the following:       zolpidem ER (AMBIEN CR) 6.25 MG CR tablet [Pharmacy Med Name: ZOLPIDEM ER 6.25MG TABLETS] 30 tablet      Sig: TAKE 1 TABLET BY MOUTH AT BEDTIME     Last Prescription Date:   12/27/22  Last Fill Qty/Refills:         30, R-5        traZODone (DESYREL) 150 MG tablet [Pharmacy Med Name: TRAZODONE 150MG (HUNDRED-FIFTY) TAB] 90 tablet 2     Sig: TAKE 1 TABLET(150 MG) BY MOUTH AT BEDTIME     Last Prescription Date:   9/7/22  Last Fill Qty/Refills:         90, R-2        atorvastatin (LIPITOR) 20 MG tablet [Pharmacy Med Name: ATORVASTATIN 20MG TABLETS] 90 tablet 2     Sig: TAKE 1 TABLET(20 MG) BY MOUTH DAILY     Last Prescription Date:   9/7/22  Last Fill Qty/Refills:         90, R-2      Last Office Visit:              11/22/22 (Heather)   Future Office visit:           7/12/23    Imani Pate RN on 6/15/2023 at 3:35 PM

## 2023-06-16 RX ORDER — METOPROLOL SUCCINATE 50 MG/1
TABLET, EXTENDED RELEASE ORAL
Qty: 90 TABLET | Refills: 2 | Status: SHIPPED | OUTPATIENT
Start: 2023-06-16 | End: 2024-02-26

## 2023-06-20 RX ORDER — LORAZEPAM 1 MG/1
TABLET ORAL
Qty: 90 TABLET | Refills: 1 | Status: SHIPPED | OUTPATIENT
Start: 2023-06-20 | End: 2023-07-12

## 2023-06-20 NOTE — TELEPHONE ENCOUNTER
[facility] sent Rx request for the following:    LORAZEPAM 1MG TABLETS  Last Prescription Date:   1/5/23  Last Fill Qty/Refills:         90, R-5    Last Office Visit:              11/22/22   Future Office visit:           7/12/23  Nenita Lewis RN on 6/20/2023 at 2:18 PM

## 2023-06-22 DIAGNOSIS — M62.830 SPASM OF BACK MUSCLES: ICD-10-CM

## 2023-06-27 RX ORDER — METHOCARBAMOL 500 MG/1
TABLET, FILM COATED ORAL
Qty: 120 TABLET | Refills: 5 | Status: SHIPPED | OUTPATIENT
Start: 2023-06-27 | End: 2023-12-27

## 2023-06-27 NOTE — TELEPHONE ENCOUNTER
[facility] sent Rx request for the following:    METHOCARBAMOL 500MG TABLETS  Last Prescription Date:   12/29/22  Last Fill Qty/Refills:         120, R-5    Last Office Visit:              11/22/22   Future Office visit:           7/12/23  Neinta Lewis RN on 6/27/2023 at 9:33 AM

## 2023-07-11 NOTE — PROGRESS NOTES
"SUBJECTIVE:   Mita is a 67 year old who presents for Preventive Visit.    Are you in the first 12 months of your Medicare coverage?  No    Healthy Habits:     In general, how would you rate your overall health?  Fair    Frequency of exercise:  2-3 days/week    Duration of exercise:  15-30 minutes    Do you usually eat at least 4 servings of fruit and vegetables a day, include whole grains    & fiber and avoid regularly eating high fat or \"junk\" foods?  Yes    Taking medications regularly:  Yes    Medication side effects:  None    Ability to successfully perform activities of daily living:  No assistance needed    Home Safety:  No safety concerns identified    Hearing Impairment:  No hearing concerns    In the past 6 months, have you been bothered by leaking of urine? Yes    In general, how would you rate your overall mental or emotional health?  Good    Additional concerns today:  Yes      Have you ever done Advance Care Planning? (For example, a Health Directive, POLST, or a discussion with a medical provider or your loved ones about your wishes): Yes, advance care planning is on file.     Fall risk  Fallen 2 or more times in the past year?: Yes  Any fall with injury in the past year?: Yes    Cognitive Screening   1) Repeat 3 items (Leader, Season, Table)    2) Clock draw: NORMAL  3) 3 item recall: Recalls 3 objects  Results: 3 items recalled: COGNITIVE IMPAIRMENT LESS LIKELY    Mini-CogTM Copyright HERNANDEZ Eastman. Licensed by the author for use in [facility]; reprinted with permission ([email or web address]). All rights reserved.      Do you have sleep apnea, excessive snoring or daytime drowsiness?: no    Reviewed and updated as needed this visit by clinical staff   Tobacco  Allergies  Meds  Problems  Med Hx  Surg Hx  Fam Hx  Soc   Hx    Reviewed and updated as needed this visit by Provider   Tobacco  Allergies  Meds  Problems  Med Hx  Surg Hx  Fam Hx         Social History     Tobacco Use     " "Smoking status: Never     Smokeless tobacco: Never   Substance Use Topics     Alcohol use: No     Alcohol/week: 0.0 standard drinks of alcohol             7/12/2023     2:02 PM   Alcohol Use   Prescreen: >3 drinks/day or >7 drinks/week? No     Do you have a current opioid prescription? No  Do you use any other controlled substances or medications that are not prescribed by a provider? None    Back pain: \"really bad\"  Starting at base of neck to low back.  Last fall began worsening but has had \"back issues\" for years.  When asked what that meant to her or what her diagnoses were - she was unable to tell me.  \"It should be in there.\"  When neck pain is worse; she noticed that her essential tremor increases  Does some stretching each day.  Ibuprofen 400mg once a day if needed.  Methocarbamol is helpful as well (taking on average ~4-6 tabs/day).  Only feels like she can lay on her L side - other positions cause more neck pain.  She did have a recent CT of cervical spine due to neck pain/trauma and this showed: scattered multilevel degenerative changes (decrease in disc height C3-C7 disc spaces, uncovertebral spurring approaching neural foramina spaces (C4-5 and C5-6 on R; C5-6 on L and mild facet joint degenerative changes noted).  These changes are all relatively stable from 2018.    Constipation: \"really bad\"  Goes ~4-5 days - only with OTC laxative (Rafy MOM) and olive oil.  No blood; not particularly painful when this occurs.  Has cut out added sugars from their diets almost entirely, but no other recent dietary changes.  She has used Miralax in the past, which she states didn't work.  Telling me she took it twice a day.  Only caused \"little porter\" to come out.    Last colonoscopy: 6/2017 that was normal.  Had CT of abd 5/2022 with normal findings besides the large L ovarian cyst and moderate atherosclerotic changes of abdominal and iliac arteries.  + Weight loss; has continued to lose 8# in the last 6 months. " "  6/2018: 168#  6/2019: 175#  10/2020: 187#  2/2021: 193# **efforts made to attempt weight loss  5/2011: 176# **  6/2021: 160# **  5/2022: 150# **  7/2022: 140# **  10/2022: 132# ?not really attempting to loose more weight  11/2022: 130#  Today: 122#  Denies night sweats, easy bruising.  Feels weaker/more tired than normal.  Has essentially had brain, chest, abd imaging within the last ~14 months that has been relatively normal.  Will plan to follow up with ovarian cyst.      Ovarian Cyst: \"I should probably have this looked at again\"  Unaware of any symptoms being caused from this.  Has not had pelvic US recently to monitor ovarian cyst.  Last one was 12/2021 showing a simple cyst.  Has since had CTA of chest/abd showing large cyst on L ovary.    Current providers sharing in care for this patient include:   Patient Care Team:  Ella Baptiste DO as PCP - General (Family Practice)  Radu Ruvalcaba DPM as Assigned Musculoskeletal Provider  Joellen Romero PA-C as Assigned PCP    The following health maintenance items are reviewed in Epic and correct as of today:  Health Maintenance   Topic Date Due     Pneumococcal Vaccine: 65+ Years (3 - PPSV23 if available, else PCV20) 01/15/2022     INFLUENZA VACCINE (1) 09/01/2023     COVID-19 Vaccine (5 - Moderna series) 11/12/2023     MAMMO SCREENING  12/08/2023     PHQ-9  01/12/2024     MEDICARE ANNUAL WELLNESS VISIT  07/12/2024     TSH W/FREE T4 REFLEX  07/12/2024     FALL RISK ASSESSMENT  07/12/2024     LIPID  06/29/2026     COLORECTAL CANCER SCREENING  06/09/2027     ADVANCE CARE PLANNING  07/12/2028     DTAP/TDAP/TD IMMUNIZATION (3 - Td or Tdap) 01/16/2029     DEXA  08/03/2036     HEPATITIS C SCREENING  Completed     DEPRESSION ACTION PLAN  Completed     ZOSTER IMMUNIZATION  Completed     IPV IMMUNIZATION  Aged Out     MENINGITIS IMMUNIZATION  Aged Out     PAP  Discontinued         FHS-7:       12/8/2021     9:52 AM   Breast CA Risk Assessment (FHS-7)   Did any of " your first-degree relatives have breast or ovarian cancer? No   Did any of your relatives have bilateral breast cancer? No   Did any man in your family have breast cancer? No   Did any woman in your family have breast and ovarian cancer? No   Did any woman in your family have breast cancer before age 50 y? No   Do you have 2 or more relatives with breast and/or ovarian cancer? No   Do you have 2 or more relatives with breast and/or bowel cancer? No     Pap: 3/15/2016, neg co-testing.  NO longer needed.  Mammo: 12/8/21 (BiRads1). DUE  Dexa: 8/3/21, osteopenia with recent distal radial fracture. Declines bisphosphonate.  Colonoscopy: 6/9/2017, 10 years.  Tdap 1/16/2019, flu yearly; Shingrix complete.  PNA - DUE. Did not get a chance to discuss vaccines due to time constraints of appointment and significance of other additional concerns as outlined above.  Covid - consider bivalent vaccine     Review of Systems   Constitutional: Negative for chills and fever.   HENT: Positive for hearing loss. Negative for congestion, ear pain and sore throat.    Eyes: Positive for visual disturbance. Negative for pain.   Respiratory: Negative for cough and shortness of breath.    Cardiovascular: Negative for chest pain, palpitations and peripheral edema.   Gastrointestinal: Positive for constipation, diarrhea and nausea. Negative for abdominal pain, heartburn and hematochezia.   Breasts:  Negative for tenderness, breast mass and discharge.   Genitourinary: Positive for pelvic pain and urgency. Negative for dysuria, frequency, genital sores, hematuria, vaginal bleeding and vaginal discharge.   Musculoskeletal: Positive for arthralgias, joint swelling and myalgias.   Skin: Negative for rash.   Neurological: Positive for weakness and headaches. Negative for dizziness and paresthesias.   Psychiatric/Behavioral: Negative for mood changes. The patient is nervous/anxious.      OBJECTIVE:   /78   Pulse 69   Temp 96.8  F (36  C)  "(Tympanic)   Resp 20   Ht 1.594 m (5' 2.75\")   Wt 55.3 kg (122 lb)   LMP  (LMP Unknown)   SpO2 99%   BMI 21.78 kg/m   Estimated body mass index is 21.78 kg/m  as calculated from the following:    Height as of this encounter: 1.594 m (5' 2.75\").    Weight as of this encounter: 55.3 kg (122 lb).  Physical Exam  GENERAL: healthy but slightly frail/thin appearing, alert and no distress  EYES: Eyes grossly normal to inspection, PERRL and conjunctivae and sclerae normal  HENT: ear canals and TM's normal, nose and mouth without ulcers or lesions  NECK: no adenopathy, no asymmetry, masses, or scars and thyroid normal to palpation  RESP: lungs clear to auscultation - no rales, rhonchi or wheezes  CV: regular rate and rhythm, normal S1 S2, no S3 or S4, no murmur, click or rub, no peripheral edema and peripheral pulses strong  MS: normal range of motion and peripheral pulses normal  SKIN: no suspicious lesions or rashes  NEURO: Normal strength and tone, mentation intact and speech with slight staccato/airy quality - unchanged from previously.  PSYCH: mentation appears normal    Diagnostic Test Results:  Labs reviewed in Epic  Results for orders placed or performed in visit on 07/12/23   Comprehensive Metabolic Panel     Status: Abnormal   Result Value Ref Range    Sodium 131 (L) 136 - 145 mmol/L    Potassium 4.0 3.4 - 5.3 mmol/L    Chloride 92 (L) 98 - 107 mmol/L    Carbon Dioxide (CO2) 28 22 - 29 mmol/L    Anion Gap 11 7 - 15 mmol/L    Urea Nitrogen 14.1 8.0 - 23.0 mg/dL    Creatinine 0.68 0.51 - 0.95 mg/dL    Calcium 9.1 8.8 - 10.2 mg/dL    Glucose 91 70 - 99 mg/dL    Alkaline Phosphatase 83 35 - 104 U/L    AST 20 0 - 45 U/L    ALT 20 0 - 50 U/L    Protein Total 6.8 6.4 - 8.3 g/dL    Albumin 4.4 3.5 - 5.2 g/dL    Bilirubin Total 0.4 <=1.2 mg/dL    GFR Estimate >90 >60 mL/min/1.73m2   TSH Reflex GH     Status: Normal   Result Value Ref Range    TSH 1.06 0.30 - 4.20 uIU/mL   Sedimentation Rate (ESR)     Status: Normal "   Result Value Ref Range    Erythrocyte Sedimentation Rate 7 0 - 30 mm/hr   CRP inflammation     Status: Normal   Result Value Ref Range    CRP Inflammation <3.00 <5.00 mg/L   Hepatitis C Screen Reflex to HCV RNA Quant and Genotype     Status: Normal   Result Value Ref Range    Hepatitis C Antibody Nonreactive Nonreactive    Narrative    Assay performance characteristics have not been established for newborns, infants, and children.   CBC with platelets and differential     Status: None   Result Value Ref Range    WBC Count 5.8 4.0 - 11.0 10e3/uL    RBC Count 4.40 3.80 - 5.20 10e6/uL    Hemoglobin 12.9 11.7 - 15.7 g/dL    Hematocrit 38.6 35.0 - 47.0 %    MCV 88 78 - 100 fL    MCH 29.3 26.5 - 33.0 pg    MCHC 33.4 31.5 - 36.5 g/dL    RDW 11.9 10.0 - 15.0 %    Platelet Count 224 150 - 450 10e3/uL    % Neutrophils 56 %    % Lymphocytes 33 %    % Monocytes 9 %    % Eosinophils 1 %    % Basophils 1 %    % Immature Granulocytes 0 %    NRBCs per 100 WBC 0 <1 /100    Absolute Neutrophils 3.2 1.6 - 8.3 10e3/uL    Absolute Lymphocytes 1.9 0.8 - 5.3 10e3/uL    Absolute Monocytes 0.5 0.0 - 1.3 10e3/uL    Absolute Eosinophils 0.1 0.0 - 0.7 10e3/uL    Absolute Basophils 0.1 0.0 - 0.2 10e3/uL    Absolute Immature Granulocytes 0.0 <=0.4 10e3/uL    Absolute NRBCs 0.0 10e3/uL   CBC and Differential     Status: None    Narrative    The following orders were created for panel order CBC and Differential.  Procedure                               Abnormality         Status                     ---------                               -----------         ------                     CBC with platelets and d...[[id number]]                      Final result                 Please view results for these tests on the individual orders.       ASSESSMENT / PLAN:   1. Encounter for Medicare annual wellness exam  - CBC and Differential; Future  - Comprehensive Metabolic Panel; Future  - TSH Reflex GH; Future  - Sedimentation Rate (ESR); Future  - CRP  inflammation; Future  - HIV Antigen Antibody Combo; Future  - Hepatitis C Screen Reflex to HCV RNA Quant and Genotype; Future  - CBC and Differential  - Comprehensive Metabolic Panel  - TSH Reflex GH  - Sedimentation Rate (ESR)  - CRP inflammation  - HIV Antigen Antibody Combo  - Hepatitis C Screen Reflex to HCV RNA Quant and Genotype    2. Mixed hyperlipidemia  Chronic, with evidence of atherosclerosis on aorta/iliac arteries.  Continue on statin; consider increase but has significant MSK pain at this time.  - atorvastatin (LIPITOR) 20 MG tablet; Take 1 tablet (20 mg) by mouth daily  Dispense: 90 tablet; Refill: 4    3. Anxiety state  Chronic, likely contributing to pain/tremor.  Encouraged regular movement/activity.  Continue on Effexor XR at current dosing.  - venlafaxine (EFFEXOR XR) 150 MG 24 hr capsule; Take 1 capsule (150 mg) by mouth every morning  Dispense: 90 capsule; Refill: 4    4. Insomnia, unspecified type  Chronic, stable.  Refilled zolpidem ER as she has not had any adverse effects or adverse events.  - zolpidem ER (AMBIEN CR) 6.25 MG CR tablet; Take 1 tablet (6.25 mg) by mouth At Bedtime  Dispense: 30 tablet; Refill: 5    5. Other mixed anxiety disorders  Chronic, prn use.  She states this is helpful for calming her tremors and we discussed the central mechanism of benzodiazepines.  Should continue follow up with Neurology to address tremors and use lorazepam prn for anxiety only.  - LORazepam (ATIVAN) 1 MG tablet; TAKE 1 TABLET(1 MG) BY MOUTH EVERY 8 HOURS AS NEEDED FOR ANXIETY  Dispense: 90 tablet; Refill: 5    6. Unintentional weight loss  Labs as ordered.  Reassuring imaging within the last year has been reviewed.  Will consider EGD/Colonoscopy early if remainder of labs return normal.  - CBC and Differential; Future  - Comprehensive Metabolic Panel; Future  - TSH Reflex GH; Future  - Sedimentation Rate (ESR); Future  - CRP inflammation; Future  - HIV Antigen Antibody Combo; Future  - Hepatitis  C Screen Reflex to HCV RNA Quant and Genotype; Future  - CBC and Differential  - Comprehensive Metabolic Panel  - TSH Reflex GH  - Sedimentation Rate (ESR)  - CRP inflammation  - HIV Antigen Antibody Combo  - Hepatitis C Screen Reflex to HCV RNA Quant and Genotype    7. Encounter for screening for other viral diseases  Screening lab in light of unintentional weight loss.  - Hepatitis C Screen Reflex to HCV RNA Quant and Genotype; Future  - Hepatitis C Screen Reflex to HCV RNA Quant and Genotype    8. Visit for screening mammogram  - MA Screen Bilateral w/Edwin; Future    9. Cysts of both ovaries  Follow up imaging for L ovarian cyst.   - US Pelvic Complete with Transvaginal; Future    10. Hyponatremia  Chronic, 2/2 to hydrochlorothiazide.  Cut down to 12.5mg.  Recheck BMP and BP check in 2-4 weeks with Joellen Romero PA-C.    11. Essential tremor  Following with Neurology; updating primidone Rx to current regimen.  - primidone (MYSOLINE) 50 MG tablet; Take 0.5 tablets by mouth daily as needed and two tablets at bedtime (managed by Neurology)  Dispense: 30 tablet; Refill: 1    12. Essential hypertension  Chronic, stable today.  Cut down hydrochlorothiazide to 12.5mg.  If BP increasing with dose reduction, would recommend increasing her Lisinopril to 10mg daily.  - hydrochlorothiazide (HYDRODIURIL) 25 MG tablet; Take 0.5 tablets (12.5 mg) by mouth daily  Dispense: 45 tablet; Refill: 4    13. Constipation  Chronic, given instructions re: Miralax + Senna.  Goal - soft daily bowel movement.    May undergo early colonoscopy/EGD due to unintentional weight loss pending labs.    14. DDD, cervical  Chronic, worsening.  Discussed possible SpineX therapy, injections.  Mainstay of arthritis treatment: anti-inflammatories, activity reduction and regular exercise.  She will continue to consider options and let me know.  Encouraged BID ibuprofen for a period of time to monitor for improvement.    Patient has been advised of split  billing requirements and indicates understanding: Yes      COUNSELING:  Reviewed preventive health counseling, as reflected in patient instructions        She reports that she has never smoked. She has never used smokeless tobacco.      Appropriate preventive services were discussed with this patient, including applicable screening as appropriate for cardiovascular disease, diabetes, osteopenia/osteoporosis, and glaucoma.  As appropriate for age/gender, discussed screening for colorectal cancer, prostate cancer, breast cancer, and cervical cancer. Checklist reviewing preventive services available has been given to the patient.    Reviewed patients plan of care and provided an AVS. The Complex Care Plan (for patients with higher acuity and needing more deliberate coordination of services) for Mita meets the Care Plan requirement. This Care Plan has been established and reviewed with the Patient and spouse.          Ella Baptiste, [facility]    Identified Health Risks:    I have reviewed Opioid Use Disorder and Substance Use Disorder risk factors and made any needed referrals.     The patient's PHQ-9 score is consistent with mild depression. She was provided with information regarding depression and was advised to schedule a follow up appointment to further address this issue.  On Effexor XR.

## 2023-07-12 ENCOUNTER — OFFICE VISIT (OUTPATIENT)
Dept: FAMILY MEDICINE | Facility: OTHER | Age: 68
End: 2023-07-12
Attending: FAMILY MEDICINE
Payer: MEDICARE

## 2023-07-12 VITALS
HEART RATE: 69 BPM | TEMPERATURE: 96.8 F | RESPIRATION RATE: 20 BRPM | DIASTOLIC BLOOD PRESSURE: 78 MMHG | HEIGHT: 63 IN | BODY MASS INDEX: 21.62 KG/M2 | WEIGHT: 122 LBS | OXYGEN SATURATION: 99 % | SYSTOLIC BLOOD PRESSURE: 128 MMHG

## 2023-07-12 DIAGNOSIS — M50.30 DDD (DEGENERATIVE DISC DISEASE), CERVICAL: ICD-10-CM

## 2023-07-12 DIAGNOSIS — G25.0 ESSENTIAL TREMOR: ICD-10-CM

## 2023-07-12 DIAGNOSIS — E78.2 MIXED HYPERLIPIDEMIA: ICD-10-CM

## 2023-07-12 DIAGNOSIS — Z12.31 VISIT FOR SCREENING MAMMOGRAM: ICD-10-CM

## 2023-07-12 DIAGNOSIS — R63.4 UNINTENTIONAL WEIGHT LOSS: ICD-10-CM

## 2023-07-12 DIAGNOSIS — F41.1 ANXIETY STATE: ICD-10-CM

## 2023-07-12 DIAGNOSIS — G47.00 INSOMNIA, UNSPECIFIED TYPE: ICD-10-CM

## 2023-07-12 DIAGNOSIS — N83.201 CYSTS OF BOTH OVARIES: ICD-10-CM

## 2023-07-12 DIAGNOSIS — K59.09 CHRONIC CONSTIPATION: ICD-10-CM

## 2023-07-12 DIAGNOSIS — Z00.00 ENCOUNTER FOR MEDICARE ANNUAL WELLNESS EXAM: Primary | ICD-10-CM

## 2023-07-12 DIAGNOSIS — N83.202 CYSTS OF BOTH OVARIES: ICD-10-CM

## 2023-07-12 DIAGNOSIS — Z11.59 ENCOUNTER FOR SCREENING FOR OTHER VIRAL DISEASES: ICD-10-CM

## 2023-07-12 DIAGNOSIS — F41.3 OTHER MIXED ANXIETY DISORDERS: ICD-10-CM

## 2023-07-12 DIAGNOSIS — I10 ESSENTIAL HYPERTENSION: ICD-10-CM

## 2023-07-12 DIAGNOSIS — E87.1 HYPONATREMIA: ICD-10-CM

## 2023-07-12 LAB
ALBUMIN SERPL BCG-MCNC: 4.4 G/DL (ref 3.5–5.2)
ALP SERPL-CCNC: 83 U/L (ref 35–104)
ALT SERPL W P-5'-P-CCNC: 20 U/L (ref 0–50)
ANION GAP SERPL CALCULATED.3IONS-SCNC: 11 MMOL/L (ref 7–15)
AST SERPL W P-5'-P-CCNC: 20 U/L (ref 0–45)
BASOPHILS # BLD AUTO: 0.1 10E3/UL (ref 0–0.2)
BASOPHILS NFR BLD AUTO: 1 %
BILIRUB SERPL-MCNC: 0.4 MG/DL
BUN SERPL-MCNC: 14.1 MG/DL (ref 8–23)
CALCIUM SERPL-MCNC: 9.1 MG/DL (ref 8.8–10.2)
CHLORIDE SERPL-SCNC: 92 MMOL/L (ref 98–107)
CREAT SERPL-MCNC: 0.68 MG/DL (ref 0.51–0.95)
CRP SERPL-MCNC: <3 MG/L
DEPRECATED HCO3 PLAS-SCNC: 28 MMOL/L (ref 22–29)
EOSINOPHIL # BLD AUTO: 0.1 10E3/UL (ref 0–0.7)
EOSINOPHIL NFR BLD AUTO: 1 %
ERYTHROCYTE [DISTWIDTH] IN BLOOD BY AUTOMATED COUNT: 11.9 % (ref 10–15)
ERYTHROCYTE [SEDIMENTATION RATE] IN BLOOD BY WESTERGREN METHOD: 7 MM/HR (ref 0–30)
GFR SERPL CREATININE-BSD FRML MDRD: >90 ML/MIN/1.73M2
GLUCOSE SERPL-MCNC: 91 MG/DL (ref 70–99)
HCT VFR BLD AUTO: 38.6 % (ref 35–47)
HGB BLD-MCNC: 12.9 G/DL (ref 11.7–15.7)
IMM GRANULOCYTES # BLD: 0 10E3/UL
IMM GRANULOCYTES NFR BLD: 0 %
LYMPHOCYTES # BLD AUTO: 1.9 10E3/UL (ref 0.8–5.3)
LYMPHOCYTES NFR BLD AUTO: 33 %
MCH RBC QN AUTO: 29.3 PG (ref 26.5–33)
MCHC RBC AUTO-ENTMCNC: 33.4 G/DL (ref 31.5–36.5)
MCV RBC AUTO: 88 FL (ref 78–100)
MONOCYTES # BLD AUTO: 0.5 10E3/UL (ref 0–1.3)
MONOCYTES NFR BLD AUTO: 9 %
NEUTROPHILS # BLD AUTO: 3.2 10E3/UL (ref 1.6–8.3)
NEUTROPHILS NFR BLD AUTO: 56 %
NRBC # BLD AUTO: 0 10E3/UL
NRBC BLD AUTO-RTO: 0 /100
PLATELET # BLD AUTO: 224 10E3/UL (ref 150–450)
POTASSIUM SERPL-SCNC: 4 MMOL/L (ref 3.4–5.3)
PROT SERPL-MCNC: 6.8 G/DL (ref 6.4–8.3)
RBC # BLD AUTO: 4.4 10E6/UL (ref 3.8–5.2)
SODIUM SERPL-SCNC: 131 MMOL/L (ref 136–145)
TSH SERPL DL<=0.005 MIU/L-ACNC: 1.06 UIU/ML (ref 0.3–4.2)
WBC # BLD AUTO: 5.8 10E3/UL (ref 4–11)

## 2023-07-12 PROCEDURE — 0124A COVID-19 BIVALENT 12+ (PFIZER): CPT

## 2023-07-12 PROCEDURE — 80053 COMPREHEN METABOLIC PANEL: CPT | Mod: ZL | Performed by: FAMILY MEDICINE

## 2023-07-12 PROCEDURE — 91312 COVID-19 BIVALENT 12+ (PFIZER): CPT

## 2023-07-12 PROCEDURE — 86140 C-REACTIVE PROTEIN: CPT | Mod: ZL | Performed by: FAMILY MEDICINE

## 2023-07-12 PROCEDURE — 85025 COMPLETE CBC W/AUTO DIFF WBC: CPT | Mod: ZL | Performed by: FAMILY MEDICINE

## 2023-07-12 PROCEDURE — 99214 OFFICE O/P EST MOD 30 MIN: CPT | Mod: 25 | Performed by: FAMILY MEDICINE

## 2023-07-12 PROCEDURE — 86803 HEPATITIS C AB TEST: CPT | Mod: ZL | Performed by: FAMILY MEDICINE

## 2023-07-12 PROCEDURE — 85652 RBC SED RATE AUTOMATED: CPT | Mod: ZL | Performed by: FAMILY MEDICINE

## 2023-07-12 PROCEDURE — G0439 PPPS, SUBSEQ VISIT: HCPCS | Performed by: FAMILY MEDICINE

## 2023-07-12 PROCEDURE — 87389 HIV-1 AG W/HIV-1&-2 AB AG IA: CPT | Mod: ZL,GZ | Performed by: FAMILY MEDICINE

## 2023-07-12 PROCEDURE — G0463 HOSPITAL OUTPT CLINIC VISIT: HCPCS | Mod: 25

## 2023-07-12 PROCEDURE — 84443 ASSAY THYROID STIM HORMONE: CPT | Mod: ZL | Performed by: FAMILY MEDICINE

## 2023-07-12 PROCEDURE — 36415 COLL VENOUS BLD VENIPUNCTURE: CPT | Mod: ZL | Performed by: FAMILY MEDICINE

## 2023-07-12 RX ORDER — LORAZEPAM 1 MG/1
TABLET ORAL
Qty: 90 TABLET | Refills: 5 | Status: SHIPPED | OUTPATIENT
Start: 2023-07-12 | End: 2023-09-13

## 2023-07-12 RX ORDER — ATORVASTATIN CALCIUM 20 MG/1
20 TABLET, FILM COATED ORAL DAILY
Qty: 90 TABLET | Refills: 4 | Status: SHIPPED | OUTPATIENT
Start: 2023-07-12 | End: 2024-07-16

## 2023-07-12 RX ORDER — VENLAFAXINE HYDROCHLORIDE 150 MG/1
150 CAPSULE, EXTENDED RELEASE ORAL EVERY MORNING
Qty: 90 CAPSULE | Refills: 4 | Status: SHIPPED | OUTPATIENT
Start: 2023-07-12 | End: 2024-07-16

## 2023-07-12 RX ORDER — ZOLPIDEM TARTRATE 6.25 MG/1
6.25 TABLET, FILM COATED, EXTENDED RELEASE ORAL AT BEDTIME
Qty: 30 TABLET | Refills: 5 | Status: SHIPPED | OUTPATIENT
Start: 2023-07-12 | End: 2023-12-29

## 2023-07-12 ASSESSMENT — ENCOUNTER SYMPTOMS
HEADACHES: 1
DIARRHEA: 1
CONSTIPATION: 1
HEMATOCHEZIA: 0
BREAST MASS: 0
DIZZINESS: 0
JOINT SWELLING: 1
HEARTBURN: 0
HEMATURIA: 0
FEVER: 0
FREQUENCY: 0
COUGH: 0
PARESTHESIAS: 0
SORE THROAT: 0
NERVOUS/ANXIOUS: 1
ARTHRALGIAS: 1
CHILLS: 0
ABDOMINAL PAIN: 0
SHORTNESS OF BREATH: 0
WEAKNESS: 1
PALPITATIONS: 0
MYALGIAS: 1
DYSURIA: 0
EYE PAIN: 0
NAUSEA: 1

## 2023-07-12 ASSESSMENT — ACTIVITIES OF DAILY LIVING (ADL): CURRENT_FUNCTION: NO ASSISTANCE NEEDED

## 2023-07-12 ASSESSMENT — PAIN SCALES - GENERAL: PAINLEVEL: EXTREME PAIN (8)

## 2023-07-12 ASSESSMENT — PATIENT HEALTH QUESTIONNAIRE - PHQ9
10. IF YOU CHECKED OFF ANY PROBLEMS, HOW DIFFICULT HAVE THESE PROBLEMS MADE IT FOR YOU TO DO YOUR WORK, TAKE CARE OF THINGS AT HOME, OR GET ALONG WITH OTHER PEOPLE: SOMEWHAT DIFFICULT
SUM OF ALL RESPONSES TO PHQ QUESTIONS 1-9: 8
SUM OF ALL RESPONSES TO PHQ QUESTIONS 1-9: 8

## 2023-07-12 NOTE — NURSING NOTE
"Chief Complaint   Patient presents with     Medicare Visit       Initial /78   Pulse 69   Temp 96.8  F (36  C) (Tympanic)   Resp 20   Ht 1.594 m (5' 2.75\")   Wt 55.3 kg (122 lb)   LMP  (LMP Unknown)   SpO2 99%   BMI 21.78 kg/m   Estimated body mass index is 21.78 kg/m  as calculated from the following:    Height as of this encounter: 1.594 m (5' 2.75\").    Weight as of this encounter: 55.3 kg (122 lb).  Medication Reconciliation: complete    Kandice Suazo LPN     Advanced Care Directive reviewed.     "

## 2023-07-12 NOTE — PATIENT INSTRUCTIONS
-- Start polyethylene glycol (MiraLax) 2 capful two times a day for a few days, then cut back dose.  Most likely you'll be using 1 capful daily after a few days   -- MiraLax is safe for you to adjust the dose yourself at home. Changes in dose take 12-24 hours to show an effect   -- Progression will be small hard pellet stool, hard log stool, soft stool.  Expect some liquid stool as well.  Goal soft formed daily stool (applesauce consistency stools)   -- After 2-3 days, if you still feel constipated the next step up is to add Senna 1-2 tablets twice a day   -- Use MiraLax daily for at least a month to allow colon to regain strength   -- Drink more water   -- Eat more fruits and vegetables   -- No fiber supplements until at least 1 month out.  Fiber containing foods okay.   -- MiraLax is safe to use indefinitely   -- After 1 month, consider switching from MiraLax to daily fiber supplement (eg psyllium/Metamucil or methylcellulose/Citrucel 1 tbsp daily in at least 8 oz water).         Patient Education   Personalized Prevention Plan  You are due for the preventive services outlined below.  Your care team is available to assist you in scheduling these services.  If you have already completed any of these items, please share that information with your care team to update in your medical record.  Health Maintenance Due   Topic Date Due    COVID-19 Vaccine (4 - Moderna series) 12/21/2021    Pneumococcal Vaccine (3 - PPSV23 if available, else PCV20) 01/15/2022       Depression and Suicide in Older Adults  Nearly 2 million older adults in the [place] have some type of depression. Some of them even take their own lives. Yet depression among older adults is often ignored. Learning about the warning signs of depression may help spare a loved one needless pain. You may also save a life.   What is depression?  Depression is a common and serious illness. It affects the way you think and feel. It is not a normal part of aging. It is  "not a sign of weakness, a character flaw, or something you can \"snap out of.\" Most people with depression need treatment to get better. The most common symptom is a feeling of deep sadness. People who are depressed also may seem tired and listless. And nothing seems to give them pleasure. It s normal to grieve or be sad sometimes. But sadness lessens or passes with time. Depression rarely goes away or improves on its own. Other symptoms of depression are:   Sleeping more or less than normal  Eating more or less than normal  Having headaches, stomachaches, or other pains that don t go away  Feeling nervous,  empty,  or worthless  Crying a lot  Thinking or talking about suicide or death  Loss of interest in activities previously enjoyed  Social isolation  Feeling confused or forgetful  What causes it?  The causes of depression aren t fully known. But it is thought to result from a complex blend of these factors:   Biochemistry. Certain chemicals in the brain play a role.  Genes. Depression does run in families.  Life stress. Life stresses can also trigger depression in some people. Older adults often face many stressors. These may include isolation, the death of friends or a spouse, health problems, and financial concerns.  Chronic health conditions. This includes diabetes, heart disease, or cancer. These can cause symptoms of depression. Medicine side effects can cause changes in thoughts and behaviors.  Giving support    Depressed people often may not want to ask for help. When they do, they may be ignored. Or they may get the wrong treatment. You can help by showing parents and older friends love and support. If they seem depressed, don t lecture the person or ignore the symptoms. Don't discount the symptoms as a  normal  part of aging. They are not. Get involved, listen, and show interest and support.   Help them understand that depression is a treatable illness. Tell them you can help them find the right " treatment. Offer to go to their healthcare provider's appointment with them for support when the symptoms are discussed. With their approval, contact a local mental health center, social service agency, or hospital about services.   Helping at healthcare visits  You can be an advocate for them at healthcare appointments. Many older adults have chronic illnesses. Many of these can cause symptoms of depression. Medicine side effects can change thoughts and behaviors.   You can help make sure that the healthcare provider looks at all of these factors. They should refer your family member or friend to a mental healthcare provider when needed. In some cases, untreated depression can lead to a misdiagnosis. A person may be diagnosed with a brain disorder such as dementia. If the healthcare provider does not take the issue of depression seriously, help your family member or friend find another provider.   Asking about self-harm thoughts  If you think an older person you care about could be suicidal, ask,  Have you thought about suicide?  Most people will tell you the truth. If they say yes, they may already have a plan for how and when they will attempt it. Find out as much as you can. The more detailed the plan, and the easier it is to carry out, the more danger the person is in right now. Tell the person you are there for them and you do not want them to get harmed. Don't wait to get help for the person. Call the person's healthcare provider, local hospital, or emergency services.   Call 988 in a crisis   Never leave the person alone. A person who is actively suicidal needs crisis care right away. They need constant supervision. Never leave the person out of sight. Call 988 Tell the crisis counselor you need help for a person who is thinking about suicide. The counselor will help you get the right level of crisis help. You may be advised to take the person to the nearest emergency room.   The National Suicide Prevention  Lifeline is available at 988, or 855-547-RAAO (124-692-4971), or www.suicidepreventionlifeline.org. When you call or text 288, you will be connected to trained counselors who are part of the National Suicide Prevention Lifeline network. An online chat option is also available. Lifeline is free and available 24/7.   To learn more  National Suicide Prevention Lifeline at www.suicidepreventionlifeline.org  or 779-216-NBQG (088-920-4236)  National [place] on Mental Illness at www.[place].org  or 006-049-QUEN (055-085-0334)  Mental Health [place] at www.nmha.org  or 029-291-5284  National [place] of Mental Health at www.nimh.nih.gov  or 486-503-3225    [place] last reviewed this educational content on 7/1/2022 2000-2023 [employer]. All rights reserved. This information is not intended as a substitute for professional medical care. Always follow your healthcare professional's instructions.

## 2023-07-13 LAB — HCV AB SERPL QL IA: NONREACTIVE

## 2023-07-14 ENCOUNTER — TELEPHONE (OUTPATIENT)
Dept: SURGERY | Facility: OTHER | Age: 68
End: 2023-07-14
Payer: MEDICARE

## 2023-07-14 LAB — HIV 1+2 AB+HIV1 P24 AG SERPL QL IA: NONREACTIVE

## 2023-07-14 RX ORDER — PRIMIDONE 50 MG/1
50 TABLET ORAL
Qty: 30 TABLET | Refills: 1 | COMMUNITY
Start: 2023-07-14 | End: 2024-07-16

## 2023-07-14 RX ORDER — HYDROCHLOROTHIAZIDE 25 MG/1
12.5 TABLET ORAL DAILY
Qty: 45 TABLET | Refills: 4 | Status: SHIPPED | OUTPATIENT
Start: 2023-07-14 | End: 2023-11-16

## 2023-07-14 ASSESSMENT — ENCOUNTER SYMPTOMS
FREQUENCY: 0
SORE THROAT: 0
MYALGIAS: 1
ABDOMINAL PAIN: 0
FEVER: 0
DIARRHEA: 1
SHORTNESS OF BREATH: 0
DYSURIA: 0
DIZZINESS: 0
JOINT SWELLING: 1
HEARTBURN: 0
HEMATURIA: 0
HEADACHES: 1
PARESTHESIAS: 0
CONSTIPATION: 1
HEMATOCHEZIA: 0
ARTHRALGIAS: 1
CHILLS: 0
EYE PAIN: 0
NAUSEA: 1
COUGH: 0
NERVOUS/ANXIOUS: 1
WEAKNESS: 1
BREAST MASS: 0
PALPITATIONS: 0

## 2023-07-14 ASSESSMENT — ACTIVITIES OF DAILY LIVING (ADL): CURRENT_FUNCTION: NO ASSISTANCE NEEDED

## 2023-07-14 NOTE — TELEPHONE ENCOUNTER
[facility] Diagnostic Referral    Patient has a referral for a c-scope and EGD with a diagnosis of Unintentional weight loss  Chronic constipation.    Please advise.    Thank you,    Pippa Jones on 7/14/2023 at 8:40 AM

## 2023-08-04 DIAGNOSIS — Z12.11 ENCOUNTER FOR SCREENING COLONOSCOPY: Primary | ICD-10-CM

## 2023-08-04 NOTE — TELEPHONE ENCOUNTER
Screening Questions for the Scheduling of Screening Colonoscopies   (If Colonoscopy is diagnostic, Provider should review the chart before scheduling.)  Are you younger than 50 or older than 80?  NO  Do you take aspirin or fish oil?  ASPIRIN AND FISH OIL (if yes, tell patient to stop 1 week prior to Colonoscopy)  Do you take warfarin (Coumadin), clopidogrel (Plavix), apixaban (Eliquis), dabigatram (Pradaxa), rivaroxaban (Xarelto) or any blood thinner? NO  Do you use oxygen at home?  NO  Do you have kidney disease? NO  Are you on dialysis? NO  Have you had a stroke or heart attack in the last year? NO  Have you had a stent in your heart or any blood vessel in the last year? NO  Have you had a transplant of any organ? NO  Have you had a colonoscopy or upper endoscopy (EGD) before? YES         When?  2017  Date of scheduled Colonoscopy. 10/10/2023  Provider [facility]armacy [facility]

## 2023-08-14 ENCOUNTER — TELEPHONE (OUTPATIENT)
Dept: FAMILY MEDICINE | Facility: OTHER | Age: 68
End: 2023-08-14
Payer: MEDICARE

## 2023-08-14 RX ORDER — POLYETHYLENE GLYCOL 3350, SODIUM CHLORIDE, SODIUM BICARBONATE, POTASSIUM CHLORIDE 420; 11.2; 5.72; 1.48 G/4L; G/4L; G/4L; G/4L
4000 POWDER, FOR SOLUTION ORAL ONCE
Qty: 4000 ML | Refills: 0 | Status: SHIPPED | OUTPATIENT
Start: 2023-10-03 | End: 2023-10-03

## 2023-08-14 RX ORDER — BISACODYL 5 MG/1
TABLET, DELAYED RELEASE ORAL
Qty: 2 TABLET | Refills: 0 | Status: ON HOLD | OUTPATIENT
Start: 2023-10-03 | End: 2023-10-10

## 2023-08-14 NOTE — TELEPHONE ENCOUNTER
We received a fax from [employer] on Behalf of Medicare [employer] Denying the RX PA Request that was submitted for Zolpidem Tartrate ER 6.25 MG Tablets Extended Release stating that the patient must try and fail 1 additional drug for her condition.  The patient may not have to try these drugs if the prescriber provides information that shows why the covered drugs are not right for the patient.  The recommended drugs may include:  - doxepin (3mg, 6mg)  - ramelteon  - temazepam (15mg, 30 mg)    For a Standard Appeal,  Call [phone]  OR  Fax to [phone]  OR  Plan Website:  [email or web address]  OR  Mail to:  Clinical Review Department  [address] [place] [address]    For an Expedited Appeal,  Call [phone]  OR  Fax to [phone]    I faxed the denial/appeal information to HIS/HIM to be scanned into the chart.    Melissa Lind on 8/14/2023 at 2:17 PM

## 2023-08-18 ENCOUNTER — MYC MEDICAL ADVICE (OUTPATIENT)
Dept: FAMILY MEDICINE | Facility: OTHER | Age: 68
End: 2023-08-18
Payer: MEDICARE

## 2023-08-18 NOTE — TELEPHONE ENCOUNTER
Called pharmacy and this insurance issue with the Ambien is a non-issue and patient has already picked this up, there is nothing further that needs to be done.     Imani Gaines RN on 8/18/2023 at 4:03 PM

## 2023-08-18 NOTE — TELEPHONE ENCOUNTER
"Called patient to clarify what \"physical appointment\" she is talking about. She believes that she has to have a pre-op history and physical. Verified with Pippa Jones in scope scheduling that a pre-op physical was not necessary being that she had an annual Medicare Wellness visit on 7/12/23.  Updated patient and she is aware, will not schedule additional pre-op physical.     Imani Gaines RN on 8/18/2023 at 4:51 PM    "

## 2023-08-18 NOTE — TELEPHONE ENCOUNTER
Has tried some medications previously when she lived in [place] - do not have records of these.    Message sent to patient with information re: doxepin and ramelteon (already on a benzodiazepine, therefore I would avoid temazepam).    She will get back to me of which medication she prefers that I send to the pharmacy.    Ella Baptiste, DO on 8/18/2023 at 5:40 AM

## 2023-09-12 DIAGNOSIS — F41.1 ANXIETY STATE: ICD-10-CM

## 2023-09-12 RX ORDER — TRAZODONE HYDROCHLORIDE 150 MG/1
TABLET ORAL
Qty: 90 TABLET | Refills: 0 | Status: SHIPPED | OUTPATIENT
Start: 2023-09-12 | End: 2023-09-13

## 2023-09-12 NOTE — H&P (VIEW-ONLY)
Assessment & Plan     1. Essential tremor  Chronic, waxes and wanes.  Follows with Neurology.  History of alcohol abuse likely a contributor as well.  On primidone and now on 50mg AM; 100mg PM.   Also claims that lorazepam is helpful at managing her tremors; and discussed this today.    2. Anxiety state  Chronic, has declined daily/maintenance therapy in the past.  Using lorazepam for anxiety and tremor symptoms.   Refilled.  - traZODone (DESYREL) 150 MG tablet; Take 1 tablet (150 mg) by mouth At Bedtime  Dispense: 90 tablet; Refill: 4  - LORazepam (ATIVAN) 1 MG tablet; TAKE 1 TABLET(1 MG) BY MOUTH EVERY 8 HOURS AS NEEDED FOR ANXIETY  Dispense: 90 tablet; Refill: 5    3. Primary hypertension  Chronic, stable.  If further increase, will up her Lisinopril dose.  Not following recommendations of 1/2 tablet of hydrochlorothiazide.  Recheck BMP in ~6-12 months.    4. Constipation, unspecified constipation type  Chronic, improved on Mg2+ supplement.  Has upcoming colonoscopy for evaluation of constipation and weight loss.    5. Need for pneumococcal vaccination  Given today.  - GH IMM-  PNEUMOCOCCAL CONJ VACCINE 13 VALENT IM    MDM:  Prescription drug management  36 minutes spent by me on the date of the encounter doing chart review, history and exam, documentation and further activities per the note    Follow up in 6-12 months.    Ella Baptiste, [facility]    Subjective   Mita is a 67 year old, presenting for the following health issues:  RECHECK        9/13/2023     1:23 PM   Additional Questions   Roomed by MECHELLE Woodson   Accompanied by spouse       History of Present Illness       Reason for visit:  Overall follow-up (constipation)    She eats 2-3 servings of fruits and vegetables daily.She consumes 0 sweetened beverage(s) daily.She exercises with enough effort to increase her heart rate 9 or less minutes per day.  She exercises with enough effort to increase her heart rate 3 or less  "days per week.   She is taking medications regularly.       Mita is here for a 2 month follow up re: multiple concerns.  Had her Medicare Wellness visit in 7/2023.    At that time we were planning to follow up on imaging of L ovarian cyst, but Mita has not scheduled this yet.  Looked like a simple cyst on imaging ~2-3 years ago; but was a 6+cm cyst on a CT scan of abdomen done through the ER.  Has not had follow up since that time.  Plans to schedule this after her colonoscopy.    Unintentional weight loss - lab evaluation was normal.  Has EGD/Colonoscopy scheduled for 10/10/23.  Stable since last visit.    BP adjustment - reduced her hydrochlorothiazide to 12.5mg daily for a few weeks, but is back to 25mg daily.  BP is borderline at this time.    Constipation:  Miralax - at the end was taking 1.5 capfuls daily.  Started off by taking 2 capfuls twice daily.  \"Was not a good thing\" with no further details given.  Upon asking different ways, was able to understand that she was going multiple times as small amounts throughout the day and \"you wouldn't want to fly [employer] at that time.\"     Magnesium tablets 200mg - has found that this is the most helpful.  Last good bowel movement: earlier today.         Objective    /86   Pulse 71   Temp 98.1  F (36.7  C) (Tympanic)   Resp 20   Wt 55.1 kg (121 lb 6 oz)   LMP  (LMP Unknown)   SpO2 100%   BMI 21.67 kg/m    Body mass index is 21.67 kg/m .  Physical Exam   GENERAL: healthy, alert and no distress  NECK: no adenopathy, no asymmetry, masses, or scars and thyroid normal to palpation  RESP: lungs clear to auscultation - no rales, rhonchi or wheezes  CV: regular rate and rhythm, normal S1 S2, no S3 or S4, no murmur, click or rub, no peripheral edema and peripheral pulses strong    No results found for any visits on 09/13/23.      Answers submitted by the patient for this visit:  Patient Health Questionnaire (Submitted on 9/13/2023)  If you checked off any " problems, how difficult have these problems made it for you to do your work, take care of things at home, or get along with other people?: Somewhat difficult  PHQ9 TOTAL SCORE: 7

## 2023-09-12 NOTE — PROGRESS NOTES
Assessment & Plan     1. Essential tremor  Chronic, waxes and wanes.  Follows with Neurology.  History of alcohol abuse likely a contributor as well.  On primidone and now on 50mg AM; 100mg PM.   Also claims that lorazepam is helpful at managing her tremors; and discussed this today.    2. Anxiety state  Chronic, has declined daily/maintenance therapy in the past.  Using lorazepam for anxiety and tremor symptoms.   Refilled.  - traZODone (DESYREL) 150 MG tablet; Take 1 tablet (150 mg) by mouth At Bedtime  Dispense: 90 tablet; Refill: 4  - LORazepam (ATIVAN) 1 MG tablet; TAKE 1 TABLET(1 MG) BY MOUTH EVERY 8 HOURS AS NEEDED FOR ANXIETY  Dispense: 90 tablet; Refill: 5    3. Primary hypertension  Chronic, stable.  If further increase, will up her Lisinopril dose.  Not following recommendations of 1/2 tablet of hydrochlorothiazide.  Recheck BMP in ~6-12 months.    4. Constipation, unspecified constipation type  Chronic, improved on Mg2+ supplement.  Has upcoming colonoscopy for evaluation of constipation and weight loss.    5. Need for pneumococcal vaccination  Given today.  - GH IMM-  PNEUMOCOCCAL CONJ VACCINE 13 VALENT IM    MDM:  Prescription drug management  36 minutes spent by me on the date of the encounter doing chart review, history and exam, documentation and further activities per the note    Follow up in 6-12 months.    Ella Baptiste, [facility]    Subjective   Mita is a 67 year old, presenting for the following health issues:  RECHECK        9/13/2023     1:23 PM   Additional Questions   Roomed by MECHELLE Woodson   Accompanied by spouse       History of Present Illness       Reason for visit:  Overall follow-up (constipation)    She eats 2-3 servings of fruits and vegetables daily.She consumes 0 sweetened beverage(s) daily.She exercises with enough effort to increase her heart rate 9 or less minutes per day.  She exercises with enough effort to increase her heart rate 3 or less  "days per week.   She is taking medications regularly.       Mita is here for a 2 month follow up re: multiple concerns.  Had her Medicare Wellness visit in 7/2023.    At that time we were planning to follow up on imaging of L ovarian cyst, but Mita has not scheduled this yet.  Looked like a simple cyst on imaging ~2-3 years ago; but was a 6+cm cyst on a CT scan of abdomen done through the ER.  Has not had follow up since that time.  Plans to schedule this after her colonoscopy.    Unintentional weight loss - lab evaluation was normal.  Has EGD/Colonoscopy scheduled for 10/10/23.  Stable since last visit.    BP adjustment - reduced her hydrochlorothiazide to 12.5mg daily for a few weeks, but is back to 25mg daily.  BP is borderline at this time.    Constipation:  Miralax - at the end was taking 1.5 capfuls daily.  Started off by taking 2 capfuls twice daily.  \"Was not a good thing\" with no further details given.  Upon asking different ways, was able to understand that she was going multiple times as small amounts throughout the day and \"you wouldn't want to fly [employer] at that time.\"     Magnesium tablets 200mg - has found that this is the most helpful.  Last good bowel movement: earlier today.         Objective    /86   Pulse 71   Temp 98.1  F (36.7  C) (Tympanic)   Resp 20   Wt 55.1 kg (121 lb 6 oz)   LMP  (LMP Unknown)   SpO2 100%   BMI 21.67 kg/m    Body mass index is 21.67 kg/m .  Physical Exam   GENERAL: healthy, alert and no distress  NECK: no adenopathy, no asymmetry, masses, or scars and thyroid normal to palpation  RESP: lungs clear to auscultation - no rales, rhonchi or wheezes  CV: regular rate and rhythm, normal S1 S2, no S3 or S4, no murmur, click or rub, no peripheral edema and peripheral pulses strong    No results found for any visits on 09/13/23.      Answers submitted by the patient for this visit:  Patient Health Questionnaire (Submitted on 9/13/2023)  If you checked off any " problems, how difficult have these problems made it for you to do your work, take care of things at home, or get along with other people?: Somewhat difficult  PHQ9 TOTAL SCORE: 7

## 2023-09-13 ENCOUNTER — OFFICE VISIT (OUTPATIENT)
Dept: FAMILY MEDICINE | Facility: OTHER | Age: 68
End: 2023-09-13
Attending: FAMILY MEDICINE
Payer: MEDICARE

## 2023-09-13 VITALS
BODY MASS INDEX: 21.67 KG/M2 | HEART RATE: 71 BPM | WEIGHT: 121.38 LBS | SYSTOLIC BLOOD PRESSURE: 138 MMHG | TEMPERATURE: 98.1 F | DIASTOLIC BLOOD PRESSURE: 86 MMHG | OXYGEN SATURATION: 100 % | RESPIRATION RATE: 20 BRPM

## 2023-09-13 DIAGNOSIS — G25.0 ESSENTIAL TREMOR: Primary | ICD-10-CM

## 2023-09-13 DIAGNOSIS — I10 PRIMARY HYPERTENSION: ICD-10-CM

## 2023-09-13 DIAGNOSIS — Z23 NEED FOR PNEUMOCOCCAL VACCINATION: ICD-10-CM

## 2023-09-13 DIAGNOSIS — K59.00 CONSTIPATION, UNSPECIFIED CONSTIPATION TYPE: ICD-10-CM

## 2023-09-13 DIAGNOSIS — F41.1 ANXIETY STATE: ICD-10-CM

## 2023-09-13 PROCEDURE — G0463 HOSPITAL OUTPT CLINIC VISIT: HCPCS | Mod: 25

## 2023-09-13 PROCEDURE — 90670 PCV13 VACCINE IM: CPT

## 2023-09-13 PROCEDURE — 99214 OFFICE O/P EST MOD 30 MIN: CPT | Performed by: FAMILY MEDICINE

## 2023-09-13 RX ORDER — LORAZEPAM 1 MG/1
TABLET ORAL
Qty: 90 TABLET | Refills: 5 | Status: SHIPPED | OUTPATIENT
Start: 2023-09-13 | End: 2024-03-01

## 2023-09-13 RX ORDER — TRAZODONE HYDROCHLORIDE 150 MG/1
150 TABLET ORAL AT BEDTIME
Qty: 90 TABLET | Refills: 4 | Status: SHIPPED | OUTPATIENT
Start: 2023-09-13 | End: 2024-07-16

## 2023-09-13 ASSESSMENT — ANXIETY QUESTIONNAIRES
IF YOU CHECKED OFF ANY PROBLEMS ON THIS QUESTIONNAIRE, HOW DIFFICULT HAVE THESE PROBLEMS MADE IT FOR YOU TO DO YOUR WORK, TAKE CARE OF THINGS AT HOME, OR GET ALONG WITH OTHER PEOPLE: SOMEWHAT DIFFICULT
GAD7 TOTAL SCORE: 3
3. WORRYING TOO MUCH ABOUT DIFFERENT THINGS: NOT AT ALL
5. BEING SO RESTLESS THAT IT IS HARD TO SIT STILL: SEVERAL DAYS
1. FEELING NERVOUS, ANXIOUS, OR ON EDGE: SEVERAL DAYS
GAD7 TOTAL SCORE: 3
7. FEELING AFRAID AS IF SOMETHING AWFUL MIGHT HAPPEN: NOT AT ALL
2. NOT BEING ABLE TO STOP OR CONTROL WORRYING: NOT AT ALL
6. BECOMING EASILY ANNOYED OR IRRITABLE: NOT AT ALL
4. TROUBLE RELAXING: SEVERAL DAYS

## 2023-09-13 ASSESSMENT — PATIENT HEALTH QUESTIONNAIRE - PHQ9
SUM OF ALL RESPONSES TO PHQ QUESTIONS 1-9: 7
10. IF YOU CHECKED OFF ANY PROBLEMS, HOW DIFFICULT HAVE THESE PROBLEMS MADE IT FOR YOU TO DO YOUR WORK, TAKE CARE OF THINGS AT HOME, OR GET ALONG WITH OTHER PEOPLE: SOMEWHAT DIFFICULT
SUM OF ALL RESPONSES TO PHQ QUESTIONS 1-9: 7

## 2023-09-13 ASSESSMENT — PAIN SCALES - GENERAL: PAINLEVEL: SEVERE PAIN (7)

## 2023-09-13 NOTE — NURSING NOTE
"Chief Complaint   Patient presents with    RECHECK       Initial /86   Pulse 71   Temp 98.1  F (36.7  C) (Tympanic)   Resp 20   Wt 55.1 kg (121 lb 6 oz)   LMP  (LMP Unknown)   SpO2 100%   BMI 21.67 kg/m   Estimated body mass index is 21.67 kg/m  as calculated from the following:    Height as of 7/12/23: 1.594 m (5' 2.75\").    Weight as of this encounter: 55.1 kg (121 lb 6 oz).  Medication Reconciliation: complete    Kandice Suazo LPN  Advanced Care Directive reviewed.       "

## 2023-10-10 ENCOUNTER — ANESTHESIA (OUTPATIENT)
Dept: SURGERY | Facility: OTHER | Age: 68
End: 2023-10-10
Payer: MEDICARE

## 2023-10-10 ENCOUNTER — ANESTHESIA EVENT (OUTPATIENT)
Dept: SURGERY | Facility: OTHER | Age: 68
End: 2023-10-10
Payer: MEDICARE

## 2023-10-10 ENCOUNTER — HOSPITAL ENCOUNTER (OUTPATIENT)
Facility: OTHER | Age: 68
Discharge: HOME | End: 2023-10-10
Attending: SURGERY | Admitting: SURGERY
Payer: MEDICARE

## 2023-10-10 VITALS
WEIGHT: 121 LBS | TEMPERATURE: 97.4 F | OXYGEN SATURATION: 94 % | BODY MASS INDEX: 22.26 KG/M2 | SYSTOLIC BLOOD PRESSURE: 149 MMHG | HEIGHT: 62 IN | DIASTOLIC BLOOD PRESSURE: 94 MMHG | RESPIRATION RATE: 18 BRPM | HEART RATE: 79 BPM

## 2023-10-10 DIAGNOSIS — K29.70 GASTRITIS WITHOUT BLEEDING, UNSPECIFIED CHRONICITY, UNSPECIFIED GASTRITIS TYPE: Primary | ICD-10-CM

## 2023-10-10 DIAGNOSIS — K29.70 GASTRITIS WITHOUT BLEEDING, UNSPECIFIED CHRONICITY, UNSPECIFIED GASTRITIS TYPE: ICD-10-CM

## 2023-10-10 PROCEDURE — 45380 COLONOSCOPY AND BIOPSY: CPT | Performed by: SURGERY

## 2023-10-10 PROCEDURE — 250N000011 HC RX IP 250 OP 636: Performed by: NURSE ANESTHETIST, CERTIFIED REGISTERED

## 2023-10-10 PROCEDURE — 43239 EGD BIOPSY SINGLE/MULTIPLE: CPT | Performed by: SURGERY

## 2023-10-10 PROCEDURE — 999N000010 HC STATISTIC ANES STAT CODE-CRNA PER MINUTE: Performed by: SURGERY

## 2023-10-10 PROCEDURE — 88305 TISSUE EXAM BY PATHOLOGIST: CPT

## 2023-10-10 PROCEDURE — 258N000003 HC RX IP 258 OP 636: Performed by: SURGERY

## 2023-10-10 PROCEDURE — 45380 COLONOSCOPY AND BIOPSY: CPT | Performed by: NURSE ANESTHETIST, CERTIFIED REGISTERED

## 2023-10-10 PROCEDURE — 45378 DIAGNOSTIC COLONOSCOPY: CPT | Performed by: SURGERY

## 2023-10-10 PROCEDURE — 250N000009 HC RX 250: Performed by: SURGERY

## 2023-10-10 PROCEDURE — 250N000009 HC RX 250: Performed by: NURSE ANESTHETIST, CERTIFIED REGISTERED

## 2023-10-10 PROCEDURE — 43235 EGD DIAGNOSTIC BRUSH WASH: CPT | Performed by: SURGERY

## 2023-10-10 RX ORDER — SODIUM CHLORIDE, SODIUM LACTATE, POTASSIUM CHLORIDE, CALCIUM CHLORIDE 600; 310; 30; 20 MG/100ML; MG/100ML; MG/100ML; MG/100ML
INJECTION, SOLUTION INTRAVENOUS CONTINUOUS
Status: DISCONTINUED | OUTPATIENT
Start: 2023-10-10 | End: 2023-10-10 | Stop reason: HOSPADM

## 2023-10-10 RX ORDER — LIDOCAINE HYDROCHLORIDE 20 MG/ML
INJECTION, SOLUTION INFILTRATION; PERINEURAL PRN
Status: DISCONTINUED | OUTPATIENT
Start: 2023-10-10 | End: 2023-10-10

## 2023-10-10 RX ORDER — LIDOCAINE 40 MG/G
CREAM TOPICAL
Status: DISCONTINUED | OUTPATIENT
Start: 2023-10-10 | End: 2023-10-10 | Stop reason: HOSPADM

## 2023-10-10 RX ORDER — NALOXONE HYDROCHLORIDE 0.4 MG/ML
0.2 INJECTION, SOLUTION INTRAMUSCULAR; INTRAVENOUS; SUBCUTANEOUS
Status: DISCONTINUED | OUTPATIENT
Start: 2023-10-10 | End: 2023-10-10 | Stop reason: HOSPADM

## 2023-10-10 RX ORDER — NALOXONE HYDROCHLORIDE 0.4 MG/ML
0.4 INJECTION, SOLUTION INTRAMUSCULAR; INTRAVENOUS; SUBCUTANEOUS
Status: DISCONTINUED | OUTPATIENT
Start: 2023-10-10 | End: 2023-10-10 | Stop reason: HOSPADM

## 2023-10-10 RX ORDER — PROPOFOL 10 MG/ML
INJECTION, EMULSION INTRAVENOUS PRN
Status: DISCONTINUED | OUTPATIENT
Start: 2023-10-10 | End: 2023-10-10

## 2023-10-10 RX ORDER — FLUMAZENIL 0.1 MG/ML
0.2 INJECTION, SOLUTION INTRAVENOUS
Status: DISCONTINUED | OUTPATIENT
Start: 2023-10-10 | End: 2023-10-10 | Stop reason: HOSPADM

## 2023-10-10 RX ORDER — PROPOFOL 10 MG/ML
INJECTION, EMULSION INTRAVENOUS CONTINUOUS PRN
Status: DISCONTINUED | OUTPATIENT
Start: 2023-10-10 | End: 2023-10-10

## 2023-10-10 RX ADMIN — LIDOCAINE HYDROCHLORIDE 0.1 ML: 10 INJECTION, SOLUTION EPIDURAL; INFILTRATION; INTRACAUDAL; PERINEURAL at 09:06

## 2023-10-10 RX ADMIN — PROPOFOL 180 MCG/KG/MIN: 10 INJECTION, EMULSION INTRAVENOUS at 09:49

## 2023-10-10 RX ADMIN — SODIUM CHLORIDE, POTASSIUM CHLORIDE, SODIUM LACTATE AND CALCIUM CHLORIDE 30 ML/HR: 600; 310; 30; 20 INJECTION, SOLUTION INTRAVENOUS at 09:06

## 2023-10-10 RX ADMIN — LIDOCAINE HYDROCHLORIDE 40 MG: 20 INJECTION, SOLUTION INFILTRATION; PERINEURAL at 09:49

## 2023-10-10 RX ADMIN — PROPOFOL 50 MG: 10 INJECTION, EMULSION INTRAVENOUS at 09:49

## 2023-10-10 ASSESSMENT — ACTIVITIES OF DAILY LIVING (ADL)
ADLS_ACUITY_SCORE: 35
ADLS_ACUITY_SCORE: 35

## 2023-10-10 NOTE — INTERVAL H&P NOTE
I saw and examined Mita Gabriel.  I have reviewed the history and physical and find no changes to the patient's medical status or condition with the exceptions noted below.   Mita Gabriel denies family history of colon cancer. Patient denies blood in stools. Chronic constipation and ~8lb weight loss in past year. Previous colonoscopy was 2017, normal.   The technical details of colonoscopy were discussed with the patient along with the risks and benefits to include bleeding, perforation and incomplete study. Mita Gabriel demonstrated understanding and is willing to proceed.       Lane Kruse MD   9:09 AM 10/10/2023

## 2023-10-10 NOTE — TELEPHONE ENCOUNTER
Pharmacy requesting 90 day supply of medication.       Disp Refills Start End NAOMI    omeprazole (PRILOSEC) 20 MG DR capsule 60 capsule 0 10/10/2023 12/9/2023 No   Sig - Route: Take 1 capsule (20 mg) by mouth daily for 60 days - Oral     This is a limited script for 60 days. Prescribed for 60 days. Olena Gilbert RN  ....................  10/10/2023   4:35 PM

## 2023-10-10 NOTE — DISCHARGE INSTRUCTIONS
.[facility]  Adult Discharge Orders & Instructions    ________________________________________________________________          For 12 hours after surgery  Get plenty of rest.  A responsible adult must stay with you for at least 12 hours after you leave the hospital.   You may feel lightheaded.  IF so, sit for a few minutes before standing.  Have someone help you get up.   You may have a slight fever. Call the doctor if your fever is over 101 F (38.3 C) (taken under the tongue) or lasts longer than 24 hours.  You may have a dry mouth, a sore throat, muscle aches or trouble sleeping.  These should go away after 24 hours.  Do not make important or legal decisions.  6.   Do not drive or use heavy equipment.  If you have weakness or tingling, don't drive or use heavy equipment until this feeling goes away.    To contact a doctor, call   [phone]_______________________

## 2023-10-10 NOTE — OP NOTE
ENDOSCOPY PROCEDURE NOTE    DATE OF SERVICE: 10/10/2023    SURGEON: CHANELLE Kruse MD     PRE-OP DIAGNOSIS:    Weight loss  Chronic constipation    POST-OP DIAGNOSIS:    Gastritis  Normal colon    PROCEDURE:   EGD with biopsy  Colonoscopy    ASSISTANT:  Circulator: Madeline Hurt RN  Scrub Person: Imani Chinyere    ANESTHESIA:  MAC                            MACCRNA Independent: Shannan Palm APRN CRNA    INDICATION FOR THE PROCEDURE: The patient is a 68 year old female with weight loss and chronic constipation.     PROCEDURE:   The patient was taken to the endoscopy suite. Appropriate monitors were attached. The patient was placed in the left lateral decubitus position. Bite block was positioned. Timeout was performed and everyone was in agreement to proceed. After appropriate sedation was confirmed, the flexible endoscope was advanced into the oropharynx. The posterior oropharynx appeared grossly normal. The scope was advanced into the proximal esophagus. The esophagus was insufflated with air. The scope was advanced under direct visualization. No acute abnormalities of the esophagus were noted. The scope was advanced into the stomach. The scope was advanced through the pylorus into the duodenal bulb. The bulb and distal duodenum appeared grossly normal.  The scope was withdrawn back into the stomach. Antral biopsy was obtained and sent to pathology. Antrum appeared moderately to severely inflamed, no ulcers. The scope was retroflexed and the GE junction inspected. No abnormalities were noted.  The scope was returned to a neutral position and the stomach was decompressed. The scope was withdrawn to the GE junction and this appeared normal. The mucosa of the esophagus was inspected while withdrawing the scope. No abnormalities were noted. The scope was withdrawn from the patient. The bite block was removed. The patient tolerated the procedure with no immediately apparent complication.     Rectal exam was  performed.  There was normal tone and no palpable masses.  The colonoscope was introduced into the rectum and advanced to the cecum with Moderate difficulty.  The patient's prep was excellent.  The terminal cecum was reached.  The cecum, ascending, transverse, descending and sigmoid colon were generally unremarkable. Colon was notable for significant redundancy and large loops. Random biopsies were taken from the colon and rectum with cold forceps. The scope was retroflexed in the rectum.  The anorectal junction was unremarkable.  The scope was straightened and removed.  The patient tolerated the procedure well.     ESTIMATED BLOOD LOSS: none    COMPLICATIONS:  None    TISSUE REMOVED:  Yes    RECOMMEND:    Start PPI for gastritis   Follow-up pending pathology    CHANELLE Kruse MD

## 2023-10-10 NOTE — ANESTHESIA CARE TRANSFER NOTE
Patient: Mita Gabriel    Procedure: Procedure(s):  Esophagoscopy, gastroscopy, duodenoscopy (EGD), combined WITH BIOPSIES  Colonoscopy WITH BIOPSIES       Diagnosis: Weight loss [R63.4]  Diagnosis Additional Information: No value filed.    Anesthesia Type:   MAC     Note:    Oropharynx: oropharynx clear of all foreign objects  Level of Consciousness: drowsy  Oxygen Supplementation: room air    Independent Airway: airway patency satisfactory and stable    Vital Signs Stable: post-procedure vital signs reviewed and stable  Report to RN Given: handoff report given  Patient transferred to: Phase II    Handoff Report: Identifed the Patient, Identified the Reponsible Provider, Reviewed the pertinent medical history, Discussed the surgical course, Reviewed Intra-OP anesthesia mangement and issues during anesthesia, Set expectations for post-procedure period and Allowed opportunity for questions and acknowledgement of understanding      Vitals:  Vitals Value Taken Time   BP     Temp     Pulse     Resp     SpO2         Electronically Signed By: GABRIELA MCKEON CRNA  October 10, 2023  10:35 AM

## 2023-10-10 NOTE — OR NURSING
Mita has been discharged to home at 1205 via  accompanied by , Dayo.    Written discharge instructions were provided to patient and spouse.  Prescriptions were none.      Patient and adult caring for them verbalize understanding of discharge instructions including no driving until tomorrow and no longer taking narcotic pain medications - no operating mechanical equipment and no making any important decisions.They understand reason for discharge, and necessary follow-up appointments.

## 2023-10-10 NOTE — ANESTHESIA POSTPROCEDURE EVALUATION
Patient: Mita Gabriel    Procedure: Procedure(s):  Esophagoscopy, gastroscopy, duodenoscopy (EGD), combined WITH BIOPSIES  Colonoscopy WITH BIOPSIES       Anesthesia Type:  MAC    Note:  Disposition: Outpatient   Postop Pain Control: Uneventful            Sign Out: Well controlled pain   PONV: No   Neuro/Psych: Uneventful            Sign Out: Acceptable/Baseline neuro status   Airway/Respiratory: Uneventful            Sign Out: Acceptable/Baseline resp. status   CV/Hemodynamics: Uneventful            Sign Out: Acceptable CV status; No obvious hypovolemia; No obvious fluid overload   Other NRE: NONE   DID A NON-ROUTINE EVENT OCCUR? No       Last vitals:  Vitals Value Taken Time   /94 10/10/23 1100   Temp 97.4  F (36.3  C) 10/10/23 1100   Pulse 79 10/10/23 1100   Resp 18 10/10/23 1100   SpO2 94 % 10/10/23 1100       Electronically Signed By: GABRIELA Faith CRNA  October 10, 2023  12:57 PM

## 2023-10-10 NOTE — ANESTHESIA PREPROCEDURE EVALUATION
"Anesthesia Pre-Procedure Evaluation    Patient: Mita Gabriel   MRN: [id number] : 1955        Procedure : Procedure(s):  Esophagoscopy, gastroscopy, duodenoscopy (EGD), combined  Colonoscopy          Past Medical History:   Diagnosis Date     Anxiety disorder      Closed fracture of left carpal bone      Essential (primary) hypertension      Hyperlipidemia      Hypothyroidism      Major depressive disorder, single episode      Obesity 2011     Postoperative nausea and vomiting      Transfusion history     with delivery of son      Past Surgical History:   Procedure Laterality Date     CATARACT IOL, RT/LT Bilateral       SECTION       COLONOSCOPY N/A 2017    Follow up   hyperplastic     OPEN REDUCTION INTERNAL FIXATION ANKLE Left 2022    Procedure: Open Reduction Internal Fixation Fracture Ankle, 1st Metatarsal phalangeal joint fusion;  Surgeon: Radu Ruvalcaba DPM;  Location: [facility] OR     OPEN REDUCTION INTERNAL FIXATION WRIST      treating a fracture     OPEN REDUCTION INTERNAL FIXATION WRIST Left 2021    Procedure: Wrist Open Reduction Internal Fixation;  Surgeon: Lobo Sky MD;  Location: [facility] OR     THYROIDECTOMY       Partial      No Known Allergies   Social History     Tobacco Use     Smoking status: Never     Smokeless tobacco: Never   Substance Use Topics     Alcohol use: No     Alcohol/week: 0.0 standard drinks of alcohol      Wt Readings from Last 1 Encounters:   10/10/23 54.9 kg (121 lb)        Anesthesia Evaluation   Pt has had prior anesthetic.     History of anesthetic complications  - motion sickness,  and PONV.  Awareness during an emergent .    ROS/MED HX  ENT/Pulmonary:       Neurologic:       Cardiovascular: Comment: Had a \"mini heart attack about 5 years ago\" no intervention that I can see. No recurrence of chest pain    (+)  hypertension- -   -  - -                                      METS/Exercise Tolerance: 4 - Raking leaves, " gardening    Hematologic:       Musculoskeletal: Comment: Back pain      GI/Hepatic:       Renal/Genitourinary:       Endo:     (+)          thyroid problem, hypothyroidism,           Psychiatric/Substance Use:     (+) psychiatric history anxiety and depression       Infectious Disease:       Malignancy:       Other:      (+)  , H/O Chronic Pain,       Physical Exam    Airway  airway exam normal    Comment: Reports a sore neck but still has appropriate extension    Mallampati: II   TM distance: > 3 FB   Neck ROM: full   Mouth opening: > 3 cm    Respiratory Devices and Support         Dental       (+) Minor Abnormalities - some fillings, tiny chips      Cardiovascular   cardiovascular exam normal       Rhythm and rate: regular and normal     Pulmonary   pulmonary exam normal        breath sounds clear to auscultation       OUTSIDE LABS:  CBC:   Lab Results   Component Value Date    WBC 5.8 07/12/2023    WBC 7.7 11/03/2022    HGB 12.9 07/12/2023    HGB 13.1 11/03/2022    HCT 38.6 07/12/2023    HCT 38.3 11/03/2022     07/12/2023     11/03/2022     BMP:   Lab Results   Component Value Date     (L) 07/12/2023     (L) 11/03/2022    POTASSIUM 4.0 07/12/2023    POTASSIUM 3.3 (L) 11/03/2022    CHLORIDE 92 (L) 07/12/2023    CHLORIDE 93 (L) 11/03/2022    CO2 28 07/12/2023    CO2 28 11/03/2022    BUN 14.1 07/12/2023    BUN 8 11/03/2022    CR 0.68 07/12/2023    CR 0.72 11/03/2022    GLC 91 07/12/2023     (H) 11/03/2022     COAGS:   Lab Results   Component Value Date    INR 1.2 01/09/2015     POC: No results found for: BGM, HCG, HCGS  HEPATIC:   Lab Results   Component Value Date    ALBUMIN 4.4 07/12/2023    PROTTOTAL 6.8 07/12/2023    ALT 20 07/12/2023    AST 20 07/12/2023    ALKPHOS 83 07/12/2023    BILITOTAL 0.4 07/12/2023     OTHER:   Lab Results   Component Value Date    LACT 1.3 11/03/2022    CHRISTINA 9.1 07/12/2023    PHOS 3.0 01/23/2015    MAG 2.1 11/03/2022    TSH 1.06 07/12/2023    T4 0.92  06/29/2021    CRP <1.0 05/10/2022    SED 7 07/12/2023       Anesthesia Plan    ASA Status:  3    NPO Status:  NPO Appropriate    Anesthesia Type: MAC.     - Reason for MAC: straight local not clinically adequate   Induction: Intravenous.   Maintenance: Balanced.        Consents    Anesthesia Plan(s) and associated risks, benefits, and realistic alternatives discussed. Questions answered and patient/representative(s) expressed understanding.     - Discussed: Risks, Benefits and Alternatives for BOTH SEDATION and the PROCEDURE were discussed     - Discussed with:  Patient, Spouse            Postoperative Care            Comments:    Other Comments: Risks, benefits and alternatives discussed and would like to proceed. General anesthesia ok if indicated.               GABRIELA Faith CRNA

## 2023-10-12 LAB
PATH REPORT.COMMENTS IMP SPEC: NORMAL
PATH REPORT.FINAL DX SPEC: NORMAL
PATH REPORT.RELEVANT HX SPEC: NORMAL
PHOTO IMAGE: NORMAL

## 2023-11-13 DIAGNOSIS — I10 ESSENTIAL HYPERTENSION: ICD-10-CM

## 2023-11-16 RX ORDER — HYDROCHLOROTHIAZIDE 25 MG/1
25 TABLET ORAL DAILY
Qty: 90 TABLET | Refills: 4 | Status: SHIPPED | OUTPATIENT
Start: 2023-11-16 | End: 2024-07-17 | Stop reason: DRUGHIGH

## 2023-11-16 NOTE — TELEPHONE ENCOUNTER
Talked with spouse and she was in the ER with tremors yesterday. They gave her Ativan and she had a CT and MRI. The scans were normal. She has strong family history of tremors. The ativan is helping. She is taking three tablets a day and they are wondering if this needs to be increased or if she should be put on something else?  She was on flexeril three years ago and it did help. When she was taken off of it, her tremors seemed to have gotten worse. She wants this message to go to Joellen Romero PA-C since she just saw her on Monday but she is not in the office today. Will forward to her PCP.    Kandice Suazo LPN   11/2/2022  8:32 AM              The ABCs of the Annual Wellness Visit  Subsequent Medicare Wellness Visit    Subjective      Junito Phelan is a 68 y.o. male who presents for a Subsequent Medicare Wellness Visit.    The following portions of the patient's history were reviewed and   updated as appropriate: allergies, current medications, past family history, past medical history, past social history, past surgical history, and problem list.    Compared to one year ago, the patient feels his physical   health is the same.    Compared to one year ago, the patient feels his mental   health is the same.    Recent Hospitalizations:  He was not admitted to the hospital during the last year.       Current Medical Providers:  Patient Care Team:  Jim Stover MD as PCP - General (Family Medicine)  Josiah Pedraza MD as Consulting Physician (Gastroenterology)    Outpatient Medications Prior to Visit   Medication Sig Dispense Refill    amLODIPine (NORVASC) 10 MG tablet Take 10 mg by mouth daily.        aspirin 325 MG tablet Take 325 mg by mouth daily      clopidogrel (PLAVIX) 75 MG tablet Take 75 mg by mouth daily.        Coenzyme Q10 50 MG tablet dispersible Take by mouth daily       FLUoxetine (PROzac) 40 MG capsule Take 1 capsule by mouth Daily. 90 capsule 2    hydrochlorothiazide (HYDRODIURIL) 12.5 MG tablet Take 12.5 mg by mouth daily Indications: as needed      hydrOXYzine pamoate (VISTARIL) 25 MG capsule TAKE 1 CAPSULE BY MOUTH AT NIGHT AS NEEDED FOR ITCHING 45 capsule 1    isosorbide mononitrate (IMDUR) 120 MG 24 hr tablet Take 90 mg by mouth 2 (Two) Times a Day.      metoprolol tartrate (LOPRESSOR) 25 MG tablet Take 25 mg by mouth 2 times daily Indications: Pt unaware of dosage      nitroglycerin (NITROSTAT) 0.4 MG SL tablet Place 1 tablet under the tongue Every 5 (Five) Minutes As Needed for Chest Pain. Take no more than 3 doses in 15 minutes. 25 tablet 0    oxyCODONE ER (oxyCONTIN) 15 MG tablet extended-release 12 hour Take 1 tablet  by mouth Every 6 (Six) Hours As Needed for Moderate Pain.      potassium chloride 10 MEQ CR tablet Take 1 tablet by mouth. With HCTZ      prochlorperazine (COMPAZINE) 10 MG tablet Take 1 tablet by mouth Every 6 (Six) Hours As Needed for Nausea or Vomiting. 10 tablet 0    REPATHA PUSHTRONEX SYSTEM 420 MG/3.5ML solution cartridge Every 30 (Thirty) Days.      tamsulosin (FLOMAX) 0.4 MG capsule 24 hr capsule Take 1 capsule by mouth Every Night for 360 days. 30 capsule 11    QUEtiapine (SEROquel) 25 MG tablet TAKE 1 TABLET BY MOUTH TWICE A  tablet 1    prochlorperazine (COMPAZINE) 10 MG tablet Take 1 tablet by mouth Every 6 (Six) Hours As Needed for Nausea or Vomiting. 10 tablet 0    Scopolamine 1 MG/3DAYS patch Place 1 patch on the skin as directed by provider Every 72 (Seventy-Two) Hours. 20 each 0     No facility-administered medications prior to visit.       Opioid medication/s are on active medication list.  and I have evaluated his active treatment plan and pain score trends (see table).  There were no vitals filed for this visit.  I have reviewed the chart for potential of high risk medication and harmful drug interactions in the elderly.          Aspirin is on active medication list. Aspirin use is indicated based on review of current medical condition/s. Pros and cons of this therapy have been discussed today. Benefits of this medication outweigh potential harm.  Patient has been encouraged to continue taking this medication.  .      Patient Active Problem List   Diagnosis    Atherosclerosis of native coronary artery of native heart with angina pectoris    Essential hypertension    Mixed hyperlipidemia    Chronic stable angina    Anxiety    Neuropathy    Diarrhea    Chronic pain due to injury    Rhinitis    Flank pain    Hematuria    Acute seasonal allergic rhinitis due to pollen    Dog bite of right arm    Acute suppurative otitis media of right ear without spontaneous rupture of tympanic membrane     "Seizure    Sebaceous cyst    Kidney stone    Recurrent UTI     Advance Care Planning   Advance Care Planning     Advance Directive is on file.  ACP discussion was held with the patient during this visit. Patient has an advance directive in EMR which is still valid.      Objective    Vitals:    23 1053   BP: 122/60   Pulse: 61   Resp: 16   Temp: 98.5 °F (36.9 °C)   SpO2: 98%   Weight: 65.3 kg (144 lb)   Height: 167.6 cm (65.98\")     Estimated body mass index is 23.25 kg/m² as calculated from the following:    Height as of this encounter: 167.6 cm (65.98\").    Weight as of this encounter: 65.3 kg (144 lb).    BMI is within normal parameters. No other follow-up for BMI required.      Does the patient have evidence of cognitive impairment?   No            HEALTH RISK ASSESSMENT    Smoking Status:  Social History     Tobacco Use   Smoking Status Former   Smokeless Tobacco Never     Alcohol Consumption:  Social History     Substance and Sexual Activity   Alcohol Use Yes    Comment: very little     Fall Risk Screen:    BREEADI Fall Risk Assessment was completed, and patient is at LOW risk for falls.Assessment completed on:2023    Depression Screenin/16/2023    10:55 AM   PHQ-2/PHQ-9 Depression Screening   Little Interest or Pleasure in Doing Things 0-->not at all   Feeling Down, Depressed or Hopeless 0-->not at all   PHQ-9: Brief Depression Severity Measure Score 0       Health Habits and Functional and Cognitive Screenin/14/2023     9:52 AM   Functional & Cognitive Status   Do you have difficulty preparing food and eating? No   Do you have difficulty bathing yourself, getting dressed or grooming yourself? No   Do you have difficulty using the toilet? No   Do you have difficulty moving around from place to place? No   Do you have trouble with steps or getting out of a bed or a chair? No   Current Diet Frequent Junk Food   Dental Exam Up to date   Eye Exam Up to date   Exercise (times per " week) 0 times per week   Do you need help using the phone?  No   Are you deaf or do you have serious difficulty hearing?  Yes   Do you need help to go to places out of walking distance? No   Do you need help shopping? No   Do you need help preparing meals?  No   Do you need help with housework?  No   Do you need help with laundry? No   Do you need help taking your medications? No   Do you need help managing money? No   Do you ever drive or ride in a car without wearing a seat belt? No   Have you felt unusual stress, anger or loneliness in the last month? No   Who do you live with? Spouse   If you need help, do you have trouble finding someone available to you? No   Have you been bothered in the last four weeks by sexual problems? No   Do you have difficulty concentrating, remembering or making decisions? No       Age-appropriate Screening Schedule:  Refer to the list below for future screening recommendations based on patient's age, sex and/or medical conditions. Orders for these recommended tests are listed in the plan section. The patient has been provided with a written plan.    Health Maintenance   Topic Date Due    ZOSTER VACCINE (2 of 2) 12/25/2019    Pneumococcal Vaccine 65+ (3 - PPSV23 or PCV20) 01/22/2020    COLORECTAL CANCER SCREENING  09/07/2022    ANNUAL WELLNESS VISIT  07/06/2023    INFLUENZA VACCINE  08/01/2023    COVID-19 Vaccine (4 - 2023-24 season) 09/01/2023    LIPID PANEL  03/29/2024    TDAP/TD VACCINES (4 - Td or Tdap) 12/17/2028    HEPATITIS C SCREENING  Completed    AAA SCREEN (ONE-TIME)  Completed                  CMS Preventative Services Quick Reference  Risk Factors Identified During Encounter:    Fall Risk-High or Moderate: Discussed Fall Prevention in the home    The above risks/problems have been discussed with the patient.  Pertinent information has been shared with the patient in the After Visit Summary.    Diagnoses and all orders for this visit:    1. Need for immunization against  influenza (Primary)  -     Fluzone High-Dose 65+yrs (0130-2252)    2. Screen for colon cancer  -     Ambulatory Referral For Screening Colonoscopy    Other orders  -     traZODone (DESYREL) 50 MG tablet; Take 1 tablet by mouth Every Night.  Dispense: 30 tablet; Refill: 5        Follow Up:   Next Medicare Wellness visit to be scheduled in 1 year.      An After Visit Summary and PPPS were made available to the patient.

## 2023-11-16 NOTE — TELEPHONE ENCOUNTER
[facility] sent Rx request for the following:      Requested Prescriptions   Pending Prescriptions Disp Refills    hydrochlorothiazide (HYDRODIURIL) 25 MG tablet [Pharmacy Med Name: HYDROCHLOROTHIAZIDE 25MG TABLETS] 90 tablet      Sig: TAKE 1 TABLET(25 MG) BY MOUTH DAILY       Diuretics (Including Combos) Protocol Failed - 11/13/2023  5:22 AM   Last Prescription Date:   7/14/23  Last Fill Qty/Refills:         45, R-4    Last Office Visit:              9/13/23   Future Office visit:           none     Nenita Lewis RN on 11/16/2023 at 11:42 AM

## 2023-12-18 DIAGNOSIS — K29.70 GASTRITIS WITHOUT BLEEDING, UNSPECIFIED CHRONICITY, UNSPECIFIED GASTRITIS TYPE: ICD-10-CM

## 2023-12-18 NOTE — TELEPHONE ENCOUNTER
Disp Refills Start End NAOMI   omeprazole (PRILOSEC) 20 MG DR capsule 60 capsule 0 10/10/2023 12/9/2023 No   Sig - Route: Take 1 capsule (20 mg) by mouth daily for 60 days - Oral      This is was a limited script. For continuation of this medication it should be reviewed by PCP for medication management. Olena Gilbert RN  ....................  12/18/2023   11:24 AM

## 2023-12-22 DIAGNOSIS — M62.830 SPASM OF BACK MUSCLES: ICD-10-CM

## 2023-12-27 DIAGNOSIS — G47.00 INSOMNIA, UNSPECIFIED TYPE: ICD-10-CM

## 2023-12-27 RX ORDER — METHOCARBAMOL 500 MG/1
TABLET, FILM COATED ORAL
Qty: 120 TABLET | Refills: 5 | Status: SHIPPED | OUTPATIENT
Start: 2023-12-27 | End: 2024-07-16

## 2023-12-27 NOTE — TELEPHONE ENCOUNTER
[facility] sent Rx request for the following:      Requested Prescriptions   Pending Prescriptions Disp Refills    methocarbamol (ROBAXIN) 500 MG tablet [Pharmacy Med Name: METHOCARBAMOL 500MG TABLETS] 120 tablet 5     Sig: TAKE 1 TO 2 TABLETS BY MOUTH FOUR TIMES DAILY       There is no refill protocol information for this order          Last Prescription Date:   6/27/23  Last Fill Qty/Refills:         120, R-5    Last Office Visit:              7/12/23   Future Office visit:           none    Routing refill request to provider for review/approval because:  Drug not on the [facility] refill protocol     Cristal Antonio RN on 12/27/2023 at 10:01 AM

## 2023-12-29 RX ORDER — ZOLPIDEM TARTRATE 6.25 MG/1
TABLET, FILM COATED, EXTENDED RELEASE ORAL
Qty: 30 TABLET | Refills: 5 | Status: SHIPPED | OUTPATIENT
Start: 2023-12-29 | End: 2024-06-11

## 2023-12-29 NOTE — TELEPHONE ENCOUNTER
Last Prescription Date: 7/12/2023  Last Qty/Refills: 30 / R-5  Last Office Visit: 9/13/2023  Future Office Visit: None     Requested Prescriptions   Pending Prescriptions Disp Refills    zolpidem ER (AMBIEN CR) 6.25 MG CR tablet [Pharmacy Med Name: ZOLPIDEM ER 6.25MG TABLETS] 30 tablet      Sig: TAKE 1 TABLET(6.25 MG) BY MOUTH AT BEDTIME       There is no refill protocol information for this order        Routing refill request to provider for review/approval because:  Drug not on the [facility] refill protocol       Lanny Bloom RN on 12/29/2023 at 8:46 AM

## 2024-01-02 ENCOUNTER — HOSPITAL ENCOUNTER (OUTPATIENT)
Dept: MAMMOGRAPHY | Facility: OTHER | Age: 69
Discharge: HOME | End: 2024-01-02
Attending: FAMILY MEDICINE | Admitting: FAMILY MEDICINE
Payer: MEDICARE

## 2024-01-02 DIAGNOSIS — Z12.31 VISIT FOR SCREENING MAMMOGRAM: ICD-10-CM

## 2024-01-02 PROCEDURE — 77063 BREAST TOMOSYNTHESIS BI: CPT

## 2024-02-24 DIAGNOSIS — I10 ESSENTIAL HYPERTENSION: ICD-10-CM

## 2024-02-26 RX ORDER — METOPROLOL SUCCINATE 50 MG/1
TABLET, EXTENDED RELEASE ORAL
Qty: 90 TABLET | Refills: 2 | Status: SHIPPED | OUTPATIENT
Start: 2024-02-26 | End: 2024-07-16

## 2024-02-26 NOTE — TELEPHONE ENCOUNTER
[facility] sent Rx request for the following:      Requested Prescriptions   Pending Prescriptions Disp Refills    metoprolol succinate ER (TOPROL XL) 50 MG 24 hr tablet [Pharmacy Med Name: METOPROLOL ER SUCCINATE 50MG TABS] 90 tablet 2     Sig: TAKE 1 TABLET(50 MG) BY MOUTH DAILY       Beta-Blockers Protocol Failed - 2/26/2024  2:38 PM        Failed - Blood pressure under 140/90 in past 12 months     BP Readings from Last 3 Encounters:   10/10/23 (!) 149/94   09/13/23 138/86   07/12/23 128/78                Last Prescription Date:   6/16/23  Last Fill Qty/Refills:         90, R-2    Last Office Visit:              9/13/23   Future Office visit:           none      Routing refill request to provider for review/approval because:  Drug not on the [facility] refill protocol     Cristal Antonio RN on 2/26/2024 at 2:40 PM

## 2024-02-26 NOTE — TELEPHONE ENCOUNTER
Filled medication; but getting an appointment scheduled for blood pressure check/follow up is not a bad idea as she was 149/94 at time of last visit.     Juany Gonzalez NP on 2/26/2024 at 2:46 PM

## 2024-02-27 ENCOUNTER — MYC MEDICAL ADVICE (OUTPATIENT)
Dept: FAMILY MEDICINE | Facility: OTHER | Age: 69
End: 2024-02-27
Payer: MEDICARE

## 2024-02-29 DIAGNOSIS — F41.1 ANXIETY STATE: ICD-10-CM

## 2024-03-01 RX ORDER — LORAZEPAM 1 MG/1
TABLET ORAL
Qty: 90 TABLET | Refills: 5 | Status: SHIPPED | OUTPATIENT
Start: 2024-03-01 | End: 2024-08-20

## 2024-03-01 NOTE — TELEPHONE ENCOUNTER
PDMP Review         Value Time User    State PDMP site checked  Yes 3/1/2024  3:39 PM Ella Baptiste DO Shelly M. Soltis, DO on 3/1/2024 at 3:40 PM

## 2024-03-01 NOTE — TELEPHONE ENCOUNTER
[facility] sent Rx request for the following:      Requested Prescriptions   Pending Prescriptions Disp Refills    LORazepam (ATIVAN) 1 MG tablet [Pharmacy Med Name: LORAZEPAM 1MG TABLETS] 90 tablet      Sig: TAKE 1 TABLET(1 MG) BY MOUTH EVERY 8 HOURS AS NEEDED FOR ANXIETY       There is no refill protocol information for this order          Last Prescription Date:   9/13/23  Last Fill Qty/Refills:         90, R-5    Last Office Visit:              9/13/23   Future Office visit:           none    Routing refill request to provider for review/approval because:  Drug not on the [facility] refill protocol       Clarisse Oscar RN on 3/1/2024 at 11:28 AM

## 2024-04-18 NOTE — TELEPHONE ENCOUNTER
All four medications requested were sent for refill by Dr. Baptiste as follows:    90 tablet 4 refills 6/21/2019     Notifying pharmacy they already have refills.  Disha Pringle RN on 7/24/2019 at 3:43 PM     Hello,Patient is calling and states his cardiologist is requesting a cardiac clearance be sent. Fax number is [phone] please send to Dr. Muller. Prior TE shows signed as if sent patient called cardio today and nothing has been received.  Can someone please assist? Thank you

## 2024-05-22 DIAGNOSIS — E06.3 HYPOTHYROIDISM DUE TO HASHIMOTO'S THYROIDITIS: ICD-10-CM

## 2024-05-22 DIAGNOSIS — I10 ESSENTIAL HYPERTENSION: ICD-10-CM

## 2024-05-22 DIAGNOSIS — M54.42 BILATERAL LOW BACK PAIN WITH LEFT-SIDED SCIATICA, UNSPECIFIED CHRONICITY: ICD-10-CM

## 2024-05-29 RX ORDER — LEVOTHYROXINE SODIUM 50 UG/1
TABLET ORAL
Qty: 90 TABLET | Refills: 4 | Status: SHIPPED | OUTPATIENT
Start: 2024-05-29

## 2024-05-29 RX ORDER — LISINOPRIL 5 MG/1
TABLET ORAL
Qty: 90 TABLET | Refills: 4 | Status: SHIPPED | OUTPATIENT
Start: 2024-05-29 | End: 2024-07-17 | Stop reason: DRUGHIGH

## 2024-05-29 RX ORDER — GABAPENTIN 300 MG/1
CAPSULE ORAL
Qty: 270 CAPSULE | Refills: 4 | Status: SHIPPED | OUTPATIENT
Start: 2024-05-29

## 2024-05-29 NOTE — TELEPHONE ENCOUNTER
[facility] sent Rx request for the following:      Requested Prescriptions   Pending Prescriptions Disp Refills    levothyroxine (SYNTHROID/LEVOTHROID) 50 MCG tablet [Pharmacy Med Name: LEVOTHYROXINE 0.05MG (50MCG) TAB] 90 tablet 4     Sig: TAKE 1 TABLET(50 MCG) BY MOUTH EVERY MORNING BEFORE BREAKFAST       Thyroid Protocol Passed - 5/29/2024  2:12 PM        Last Prescription Date:   3/15/23  Last Fill Qty/Refills:         90, R-4             lisinopril (ZESTRIL) 5 MG tablet [Pharmacy Med Name: LISINOPRIL 5MG TABLETS] 90 tablet 4     Sig: TAKE 1 TABLET(5 MG) BY MOUTH DAILY       ACE Inhibitors (Including Combos) Protocol Failed - 5/29/2024  2:12 PM        Failed - Blood pressure under 140/90 in past 12 months- Clinicial or Patient Reported     BP Readings from Last 3 Encounters:   10/10/23 (!) 149/94   09/13/23 138/86   07/12/23 128/78       No data recorded         Last Prescription Date:   3/15/23  Last Fill Qty/Refills:         90, R-4        gabapentin (NEURONTIN) 300 MG capsule [Pharmacy Med Name: GABAPENTIN 300MG CAPSULES] 270 capsule 4     Sig: TAKE 1 CAPSULE BY MOUTH THREE TIMES DAILY       There is no refill protocol information for this order          Last Prescription Date:   3/15/23  Last Fill Qty/Refills:         270, R-4    Last Office Visit:              9/13/23   Future Office visit:           7/16/24    Routing refill request to provider for review/approval because:  Drug not on the [facility] refill protocol     Cristal Antonio RN on 5/29/2024 at 2:26 PM

## 2024-06-06 DIAGNOSIS — G47.00 INSOMNIA, UNSPECIFIED TYPE: ICD-10-CM

## 2024-06-11 RX ORDER — ZOLPIDEM TARTRATE 6.25 MG/1
TABLET, FILM COATED, EXTENDED RELEASE ORAL
Qty: 30 TABLET | Refills: 1 | Status: SHIPPED | OUTPATIENT
Start: 2024-06-11 | End: 2024-06-12

## 2024-06-11 NOTE — TELEPHONE ENCOUNTER
[facility] sent Rx request for the following:      Requested Prescriptions   Pending Prescriptions Disp Refills    zolpidem ER (AMBIEN CR) 6.25 MG CR tablet [Pharmacy Med Name: ZOLPIDEM ER 6.25MG TABLETS] 30 tablet      Sig: TAKE 1 TABLET(6.25 MG) BY MOUTH AT BEDTIME       There is no refill protocol information for this order          Last Prescription Date:   12/29/23  Last Fill Qty/Refills:         30, R-5    Last Office Visit:              9/13/23   Future Office visit:             Next 5 appointments (look out 90 days)      Jul 16, 2024  1:20 PM  (Arrive by 1:05 PM)  PHYSICAL with Ella Baptiste, [facility] ([facility] ) [address]  [place] [address]  [phone]     Jul 16, 2024  2:00 PM  (Arrive by 1:45 PM)  SHORT with Ella Baptiste, [facility] ([facility] ) [address]  [place] [address]  [phone]           Nenita Lewis RN on 6/11/2024 at 10:48 AM

## 2024-06-12 DIAGNOSIS — G47.00 INSOMNIA, UNSPECIFIED TYPE: ICD-10-CM

## 2024-06-12 RX ORDER — ZOLPIDEM TARTRATE 6.25 MG/1
TABLET, FILM COATED, EXTENDED RELEASE ORAL
Qty: 30 TABLET | Refills: 1 | Status: SHIPPED | OUTPATIENT
Start: 2024-06-12 | End: 2024-07-16

## 2024-06-12 NOTE — TELEPHONE ENCOUNTER
Reason for call: Medication or medication refill    Have you contacted your pharmacy regarding this refill? YES    If No, direct the patient to contact the Pharmacy and discontinue telephone note using Erroneous Encounter.  If Yes, continue.    Name of medication requested: zolpidem - one tablet (until July appointment)    How many days of medication do you have left? TWO    What pharmacy do you use? [facility]    Preferred method for responding to this message: Telephone Call    Phone number patient can be reached at: Cell number on file:    Telephone Information:   Mobile [phone]       If we cannot reach you directly, may we leave a detailed response at the number you provided? No    Patient requests the prescription be sent to [facility] pharmacy due to [facility] not being able to fill the prescription until sometime in July. Patient stated she would like this refill to be for one tablet and she will discuss the next refill options with SMS at her next appointment in July.      Rachell Muñoz on 6/12/2024 at 12:40 PM

## 2024-06-12 NOTE — TELEPHONE ENCOUNTER
Yesterday's prescription:  zolpidem ER (AMBIEN CR) 6.25 MG CR tablet 30 tablet 1 6/11/2024 -- No   Sig: TAKE 1 TABLET(6.25 MG) BY MOUTH AT BEDTIME     [id number] - [place], [place] - [address]     Patient requests the prescription be sent to [facility] pharmacy due to [facility] not being able to fill the prescription until sometime in July. Patient stated she would like this refill to be for one tablet and she will discuss the next refill options with SMS at her next appointment in July.     Kelle Bazan RN .............. 6/12/2024  1:24 PM

## 2024-07-11 SDOH — HEALTH STABILITY: PHYSICAL HEALTH: ON AVERAGE, HOW MANY DAYS PER WEEK DO YOU ENGAGE IN MODERATE TO STRENUOUS EXERCISE (LIKE A BRISK WALK)?: 5 DAYS

## 2024-07-11 SDOH — HEALTH STABILITY: PHYSICAL HEALTH: ON AVERAGE, HOW MANY MINUTES DO YOU ENGAGE IN EXERCISE AT THIS LEVEL?: 30 MIN

## 2024-07-11 ASSESSMENT — SOCIAL DETERMINANTS OF HEALTH (SDOH): HOW OFTEN DO YOU GET TOGETHER WITH FRIENDS OR RELATIVES?: ONCE A WEEK

## 2024-07-14 NOTE — PATIENT INSTRUCTIONS
Consider receiving your:  --RSV (respiratory synticial virus) vaccine this fall - or at any time, but needs to be given at your pharmacy for insurance purposes.  --Covid vaccine, that will be a new formulation, this fall.  --Flu vaccine as always.

## 2024-07-15 ASSESSMENT — PATIENT HEALTH QUESTIONNAIRE - PHQ9: SUM OF ALL RESPONSES TO PHQ QUESTIONS 1-9: 8

## 2024-07-16 ENCOUNTER — OFFICE VISIT (OUTPATIENT)
Dept: FAMILY MEDICINE | Facility: OTHER | Age: 69
End: 2024-07-16
Attending: FAMILY MEDICINE
Payer: MEDICARE

## 2024-07-16 VITALS
SYSTOLIC BLOOD PRESSURE: 136 MMHG | HEART RATE: 64 BPM | DIASTOLIC BLOOD PRESSURE: 70 MMHG | RESPIRATION RATE: 16 BRPM | BODY MASS INDEX: 20.45 KG/M2 | HEIGHT: 63 IN | OXYGEN SATURATION: 99 % | TEMPERATURE: 97.2 F | WEIGHT: 115.38 LBS

## 2024-07-16 DIAGNOSIS — N83.202 CYST OF LEFT OVARY: ICD-10-CM

## 2024-07-16 DIAGNOSIS — F41.1 ANXIETY STATE: ICD-10-CM

## 2024-07-16 DIAGNOSIS — E78.2 MIXED HYPERLIPIDEMIA: ICD-10-CM

## 2024-07-16 DIAGNOSIS — G47.00 INSOMNIA, UNSPECIFIED TYPE: ICD-10-CM

## 2024-07-16 DIAGNOSIS — E06.3 HYPOTHYROIDISM DUE TO HASHIMOTO'S THYROIDITIS: ICD-10-CM

## 2024-07-16 DIAGNOSIS — E87.1 HYPONATREMIA: ICD-10-CM

## 2024-07-16 DIAGNOSIS — Z23 NEED FOR PNEUMOCOCCAL VACCINATION: ICD-10-CM

## 2024-07-16 DIAGNOSIS — Z00.00 ENCOUNTER FOR MEDICARE ANNUAL WELLNESS EXAM: Primary | ICD-10-CM

## 2024-07-16 DIAGNOSIS — G25.0 ESSENTIAL TREMOR: ICD-10-CM

## 2024-07-16 DIAGNOSIS — I10 ESSENTIAL HYPERTENSION: ICD-10-CM

## 2024-07-16 DIAGNOSIS — Z78.0 MENOPAUSE: ICD-10-CM

## 2024-07-16 DIAGNOSIS — M85.80 OSTEOPENIA, UNSPECIFIED LOCATION: ICD-10-CM

## 2024-07-16 DIAGNOSIS — M62.830 SPASM OF BACK MUSCLES: ICD-10-CM

## 2024-07-16 DIAGNOSIS — K29.70 GASTRITIS WITHOUT BLEEDING, UNSPECIFIED CHRONICITY, UNSPECIFIED GASTRITIS TYPE: ICD-10-CM

## 2024-07-16 LAB
ANION GAP SERPL CALCULATED.3IONS-SCNC: 8 MMOL/L (ref 7–15)
BUN SERPL-MCNC: 8 MG/DL (ref 8–23)
CALCIUM SERPL-MCNC: 9.1 MG/DL (ref 8.8–10.4)
CHLORIDE SERPL-SCNC: 89 MMOL/L (ref 98–107)
CHOLEST SERPL-MCNC: 140 MG/DL
CREAT SERPL-MCNC: 0.59 MG/DL (ref 0.51–0.95)
EGFRCR SERPLBLD CKD-EPI 2021: >90 ML/MIN/1.73M2
FASTING STATUS PATIENT QL REPORTED: NO
FASTING STATUS PATIENT QL REPORTED: NO
GLUCOSE SERPL-MCNC: 94 MG/DL (ref 70–99)
HCO3 SERPL-SCNC: 29 MMOL/L (ref 22–29)
HDLC SERPL-MCNC: 70 MG/DL
LDLC SERPL CALC-MCNC: 56 MG/DL
NONHDLC SERPL-MCNC: 70 MG/DL
POTASSIUM SERPL-SCNC: 3.9 MMOL/L (ref 3.4–5.3)
SODIUM SERPL-SCNC: 126 MMOL/L (ref 135–145)
T4 FREE SERPL-MCNC: 1.32 NG/DL (ref 0.9–1.7)
TRIGL SERPL-MCNC: 70 MG/DL
TSH SERPL DL<=0.005 MIU/L-ACNC: 1.38 UIU/ML (ref 0.3–4.2)

## 2024-07-16 PROCEDURE — G0439 PPPS, SUBSEQ VISIT: HCPCS | Performed by: FAMILY MEDICINE

## 2024-07-16 PROCEDURE — 82465 ASSAY BLD/SERUM CHOLESTEROL: CPT | Mod: ZL | Performed by: FAMILY MEDICINE

## 2024-07-16 PROCEDURE — G0463 HOSPITAL OUTPT CLINIC VISIT: HCPCS | Mod: 25

## 2024-07-16 PROCEDURE — 99214 OFFICE O/P EST MOD 30 MIN: CPT | Mod: 25 | Performed by: FAMILY MEDICINE

## 2024-07-16 PROCEDURE — 82565 ASSAY OF CREATININE: CPT | Mod: ZL | Performed by: FAMILY MEDICINE

## 2024-07-16 PROCEDURE — 82374 ASSAY BLOOD CARBON DIOXIDE: CPT | Mod: ZL | Performed by: FAMILY MEDICINE

## 2024-07-16 PROCEDURE — 36415 COLL VENOUS BLD VENIPUNCTURE: CPT | Mod: ZL | Performed by: FAMILY MEDICINE

## 2024-07-16 PROCEDURE — G0009 ADMIN PNEUMOCOCCAL VACCINE: HCPCS

## 2024-07-16 PROCEDURE — 84443 ASSAY THYROID STIM HORMONE: CPT | Mod: ZL | Performed by: FAMILY MEDICINE

## 2024-07-16 PROCEDURE — 84439 ASSAY OF FREE THYROXINE: CPT | Mod: ZL | Performed by: FAMILY MEDICINE

## 2024-07-16 RX ORDER — PRIMIDONE 50 MG/1
50 TABLET ORAL
Qty: 90 TABLET | Refills: 1 | COMMUNITY
Start: 2024-07-16

## 2024-07-16 RX ORDER — VENLAFAXINE HYDROCHLORIDE 150 MG/1
150 CAPSULE, EXTENDED RELEASE ORAL EVERY MORNING
Qty: 90 CAPSULE | Refills: 4 | Status: SHIPPED | OUTPATIENT
Start: 2024-07-16

## 2024-07-16 RX ORDER — METOPROLOL SUCCINATE 50 MG/1
50 TABLET, EXTENDED RELEASE ORAL DAILY
Qty: 90 TABLET | Refills: 4 | Status: SHIPPED | OUTPATIENT
Start: 2024-07-16

## 2024-07-16 RX ORDER — METHOCARBAMOL 500 MG/1
500-1000 TABLET, FILM COATED ORAL 4 TIMES DAILY
Qty: 120 TABLET | Refills: 5 | Status: SHIPPED | OUTPATIENT
Start: 2024-07-16

## 2024-07-16 RX ORDER — ATORVASTATIN CALCIUM 20 MG/1
20 TABLET, FILM COATED ORAL DAILY
Qty: 90 TABLET | Refills: 4 | Status: SHIPPED | OUTPATIENT
Start: 2024-07-16

## 2024-07-16 RX ORDER — TRAZODONE HYDROCHLORIDE 150 MG/1
150 TABLET ORAL AT BEDTIME
Qty: 90 TABLET | Refills: 4 | Status: SHIPPED | OUTPATIENT
Start: 2024-07-16

## 2024-07-16 RX ORDER — ZOLPIDEM TARTRATE 6.25 MG/1
TABLET, FILM COATED, EXTENDED RELEASE ORAL
Qty: 30 TABLET | Refills: 5 | Status: SHIPPED | OUTPATIENT
Start: 2024-07-16

## 2024-07-16 ASSESSMENT — PATIENT HEALTH QUESTIONNAIRE - PHQ9
SUM OF ALL RESPONSES TO PHQ QUESTIONS 1-9: 8
10. IF YOU CHECKED OFF ANY PROBLEMS, HOW DIFFICULT HAVE THESE PROBLEMS MADE IT FOR YOU TO DO YOUR WORK, TAKE CARE OF THINGS AT HOME, OR GET ALONG WITH OTHER PEOPLE: SOMEWHAT DIFFICULT

## 2024-07-16 ASSESSMENT — PAIN SCALES - GENERAL
PAINLEVEL: MODERATE PAIN (5)
PAINLEVEL: SEVERE PAIN (7)

## 2024-07-16 NOTE — PROGRESS NOTES
Preventive Care Visit  [facility] [place]  Ella Baptiste DO, Family Medicine  Jul 16, 2024      Assessment & Plan     1. Encounter for Medicare annual wellness exam  -  IMM-  PNEUMOCOCCAL VACCINE,ADULT,SQ OR IM  - TSH; Future  - T4, Free; Future  - Lipid Panel; Future  - Basic Metabolic Panel; Future  - TSH  - T4, Free  - Lipid Panel  - Basic Metabolic Panel    2. Hypothyroidism due to Hashimoto's thyroiditis  Chronic, due for monitoring levels.  Continues to have essential tremor and mild weight loss, but no new/worsening thyroid symptomatology.  If stable; will renew current thyroid medication x 1 year.  - TSH; Future  - T4, Free; Future  - TSH  - T4, Free    3. Essential hypertension  Chronic, with some reports of high readings at home but it sounds like she is checking those when she is anxious or upset.  Recommended 3x/week at rest/relaxing for at least 15 minutes.  Reviewed BPs within the last year and normal or only borderline readings; therefore was not planning to make initial changes to anti-hypertensive medications.  However, labs returned with hyponatremia and Mita is on hydrochlorothiazide; therefore will cut that dosing in 1/2; and increase lisinopril to help keep BP readings controlled.  New doses of medication (combination pill) sent to pharmacy.  Recheck labs in ~1 month.  - Lipid Panel; Future  - Basic Metabolic Panel; Future  - metoprolol succinate ER (TOPROL XL) 50 MG 24 hr tablet; Take 1 tablet (50 mg) by mouth daily  Dispense: 90 tablet; Refill: 4  - Lipid Panel  - Basic Metabolic Panel    4. Hyponatremia  Chronic, slightly worsened today without new symptoms - therefore will make adjustments to BP medications as above. Recheck in ~2-4 weeks.  - Basic Metabolic Panel; Future  - Basic Metabolic Panel    5. Need for pneumococcal vaccination  Updated today  -  IMM-  PNEUMOCOCCAL VACCINE,ADULT,SQ OR IM    6. Insomnia, unspecified type  Chronic, stable without new concerns.   Rx for Ambien and trazodone as Rx; continue to monitor for adverse events due to age.  - zolpidem ER (AMBIEN CR) 6.25 MG CR tablet; TAKE 1 TABLET(6.25 MG) BY MOUTH AT BEDTIME  Dispense: 30 tablet; Refill: 5  - traZODone (DESYREL) 150 MG tablet; Take 1 tablet (150 mg) by mouth at bedtime  Dispense: 90 tablet; Refill: 4    7. Anxiety state  Chronic, waxes and wanes with situational stress (DIL has breast CA and son/grandchildren living with them).   Rx for venlafaxine renewed.  - venlafaxine (EFFEXOR XR) 150 MG 24 hr capsule; Take 1 capsule (150 mg) by mouth every morning  Dispense: 90 capsule; Refill: 4    8. Essential tremor  Chronic, following with Neurology.  Needing Rx renewed until she can get into their office for follow up appointment.    Is also considering Botox injections for Essential tremor of head/neck; to help relieve neck pain/strain as well.  If she receives benefit of this, would consider this being completed closer to home by IR.  - primidone (MYSOLINE) 50 MG tablet; 1 tablet (50 mg) Take 1 tablets by mouth daily as needed and two tablets at bedtime (managed by Neurology)  Dispense: 90 tablet; Refill: 1    9. Gastritis without bleeding, unspecified chronicity, unspecified gastritis type  Chronic, no significant breakthrough symptoms, renewed x 1 year.  Will consider weaning off within this time frame.  - omeprazole (PRILOSEC) 20 MG DR capsule; Take 1 capsule (20 mg) by mouth daily  Dispense: 90 capsule; Refill: 4    10. Spasm of back muscles  Chronic, responding to muscle relaxer.  Continue stretching.  Botox option as above; she will continue to consider with her Neurologist.  - methocarbamol (ROBAXIN) 500 MG tablet; Take 1-2 tablets (500-1,000 mg) by mouth 4 times daily  Dispense: 120 tablet; Refill: 5    11. Mixed hyperlipidemia  Chronic, on statin for that and some atherosclerosis seen on imaging studies.  Tolerating well.  Monitoring lipid panel collected today and stable.  - atorvastatin  (LIPITOR) 20 MG tablet; Take 1 tablet (20 mg) by mouth daily  Dispense: 90 tablet; Refill: 4    12. Cyst of left ovary  Chronic, no evaluation of this since 2022 because she has declined setting up the pelvic US due to the possibility of the transvaginal probe.  Worried about setting off her Essential tremor/anxiety and causing full body shaking which she has experienced before in stressful situations (not seizures).  Will proceed with MR of pelvis to monitor.  Refer to OB/GYN for further evaluation if still present.  - MR Pelvis (GYN) wo & w Contrast; Future    13. Menopause  14. Osteopenia, unspecified location  Chronic, due for monitoring.  Discuss bisphosphonate therapy for fracture risk reduction if worsening.  -DX Bone Density      MDM:  Ordering of each unique test  Prescription drug management    Follow up: 6 months; sooner pending above results.      Emelia Fan is a 68 year old, presenting for the following:  Medicare Visit        7/16/2024     1:20 PM   Additional Questions   Roomed by MECHELLE Woodson   Accompanied by spouse         Health Care Directive  Patient has a Health Care Directive on file  Advance care planning document is on file and is current.    HPI    Back issues: spasm/tightness continues between shoulder blades.  Worse when essential tremor is worse.  On Primidone.  Following with Neurology.  Considering botox treatment - would be more willing if she could receive this closer to home as the drive to [place] (which would be done every 3 months then) is hard on her neck/back.    Constipation: improving with the re-introduction of drinking coffee.  Has not noticed any worsening of her essential tremor with this caffeine intake change.  On magnesium supplement 250mg daily; no routine use of stool softeners.  Will occasionally (~1x/week) use Rafy MOM.  Declines needs at this time.  + hemorrhoid has shown up; worse with constipation, getting better.  Using preparation H.    Mentions to me  that she has never set up the pelvic US for evaluation of the L ovary due to a large cyst being seen 5/2022.  States that she can't complete the pelvic US with TV due to anxiety and worried about her shaking.  She did complete a pelvic US in 12/2021 that showed a simple cyst of the ovary, therefore she has continued to put this off.  Wondering about a different imaging modality.    Anxiety:      8/20/2021     2:26 PM 9/13/2023     1:29 PM 7/15/2024     1:49 PM   KIAH-7 SCORE   Total Score  3 (minimal anxiety) 5 (mild anxiety)   Total Score 10 3 5   On gabapentin 300mg TID, metoprolol succinate 50mg daily, venlafaxine 150mg daily, trazodone 150mg at bedtime, zolpidem CR 6.25mg at bedtime and uses lorazepam 1mg every 8 hours prn.  We discussed risks of her medications including the sleep aids but particularly her benzodiazepine.  She inadvertently said she uses benzos for recreation on her tablet today - was just answering it because she takes this regularly.  Brief discussion re: upcoming need for wean which would be extremely slow due to her psychological attachment to this medication.  However she does mention that she has been on it so long that it is not likely doing much for her; but in the next sentence she tells me that it helps.          7/11/2024   General Health   How would you rate your overall physical health? (!) FAIR   Feel stress (tense, anxious, or unable to sleep) Rather much      (!) STRESS CONCERN      7/11/2024   Nutrition   Diet: Regular (no restrictions)            7/11/2024   Exercise   Days per week of moderate/strenous exercise 5 days   Average minutes spent exercising at this level 30 min            7/15/2024   Social Factors   Worry food won't last until get money to buy more No   Food not last or not have enough money for food? No   Do you have housing? (Housing is defined as stable permanent housing and does not include staying ouside in a car, in a tent, in an abandoned building, in an  overnight shelter, or couch-surfing.) Yes   Are you worried about losing your housing? No   Lack of transportation? No   Unable to get utilities (heat,electricity)? No            7/15/2024   Fall Risk   Fallen 2 or more times in the past year? No   Trouble with walking or balance? No             7/11/2024   Activities of Daily Living- Home Safety   Needs help with the following daily activites None of the above   Safety concerns in the home None of the above            7/11/2024   Dental   Dentist two times every year? (!) NO            7/11/2024   Hearing Screening   Hearing concerns? (!) I NEED TO ASK PEOPLE TO SPEAK UP OR REPEAT THEMSELVES.    (!) TROUBLE UNDERSTANDING SOFT OR WHISPERED SPEECH.       Multiple values from one day are sorted in reverse-chronological order         7/11/2024   Driving Risk Screening   Patient/family members have concerns about driving No            7/11/2024   General Alertness/Fatigue Screening   Have you been more tired than usual lately? No            7/11/2024   Urinary Incontinence Screening   Bothered by leaking urine in past 6 months Yes            7/11/2024   TB Screening   Were you born outside of the [place]? No          Today's PHQ-9 Score:       7/15/2024     1:43 PM   PHQ-9 SCORE   PHQ-9 Total Score MyChart 8 (Mild depression)   PHQ-9 Total Score 8    8         7/11/2024   Substance Use   Alcohol more than 3/day or more than 7/wk Not Applicable   Do you have a current opioid prescription? No   How severe/bad is pain from 1 to 10? 8/10   Do you use any other substances recreationally? (!) XANAX OR OTHER BENZOS        Social History     Tobacco Use    Smoking status: Never    Smokeless tobacco: Never   Vaping Use    Vaping status: Never Used   Substance Use Topics    Alcohol use: No     Alcohol/week: 0.0 standard drinks of alcohol    Drug use: No           12/8/2021   LAST FHS-7 RESULTS   1st degree relative breast or ovarian cancer No   Any relative bilateral breast cancer No    Any male have breast cancer No   Any ONE woman have BOTH breast AND ovarian cancer No   Any woman with breast cancer before 50yrs No   2 or more relatives with breast AND/OR ovarian cancer No   2 or more relatives with breast AND/OR bowel cancer No           Mammogram Screening - Mammogram every 1-2 years updated in Health Maintenance based on mutual decision making      History of abnormal Pap smear: No - age 65 or older with adequate negative prior screening test results (3 consecutive negative cytology results, 2 consecutive negative cotesting results, or 2 consecutive negative HrHPV test results within 10 years, with the most recent test occurring within the recommended screening interval for the test used)       ASCVD Risk   The ASCVD Risk score (Shannan PIKE, et al., 2019) failed to calculate for the following reasons:    The valid total cholesterol range is 130 to 320 mg/dL    Fracture Risk Assessment Tool  Link to Frax Calculator  Use the information below to complete the Frax calculator  : 1955  Sex: female  Weight (kg): 52.3 kg (actual weight)  Height (cm): 159.4 cm  Previous Fragility Fracture:  No  History of parent with fractured hip:  No  Current Smoking:  No  Patient has been on glucocorticoids for more than 3 months (5mg/day or more): No  Rheumatoid Arthritis on Problem List:  No  Secondary Osteoporosis on Problem List:  No  Consumes 3 or more units of alcohol per day: No  Femoral Neck BMD (g/cm2)            Reviewed and updated as needed this visit by Provider   Tobacco  Allergies  Meds  Problems  Med Hx  Surg Hx  Fam Hx            Pap: 3/15/2016, neg co-testing.  NO longer needed.  Mammo: 24 (BiRads1).  Dexa: 8/3/21, osteopenia with recent distal radial fracture.  Discuss BISPHOS  Colonoscopy: 2017, 10 years.  Lung CA: NA  Tdap 2019, flu yearly; Shingrix complete.  PCV13 done; PPSV 23 DUE. Covid UTD.    Past Medical History:   Diagnosis Date    Anxiety disorder      Closed fracture of left carpal bone     Essential (primary) hypertension     Hyperlipidemia     Hypothyroidism     Major depressive disorder, single episode     Obesity 2011    Postoperative nausea and vomiting     Transfusion history     with delivery of son     Past Surgical History:   Procedure Laterality Date    CATARACT IOL, RT/LT Bilateral      SECTION      COLONOSCOPY N/A 2017    Follow up   hyperplastic    COLONOSCOPY N/A 10/10/2023    Procedure: Colonoscopy WITH BIOPSIES;  Surgeon: Lane Kruse MD;  Location:  OR    ESOPHAGOSCOPY, GASTROSCOPY, DUODENOSCOPY (EGD), COMBINED N/A 10/10/2023    Reactive gastropathy    OPEN REDUCTION INTERNAL FIXATION ANKLE Left 2022    Procedure: Open Reduction Internal Fixation Fracture Ankle, 1st Metatarsal phalangeal joint fusion;  Surgeon: Radu Ruvalcaba DPM;  Location:  OR    OPEN REDUCTION INTERNAL FIXATION WRIST  1999    treating a fracture    OPEN REDUCTION INTERNAL FIXATION WRIST Left 2021    Procedure: Wrist Open Reduction Internal Fixation;  Surgeon: Lobo Sky MD;  Location:  OR    THYROIDECTOMY       Partial     OB History   No obstetric history on file.     BP Readings from Last 3 Encounters:   24 136/70   10/10/23 (!) 149/94   23 138/86    Wt Readings from Last 3 Encounters:   24 52.3 kg (115 lb 6 oz)   10/10/23 54.9 kg (121 lb)   23 55.1 kg (121 lb 6 oz)                  Patient Active Problem List   Diagnosis    History of alcohol abuse    Anxiety state    Osteoarthritis of spine    Senile cataract    Dysthymic disorder    H/O non-ST elevation myocardial infarction (NSTEMI)    Hypertension    Hypothyroidism    Insomnia    Lumbago    Motion sickness    Special screening for malignant neoplasms, colon    Mixed hyperlipidemia     Past Surgical History:   Procedure Laterality Date    CATARACT IOL, RT/LT Bilateral      SECTION      COLONOSCOPY N/A 2017    Follow up  2027  hyperplastic    COLONOSCOPY N/A 10/10/2023    Procedure: Colonoscopy WITH BIOPSIES;  Surgeon: Lane Kruse MD;  Location:  OR    ESOPHAGOSCOPY, GASTROSCOPY, DUODENOSCOPY (EGD), COMBINED N/A 10/10/2023    Reactive gastropathy    OPEN REDUCTION INTERNAL FIXATION ANKLE Left 05/19/2022    Procedure: Open Reduction Internal Fixation Fracture Ankle, 1st Metatarsal phalangeal joint fusion;  Surgeon: Radu Ruvalcaba DPM;  Location:  OR    OPEN REDUCTION INTERNAL FIXATION WRIST  1999    treating a fracture    OPEN REDUCTION INTERNAL FIXATION WRIST Left 05/14/2021    Procedure: Wrist Open Reduction Internal Fixation;  Surgeon: Lobo Sky MD;  Location:  OR    THYROIDECTOMY       Partial       Social History     Tobacco Use    Smoking status: Never    Smokeless tobacco: Never   Substance Use Topics    Alcohol use: No     Alcohol/week: 0.0 standard drinks of alcohol     Family History   Problem Relation Age of Onset    Heart Disease Mother     Genitourinary Problems Father         Genitourinary Disease,kidney disease    Heart Disease Father         MI    Depression Sister     No Known Problems Sister     Depression Brother     Bipolar Disorder Brother     No Known Problems Brother         mild cognitive delay    Cancer Maternal Grandmother         Bladder    Other - See Comments Paternal Grandmother 70        Stroke    Cancer Maternal Grandfather         Throat and lung    Other - See Comments Other         Family history of depression and anxiety    Anesthesia Reaction No family hx of         Anesthesia Problem,Notes no prior complications from anesthesia.    Breast Cancer No family hx of         Cancer-breast         Current Outpatient Medications   Medication Sig Dispense Refill    aspirin 81 MG EC tablet Take 1 tablet (81 mg) by mouth 2 times daily 60 tablet 0    atorvastatin (LIPITOR) 20 MG tablet Take 1 tablet (20 mg) by mouth daily 90 tablet 4    diclofenac (VOLTAREN) 1 % topical gel Apply  4 g topically 4 times daily 350 g 0    folic acid (FOLVITE) 1 MG tablet Take 1 mg by mouth daily      gabapentin (NEURONTIN) 300 MG capsule TAKE 1 CAPSULE BY MOUTH THREE TIMES DAILY 270 capsule 4    hydrochlorothiazide (HYDRODIURIL) 25 MG tablet TAKE 1 TABLET(25 MG) BY MOUTH DAILY 90 tablet 4    levothyroxine (SYNTHROID/LEVOTHROID) 50 MCG tablet TAKE 1 TABLET(50 MCG) BY MOUTH EVERY MORNING BEFORE BREAKFAST 90 tablet 4    lisinopril (ZESTRIL) 5 MG tablet TAKE 1 TABLET(5 MG) BY MOUTH DAILY 90 tablet 4    LORazepam (ATIVAN) 1 MG tablet TAKE 1 TABLET(1 MG) BY MOUTH EVERY 8 HOURS AS NEEDED FOR ANXIETY 90 tablet 5    magnesium 250 MG tablet Take 1 tablet by mouth daily      methocarbamol (ROBAXIN) 500 MG tablet TAKE 1 TO 2 TABLETS BY MOUTH FOUR TIMES DAILY 120 tablet 5    metoprolol succinate ER (TOPROL XL) 50 MG 24 hr tablet TAKE 1 TABLET(50 MG) BY MOUTH DAILY 90 tablet 2    Multiple Vitamins-Minerals (MULTIVITAMIN ADULT PO) Take 1 tablet by mouth daily      omeprazole (PRILOSEC) 20 MG DR capsule TAKE 1 CAPSULE(20 MG) BY MOUTH DAILY 90 capsule 1    primidone (MYSOLINE) 50 MG tablet 50 mg Take 1 tablets by mouth daily as needed and two tablets at bedtime (managed by Neurology) 30 tablet 1    scopolamine (TRANSDERM) 1 MG/3DAYS 72 hr patch APPLY 1 PATCH EVERY 72 HOURS FOR MOTION SICKNESS 30 patch 1    thiamine 100 MG tablet Take 100 mg by mouth daily as needed      traZODone (DESYREL) 150 MG tablet Take 1 tablet (150 mg) by mouth At Bedtime 90 tablet 4    venlafaxine (EFFEXOR XR) 150 MG 24 hr capsule Take 1 capsule (150 mg) by mouth every morning 90 capsule 4    zolpidem ER (AMBIEN CR) 6.25 MG CR tablet TAKE 1 TABLET(6.25 MG) BY MOUTH AT BEDTIME 30 tablet 1     No Known Allergies  Current providers sharing in care for this patient include:  Patient Care Team:  Ella Baptiste DO as PCP - General (Family Practice)  Ella Baptiste DO as Assigned PCP    The following health maintenance items are reviewed in Epic and  "correct as of today:  Health Maintenance   Topic Date Due    RSV VACCINE (Pregnancy & 60+) (1 - 1-dose 60+ series) Never done    LIPID  06/29/2022    COVID-19 Vaccine (6 - 2023-24 season) 02/23/2024    TSH W/FREE T4 REFLEX  07/12/2024    INFLUENZA VACCINE (1) 09/01/2024    Pneumococcal Vaccine: 65+ Years (3 of 3 - PPSV23 or PCV20) 09/13/2024    PHQ-9  01/16/2025    MEDICARE ANNUAL WELLNESS VISIT  07/16/2025    FALL RISK ASSESSMENT  07/16/2025    MAMMO SCREENING  01/02/2026    GLUCOSE  07/12/2026    DTAP/TDAP/TD IMMUNIZATION (3 - Td or Tdap) 01/16/2029    ADVANCE CARE PLANNING  07/16/2029    COLORECTAL CANCER SCREENING  10/10/2033    DEXA  08/03/2036    HEPATITIS C SCREENING  Completed    DEPRESSION ACTION PLAN  Completed    ZOSTER IMMUNIZATION  Completed    IPV IMMUNIZATION  Aged Out    HPV IMMUNIZATION  Aged Out    MENINGITIS IMMUNIZATION  Aged Out    RSV MONOCLONAL ANTIBODY  Aged Out    PAP  Discontinued        Objective    Exam  /70   Pulse 64   Temp 97.2  F (36.2  C) (Tympanic)   Resp 16   Ht 1.594 m (5' 2.75\")   Wt 52.3 kg (115 lb 6 oz)   LMP  (LMP Unknown)   SpO2 99%   BMI 20.60 kg/m     Estimated body mass index is 20.6 kg/m  as calculated from the following:    Height as of this encounter: 1.594 m (5' 2.75\").    Weight as of this encounter: 52.3 kg (115 lb 6 oz).    Physical Exam  Vitals reviewed.   Constitutional:       Appearance: Normal appearance. She is normal weight.   HENT:      Head: Normocephalic and atraumatic.      Right Ear: Tympanic membrane, ear canal and external ear normal. There is no impacted cerumen.      Left Ear: Tympanic membrane, ear canal and external ear normal. There is no impacted cerumen.      Nose: Nose normal. No congestion.      Mouth/Throat:      Mouth: Mucous membranes are moist.      Pharynx: Oropharynx is clear. No oropharyngeal exudate or posterior oropharyngeal erythema.   Eyes:      Extraocular Movements: Extraocular movements intact.      " Conjunctiva/sclera: Conjunctivae normal.      Pupils: Pupils are equal, round, and reactive to light.   Neck:      Comments: + tremor  Cardiovascular:      Rate and Rhythm: Regular rhythm. Tachycardia present.      Pulses: Normal pulses.      Heart sounds: No murmur heard.  Pulmonary:      Effort: Pulmonary effort is normal.      Breath sounds: Normal breath sounds.   Abdominal:      General: Abdomen is flat. Bowel sounds are normal.      Palpations: Abdomen is soft.   Genitourinary:     General: Normal vulva.      Rectum: Normal.      Comments: No hemorrhoid  Musculoskeletal:         General: No swelling. Normal range of motion.      Cervical back: Normal range of motion and neck supple.   Skin:     General: Skin is warm and dry.      Capillary Refill: Capillary refill takes less than 2 seconds.   Neurological:      Mental Status: She is alert.             7/16/2024   Mini Cog   Mini-Cog Not Completed (choose reason) Patient declines          Patient declines, there are NO concerns for cognitive deficits.           Signed Electronically by: Ella Baptiste DO    Answers submitted by the patient for this visit:  Patient Health Questionnaire (Submitted on 7/16/2024)  If you checked off any problems, how difficult have these problems made it for you to do your work, take care of things at home, or get along with other people?: Somewhat difficult  PHQ9 TOTAL SCORE: 8

## 2024-07-16 NOTE — NURSING NOTE
"Chief Complaint   Patient presents with    Medicare Visit       Initial BP (!) 142/66   Pulse 64   Temp 97.2  F (36.2  C) (Tympanic)   Resp 16   Ht 1.594 m (5' 2.75\")   Wt 52.3 kg (115 lb 6 oz)   LMP  (LMP Unknown)   SpO2 99%   BMI 20.60 kg/m   Estimated body mass index is 20.6 kg/m  as calculated from the following:    Height as of this encounter: 1.594 m (5' 2.75\").    Weight as of this encounter: 52.3 kg (115 lb 6 oz).  Medication Review: complete    The next two questions are to help us understand your food security.  If you are feeling you need any assistance in this area, we have resources available to support you today.          7/15/2024   SDOH- Food Insecurity   Within the past 12 months, did you worry that your food would run out before you got money to buy more? N   Within the past 12 months, did the food you bought just not last and you didn t have money to get more? N            Health Care Directive:  Patient has a Health Care Directive on file      Kandice Suazo LPN      "

## 2024-07-17 RX ORDER — LISINOPRIL/HYDROCHLOROTHIAZIDE 10-12.5 MG
1 TABLET ORAL DAILY
Qty: 90 TABLET | Refills: 4 | Status: SHIPPED | OUTPATIENT
Start: 2024-07-17

## 2024-07-19 ENCOUNTER — HOSPITAL ENCOUNTER (OUTPATIENT)
Dept: MRI IMAGING | Facility: OTHER | Age: 69
Discharge: HOME | End: 2024-07-19
Attending: FAMILY MEDICINE | Admitting: FAMILY MEDICINE
Payer: MEDICARE

## 2024-07-19 DIAGNOSIS — N83.202 CYST OF LEFT OVARY: ICD-10-CM

## 2024-07-19 PROCEDURE — 255N000002 HC RX 255 OP 636: Performed by: FAMILY MEDICINE

## 2024-07-19 PROCEDURE — A9575 INJ GADOTERATE MEGLUMI 0.1ML: HCPCS | Performed by: FAMILY MEDICINE

## 2024-07-19 PROCEDURE — G1010 CDSM STANSON: HCPCS

## 2024-07-19 RX ORDER — GADOTERATE MEGLUMINE 376.9 MG/ML
15 INJECTION INTRAVENOUS ONCE
Status: COMPLETED | OUTPATIENT
Start: 2024-07-19 | End: 2024-07-19

## 2024-07-19 RX ADMIN — GADOTERATE MEGLUMINE 11 ML: 376.9 INJECTION INTRAVENOUS at 13:50

## 2024-07-23 DIAGNOSIS — N83.202 CYST OF LEFT OVARY: Primary | ICD-10-CM

## 2024-08-19 DIAGNOSIS — F41.1 ANXIETY STATE: ICD-10-CM

## 2024-08-19 NOTE — TELEPHONE ENCOUNTER
Reason for call: Medication or medication refill    Have you contacted your pharmacy regarding this refill? Yes    If No, direct the patient to contact the Pharmacy and discontinue telephone note using Erroneous Encounter.  If Yes, continue.    Name of medication requested: Lorazepam    How many days of medication do you have left? 1 day    What pharmacy do you use? [facility]    Preferred method for responding to this message: Telephone Call    Phone number patient can be reached at: Cell number on file:    Telephone Information:   Mobile [phone]       If we cannot reach you directly, may we leave a detailed response at the number you provided? Yes     Carissa Stuart on 8/19/2024 at 9:28 AM

## 2024-08-19 NOTE — TELEPHONE ENCOUNTER
Last Office Visit:              7/16/24 Emile   Future Office visit:           none    Requested Prescriptions   Pending Prescriptions Disp Refills    LORazepam (ATIVAN) 1 MG tablet [Pharmacy Med Name: LORAZEPAM 1MG TABLETS] 90 tablet      Sig: TAKE 1 TABLET(1 MG) BY MOUTH EVERY 8 HOURS AS NEEDED FOR ANXIETY       There is no refill protocol information for this order      Last Prescription Date:   3/1/24  Last Fill Qty/Refills:         90  /  5

## 2024-08-20 RX ORDER — LORAZEPAM 1 MG/1
TABLET ORAL
Qty: 90 TABLET | Refills: 5 | Status: SHIPPED | OUTPATIENT
Start: 2024-08-20

## 2024-08-26 ASSESSMENT — PATIENT HEALTH QUESTIONNAIRE - PHQ9
10. IF YOU CHECKED OFF ANY PROBLEMS, HOW DIFFICULT HAVE THESE PROBLEMS MADE IT FOR YOU TO DO YOUR WORK, TAKE CARE OF THINGS AT HOME, OR GET ALONG WITH OTHER PEOPLE: SOMEWHAT DIFFICULT
SUM OF ALL RESPONSES TO PHQ QUESTIONS 1-9: 7
SUM OF ALL RESPONSES TO PHQ QUESTIONS 1-9: 7

## 2024-08-27 ENCOUNTER — OFFICE VISIT (OUTPATIENT)
Dept: OBGYN | Facility: OTHER | Age: 69
End: 2024-08-27
Attending: FAMILY MEDICINE
Payer: MEDICARE

## 2024-08-27 VITALS
HEART RATE: 62 BPM | BODY MASS INDEX: 20.68 KG/M2 | SYSTOLIC BLOOD PRESSURE: 128 MMHG | WEIGHT: 115.8 LBS | DIASTOLIC BLOOD PRESSURE: 94 MMHG

## 2024-08-27 DIAGNOSIS — N83.202 CYST OF LEFT OVARY: Primary | ICD-10-CM

## 2024-08-27 DIAGNOSIS — D39.12 NEOPLASM OF UNCERTAIN BEHAVIOR OF LEFT OVARY: ICD-10-CM

## 2024-08-27 PROCEDURE — 99204 OFFICE O/P NEW MOD 45 MIN: CPT | Performed by: OBSTETRICS & GYNECOLOGY

## 2024-08-27 PROCEDURE — G0463 HOSPITAL OUTPT CLINIC VISIT: HCPCS | Performed by: OBSTETRICS & GYNECOLOGY

## 2024-08-27 PROCEDURE — 36415 COLL VENOUS BLD VENIPUNCTURE: CPT | Mod: ZL | Performed by: OBSTETRICS & GYNECOLOGY

## 2024-08-27 PROCEDURE — 86304 IMMUNOASSAY TUMOR CA 125: CPT | Mod: ZL | Performed by: OBSTETRICS & GYNECOLOGY

## 2024-08-27 ASSESSMENT — PAIN SCALES - GENERAL: PAINLEVEL: NO PAIN (0)

## 2024-08-27 NOTE — PROGRESS NOTES
Gynecology Visit    CC: persistent bilateral ovarian cysts    HPI:    Mita Gabriel is a 68 year old  here for persistent bilateral ovarian cysts. She started having pelvic pain a few years ago, which prompted an ultrasound. In 2021, she was noted to have a simple appearing left ovarian cyst measuring up to 3.5 cm and two simple right ovarian cysts measuring 1.9 and 1.1cm. She had follow-up 2021 that demonstrates unchanged ovarian cysts. She did not see gynecology at the time. In May 2022, she had a CT C/A/P for possible PE and was noted to have a 6.4cm cyst of the left ovary and a 2cm cyst of the right ovary but again did not see gynecology. She was recently seen by her PCP, who did a pelvic MR due to patient concerns about having a TV pelvic ultrasound which noted a 7.4cm multiloculated cyst in the left adnexa, similar volume to prior imaging, and multiple small cysts in the right adnexa that were also not significantly changed from prior imaging.    She reports occasional discomfort in her pelvis, mostly associated with constipation. No vaginal bleeding. She is not sexually active. Is currently helping her son's family as her daughter in law goes through breast cancer treatment.      PMHx:   Past Medical History:   Diagnosis Date    Anxiety disorder     Closed fracture of left carpal bone     Essential (primary) hypertension     Hyperlipidemia     Hypothyroidism     Major depressive disorder, single episode     Obesity 2011    Postoperative nausea and vomiting     Transfusion history     with delivery of son      PSHx:   Past Surgical History:   Procedure Laterality Date    CATARACT IOL, RT/LT Bilateral      SECTION      COLONOSCOPY N/A 2017    Follow up   hyperplastic    COLONOSCOPY N/A 10/10/2023    Procedure: Colonoscopy WITH BIOPSIES;  Surgeon: Lane Kruse MD;  Location:  OR    ESOPHAGOSCOPY, GASTROSCOPY, DUODENOSCOPY (EGD), COMBINED N/A 10/10/2023     Reactive gastropathy    OPEN REDUCTION INTERNAL FIXATION ANKLE Left 05/19/2022    Procedure: Open Reduction Internal Fixation Fracture Ankle, 1st Metatarsal phalangeal joint fusion;  Surgeon: Radu Ruvalcaba DPM;  Location: [facility] OR    OPEN REDUCTION INTERNAL FIXATION WRIST  1999    treating a fracture    OPEN REDUCTION INTERNAL FIXATION WRIST Left 05/14/2021    Procedure: Wrist Open Reduction Internal Fixation;  Surgeon: Lobo Sky MD;  Location: [facility] OR    THYROIDECTOMY       Partial     Meds:   Current Outpatient Medications   Medication Sig Dispense Refill    aspirin 81 MG EC tablet Take 1 tablet (81 mg) by mouth 2 times daily 60 tablet 0    atorvastatin (LIPITOR) 20 MG tablet Take 1 tablet (20 mg) by mouth daily 90 tablet 4    gabapentin (NEURONTIN) 300 MG capsule TAKE 1 CAPSULE BY MOUTH THREE TIMES DAILY 270 capsule 4    levothyroxine (SYNTHROID/LEVOTHROID) 50 MCG tablet TAKE 1 TABLET(50 MCG) BY MOUTH EVERY MORNING BEFORE BREAKFAST 90 tablet 4    lisinopril-hydrochlorothiazide (ZESTORETIC) 10-12.5 MG tablet Take 1 tablet by mouth daily 90 tablet 4    LORazepam (ATIVAN) 1 MG tablet TAKE 1 TABLET(1 MG) BY MOUTH EVERY 8 HOURS AS NEEDED FOR ANXIETY 90 tablet 5    magnesium 250 MG tablet Take 1 tablet by mouth daily      methocarbamol (ROBAXIN) 500 MG tablet Take 1-2 tablets (500-1,000 mg) by mouth 4 times daily 120 tablet 5    metoprolol succinate ER (TOPROL XL) 50 MG 24 hr tablet Take 1 tablet (50 mg) by mouth daily 90 tablet 4    Multiple Vitamins-Minerals (MULTIVITAMIN ADULT PO) Take 1 tablet by mouth daily      omeprazole (PRILOSEC) 20 MG DR capsule Take 1 capsule (20 mg) by mouth daily 90 capsule 4    primidone (MYSOLINE) 50 MG tablet 1 tablet (50 mg) Take 1 tablets by mouth daily as needed and two tablets at bedtime (managed by Neurology) 90 tablet 1    scopolamine (TRANSDERM) 1 MG/3DAYS 72 hr patch APPLY 1 PATCH EVERY 72 HOURS FOR MOTION SICKNESS 30 patch 1    thiamine 100 MG tablet Take 100 mg by  mouth daily as needed.      traZODone (DESYREL) 150 MG tablet Take 1 tablet (150 mg) by mouth at bedtime 90 tablet 4    venlafaxine (EFFEXOR XR) 150 MG 24 hr capsule Take 1 capsule (150 mg) by mouth every morning 90 capsule 4    zolpidem ER (AMBIEN CR) 6.25 MG CR tablet TAKE 1 TABLET(6.25 MG) BY MOUTH AT BEDTIME 30 tablet 5    diclofenac (VOLTAREN) 1 % topical gel Apply 4 g topically 4 times daily 350 g 0    folic acid (FOLVITE) 1 MG tablet Take 1 mg by mouth daily (Patient not taking: Reported on 8/27/2024)       No current facility-administered medications for this visit.      Allergies:     Allergies   Allergen Reactions    Morphine And Codeine      States she has had this since with no reaction       SocHx:   Social History     Tobacco Use    Smoking status: Never    Smokeless tobacco: Never   Vaping Use    Vaping status: Never Used   Substance Use Topics    Alcohol use: No     Alcohol/week: 0.0 standard drinks of alcohol    Drug use: No       Lives with her . Retired.    FamHx:   Family History   Problem Relation Age of Onset    Heart Disease Mother     Genitourinary Problems Father         Genitourinary Disease,kidney disease    Heart Disease Father         MI    Depression Sister     No Known Problems Sister     Depression Brother     Bipolar Disorder Brother     No Known Problems Brother         mild cognitive delay    Cancer Maternal Grandmother         Bladder    Other - See Comments Paternal Grandmother 70        Stroke    Cancer Maternal Grandfather         Throat and lung    Other - See Comments Other         Family history of depression and anxiety    Anesthesia Reaction No family hx of         Anesthesia Problem,Notes no prior complications from anesthesia.    Breast Cancer No family hx of         Cancer-breast      No family history of breast, colon, ovarian, or uterine cancer.    Physical Exam  BP (!) 128/94   Pulse 62   Wt 52.5 kg (115 lb 12.8 oz)   LMP  (LMP Unknown)   BMI 20.68 kg/m     Gen: Well-appearing, NAD  Resp: nonlabored  Abd: soft, nondistended, nontender. Pelvic masses not palpable.   Psych: appropriate mood and affect  Pelvic:  declined      Assessment/Plan  Mita Gabriel is a 68 year old  female here for persistent ovarian cysts. Reviewed imaging- essentially stable in size when considering the differences in imaging modalities since first noted 8/2021. Explained this is reassuring that the cysts are unlikely to be malignant but no guarantee can be made without excision and tissue evaluation. She has not had a CA-125 yet and recommend this be done today, which she was in agreement with. The cysts are causing some discomfort and she would be interested in surgical excision, however, would prefer to wait until after her daughter in law finishes her breast cancer treatments. Explained indications when waiting would not be recommended. If CA-125 is normal, will plan for repeat transabdominal pelvic ultrasound and clinic appt in 6 months. She was in agreement with this plan.     Mila Oscar MD  OB/GYN  8/27/2024 3:37 PM

## 2024-08-28 LAB — CANCER AG125 SERPL-ACNC: 11 U/ML

## 2024-09-12 DIAGNOSIS — I10 ESSENTIAL HYPERTENSION: ICD-10-CM

## 2024-09-12 NOTE — TELEPHONE ENCOUNTER
Pt needs to change her BP meds back because it was not working.  Please call.    Michele Lucas on 9/12/2024 at 11:29 AM

## 2024-09-12 NOTE — TELEPHONE ENCOUNTER
Tried calling patient back with no answer and voicemail has not been set up.  Kandice Suazo LPN  9/12/2024  2:23 PM  Ext: [id number]

## 2024-09-13 RX ORDER — METOPROLOL SUCCINATE 50 MG/1
50 TABLET, EXTENDED RELEASE ORAL DAILY
Qty: 90 TABLET | Refills: 4 | Status: CANCELLED | OUTPATIENT
Start: 2024-09-13

## 2024-09-13 RX ORDER — HYDROCHLOROTHIAZIDE 25 MG/1
25 TABLET ORAL DAILY
Qty: 90 TABLET | Status: CANCELLED | OUTPATIENT
Start: 2024-09-13

## 2024-09-13 RX ORDER — LISINOPRIL 5 MG/1
5 TABLET ORAL DAILY
Qty: 90 TABLET | Status: CANCELLED | OUTPATIENT
Start: 2024-09-13

## 2024-09-13 NOTE — TELEPHONE ENCOUNTER
Talked to patient and she states that she tried taking 1/2 tablet of the metoprolol (she tried for 3 weeks) but her blood pressure was going up.  She would like to go back to the full tablet.  She also would like to go back to the lisinopril 5 mg daily and the hydrochlorothiazide 25 mg daily vs the Zestoretic.  She states that she was retaining fluid.   She also is wondering if her blood pressure continues to go high, is she able to take an additional metoprolol?  Kandice Suazo LPN 9/13/2024  10:22 AM

## 2024-09-16 NOTE — TELEPHONE ENCOUNTER
Hydrochlorothiazide was changed due to hyponatremia, therefore would not be able to increase again.    Please keep a log of BPs x 1-2 weeks and report them so I can consider what other changes could be made.    Ella Baptiste, DO

## 2024-09-19 NOTE — TELEPHONE ENCOUNTER
Called and spoke with patient.  Informed informed of Dr. Baptiste response and patient verbalized understanding.    Clarisse Oscar RN on 9/19/2024 at 2:28 PM

## 2025-01-16 DIAGNOSIS — G47.00 INSOMNIA, UNSPECIFIED TYPE: ICD-10-CM

## 2025-01-16 RX ORDER — ZOLPIDEM TARTRATE 6.25 MG/1
TABLET, FILM COATED, EXTENDED RELEASE ORAL
Qty: 30 TABLET | Refills: 5 | Status: SHIPPED | OUTPATIENT
Start: 2025-01-16

## 2025-01-16 NOTE — TELEPHONE ENCOUNTER
[facility] sent Rx request for the following:      Requested Prescriptions   Pending Prescriptions Disp Refills    zolpidem ER (AMBIEN CR) 6.25 MG CR tablet [Pharmacy Med Name: ZOLPIDEM ER 6.25MG TABLETS] 30 tablet      Sig: TAKE 1 TABLET(6.25 MG) BY MOUTH AT BEDTIME       There is no refill protocol information for this order        Last Prescription Date:   7/16/24  Last Fill Qty/Refills:         30, R-5    Last Office Visit:              7/16/24 (Px)   Future Office visit:           None    Per LOV note:  Follow up: 6 months; sooner pending above results.     Pt due for follow up. Routing to provider for refill consideration. Routing to Unit scheduling pool, to assist Pt in scheduling appointment. Unable to complete prescription refill per RN Medication Refill Policy. Kelle Bazan RN .............. 1/16/2025  3:26 PM

## 2025-02-06 ENCOUNTER — TELEPHONE (OUTPATIENT)
Dept: FAMILY MEDICINE | Facility: OTHER | Age: 70
End: 2025-02-06
Payer: MEDICARE

## 2025-02-06 DIAGNOSIS — F41.1 ANXIETY STATE: ICD-10-CM

## 2025-02-06 RX ORDER — LORAZEPAM 1 MG/1
TABLET ORAL
Qty: 90 TABLET | Refills: 5 | Status: SHIPPED | OUTPATIENT
Start: 2025-02-06

## 2025-02-06 NOTE — TELEPHONE ENCOUNTER
Left a detailed message for patient (per request below) that this was sent to her pharmacy.  Kandice Suazo LPN  2/6/2025  1:25 PM

## 2025-02-06 NOTE — TELEPHONE ENCOUNTER
Reason for call: Medication or medication refill    Have you contacted your pharmacy regarding this refill? yes    If No, direct the patient to contact the Pharmacy and discontinue telephone note using Erroneous Encounter.  If Yes, continue.    Name of medication requested: lorazapam    How many days of medication do you have left? 1    What [facility]    Preferred method for responding to this message: Telephone Call    Phone number patient can be reached at: Cell number on file:    Telephone Information:   Mobile [phone]       If we cannot reach you directly, may we leave a detailed response at the number you provided? Yes    Jennifer Lindsey on 2/6/2025 at 8:20 AM

## 2025-02-06 NOTE — TELEPHONE ENCOUNTER
Phone request for refill -Patient states she only has 1 day supply left.      [facility] sent Rx request for the following:      Requested Prescriptions   Pending Prescriptions Disp Refills    LORazepam (ATIVAN) 1 MG tablet 90 tablet 5     Sig: TAKE 1 TABLET(1 MG) BY MOUTH EVERY 8 HOURS AS NEEDED FOR ANXIETY       There is no refill protocol information for this order      Last Prescription Date:   08/20/2024  Last Fill Qty/Refills:         90, R-5    Last Office Visit:              07/16/2024  Future Office visit:           03/04/2025    Unable to complete prescription refill per RN Medication Refill Policy.    Layla Thomason RN on 2/6/2025 at 8:53 AM

## 2025-02-26 ENCOUNTER — HOSPITAL ENCOUNTER (OUTPATIENT)
Dept: ULTRASOUND IMAGING | Facility: OTHER | Age: 70
Discharge: HOME | End: 2025-02-26
Attending: OBSTETRICS & GYNECOLOGY
Payer: MEDICARE

## 2025-02-26 ENCOUNTER — OFFICE VISIT (OUTPATIENT)
Dept: OBGYN | Facility: OTHER | Age: 70
End: 2025-02-26
Attending: OBSTETRICS & GYNECOLOGY
Payer: MEDICARE

## 2025-02-26 VITALS
WEIGHT: 119 LBS | SYSTOLIC BLOOD PRESSURE: 132 MMHG | BODY MASS INDEX: 21.25 KG/M2 | DIASTOLIC BLOOD PRESSURE: 84 MMHG | HEART RATE: 60 BPM

## 2025-02-26 DIAGNOSIS — N83.202 CYST OF LEFT OVARY: ICD-10-CM

## 2025-02-26 DIAGNOSIS — D39.12 NEOPLASM OF UNCERTAIN BEHAVIOR OF LEFT OVARY: ICD-10-CM

## 2025-02-26 DIAGNOSIS — N83.202 CYST OF LEFT OVARY: Primary | ICD-10-CM

## 2025-02-26 PROCEDURE — 86304 IMMUNOASSAY TUMOR CA 125: CPT | Mod: ZL | Performed by: OBSTETRICS & GYNECOLOGY

## 2025-02-26 PROCEDURE — 36415 COLL VENOUS BLD VENIPUNCTURE: CPT | Mod: ZL | Performed by: OBSTETRICS & GYNECOLOGY

## 2025-02-26 PROCEDURE — 76856 US EXAM PELVIC COMPLETE: CPT

## 2025-02-26 ASSESSMENT — PATIENT HEALTH QUESTIONNAIRE - PHQ9
SUM OF ALL RESPONSES TO PHQ QUESTIONS 1-9: 8
SUM OF ALL RESPONSES TO PHQ QUESTIONS 1-9: 8
10. IF YOU CHECKED OFF ANY PROBLEMS, HOW DIFFICULT HAVE THESE PROBLEMS MADE IT FOR YOU TO DO YOUR WORK, TAKE CARE OF THINGS AT HOME, OR GET ALONG WITH OTHER PEOPLE: SOMEWHAT DIFFICULT

## 2025-02-26 ASSESSMENT — PAIN SCALES - GENERAL: PAINLEVEL_OUTOF10: NO PAIN (0)

## 2025-02-26 NOTE — NURSING NOTE
CLINICAL HISTORY: Ventriculostomy tube insertion.

TECHNIQUE: Multiple axial brain CT scan sections were obtained from base to vertex without contrast a
dministration.

COMMENTS:

Comparison to the prior exam performed on 10/4/2017.

The ventriculostomy tube is inserted in good position with its tip at the level of the frontal horn o
f the left lateral ventricle.

Decrease in the size of the ventricular system in the interim.

Unchanged intraventricular hemorrhage.

Unchanged intraparenchymal hematoma in the right caudate nucleus head.

IMPRESSION:

A left frontal ventriculostomy tube is in good position with mild decrease in the size of the ventric
ular system.

Thank you for your kind referral of this patient.

     Electronically signed by SAM VILLASENOR MD on 10/05/2017 05:21:08 AM ET Chief Complaint   Patient presents with    Follow Up     Follow up 6 month Persistent Bilateral Ovarian Cysts        Medication Reconciliation: complete        Bee Meek LPN........................2/26/2025  1:50 PM

## 2025-02-26 NOTE — PROGRESS NOTES
Follow-Up Visit    S: Ms. Mita Gabriel is a 69 year old  here for persistent left adnexal cyst follow up. She feels like the cyst has been causing more issues- puts pressure on her bladder and bowels. Is interested in discussing surgery.    O:  /84 (BP Location: Right arm, Patient Position: Sitting, Cuff Size: Adult Regular)   Pulse 60   Wt 54 kg (119 lb)   LMP  (LMP Unknown)   BMI 21.25 kg/m    Gen: Well-appearing, NAD  Pulm: nonlabored  Psych: appropriate mood and affect    A/P:  Ms. Mita Gabriel is a 69 year old  here for left ovarian cyst follow-up.  - ***    Mila Oscar MD  OB/GYN  2/26/2025 2:38 PM

## 2025-02-27 LAB — CANCER AG125 SERPL-ACNC: 9 U/ML

## 2025-03-12 NOTE — PROGRESS NOTES
Medication Therapy Management (MTM) Encounter    ASSESSMENT:                            Medication Adherence/Access: No issues identified.    Hypertension   Patient may benefit from checking sodium level as this has been running low; may benefit from stopping hydrochlorothiazide and optimizing lisinopril for blood pressure control (pending labs)     Tremor  Patient may benefit from trying propranolol in the future after recovery from upcoming surgery; would require close monitoring as patient is also on metoprolol  Patient may benefit from primidone drug level as this has never been checked    Hyperlipidemia   Stable    Hypothyroidism   Stable    Bone Health   Osteopenia:   Stable-due for DEXA scan     Chronic pain    Stable    Constipation   Stable    Mental Health   Anxiety and Insomnia  Stable     Supplements   Patient may benefit from stopping folic acid  Patient may benefit form checking a magnesium level to assess need for supplementation    PLAN:                            Stop folic acid  Magnesium level to be checked to see if you need supplementation    Follow-up: Return in about 1 year (around 3/13/2026) for Medication Therapy Management Visit.    SUBJECTIVE/OBJECTIVE:                          Mita Gabriel is a 69 year old female seen for an initial visit. She was referred to me from [facility].      Reason for visit: Medication Review.    Allergies/ADRs: Reviewed in chart  Past Medical History: Reviewed in chart  Tobacco: She reports that she has never smoked. She has never used smokeless tobacco.  Alcohol: not currently using    Medication Adherence/Access: no issues reported.    Hypertension   Aspirin 81 mg daily  Lisinopril-hydrochlorothiazide 10-12.5 mg daily  Metoprolol XL 50 mg daily  Patient reports no current medication side effects  Patient self monitors blood pressure.  Home BP monitoring 140-150/90, but only checks her blood pressure at home when she is feeling anxious.  Patient reporting  "that her blood pressure has been up (feels jittery, feels heart racing); and wants her blood pressure medicine increased. She does report being very anxious about her upcoming surgery (3/27/25)     Tremor  Primodine 50 mg in the morning and 100 mg at bedtime  Lorazepam 1 mg three times daily   Propranolol 10 mg daily as needed   Patient never took  Patient follows with [facility] neurology (once yearly)  Patient follows with [facility] neurology; patient would like to try provider near her who is willing to do botox injections for cervical dystonia   Patient has had all of  her life, but has reported this worsening over the last few years. She hasn't had a \"bad episode\" in quite awhile    Hyperlipidemia   Atorvastatin 20 mg daily  Patient reports no significant myalgias or other side effects.  The 10-year ASCVD risk score (Shannan PIKE, et al., 2019) is: 10.1%    Values used to calculate the score:      Age: 69 years      Sex: Female      Is Non- : No      Diabetic: No      Tobacco smoker: No      Systolic Blood Pressure: 132 mmHg      Is BP treated: Yes      HDL Cholesterol: 70 mg/dL      Total Cholesterol: 140 mg/dL    Hypothyroidism   Levothyroxine 50 mcg daily  Patient is having the following symptoms: none.   Patient had thyroid surgery in 2001, and oncologist recommended she take levothyroxine    Bone Health   Osteopenia:   Vitamin D 1 tablet daily  Patient is not experiencing side effects.  DEXA History: 8/3/21 = Osteopenia  Patient is getting 0 serving(s)/day of calcium in their diet. Calcium causes constipation so she  does not eat calcium rich foods or takes a calcium supplement  Risk factors: post-menopausal, chronic PPI use     Chronic pain    Gabapentin 300 mg three times daily   Methocarbamol 500 mg four times daily for back   Pain type/location: back  Patient reports no side effects  Patient was on Flexeril in the past, but was taken off  by her insurance company    Constipation "   Milk of Magnesia daily as needed -often takes before an clinic visit  Patient feels that current therapy is somewhat effective.   Patient reports no current medication side effects.   Patient does think the constipation is due to her cyst  Patient does get lots of fiber in her diet    Mental Health   Anxiety and Insomnia  Gabapentin as above  Lorazepam as above  Trazodone 150 mg at bedtime  Venlafaxine  mg daily  Zolpidem ER 6.25 mg at bedtime in the middle of the night  Patient reports no current medication side effects.  Patient is not seeing a therapist.  Patient is not seeing a psychiatrist.  Patient has seen several mental health providers in the past and is not interested in talking with anyone in the future  Patient reports only getting 3-4 hours of sleep per night     Supplements   Folic Acid 1 mg daily  Patient does not recall why she takes this  Magnesium 250 mg daily  Patient no longer taking  Multivitamin daily  Patient feels this constipates her  Vitamin C 1 tablet daily  Vitamin B complex 1 tablet daily  No reported issues at this time.   ----------------      I spent 48 minutes with this patient today. All changes were made via collaborative practice agreement with Ella Baptiste DO.     A summary of these recommendations was sent via [employer].    Blade Burdick, PharmD  Medication Therapy Management Pharmacist    Telemedicine Visit Details  The patient's medications can be safely assessed via a telemedicine encounter.  Type of service:  Telephone visit  Originating Location (pt. Location): Home    Distant Location (provider location):  On-site  Start Time: 1:16 PM  End Time: 2:04 PM     Medication Therapy Recommendations  No medication therapy re/commendations to display

## 2025-03-13 ENCOUNTER — VIRTUAL VISIT (OUTPATIENT)
Dept: PHARMACY | Facility: OTHER | Age: 70
End: 2025-03-13

## 2025-03-13 DIAGNOSIS — E03.9 HYPOTHYROIDISM, UNSPECIFIED TYPE: ICD-10-CM

## 2025-03-13 DIAGNOSIS — M54.50 LOW BACK PAIN, UNSPECIFIED BACK PAIN LATERALITY, UNSPECIFIED CHRONICITY, UNSPECIFIED WHETHER SCIATICA PRESENT: ICD-10-CM

## 2025-03-13 DIAGNOSIS — R25.1 TREMOR: ICD-10-CM

## 2025-03-13 DIAGNOSIS — M85.80 OSTEOPENIA, UNSPECIFIED LOCATION: ICD-10-CM

## 2025-03-13 DIAGNOSIS — I10 PRIMARY HYPERTENSION: Primary | ICD-10-CM

## 2025-03-13 DIAGNOSIS — E78.2 MIXED HYPERLIPIDEMIA: ICD-10-CM

## 2025-03-13 DIAGNOSIS — Z78.9 TAKES DIETARY SUPPLEMENTS: ICD-10-CM

## 2025-03-13 DIAGNOSIS — F41.1 ANXIETY STATE: ICD-10-CM

## 2025-03-13 DIAGNOSIS — K59.00 CONSTIPATION, UNSPECIFIED CONSTIPATION TYPE: ICD-10-CM

## 2025-03-13 DIAGNOSIS — G47.00 INSOMNIA, UNSPECIFIED TYPE: ICD-10-CM

## 2025-03-13 RX ORDER — METHOCARBAMOL 500 MG/1
500 TABLET, FILM COATED ORAL 4 TIMES DAILY
COMMUNITY

## 2025-03-13 RX ORDER — ASPIRIN 81 MG/1
81 TABLET ORAL DAILY
COMMUNITY

## 2025-03-13 NOTE — PATIENT INSTRUCTIONS
"Recommendations from today's MTM visit:                                                    MTM (medication therapy management) is a service provided by a clinical pharmacist designed to help you get the most of out of your medicines.   Today we reviewed what your medicines are for, how to know if they are working, that your medicines are safe and how to make your medicine regimen as easy as possible.      Stop folic acid  Magnesium level to be checked to see if you need supplementation    Follow-up: Return in about 1 year (around 3/13/2026) for Medication Therapy Management Visit.    It was great speaking with you today.  I value your experience and would be very thankful for your time in providing feedback in our clinic survey. In the next few days, you may receive an email or text message from [employer] with a link to a survey related to your  clinical pharmacist.\"     To schedule another MTM appointment, please call the clinic directly or you may call the MTM scheduling line at [phone] or toll-free at [phone].     My Clinical Pharmacist's contact information:                                                      Please feel free to contact me with any questions or concerns you have.      Blade Burdick, PharmD  Medication Therapy Management Pharmacist     "

## 2025-03-20 ENCOUNTER — DOCUMENTATION ONLY (OUTPATIENT)
Dept: OTHER | Facility: CLINIC | Age: 70
End: 2025-03-20

## 2025-03-20 ENCOUNTER — OFFICE VISIT (OUTPATIENT)
Dept: OBGYN | Facility: OTHER | Age: 70
End: 2025-03-20
Payer: MEDICARE

## 2025-03-20 VITALS
WEIGHT: 116 LBS | DIASTOLIC BLOOD PRESSURE: 78 MMHG | SYSTOLIC BLOOD PRESSURE: 138 MMHG | HEIGHT: 63 IN | BODY MASS INDEX: 20.55 KG/M2 | HEART RATE: 61 BPM

## 2025-03-20 DIAGNOSIS — G47.00 INSOMNIA, UNSPECIFIED TYPE: ICD-10-CM

## 2025-03-20 DIAGNOSIS — Z01.818 PRE-OP EXAM: Primary | ICD-10-CM

## 2025-03-20 DIAGNOSIS — F41.1 ANXIETY STATE: ICD-10-CM

## 2025-03-20 DIAGNOSIS — F34.1 DYSTHYMIC DISORDER: ICD-10-CM

## 2025-03-20 DIAGNOSIS — E03.9 HYPOTHYROIDISM, UNSPECIFIED TYPE: ICD-10-CM

## 2025-03-20 DIAGNOSIS — I10 PRIMARY HYPERTENSION: ICD-10-CM

## 2025-03-20 DIAGNOSIS — I25.2 H/O NON-ST ELEVATION MYOCARDIAL INFARCTION (NSTEMI): ICD-10-CM

## 2025-03-20 LAB
ALBUMIN SERPL BCG-MCNC: 4.2 G/DL (ref 3.5–5.2)
ALP SERPL-CCNC: 91 U/L (ref 40–150)
ALT SERPL W P-5'-P-CCNC: 23 U/L (ref 0–50)
ANION GAP SERPL CALCULATED.3IONS-SCNC: 10 MMOL/L (ref 7–15)
AST SERPL W P-5'-P-CCNC: 25 U/L (ref 0–45)
ATRIAL RATE - MUSE: 61 BPM
BILIRUB SERPL-MCNC: 0.6 MG/DL
BUN SERPL-MCNC: 11.3 MG/DL (ref 8–23)
CALCIUM SERPL-MCNC: 9 MG/DL (ref 8.8–10.4)
CHLORIDE SERPL-SCNC: 92 MMOL/L (ref 98–107)
CREAT SERPL-MCNC: 0.62 MG/DL (ref 0.51–0.95)
DIASTOLIC BLOOD PRESSURE - MUSE: NORMAL MMHG
EGFRCR SERPLBLD CKD-EPI 2021: >90 ML/MIN/1.73M2
ERYTHROCYTE [DISTWIDTH] IN BLOOD BY AUTOMATED COUNT: 11.6 % (ref 10–15)
GLUCOSE SERPL-MCNC: 96 MG/DL (ref 70–99)
HCO3 SERPL-SCNC: 27 MMOL/L (ref 22–29)
HCT VFR BLD AUTO: 36.8 % (ref 35–47)
HGB BLD-MCNC: 12.8 G/DL (ref 11.7–15.7)
INTERPRETATION ECG - MUSE: NORMAL
MCH RBC QN AUTO: 30.5 PG (ref 26.5–33)
MCHC RBC AUTO-ENTMCNC: 34.8 G/DL (ref 31.5–36.5)
MCV RBC AUTO: 88 FL (ref 78–100)
P AXIS - MUSE: 44 DEGREES
PLATELET # BLD AUTO: 224 10E3/UL (ref 150–450)
POTASSIUM SERPL-SCNC: 4.1 MMOL/L (ref 3.4–5.3)
PR INTERVAL - MUSE: 186 MS
PROT SERPL-MCNC: 6.6 G/DL (ref 6.4–8.3)
QRS DURATION - MUSE: 74 MS
QT - MUSE: 436 MS
QTC - MUSE: 438 MS
R AXIS - MUSE: 2 DEGREES
RBC # BLD AUTO: 4.2 10E6/UL (ref 3.8–5.2)
SODIUM SERPL-SCNC: 129 MMOL/L (ref 135–145)
SYSTOLIC BLOOD PRESSURE - MUSE: NORMAL MMHG
T AXIS - MUSE: 17 DEGREES
VENTRICULAR RATE- MUSE: 61 BPM
WBC # BLD AUTO: 6.3 10E3/UL (ref 4–11)

## 2025-03-20 PROCEDURE — G0463 HOSPITAL OUTPT CLINIC VISIT: HCPCS | Mod: 25

## 2025-03-20 ASSESSMENT — PATIENT HEALTH QUESTIONNAIRE - PHQ9
SUM OF ALL RESPONSES TO PHQ QUESTIONS 1-9: 8
10. IF YOU CHECKED OFF ANY PROBLEMS, HOW DIFFICULT HAVE THESE PROBLEMS MADE IT FOR YOU TO DO YOUR WORK, TAKE CARE OF THINGS AT HOME, OR GET ALONG WITH OTHER PEOPLE: SOMEWHAT DIFFICULT
SUM OF ALL RESPONSES TO PHQ QUESTIONS 1-9: 8

## 2025-03-20 ASSESSMENT — PAIN SCALES - GENERAL: PAINLEVEL_OUTOF10: SEVERE PAIN (7)

## 2025-03-20 NOTE — PROGRESS NOTES
Preoperative Evaluation  [facility]  [address]  [place] [address]  Phone: [phone]  Fax: [phone]  Primary Provider: Ella Baptiste DO  Pre-op Performing Provider: GABRIELA Petersen CNP  Mar 20, 2025           Surgery on 3/27/25- Lap BSO with pelvic washings     Fax number for surgical facility: Note does not need to be faxed, will be available electronically in Epic.    Assessment & Plan     The proposed surgical procedure is considered INTERMEDIATE risk.    Pre-op exam  Completed today. Hold vitamins until following procedure. Pre Op instructions reviewed.       Hypothyroidism, unspecified type  Stable on current synthroid dosing. Last done 07/16/2024    Primary hypertension  Stable on lisinopril- hydrochlorothiazide as well as beta blocker. Continue beta blocker hold lisinopril- hydrochlorothiazide combo.     Dysthymic disorder  Continue ativan as well as effexor and take AM of surgery if needed .     Anxiety state  As above     Insomnia, unspecified type  Okay for trazodone- do not take ambien night before procedure       H/O non-ST elevation myocardial infarction (NSTEMI)  EKG done today- cardiac risk low risk stable since 2015. Reviewed last EKG as well as prelim of todays with no concerns.     Essential Tremor:   Continue Mysoline and take take of surgery as well.       - No identified additional risk factors other than previously addressed       Recommendation  Approval given to proceed with proposed procedure pending review of diagnostic evaluation.    Emelia Fan is a 69 year old, presenting for the following: Pre Op exam for laparoscopic BSO, pelvic washings     HPI:    Presents for left ovarian cyst follow-up. The cyst is simple but persistent and slowly growing in size, now having more symptoms. CA-125 is normal. Discussed that it is most likely benign but this cannot be guaranteed until after surgery and pathology are completed. Offered  referral to gyn oncology.  She strongly prefers to have surgery at [facility] because of mobility and transportation difficulties. sHe is overall doing well today. No new complaints.     Some constipation issues that have been chronic for her.              No data to display              Health Care Directive  Patient has a Health Care Directive on file      Preoperative Review of    reviewed - controlled substances reflected in medication list.      Status of Chronic Conditions:  CAD - Patient has a longstanding history of moderate-severe CAD. Patient denies recent chest pain or NTG use, denies exercise induced dyspnea or PND. Last Stress test 9/22/2015, EKG 05/16/2022.     DEPRESSION - Patient has a long history of Depression of moderate severity requiring medication for control with recent symptoms being stable..Current symptoms of depression include none.     HYPERTENSION - Patient has longstanding history of HTN , currently denies any symptoms referable to elevated blood pressure. Specifically denies chest pain, palpitations, dyspnea, orthopnea, PND or peripheral edema. Blood pressure readings have been in normal range. Current medication regimen is as listed below. Patient denies any side effects of medication.     HYPOTHYROIDISM - Patient has a longstanding history of chronic Hypothyroidism. Patient has been doing well, noting no tremor, insomnia, hair loss or changes in skin texture. Continues to take medications as directed, without adverse reactions or side effects. Last TSH   Lab Results   Component Value Date    TSH 1.38 07/16/2024   .      SLEEP PROBLEM - Patient has a longstanding history of insomnia issues. Patient has tried OTC medications with limited success.     Patient Active Problem List    Diagnosis Date Noted    Mixed hyperlipidemia 06/12/2018     Priority: Medium    Special screening for malignant neoplasms, colon 06/09/2017     Priority: Medium    H/O non-ST elevation myocardial infarction  (NSTEMI) 2015     Priority: Medium    Senile cataract 2012     Priority: Medium    Insomnia 2011     Priority: Medium    Motion sickness 2011     Priority: Medium    Hypothyroidism 2009     Priority: Medium    Osteoarthritis of spine 10/21/2008     Priority: Medium    Dysthymic disorder 10/21/2008     Priority: Medium    Hypertension 10/21/2008     Priority: Medium    Lumbago 10/21/2008     Priority: Medium    History of alcohol abuse 10/16/2008     Priority: Medium    Anxiety state 10/16/2008     Priority: Medium      Past Medical History:   Diagnosis Date    Anxiety disorder     Closed fracture of left carpal bone     Essential (primary) hypertension     Hyperlipidemia     Hypothyroidism     Major depressive disorder, single episode     Obesity 2011    Postoperative nausea and vomiting     Transfusion history     with delivery of son     Past Surgical History:   Procedure Laterality Date    CATARACT IOL, RT/LT Bilateral      SECTION      COLONOSCOPY N/A 2017    Follow up   hyperplastic    COLONOSCOPY N/A 10/10/2023    Procedure: Colonoscopy WITH BIOPSIES;  Surgeon: Lane Kruse MD;  Location:  OR    ESOPHAGOSCOPY, GASTROSCOPY, DUODENOSCOPY (EGD), COMBINED N/A 10/10/2023    Reactive gastropathy    OPEN REDUCTION INTERNAL FIXATION ANKLE Left 2022    Procedure: Open Reduction Internal Fixation Fracture Ankle, 1st Metatarsal phalangeal joint fusion;  Surgeon: Radu Ruvalcaba DPM;  Location:  OR    OPEN REDUCTION INTERNAL FIXATION WRIST  1999    treating a fracture    OPEN REDUCTION INTERNAL FIXATION WRIST Left 2021    Procedure: Wrist Open Reduction Internal Fixation;  Surgeon: Lobo Sky MD;  Location:  OR    THYROIDECTOMY       Partial     Current Outpatient Medications   Medication Sig Dispense Refill    Ascorbic Acid (VITAMIN C PO) Take 1 tablet by mouth daily.      aspirin 81 MG EC tablet Take 81 mg by mouth daily.       "atorvastatin (LIPITOR) 20 MG tablet Take 1 tablet (20 mg) by mouth daily 90 tablet 4    gabapentin (NEURONTIN) 300 MG capsule TAKE 1 CAPSULE BY MOUTH THREE TIMES DAILY 270 capsule 4    levothyroxine (SYNTHROID/LEVOTHROID) 50 MCG tablet TAKE 1 TABLET(50 MCG) BY MOUTH EVERY MORNING BEFORE BREAKFAST 90 tablet 4    lisinopril-hydrochlorothiazide (ZESTORETIC) 10-12.5 MG tablet Take 1 tablet by mouth daily 90 tablet 4    LORazepam (ATIVAN) 1 MG tablet TAKE 1 TABLET(1 MG) BY MOUTH EVERY 8 HOURS AS NEEDED FOR ANXIETY 90 tablet 5    magnesium hydroxide (MOM) 2400 MG/10ML SUSP Take 5 mLs by mouth daily as needed for constipation.      methocarbamol (ROBAXIN) 500 MG tablet Take 500 mg by mouth 4 times daily.      metoprolol succinate ER (TOPROL XL) 50 MG 24 hr tablet Take 1 tablet (50 mg) by mouth daily 90 tablet 4    Multiple Vitamins-Minerals (MULTIVITAMIN ADULT PO) Take 1 tablet by mouth daily      primidone (MYSOLINE) 50 MG tablet Take 50 mg by mouth every morning. And 100 mg at bedtime (managed by Neurology) 90 tablet 1    traZODone (DESYREL) 150 MG tablet Take 1 tablet (150 mg) by mouth at bedtime 90 tablet 4    venlafaxine (EFFEXOR XR) 150 MG 24 hr capsule Take 1 capsule (150 mg) by mouth every morning 90 capsule 4    vitamin B-Complex Take 1 tablet by mouth daily.      VITAMIN D PO Take 1 tablet by mouth daily.      zolpidem ER (AMBIEN CR) 6.25 MG CR tablet TAKE 1 TABLET(6.25 MG) BY MOUTH AT BEDTIME 30 tablet 5       No Known Allergies     Social History     Tobacco Use    Smoking status: Never    Smokeless tobacco: Never   Substance Use Topics    Alcohol use: No     Alcohol/week: 0.0 standard drinks of alcohol       History   Drug Use No             Review of Systems  Constitutional, neuro, ENT, endocrine, pulmonary, cardiac, gastrointestinal, genitourinary, musculoskeletal, integument and psychiatric systems are negative, except as otherwise noted.    Objective    /78   Pulse 61   Ht 1.588 m (5' 2.5\")   Wt " "52.6 kg (116 lb)   LMP  (LMP Unknown)   BMI 20.88 kg/m     Estimated body mass index is 20.88 kg/m  as calculated from the following:    Height as of this encounter: 1.588 m (5' 2.5\").    Weight as of this encounter: 52.6 kg (116 lb).  Physical Exam  GENERAL: alert and no distress  EYES: Eyes grossly normal to inspection, PERRL and conjunctivae and sclerae normal  NECK: no adenopathy, no asymmetry, masses, or scars  RESP: lungs clear to auscultation - no rales, rhonchi or wheezes  CV: regular rate and rhythm, normal S1 S2, no S3 or S4, no murmur, click or rub, no peripheral edema  ABDOMEN: soft, nontender, no hepatosplenomegaly, no masses and bowel sounds normal  MS: no gross musculoskeletal defects noted, no edema  SKIN: no suspicious lesions or rashes  NEURO: Normal strength and tone, mentation intact and speech normal    Recent Labs   Lab Test 07/16/24  1446   *   POTASSIUM 3.9   CR 0.59        Diagnostics  Labs pending at this time.  Results will be reviewed when available.   EKG required for known coronary heart disease and not completed in the last 90 days.     Revised Cardiac Risk Index (RCRI)  The patient has the following serious cardiovascular risks for perioperative complications:   - Coronary Artery Disease HX (MI, positive stress test, angina, Qs on EKG) = 1 point     RCRI Interpretation: 1 point: Class II (low risk - 0.9% complication rate)         Signed Electronically by: GABRIELA Petersen CNP  A copy of this evaluation report is provided to the requesting physician.           "

## 2025-03-20 NOTE — NURSING NOTE
"Chief Complaint   Patient presents with    Pre-Op Exam       Initial /78   Pulse 61   Ht 1.588 m (5' 2.5\")   Wt 52.6 kg (116 lb)   LMP  (LMP Unknown)   BMI 20.88 kg/m   Estimated body mass index is 20.88 kg/m  as calculated from the following:    Height as of this encounter: 1.588 m (5' 2.5\").    Weight as of this encounter: 52.6 kg (116 lb).  Medication Reconciliation: complete          Cassandra Chester LPN    "

## 2025-03-20 NOTE — H&P (VIEW-ONLY)
Preoperative Evaluation  [facility]  [address]  [place] [address]  Phone: [phone]  Fax: [phone]  Primary Provider: Ella Baptiste DO  Pre-op Performing Provider: GABRIELA Petersen CNP  Mar 20, 2025           Surgery on 3/27/25- Lap BSO with pelvic washings     Fax number for surgical facility: Note does not need to be faxed, will be available electronically in Epic.    Assessment & Plan     The proposed surgical procedure is considered INTERMEDIATE risk.    Pre-op exam  Completed today. Hold vitamins until following procedure. Pre Op instructions reviewed.       Hypothyroidism, unspecified type  Stable on current synthroid dosing. Last done 07/16/2024    Primary hypertension  Stable on lisinopril- hydrochlorothiazide as well as beta blocker. Continue beta blocker hold lisinopril- hydrochlorothiazide combo.     Dysthymic disorder  Continue ativan as well as effexor and take AM of surgery if needed .     Anxiety state  As above     Insomnia, unspecified type  Okay for trazodone- do not take ambien night before procedure       H/O non-ST elevation myocardial infarction (NSTEMI)  EKG done today- cardiac risk low risk stable since 2015. Reviewed last EKG as well as prelim of todays with no concerns.     Essential Tremor:   Continue Mysoline and take take of surgery as well.       - No identified additional risk factors other than previously addressed       Recommendation  Approval given to proceed with proposed procedure pending review of diagnostic evaluation.    Emelia Fan is a 69 year old, presenting for the following: Pre Op exam for laparoscopic BSO, pelvic washings     HPI:    Presents for left ovarian cyst follow-up. The cyst is simple but persistent and slowly growing in size, now having more symptoms. CA-125 is normal. Discussed that it is most likely benign but this cannot be guaranteed until after surgery and pathology are completed. Offered  referral to gyn oncology.  She strongly prefers to have surgery at [facility] because of mobility and transportation difficulties. sHe is overall doing well today. No new complaints.     Some constipation issues that have been chronic for her.              No data to display              Health Care Directive  Patient has a Health Care Directive on file      Preoperative Review of    reviewed - controlled substances reflected in medication list.      Status of Chronic Conditions:  CAD - Patient has a longstanding history of moderate-severe CAD. Patient denies recent chest pain or NTG use, denies exercise induced dyspnea or PND. Last Stress test 9/22/2015, EKG 05/16/2022.     DEPRESSION - Patient has a long history of Depression of moderate severity requiring medication for control with recent symptoms being stable..Current symptoms of depression include none.     HYPERTENSION - Patient has longstanding history of HTN , currently denies any symptoms referable to elevated blood pressure. Specifically denies chest pain, palpitations, dyspnea, orthopnea, PND or peripheral edema. Blood pressure readings have been in normal range. Current medication regimen is as listed below. Patient denies any side effects of medication.     HYPOTHYROIDISM - Patient has a longstanding history of chronic Hypothyroidism. Patient has been doing well, noting no tremor, insomnia, hair loss or changes in skin texture. Continues to take medications as directed, without adverse reactions or side effects. Last TSH   Lab Results   Component Value Date    TSH 1.38 07/16/2024   .      SLEEP PROBLEM - Patient has a longstanding history of insomnia issues. Patient has tried OTC medications with limited success.     Patient Active Problem List    Diagnosis Date Noted    Mixed hyperlipidemia 06/12/2018     Priority: Medium    Special screening for malignant neoplasms, colon 06/09/2017     Priority: Medium    H/O non-ST elevation myocardial infarction  (NSTEMI) 2015     Priority: Medium    Senile cataract 2012     Priority: Medium    Insomnia 2011     Priority: Medium    Motion sickness 2011     Priority: Medium    Hypothyroidism 2009     Priority: Medium    Osteoarthritis of spine 10/21/2008     Priority: Medium    Dysthymic disorder 10/21/2008     Priority: Medium    Hypertension 10/21/2008     Priority: Medium    Lumbago 10/21/2008     Priority: Medium    History of alcohol abuse 10/16/2008     Priority: Medium    Anxiety state 10/16/2008     Priority: Medium      Past Medical History:   Diagnosis Date    Anxiety disorder     Closed fracture of left carpal bone     Essential (primary) hypertension     Hyperlipidemia     Hypothyroidism     Major depressive disorder, single episode     Obesity 2011    Postoperative nausea and vomiting     Transfusion history     with delivery of son     Past Surgical History:   Procedure Laterality Date    CATARACT IOL, RT/LT Bilateral      SECTION      COLONOSCOPY N/A 2017    Follow up   hyperplastic    COLONOSCOPY N/A 10/10/2023    Procedure: Colonoscopy WITH BIOPSIES;  Surgeon: Lane Kruse MD;  Location:  OR    ESOPHAGOSCOPY, GASTROSCOPY, DUODENOSCOPY (EGD), COMBINED N/A 10/10/2023    Reactive gastropathy    OPEN REDUCTION INTERNAL FIXATION ANKLE Left 2022    Procedure: Open Reduction Internal Fixation Fracture Ankle, 1st Metatarsal phalangeal joint fusion;  Surgeon: Radu Ruvalcaba DPM;  Location:  OR    OPEN REDUCTION INTERNAL FIXATION WRIST  1999    treating a fracture    OPEN REDUCTION INTERNAL FIXATION WRIST Left 2021    Procedure: Wrist Open Reduction Internal Fixation;  Surgeon: Lobo Sky MD;  Location:  OR    THYROIDECTOMY       Partial     Current Outpatient Medications   Medication Sig Dispense Refill    Ascorbic Acid (VITAMIN C PO) Take 1 tablet by mouth daily.      aspirin 81 MG EC tablet Take 81 mg by mouth daily.       "atorvastatin (LIPITOR) 20 MG tablet Take 1 tablet (20 mg) by mouth daily 90 tablet 4    gabapentin (NEURONTIN) 300 MG capsule TAKE 1 CAPSULE BY MOUTH THREE TIMES DAILY 270 capsule 4    levothyroxine (SYNTHROID/LEVOTHROID) 50 MCG tablet TAKE 1 TABLET(50 MCG) BY MOUTH EVERY MORNING BEFORE BREAKFAST 90 tablet 4    lisinopril-hydrochlorothiazide (ZESTORETIC) 10-12.5 MG tablet Take 1 tablet by mouth daily 90 tablet 4    LORazepam (ATIVAN) 1 MG tablet TAKE 1 TABLET(1 MG) BY MOUTH EVERY 8 HOURS AS NEEDED FOR ANXIETY 90 tablet 5    magnesium hydroxide (MOM) 2400 MG/10ML SUSP Take 5 mLs by mouth daily as needed for constipation.      methocarbamol (ROBAXIN) 500 MG tablet Take 500 mg by mouth 4 times daily.      metoprolol succinate ER (TOPROL XL) 50 MG 24 hr tablet Take 1 tablet (50 mg) by mouth daily 90 tablet 4    Multiple Vitamins-Minerals (MULTIVITAMIN ADULT PO) Take 1 tablet by mouth daily      primidone (MYSOLINE) 50 MG tablet Take 50 mg by mouth every morning. And 100 mg at bedtime (managed by Neurology) 90 tablet 1    traZODone (DESYREL) 150 MG tablet Take 1 tablet (150 mg) by mouth at bedtime 90 tablet 4    venlafaxine (EFFEXOR XR) 150 MG 24 hr capsule Take 1 capsule (150 mg) by mouth every morning 90 capsule 4    vitamin B-Complex Take 1 tablet by mouth daily.      VITAMIN D PO Take 1 tablet by mouth daily.      zolpidem ER (AMBIEN CR) 6.25 MG CR tablet TAKE 1 TABLET(6.25 MG) BY MOUTH AT BEDTIME 30 tablet 5       No Known Allergies     Social History     Tobacco Use    Smoking status: Never    Smokeless tobacco: Never   Substance Use Topics    Alcohol use: No     Alcohol/week: 0.0 standard drinks of alcohol       History   Drug Use No             Review of Systems  Constitutional, neuro, ENT, endocrine, pulmonary, cardiac, gastrointestinal, genitourinary, musculoskeletal, integument and psychiatric systems are negative, except as otherwise noted.    Objective    /78   Pulse 61   Ht 1.588 m (5' 2.5\")   Wt " "52.6 kg (116 lb)   LMP  (LMP Unknown)   BMI 20.88 kg/m     Estimated body mass index is 20.88 kg/m  as calculated from the following:    Height as of this encounter: 1.588 m (5' 2.5\").    Weight as of this encounter: 52.6 kg (116 lb).  Physical Exam  GENERAL: alert and no distress  EYES: Eyes grossly normal to inspection, PERRL and conjunctivae and sclerae normal  NECK: no adenopathy, no asymmetry, masses, or scars  RESP: lungs clear to auscultation - no rales, rhonchi or wheezes  CV: regular rate and rhythm, normal S1 S2, no S3 or S4, no murmur, click or rub, no peripheral edema  ABDOMEN: soft, nontender, no hepatosplenomegaly, no masses and bowel sounds normal  MS: no gross musculoskeletal defects noted, no edema  SKIN: no suspicious lesions or rashes  NEURO: Normal strength and tone, mentation intact and speech normal    Recent Labs   Lab Test 07/16/24  1446   *   POTASSIUM 3.9   CR 0.59        Diagnostics  Labs pending at this time.  Results will be reviewed when available.   EKG required for known coronary heart disease and not completed in the last 90 days.     Revised Cardiac Risk Index (RCRI)  The patient has the following serious cardiovascular risks for perioperative complications:   - Coronary Artery Disease HX (MI, positive stress test, angina, Qs on EKG) = 1 point     RCRI Interpretation: 1 point: Class II (low risk - 0.9% complication rate)         Signed Electronically by: GABRIELA Petersen CNP  A copy of this evaluation report is provided to the requesting physician.           "

## 2025-03-20 NOTE — PATIENT INSTRUCTIONS
Please hold lisinopril-hydrochlorothiazide, vitamins and supplements, as well as ambien on the day fo your procedure. You can take all of your other meds.     No eating or drinking not clear fluids 8 hours prior to your surgery. Only clear liquids until 2 hours prior to your surgery.

## 2025-03-26 ENCOUNTER — ANESTHESIA EVENT (OUTPATIENT)
Dept: SURGERY | Facility: OTHER | Age: 70
End: 2025-03-26
Payer: MEDICARE

## 2025-03-27 ENCOUNTER — HOSPITAL ENCOUNTER (OUTPATIENT)
Facility: OTHER | Age: 70
Discharge: HOME | End: 2025-03-27
Attending: OBSTETRICS & GYNECOLOGY | Admitting: OBSTETRICS & GYNECOLOGY
Payer: MEDICARE

## 2025-03-27 ENCOUNTER — ANESTHESIA (OUTPATIENT)
Dept: SURGERY | Facility: OTHER | Age: 70
End: 2025-03-27
Payer: MEDICARE

## 2025-03-27 VITALS
TEMPERATURE: 97.3 F | SYSTOLIC BLOOD PRESSURE: 159 MMHG | HEART RATE: 63 BPM | HEIGHT: 63 IN | BODY MASS INDEX: 20.55 KG/M2 | WEIGHT: 116 LBS | DIASTOLIC BLOOD PRESSURE: 80 MMHG | RESPIRATION RATE: 11 BRPM | OXYGEN SATURATION: 98 %

## 2025-03-27 DIAGNOSIS — N83.202 CYST OF LEFT OVARY: ICD-10-CM

## 2025-03-27 DIAGNOSIS — Z98.890 S/P LAPAROSCOPY: Primary | ICD-10-CM

## 2025-03-27 DIAGNOSIS — D39.12 NEOPLASM OF UNCERTAIN BEHAVIOR OF LEFT OVARY: ICD-10-CM

## 2025-03-27 LAB
ATRIAL RATE - MUSE: 61 BPM
DIASTOLIC BLOOD PRESSURE - MUSE: NORMAL MMHG
INTERPRETATION ECG - MUSE: NORMAL
P AXIS - MUSE: 44 DEGREES
PR INTERVAL - MUSE: 186 MS
QRS DURATION - MUSE: 74 MS
QT - MUSE: 436 MS
QTC - MUSE: 438 MS
R AXIS - MUSE: 2 DEGREES
SYSTOLIC BLOOD PRESSURE - MUSE: NORMAL MMHG
T AXIS - MUSE: 17 DEGREES
VENTRICULAR RATE- MUSE: 61 BPM

## 2025-03-27 PROCEDURE — 88112 CYTOPATH CELL ENHANCE TECH: CPT

## 2025-03-27 PROCEDURE — 258N000003 HC RX IP 258 OP 636

## 2025-03-27 PROCEDURE — 370N000017 HC ANESTHESIA TECHNICAL FEE, PER MIN: Performed by: OBSTETRICS & GYNECOLOGY

## 2025-03-27 PROCEDURE — 250N000013 HC RX MED GY IP 250 OP 250 PS 637: Performed by: OBSTETRICS & GYNECOLOGY

## 2025-03-27 PROCEDURE — 258N000001 HC RX 258: Performed by: OBSTETRICS & GYNECOLOGY

## 2025-03-27 PROCEDURE — 58661 LAPAROSCOPY REMOVE ADNEXA: CPT | Mod: 50 | Performed by: OBSTETRICS & GYNECOLOGY

## 2025-03-27 PROCEDURE — 250N000009 HC RX 250

## 2025-03-27 PROCEDURE — 250N000025 HC SEVOFLURANE, PER MIN: Performed by: OBSTETRICS & GYNECOLOGY

## 2025-03-27 PROCEDURE — 710N000012 HC RECOVERY PHASE 2, PER MINUTE: Performed by: OBSTETRICS & GYNECOLOGY

## 2025-03-27 PROCEDURE — 88305 TISSUE EXAM BY PATHOLOGIST: CPT

## 2025-03-27 PROCEDURE — 250N000011 HC RX IP 250 OP 636: Performed by: OBSTETRICS & GYNECOLOGY

## 2025-03-27 PROCEDURE — 250N000011 HC RX IP 250 OP 636

## 2025-03-27 PROCEDURE — 272N000001 HC OR GENERAL SUPPLY STERILE: Performed by: OBSTETRICS & GYNECOLOGY

## 2025-03-27 PROCEDURE — 999N000141 HC STATISTIC PRE-PROCEDURE NURSING ASSESSMENT: Performed by: OBSTETRICS & GYNECOLOGY

## 2025-03-27 PROCEDURE — 272N000002 HC OR SUPPLY OTHER OPNP: Performed by: OBSTETRICS & GYNECOLOGY

## 2025-03-27 PROCEDURE — 88307 TISSUE EXAM BY PATHOLOGIST: CPT

## 2025-03-27 PROCEDURE — 710N000010 HC RECOVERY PHASE 1, LEVEL 2, PER MIN: Performed by: OBSTETRICS & GYNECOLOGY

## 2025-03-27 PROCEDURE — 360N000077 HC SURGERY LEVEL 4, PER MIN: Performed by: OBSTETRICS & GYNECOLOGY

## 2025-03-27 RX ORDER — LIDOCAINE 40 MG/G
CREAM TOPICAL
Status: DISCONTINUED | OUTPATIENT
Start: 2025-03-27 | End: 2025-03-27 | Stop reason: HOSPADM

## 2025-03-27 RX ORDER — FENTANYL CITRATE 50 UG/ML
25 INJECTION, SOLUTION INTRAMUSCULAR; INTRAVENOUS EVERY 5 MIN PRN
Status: DISCONTINUED | OUTPATIENT
Start: 2025-03-27 | End: 2025-03-27 | Stop reason: HOSPADM

## 2025-03-27 RX ORDER — HYDROMORPHONE HCL IN WATER/PF 6 MG/30 ML
0.2 PATIENT CONTROLLED ANALGESIA SYRINGE INTRAVENOUS EVERY 5 MIN PRN
Status: DISCONTINUED | OUTPATIENT
Start: 2025-03-27 | End: 2025-03-27 | Stop reason: HOSPADM

## 2025-03-27 RX ORDER — PROPOFOL 10 MG/ML
INJECTION, EMULSION INTRAVENOUS PRN
Status: DISCONTINUED | OUTPATIENT
Start: 2025-03-27 | End: 2025-03-27

## 2025-03-27 RX ORDER — ACETAMINOPHEN 325 MG/1
975 TABLET ORAL ONCE
Status: DISCONTINUED | OUTPATIENT
Start: 2025-03-27 | End: 2025-03-27 | Stop reason: HOSPADM

## 2025-03-27 RX ORDER — BUPIVACAINE HYDROCHLORIDE 2.5 MG/ML
INJECTION, SOLUTION INFILTRATION; PERINEURAL PRN
Status: DISCONTINUED | OUTPATIENT
Start: 2025-03-27 | End: 2025-03-27 | Stop reason: HOSPADM

## 2025-03-27 RX ORDER — ONDANSETRON 4 MG/1
4 TABLET, ORALLY DISINTEGRATING ORAL EVERY 30 MIN PRN
Status: DISCONTINUED | OUTPATIENT
Start: 2025-03-27 | End: 2025-03-27 | Stop reason: HOSPADM

## 2025-03-27 RX ORDER — KETOROLAC TROMETHAMINE 30 MG/ML
INJECTION, SOLUTION INTRAMUSCULAR; INTRAVENOUS PRN
Status: DISCONTINUED | OUTPATIENT
Start: 2025-03-27 | End: 2025-03-27

## 2025-03-27 RX ORDER — NALOXONE HYDROCHLORIDE 0.4 MG/ML
0.1 INJECTION, SOLUTION INTRAMUSCULAR; INTRAVENOUS; SUBCUTANEOUS
Status: DISCONTINUED | OUTPATIENT
Start: 2025-03-27 | End: 2025-03-27 | Stop reason: HOSPADM

## 2025-03-27 RX ORDER — PROPOFOL 10 MG/ML
INJECTION, EMULSION INTRAVENOUS CONTINUOUS PRN
Status: DISCONTINUED | OUTPATIENT
Start: 2025-03-27 | End: 2025-03-27

## 2025-03-27 RX ORDER — OXYCODONE HYDROCHLORIDE 5 MG/1
10 TABLET ORAL
Status: DISCONTINUED | OUTPATIENT
Start: 2025-03-27 | End: 2025-03-27 | Stop reason: HOSPADM

## 2025-03-27 RX ORDER — FENTANYL CITRATE 50 UG/ML
INJECTION, SOLUTION INTRAMUSCULAR; INTRAVENOUS PRN
Status: DISCONTINUED | OUTPATIENT
Start: 2025-03-27 | End: 2025-03-27

## 2025-03-27 RX ORDER — ONDANSETRON 2 MG/ML
4 INJECTION INTRAMUSCULAR; INTRAVENOUS EVERY 30 MIN PRN
Status: DISCONTINUED | OUTPATIENT
Start: 2025-03-27 | End: 2025-03-27 | Stop reason: HOSPADM

## 2025-03-27 RX ORDER — SODIUM CHLORIDE, SODIUM LACTATE, POTASSIUM CHLORIDE, CALCIUM CHLORIDE 600; 310; 30; 20 MG/100ML; MG/100ML; MG/100ML; MG/100ML
INJECTION, SOLUTION INTRAVENOUS CONTINUOUS
Status: DISCONTINUED | OUTPATIENT
Start: 2025-03-27 | End: 2025-03-27 | Stop reason: HOSPADM

## 2025-03-27 RX ORDER — IBUPROFEN 200 MG
600 TABLET ORAL ONCE
Status: DISCONTINUED | OUTPATIENT
Start: 2025-03-27 | End: 2025-03-27 | Stop reason: HOSPADM

## 2025-03-27 RX ORDER — DEXAMETHASONE SODIUM PHOSPHATE 4 MG/ML
INJECTION, SOLUTION INTRA-ARTICULAR; INTRALESIONAL; INTRAMUSCULAR; INTRAVENOUS; SOFT TISSUE PRN
Status: DISCONTINUED | OUTPATIENT
Start: 2025-03-27 | End: 2025-03-27

## 2025-03-27 RX ORDER — DEXAMETHASONE SODIUM PHOSPHATE 10 MG/ML
4 INJECTION, SOLUTION INTRAMUSCULAR; INTRAVENOUS
Status: DISCONTINUED | OUTPATIENT
Start: 2025-03-27 | End: 2025-03-27 | Stop reason: HOSPADM

## 2025-03-27 RX ORDER — ONDANSETRON 2 MG/ML
INJECTION INTRAMUSCULAR; INTRAVENOUS PRN
Status: DISCONTINUED | OUTPATIENT
Start: 2025-03-27 | End: 2025-03-27

## 2025-03-27 RX ORDER — ACETAMINOPHEN 325 MG/1
975 TABLET ORAL ONCE
Status: COMPLETED | OUTPATIENT
Start: 2025-03-27 | End: 2025-03-27

## 2025-03-27 RX ORDER — HYDROMORPHONE HCL IN WATER/PF 6 MG/30 ML
0.4 PATIENT CONTROLLED ANALGESIA SYRINGE INTRAVENOUS EVERY 5 MIN PRN
Status: DISCONTINUED | OUTPATIENT
Start: 2025-03-27 | End: 2025-03-27 | Stop reason: HOSPADM

## 2025-03-27 RX ORDER — OXYCODONE HYDROCHLORIDE 5 MG/1
5 TABLET ORAL
Status: DISCONTINUED | OUTPATIENT
Start: 2025-03-27 | End: 2025-03-27 | Stop reason: HOSPADM

## 2025-03-27 RX ORDER — ONDANSETRON 4 MG/1
4 TABLET, ORALLY DISINTEGRATING ORAL EVERY 8 HOURS PRN
Qty: 4 TABLET | Refills: 0 | Status: SHIPPED | OUTPATIENT
Start: 2025-03-27

## 2025-03-27 RX ORDER — FENTANYL CITRATE 50 UG/ML
50 INJECTION, SOLUTION INTRAMUSCULAR; INTRAVENOUS EVERY 5 MIN PRN
Status: DISCONTINUED | OUTPATIENT
Start: 2025-03-27 | End: 2025-03-27 | Stop reason: HOSPADM

## 2025-03-27 RX ORDER — LABETALOL 20 MG/4 ML (5 MG/ML) INTRAVENOUS SYRINGE
PRN
Status: DISCONTINUED | OUTPATIENT
Start: 2025-03-27 | End: 2025-03-27

## 2025-03-27 RX ORDER — LIDOCAINE HYDROCHLORIDE 20 MG/ML
INJECTION, SOLUTION INFILTRATION; PERINEURAL PRN
Status: DISCONTINUED | OUTPATIENT
Start: 2025-03-27 | End: 2025-03-27

## 2025-03-27 RX ADMIN — SODIUM CHLORIDE, SODIUM LACTATE, POTASSIUM CHLORIDE, AND CALCIUM CHLORIDE: .6; .31; .03; .02 INJECTION, SOLUTION INTRAVENOUS at 08:22

## 2025-03-27 RX ADMIN — KETOROLAC TROMETHAMINE 15 MG: 30 INJECTION, SOLUTION INTRAMUSCULAR at 09:16

## 2025-03-27 RX ADMIN — PROPOFOL 40 MG: 10 INJECTION, EMULSION INTRAVENOUS at 08:58

## 2025-03-27 RX ADMIN — LABETALOL 20 MG/4 ML (5 MG/ML) INTRAVENOUS SYRINGE 5 MG: at 09:01

## 2025-03-27 RX ADMIN — LABETALOL 20 MG/4 ML (5 MG/ML) INTRAVENOUS SYRINGE 5 MG: at 09:17

## 2025-03-27 RX ADMIN — PROPOFOL 120 MG: 10 INJECTION, EMULSION INTRAVENOUS at 08:32

## 2025-03-27 RX ADMIN — LABETALOL 20 MG/4 ML (5 MG/ML) INTRAVENOUS SYRINGE 5 MG: at 09:09

## 2025-03-27 RX ADMIN — SUGAMMADEX 200 MG: 100 INJECTION, SOLUTION INTRAVENOUS at 09:28

## 2025-03-27 RX ADMIN — DEXAMETHASONE SODIUM PHOSPHATE 8 MG: 4 INJECTION, SOLUTION INTRA-ARTICULAR; INTRALESIONAL; INTRAMUSCULAR; INTRAVENOUS; SOFT TISSUE at 08:40

## 2025-03-27 RX ADMIN — HYDROMORPHONE HYDROCHLORIDE 0.5 MG: 1 INJECTION, SOLUTION INTRAMUSCULAR; INTRAVENOUS; SUBCUTANEOUS at 08:48

## 2025-03-27 RX ADMIN — ROCURONIUM BROMIDE 45 MG: 50 INJECTION, SOLUTION INTRAVENOUS at 08:33

## 2025-03-27 RX ADMIN — ONDANSETRON HYDROCHLORIDE 4 MG: 2 SOLUTION INTRAMUSCULAR; INTRAVENOUS at 09:04

## 2025-03-27 RX ADMIN — LIDOCAINE HYDROCHLORIDE 60 MG: 20 INJECTION, SOLUTION INFILTRATION; PERINEURAL at 08:32

## 2025-03-27 RX ADMIN — FENTANYL CITRATE 25 MCG: 50 INJECTION INTRAMUSCULAR; INTRAVENOUS at 08:47

## 2025-03-27 RX ADMIN — PROPOFOL 50 MCG/KG/MIN: 10 INJECTION, EMULSION INTRAVENOUS at 08:37

## 2025-03-27 RX ADMIN — FENTANYL CITRATE 25 MCG: 50 INJECTION INTRAMUSCULAR; INTRAVENOUS at 08:30

## 2025-03-27 RX ADMIN — MIDAZOLAM HYDROCHLORIDE 1 MG: 1 INJECTION, SOLUTION INTRAMUSCULAR; INTRAVENOUS at 08:28

## 2025-03-27 RX ADMIN — ROCURONIUM BROMIDE 5 MG: 50 INJECTION, SOLUTION INTRAVENOUS at 08:32

## 2025-03-27 RX ADMIN — PROPOFOL 20 MG: 10 INJECTION, EMULSION INTRAVENOUS at 09:28

## 2025-03-27 RX ADMIN — LABETALOL 20 MG/4 ML (5 MG/ML) INTRAVENOUS SYRINGE 5 MG: at 09:04

## 2025-03-27 RX ADMIN — ROCURONIUM BROMIDE 20 MG: 50 INJECTION, SOLUTION INTRAVENOUS at 08:53

## 2025-03-27 RX ADMIN — SODIUM CHLORIDE, SODIUM LACTATE, POTASSIUM CHLORIDE, AND CALCIUM CHLORIDE: .6; .31; .03; .02 INJECTION, SOLUTION INTRAVENOUS at 09:15

## 2025-03-27 RX ADMIN — DEXMEDETOMIDINE HYDROCHLORIDE 8 MCG: 100 INJECTION, SOLUTION INTRAVENOUS at 09:14

## 2025-03-27 RX ADMIN — ACETAMINOPHEN 975 MG: 325 TABLET ORAL at 08:01

## 2025-03-27 ASSESSMENT — ACTIVITIES OF DAILY LIVING (ADL)
ADLS_ACUITY_SCORE: 41

## 2025-03-27 NOTE — OR NURSING
Patient and responsible adult given discharge instructions with no questions regarding instructions. Cyril score 20. Pain level 3/10.  Discharged from unit via wheelchair . Patient discharged to home.

## 2025-03-27 NOTE — ANESTHESIA POSTPROCEDURE EVALUATION
Patient: Mita Gabriel    Procedure: Procedure(s):  laparoscopic bilateral salpingo-oophorectomy, pelvic washings (bilateral)       Anesthesia Type:  General    Note:  Disposition: Outpatient   Postop Pain Control: Uneventful            Sign Out: Well controlled pain   PONV: No   Neuro/Psych: Uneventful            Sign Out: Acceptable/Baseline neuro status   Airway/Respiratory: Uneventful            Sign Out: Acceptable/Baseline resp. status   CV/Hemodynamics: Uneventful            Sign Out: Acceptable CV status; No obvious hypovolemia; No obvious fluid overload   Other NRE: NONE   DID A NON-ROUTINE EVENT OCCUR? No           Last vitals:  Vitals Value Taken Time   /79 03/27/25 1010   Temp 97.3  F (36.3  C) 03/27/25 1005   Pulse 58 03/27/25 1010   Resp 15 03/27/25 1011   SpO2 98 % 03/27/25 1011   Vitals shown include unfiled device data.    Electronically Signed By: GABRIELA COVARRUBIAS CRNA  March 27, 2025  11:31 AM

## 2025-03-27 NOTE — DISCHARGE INSTRUCTIONS
[facility]  Adult Discharge Orders & Instructions      For 24 hours after surgery:  Get plenty of rest.  A responsible adult must stay with you for at least 24 hours after you leave the hospital.   You may feel lightheaded.  IF so, sit for a few minutes before standing.  Have someone help you get up.   You may have a slight fever. Call the doctor if your fever is over 101 F (38.3 C) (taken under the tongue) or lasts longer than 24 hours.  You may have a dry mouth, a sore throat, muscle aches or trouble sleeping.  These should go away after 24 hours.  Do not make important or legal decisions.  6.   Do not drive or use heavy equipment.  If you have weakness or tingling, don't drive or use heavy equipment until this feeling goes away.                                                                                                                                                                         To contact a doctor, call    [phone]______________

## 2025-03-27 NOTE — ANESTHESIA PREPROCEDURE EVALUATION
Anesthesia Pre-Procedure Evaluation    Patient: Mita Gabriel   MRN: [id number] : 1955        Procedure : Procedure(s):  laparoscopic bilateral salpingo-oophorectomy, pelvic washings (bilateral)          Past Medical History:   Diagnosis Date    Anxiety disorder     Closed fracture of left carpal bone     Essential (primary) hypertension     Familial tremor     Hyperlipidemia     Hypothyroidism     Major depressive disorder, single episode     Obesity 2011    Postoperative nausea and vomiting     Transfusion history     with delivery of son      Past Surgical History:   Procedure Laterality Date    CATARACT IOL, RT/LT Bilateral      SECTION      COLONOSCOPY N/A 2017    Follow up   hyperplastic    COLONOSCOPY N/A 10/10/2023    Procedure: Colonoscopy WITH BIOPSIES;  Surgeon: Lane Kruse MD;  Location:  OR    ESOPHAGOSCOPY, GASTROSCOPY, DUODENOSCOPY (EGD), COMBINED N/A 10/10/2023    Reactive gastropathy    OPEN REDUCTION INTERNAL FIXATION ANKLE Left 2022    Procedure: Open Reduction Internal Fixation Fracture Ankle, 1st Metatarsal phalangeal joint fusion;  Surgeon: Radu Ruvalcaba DPM;  Location:  OR    OPEN REDUCTION INTERNAL FIXATION WRIST      treating a fracture    OPEN REDUCTION INTERNAL FIXATION WRIST Left 2021    Procedure: Wrist Open Reduction Internal Fixation;  Surgeon: Lobo Sky MD;  Location: [facility] OR    THYROIDECTOMY       Partial      No Known Allergies   Social History     Tobacco Use    Smoking status: Never    Smokeless tobacco: Never   Substance Use Topics    Alcohol use: No     Alcohol/week: 0.0 standard drinks of alcohol      Wt Readings from Last 1 Encounters:   25 52.6 kg (116 lb)        Anesthesia Evaluation   Pt has had prior anesthetic. Type: General and MAC.    History of anesthetic complications  - motion sickness,  and PONV.  awareness during an emergent  years ago.    ROS/MED HX  ENT/Pulmonary:  - neg  pulmonary ROS     Neurologic: Comment: Essential tremor      Cardiovascular: Comment: History of NSTEMI    (+)  hypertension- -   -  - -                                 Previous cardiac testing   Echo: Date: Results:    Stress Test:  Date: Results:    ECG Reviewed:  Date: 03/20/2025 Results:    Sinus rhythm  Normal ECG  When compared with ECG of 16-May-2022 13:56,  No significant change was found      Cath:  Date: Results:      METS/Exercise Tolerance: 4 - Raking leaves, gardening    Hematologic:  - neg hematologic  ROS     Musculoskeletal:   (+)  arthritis,             GI/Hepatic:  - neg GI/hepatic ROS     Renal/Genitourinary:  - neg Renal ROS     Endo:     (+)          thyroid problem, hypothyroidism,        (-) obesity   Psychiatric/Substance Use:     (+) psychiatric history anxiety       Infectious Disease:  - neg infectious disease ROS     Malignancy:  - neg malignancy ROS     Other:  - neg other ROS          Physical Exam    Airway        Mallampati: III   TM distance: > 3 FB   Neck ROM: limited   Mouth opening: > 3 cm    Respiratory Devices and Support         Dental     Comment: veneers    (+) Completely normal teeth      Cardiovascular          Rhythm and rate: regular and normal     Pulmonary           breath sounds clear to auscultation           OUTSIDE LABS:  CBC:   Lab Results   Component Value Date    WBC 6.3 03/20/2025    WBC 5.8 07/12/2023    HGB 12.8 03/20/2025    HGB 12.9 07/12/2023    HCT 36.8 03/20/2025    HCT 38.6 07/12/2023     03/20/2025     07/12/2023     BMP:   Lab Results   Component Value Date     (L) 03/20/2025     (L) 07/16/2024    POTASSIUM 4.1 03/20/2025    POTASSIUM 3.9 07/16/2024    CHLORIDE 92 (L) 03/20/2025    CHLORIDE 89 (L) 07/16/2024    CO2 27 03/20/2025    CO2 29 07/16/2024    BUN 11.3 03/20/2025    BUN 8.0 07/16/2024    CR 0.62 03/20/2025    CR 0.59 07/16/2024    GLC 96 03/20/2025    GLC 94 07/16/2024     COAGS:   Lab Results   Component Value Date  "   INR 1.2 01/09/2015     POC: No results found for: \"BGM\", \"HCG\", \"HCGS\"  HEPATIC:   Lab Results   Component Value Date    ALBUMIN 4.2 03/20/2025    PROTTOTAL 6.6 03/20/2025    ALT 23 03/20/2025    AST 25 03/20/2025    ALKPHOS 91 03/20/2025    BILITOTAL 0.6 03/20/2025     OTHER:   Lab Results   Component Value Date    LACT 1.3 11/03/2022    CHRISTINA 9.0 03/20/2025    PHOS 3.0 01/23/2015    MAG 2.1 11/03/2022    TSH 1.38 07/16/2024    T4 1.32 07/16/2024    CRP <1.0 05/10/2022    SED 7 07/12/2023       Anesthesia Plan    ASA Status:  2    NPO Status:  NPO Appropriate    Anesthesia Type: General.     - Airway: ETT   Induction: Intravenous.   Maintenance: Balanced.   Techniques and Equipment:     - Airway: Video-Laryngoscope       Consents    Anesthesia Plan(s) and associated risks, benefits, and realistic alternatives discussed. Questions answered and patient/representative(s) expressed understanding.     - Discussed: Risks, Benefits and Alternatives for BOTH SEDATION and the PROCEDURE were discussed     - Discussed with:  Patient, Spouse      - Specific Concerns: heart attack, stroke, aspiration.     - Extended Intubation/Ventilatory Support Discussed: No.      - Patient is DNR/DNI Status: No     Use of blood products discussed: No .     Postoperative Care    Pain management: Multi-modal analgesia.   PONV prophylaxis: Ondansetron (or other 5HT-3), Dexamethasone or Solumedrol, Background Propofol Infusion     Comments:               Anali Corea    Clinically Significant Risk Factors Present on Admission                 # Drug Induced Platelet Defect: home medication list includes an antiplatelet medication   # Hypertension: Noted on problem list                        "

## 2025-03-27 NOTE — INTERVAL H&P NOTE
I have reviewed the surgical (or preoperative) H&P that is linked to this encounter, and examined the patient. There are no significant changes    Clinical Conditions Present on Arrival:  Clinically Significant Risk Factors Present on Admission         # Hyponatremia: Lowest Na = 129 mmol/L in last 30 days, will monitor as appropriate  # Hypochloremia: Lowest Cl = 92 mmol/L in last 30 days, will monitor as appropriate         # Drug Induced Platelet Defect: home medication list includes an antiplatelet medication       Mila Oscar MD FACOG  OB/GYN  3/27/2025 8:06 AM

## 2025-03-27 NOTE — ANESTHESIA CARE TRANSFER NOTE
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    Anesthesia Type:   General     Note:    Oropharynx: oropharynx clear of all foreign objects and spontaneously breathing  Level of Consciousness: awake  Oxygen Supplementation: face mask  Level of Supplemental Oxygen (L/min / FiO2): 4  Independent Airway: airway patency satisfactory and stable  Dentition: dentition unchanged  Vital Signs Stable: post-procedure vital signs reviewed and stable  Report to RN Given: handoff report given  Patient transferred to: PACU    Handoff Report: Identifed the Patient, Identified the Reponsible Provider, Reviewed the pertinent medical history, Discussed the surgical course, Reviewed Intra-OP anesthesia mangement and issues during anesthesia, Set expectations for post-procedure period and Allowed opportunity for questions and acknowledgement of understanding      Vitals:  Vitals Value Taken Time   /77 03/27/25 0944   Temp     Pulse 62 03/27/25 0946   Resp 22 03/27/25 0946   SpO2 100 % 03/27/25 0946   Vitals shown include unfiled device data.    Electronically Signed By: Anali Corea  March 27, 2025  9:47 AM

## 2025-03-27 NOTE — OP NOTE
Gynecologic Operative Note   Mita Garbiel  [id number]  3/27/2025    Preoperative Diagnosis:   Left ovarian cyst     Postoperative Diagnosis:   same     Procedure:   Laparoscopic bilateral salpingo-oophorectomy    Surgeon: Mila Oscar MD     Anesthesia: general endotracheal     Complications: none     EBL: 5 mL     Findings: Upon entry of the abdomen with the laparoscope, no evidence of injury.  A survey of the upper abdomen showed normal anatomy.  Enlarged left ovary with no para-ovarian adhesions, no nodularity in pelvis, normal omentum. Normal appearing right ovary. Normal appearing fallopian tubes bilaterally. Normal uterus.     Indications: Ms. Gabriel is a 69 year old female who presented with complaint of a persistent left adnexal cyst that was causing discomfort.  Preoperative CA-125 was normal and  ultrasound features suggested benign.  All risks, benefits and alternatives were discussed and written informed consent was obtained.     Technique:  The patient was taken to the operating room where she was placed in the supine position. General endotracheal anesthesia was administered.  The patient was then prepped and draped in the usual sterile fashion. Attention was then turned to the abdomen where 0.5% bupivicaine was used to infiltrate the superior aspect of the umbilicus. An 11-blade scalpel was used to make a 5 mm incision in the umbilicus and a [place] used to expand the incision. A Veress needle was used to access the peritoneum.  CO2 gas was attached to the needle and opening pressure was less than 5 mmHg, and flow was increased to 20 L/min. Pneumoperitoneum was achieved with good tympany of the abdomen. A 5 mm trocar was used to place the first port. The 5 mm scope was placed in the port and visualized the abdomen which was free of any injury. Upper abdomen was explored with no abnormal findings. Attention was turned to the uterus, ovaries, fallopian tubes, and lower abdominal walls with  above findings. Two additional ports, one 5mm and one 11mm, were placed in the LLQ under direct visualization after infiltration with local anesthetic. The right and left ureters were identified and noted to be far from the surgical sites. Pelvic washings were obtained. The right fallopian tube and ovary were elevated and the IP ligament divided with the Ligasure device. The mesoovarium was then cauterized and divided with the Ligasure device toward the cornua. The fallopian tube and uteroovarian ligament were each cauterized and divided, thus excising the specimen. This was placed in an endocatch bag and removed from the abdomen. The same was then completed on the left. The specimen was too large to directly remove through the port. The edges of the bag were brought up through the incision and the cyst drained outside the body with no spillage into the incision site or the abdomen, clear fluid. The remaining specimen in the bag was then able to be removed through the port site.  Surgical sites were observed and noted to be hemostatic.  The ports were removed and pneumoperitoneum was expelled. The 11mm port fascia was closed with running 0-vicryl. The subcutaneous tissue was closed with 3-0 vicryl. The skin incisions were closed using 3-0 monocryl and dermabond.      Instrument, sponge, and needle counts were correct times 2. The patient was extubated in the operating room and transferred to the PACU in stable condition.    Mila Oscar MD  OB/GYN  3/27/2025 9:25 AM

## 2025-03-27 NOTE — ANESTHESIA PROCEDURE NOTES
Airway       Patient location during procedure: OR       Procedure Start/Stop Times: 3/27/2025 8:35 AM and 3/27/2025 8:35 AM  Staff -        CRNA: Robert Peter APRN CRNA       Other Anesthesia Staff: Anali Corea       Performed By: SRNAIndications and Patient Condition       Indications for airway management: conrad-procedural       Induction type:intravenous       Mask difficulty assessment: 1 - vent by mask    Final Airway Details       Final airway type: endotracheal airway       Successful airway: ETT - single  Endotracheal Airway Details        ETT size (mm): 7.0       Cuffed: yes       Cuff volume (mL): 7       Successful intubation technique: video laryngoscopy       VL Blade Size: Glidescope 3       Grade View of Cords: 2       Adjucts: stylet       Position: Center       Measured from: gums/teeth       Secured at (cm): 23       Bite block used: None    Post intubation assessment        Placement verified by: capnometry, equal breath sounds and chest rise        Number of attempts at approach: 1       Number of other approaches attempted: 0       Secured with: tape       Ease of procedure: easy       Dentition: Intact and Unchanged    Medication(s) Administered   Medication Administration Time: 3/27/2025 8:35 AM

## 2025-03-31 ENCOUNTER — TELEPHONE (OUTPATIENT)
Dept: OBGYN | Facility: OTHER | Age: 70
End: 2025-03-31
Payer: MEDICARE

## 2025-03-31 NOTE — TELEPHONE ENCOUNTER
Reason for call: Request for results.    Name of test or procedure: lab/ biopsy    Date of test or procedure: 03/27/2025    Location of test or procedure: [facility]    Preferred method for responding to this message: Telephone Call    Phone number patient can be reached at: Cell number on file:    Telephone Information:   Mobile [phone]       If we can't reach you directly, may we leave a detailed response at the number you provided?Yes        Rachell Muñoz on 3/31/2025 at 5:01 PM     [de-identified] : The patient is a 71-year-old woman who presents with left knee pain.\par \par Since with a one-month history of left lateral knee pain, similar nature to her prior left knee pain. She states that she had a fall on her anterior knee one month ago, with subsequent pain and mild swelling. Subsequently, she is apparently without issue, but her pain progressed over the last few weeks. No significant swelling or bruising. Pain is worse with prolonged walking.  She was last seen by orthopedics in June 2019 left pes anserine bursitis and had a cortisone injection. She also has a known history of left knee osteoarthritis. [6] : a current pain level of 6/10

## 2025-04-01 NOTE — TELEPHONE ENCOUNTER
I've tried to call her twice but it goes directly to a voicemail that hasn't been set up yet and I can't leave a message    Mila Oscar MD FACOG  OB/GYN  4/1/2025 10:08 AM

## 2025-04-01 NOTE — TELEPHONE ENCOUNTER
After proper verification writer spoke with patient who verbalized understanding of results.  Selam Almonte RN on 4/1/2025 at 11:21 AM

## 2025-04-02 LAB — PATH REPORT.FINAL DX SPEC: NORMAL

## 2025-06-25 ENCOUNTER — RESULTS FOLLOW-UP (OUTPATIENT)
Dept: FAMILY MEDICINE | Facility: OTHER | Age: 70
End: 2025-06-25

## 2025-06-25 ENCOUNTER — HOSPITAL ENCOUNTER (OUTPATIENT)
Dept: MAMMOGRAPHY | Facility: OTHER | Age: 70
Discharge: HOME | End: 2025-06-25
Attending: FAMILY MEDICINE
Payer: MEDICARE

## 2025-06-25 ENCOUNTER — HOSPITAL ENCOUNTER (OUTPATIENT)
Dept: BONE DENSITY | Facility: OTHER | Age: 70
Discharge: HOME | End: 2025-06-25
Attending: FAMILY MEDICINE
Payer: MEDICARE

## 2025-06-25 DIAGNOSIS — Z78.0 MENOPAUSE: ICD-10-CM

## 2025-06-25 DIAGNOSIS — M85.80 OSTEOPENIA, UNSPECIFIED LOCATION: ICD-10-CM

## 2025-06-25 DIAGNOSIS — Z12.31 VISIT FOR SCREENING MAMMOGRAM: ICD-10-CM

## 2025-06-25 PROCEDURE — 77063 BREAST TOMOSYNTHESIS BI: CPT

## 2025-06-25 PROCEDURE — 77080 DXA BONE DENSITY AXIAL: CPT

## 2025-06-25 PROCEDURE — 77080 DXA BONE DENSITY AXIAL: CPT | Mod: 26 | Performed by: STUDENT IN AN ORGANIZED HEALTH CARE EDUCATION/TRAINING PROGRAM

## 2025-07-02 DIAGNOSIS — G47.00 INSOMNIA, UNSPECIFIED TYPE: ICD-10-CM

## 2025-07-03 RX ORDER — ZOLPIDEM TARTRATE 6.25 MG/1
TABLET, FILM COATED, EXTENDED RELEASE ORAL
Qty: 30 TABLET | OUTPATIENT
Start: 2025-07-03

## 2025-08-02 DIAGNOSIS — I10 ESSENTIAL HYPERTENSION: ICD-10-CM

## 2025-08-03 DIAGNOSIS — F41.1 ANXIETY STATE: ICD-10-CM

## 2025-08-06 DIAGNOSIS — I10 ESSENTIAL HYPERTENSION: ICD-10-CM

## 2025-08-07 RX ORDER — METOPROLOL SUCCINATE 50 MG/1
50 TABLET, EXTENDED RELEASE ORAL DAILY
Qty: 90 TABLET | Refills: 0 | Status: SHIPPED | OUTPATIENT
Start: 2025-08-07

## 2025-08-07 RX ORDER — LISINOPRIL AND HYDROCHLOROTHIAZIDE 10; 12.5 MG/1; MG/1
1 TABLET ORAL DAILY
Qty: 90 TABLET | Refills: 0 | Status: SHIPPED | OUTPATIENT
Start: 2025-08-07

## 2025-08-11 RX ORDER — TRAZODONE HYDROCHLORIDE 150 MG/1
150 TABLET ORAL AT BEDTIME
Qty: 90 TABLET | Refills: 4 | Status: SHIPPED | OUTPATIENT
Start: 2025-08-11

## 2025-08-14 DIAGNOSIS — M54.42 BILATERAL LOW BACK PAIN WITH LEFT-SIDED SCIATICA, UNSPECIFIED CHRONICITY: ICD-10-CM

## 2025-08-14 DIAGNOSIS — F41.1 ANXIETY STATE: ICD-10-CM

## 2025-08-14 DIAGNOSIS — E06.3 HYPOTHYROIDISM DUE TO HASHIMOTO'S THYROIDITIS: ICD-10-CM

## 2025-08-14 DIAGNOSIS — E78.2 MIXED HYPERLIPIDEMIA: ICD-10-CM

## 2025-08-14 RX ORDER — GABAPENTIN 300 MG/1
300 CAPSULE ORAL 3 TIMES DAILY
Qty: 270 CAPSULE | Refills: 4 | Status: SHIPPED | OUTPATIENT
Start: 2025-08-14

## 2025-08-14 RX ORDER — VENLAFAXINE HYDROCHLORIDE 150 MG/1
CAPSULE, EXTENDED RELEASE ORAL
Qty: 90 CAPSULE | Refills: 4 | Status: SHIPPED | OUTPATIENT
Start: 2025-08-14

## 2025-08-14 RX ORDER — LEVOTHYROXINE SODIUM 50 UG/1
TABLET ORAL
Qty: 90 TABLET | Refills: 4 | Status: SHIPPED | OUTPATIENT
Start: 2025-08-14

## 2025-08-14 RX ORDER — ATORVASTATIN CALCIUM 20 MG/1
20 TABLET, FILM COATED ORAL DAILY
Qty: 90 TABLET | Refills: 4 | Status: SHIPPED | OUTPATIENT
Start: 2025-08-14

## 2025-08-18 SDOH — HEALTH STABILITY: PHYSICAL HEALTH: ON AVERAGE, HOW MANY DAYS PER WEEK DO YOU ENGAGE IN MODERATE TO STRENUOUS EXERCISE (LIKE A BRISK WALK)?: 2 DAYS

## 2025-08-18 SDOH — HEALTH STABILITY: PHYSICAL HEALTH: ON AVERAGE, HOW MANY MINUTES DO YOU ENGAGE IN EXERCISE AT THIS LEVEL?: 20 MIN

## 2025-08-18 ASSESSMENT — ANXIETY QUESTIONNAIRES
IF YOU CHECKED OFF ANY PROBLEMS ON THIS QUESTIONNAIRE, HOW DIFFICULT HAVE THESE PROBLEMS MADE IT FOR YOU TO DO YOUR WORK, TAKE CARE OF THINGS AT HOME, OR GET ALONG WITH OTHER PEOPLE: SOMEWHAT DIFFICULT
2. NOT BEING ABLE TO STOP OR CONTROL WORRYING: SEVERAL DAYS
6. BECOMING EASILY ANNOYED OR IRRITABLE: NOT AT ALL
3. WORRYING TOO MUCH ABOUT DIFFERENT THINGS: SEVERAL DAYS
5. BEING SO RESTLESS THAT IT IS HARD TO SIT STILL: SEVERAL DAYS
1. FEELING NERVOUS, ANXIOUS, OR ON EDGE: SEVERAL DAYS
4. TROUBLE RELAXING: MORE THAN HALF THE DAYS
8. IF YOU CHECKED OFF ANY PROBLEMS, HOW DIFFICULT HAVE THESE MADE IT FOR YOU TO DO YOUR WORK, TAKE CARE OF THINGS AT HOME, OR GET ALONG WITH OTHER PEOPLE?: SOMEWHAT DIFFICULT
7. FEELING AFRAID AS IF SOMETHING AWFUL MIGHT HAPPEN: NOT AT ALL
GAD7 TOTAL SCORE: 6
7. FEELING AFRAID AS IF SOMETHING AWFUL MIGHT HAPPEN: NOT AT ALL

## 2025-08-18 ASSESSMENT — SOCIAL DETERMINANTS OF HEALTH (SDOH): HOW OFTEN DO YOU GET TOGETHER WITH FRIENDS OR RELATIVES?: ONCE A WEEK

## 2025-08-19 ENCOUNTER — OFFICE VISIT (OUTPATIENT)
Dept: FAMILY MEDICINE | Facility: OTHER | Age: 70
End: 2025-08-19
Attending: FAMILY MEDICINE
Payer: MEDICARE

## 2025-08-19 VITALS
WEIGHT: 120.13 LBS | RESPIRATION RATE: 20 BRPM | DIASTOLIC BLOOD PRESSURE: 74 MMHG | HEIGHT: 63 IN | OXYGEN SATURATION: 99 % | TEMPERATURE: 97.6 F | HEART RATE: 69 BPM | SYSTOLIC BLOOD PRESSURE: 122 MMHG | BODY MASS INDEX: 21.29 KG/M2

## 2025-08-19 DIAGNOSIS — E87.1 HYPONATREMIA: ICD-10-CM

## 2025-08-19 DIAGNOSIS — G25.0 ESSENTIAL TREMOR: ICD-10-CM

## 2025-08-19 DIAGNOSIS — M81.0 AGE RELATED OSTEOPOROSIS, UNSPECIFIED PATHOLOGICAL FRACTURE PRESENCE: ICD-10-CM

## 2025-08-19 DIAGNOSIS — K59.00 CONSTIPATION, UNSPECIFIED CONSTIPATION TYPE: ICD-10-CM

## 2025-08-19 DIAGNOSIS — E78.2 MIXED HYPERLIPIDEMIA: ICD-10-CM

## 2025-08-19 DIAGNOSIS — I10 ESSENTIAL HYPERTENSION: ICD-10-CM

## 2025-08-19 DIAGNOSIS — Z00.00 ENCOUNTER FOR MEDICARE ANNUAL WELLNESS EXAM: Primary | ICD-10-CM

## 2025-08-19 DIAGNOSIS — E06.3 HYPOTHYROIDISM DUE TO HASHIMOTO'S THYROIDITIS: ICD-10-CM

## 2025-08-19 DIAGNOSIS — M81.0 AGE-RELATED OSTEOPOROSIS WITHOUT CURRENT PATHOLOGICAL FRACTURE: ICD-10-CM

## 2025-08-19 DIAGNOSIS — F41.1 ANXIETY STATE: ICD-10-CM

## 2025-08-19 DIAGNOSIS — G47.00 INSOMNIA, UNSPECIFIED TYPE: ICD-10-CM

## 2025-08-19 LAB
ALBUMIN SERPL BCG-MCNC: 4.3 G/DL (ref 3.5–5.2)
ALP SERPL-CCNC: 104 U/L (ref 40–150)
ALT SERPL W P-5'-P-CCNC: 29 U/L (ref 0–50)
ANION GAP SERPL CALCULATED.3IONS-SCNC: 9 MMOL/L (ref 7–15)
AST SERPL W P-5'-P-CCNC: 26 U/L (ref 0–45)
BASOPHILS # BLD AUTO: <0.03 10E3/UL (ref 0–0.2)
BASOPHILS NFR BLD AUTO: 0.4 %
BILIRUB SERPL-MCNC: 0.3 MG/DL
BUN SERPL-MCNC: 9.2 MG/DL (ref 8–23)
CALCIUM SERPL-MCNC: 9.1 MG/DL (ref 8.8–10.4)
CHLORIDE SERPL-SCNC: 90 MMOL/L (ref 98–107)
CHOLEST SERPL-MCNC: 147 MG/DL
CREAT SERPL-MCNC: 0.59 MG/DL (ref 0.51–0.95)
EGFRCR SERPLBLD CKD-EPI 2021: >90 ML/MIN/1.73M2
EOSINOPHIL # BLD AUTO: 0.09 10E3/UL (ref 0–0.7)
EOSINOPHIL NFR BLD AUTO: 1.6 %
ERYTHROCYTE [DISTWIDTH] IN BLOOD BY AUTOMATED COUNT: 11.8 % (ref 10–15)
FASTING STATUS PATIENT QL REPORTED: NO
FASTING STATUS PATIENT QL REPORTED: NO
GLUCOSE SERPL-MCNC: 92 MG/DL (ref 70–99)
HCO3 SERPL-SCNC: 29 MMOL/L (ref 22–29)
HCT VFR BLD AUTO: 38.1 % (ref 35–47)
HDLC SERPL-MCNC: 71 MG/DL
HGB BLD-MCNC: 13.1 G/DL (ref 11.7–15.7)
IMM GRANULOCYTES # BLD: <0.03 10E3/UL
IMM GRANULOCYTES NFR BLD: 0.2 %
LDLC SERPL CALC-MCNC: 64 MG/DL
LYMPHOCYTES # BLD AUTO: 1.16 10E3/UL (ref 0.8–5.3)
LYMPHOCYTES NFR BLD AUTO: 20.8 %
MCH RBC QN AUTO: 30.4 PG (ref 26.5–33)
MCHC RBC AUTO-ENTMCNC: 34.4 G/DL (ref 31.5–36.5)
MCV RBC AUTO: 88.4 FL (ref 78–100)
MONOCYTES # BLD AUTO: 0.43 10E3/UL (ref 0–1.3)
MONOCYTES NFR BLD AUTO: 7.7 %
NEUTROPHILS # BLD AUTO: 3.86 10E3/UL (ref 1.6–8.3)
NEUTROPHILS NFR BLD AUTO: 69.3 %
NONHDLC SERPL-MCNC: 76 MG/DL
NRBC # BLD AUTO: <0.03 10E3/UL
NRBC BLD AUTO-RTO: 0 /100
PLATELET # BLD AUTO: 237 10E3/UL (ref 150–450)
POTASSIUM SERPL-SCNC: 4.6 MMOL/L (ref 3.4–5.3)
PROT SERPL-MCNC: 6.9 G/DL (ref 6.4–8.3)
RBC # BLD AUTO: 4.31 10E6/UL (ref 3.8–5.2)
SODIUM SERPL-SCNC: 128 MMOL/L (ref 135–145)
T4 FREE SERPL-MCNC: 1.29 NG/DL (ref 0.9–1.7)
TRIGL SERPL-MCNC: 62 MG/DL
TSH SERPL DL<=0.005 MIU/L-ACNC: 1 UIU/ML (ref 0.3–4.2)
VIT D+METAB SERPL-MCNC: 48 NG/ML (ref 20–50)
WBC # BLD AUTO: 5.57 10E3/UL (ref 4–11)

## 2025-08-19 PROCEDURE — 82040 ASSAY OF SERUM ALBUMIN: CPT | Mod: ZL | Performed by: FAMILY MEDICINE

## 2025-08-19 PROCEDURE — 80061 LIPID PANEL: CPT | Mod: ZL | Performed by: FAMILY MEDICINE

## 2025-08-19 PROCEDURE — 85004 AUTOMATED DIFF WBC COUNT: CPT | Mod: ZL | Performed by: FAMILY MEDICINE

## 2025-08-19 PROCEDURE — 36415 COLL VENOUS BLD VENIPUNCTURE: CPT | Mod: ZL | Performed by: FAMILY MEDICINE

## 2025-08-19 PROCEDURE — G0463 HOSPITAL OUTPT CLINIC VISIT: HCPCS

## 2025-08-19 PROCEDURE — 84443 ASSAY THYROID STIM HORMONE: CPT | Mod: ZL | Performed by: FAMILY MEDICINE

## 2025-08-19 PROCEDURE — 82306 VITAMIN D 25 HYDROXY: CPT | Mod: ZL | Performed by: FAMILY MEDICINE

## 2025-08-19 PROCEDURE — 84439 ASSAY OF FREE THYROXINE: CPT | Mod: ZL | Performed by: FAMILY MEDICINE

## 2025-08-19 RX ORDER — ZOLPIDEM TARTRATE 6.25 MG/1
TABLET, FILM COATED, EXTENDED RELEASE ORAL
Qty: 30 TABLET | Refills: 5 | Status: SHIPPED | OUTPATIENT
Start: 2025-08-19

## 2025-08-19 RX ORDER — DOCUSATE SODIUM 100 MG/1
100 CAPSULE, LIQUID FILLED ORAL 2 TIMES DAILY PRN
Qty: 60 CAPSULE | Refills: 2 | Status: SHIPPED | OUTPATIENT
Start: 2025-08-19

## 2025-08-19 RX ORDER — LORAZEPAM 1 MG/1
TABLET ORAL
Qty: 90 TABLET | Refills: 2 | Status: SHIPPED | OUTPATIENT
Start: 2025-08-19 | End: 2025-08-20

## 2025-08-19 RX ORDER — LISINOPRIL AND HYDROCHLOROTHIAZIDE 10; 12.5 MG/1; MG/1
1 TABLET ORAL DAILY
Qty: 90 TABLET | Refills: 4 | Status: SHIPPED | OUTPATIENT
Start: 2025-08-19 | End: 2025-08-20 | Stop reason: SINTOL

## 2025-08-19 RX ORDER — METOPROLOL SUCCINATE 50 MG/1
50 TABLET, EXTENDED RELEASE ORAL DAILY
Qty: 90 TABLET | Refills: 4 | Status: SHIPPED | OUTPATIENT
Start: 2025-08-19

## 2025-08-19 ASSESSMENT — PAIN SCALES - GENERAL: PAINLEVEL_OUTOF10: NO PAIN (0)

## 2025-08-20 RX ORDER — LISINOPRIL 10 MG/1
10 TABLET ORAL DAILY
Qty: 90 TABLET | Refills: 4 | Status: SHIPPED | OUTPATIENT
Start: 2025-08-20

## 2025-08-20 RX ORDER — LISINOPRIL 10 MG/1
10 TABLET ORAL DAILY
Qty: 90 TABLET | Refills: 4 | Status: SHIPPED | OUTPATIENT
Start: 2025-08-20 | End: 2025-08-20

## 2025-08-20 RX ORDER — LORAZEPAM 1 MG/1
TABLET ORAL
Qty: 90 TABLET | Refills: 5 | Status: SHIPPED | OUTPATIENT
Start: 2025-08-20

## (undated) DEVICE — ESU GROUND PAD ADULT W/CORD E7507

## (undated) DEVICE — GEL ULTRASOUND AQUASONIC 20GM 01-01

## (undated) DEVICE — DRAPE C-ARM PACK 9"

## (undated) DEVICE — DRSG ABDOMINAL PAD UNSTERILE 5X9" 9190

## (undated) DEVICE — SOL WATER 1500ML

## (undated) DEVICE — GLOVE PROTEXIS PI ORTHO PF 8.5 2D73HT76

## (undated) DEVICE — GUIDEWIRE THRD TROCAR TIP .045" W/LASER LINE AR-8737-05

## (undated) DEVICE — ENDO POUCH UNIV RETRIEVAL SYSTEM INZII 10MM CD001

## (undated) DEVICE — DRILL BIT ARTHREX 2.5MM AR-8943-42

## (undated) DEVICE — GLOVE BIOGEL INDICATOR 7.5 LF 41675

## (undated) DEVICE — Device

## (undated) DEVICE — SUCTION STRYKERFLOW II 250-070-500

## (undated) DEVICE — SU VICRYL 2-0 SH 27" UND J417H

## (undated) DEVICE — DRSG JUMPSTART ANTIMICROBIAL 1.5X8" ABS-4005

## (undated) DEVICE — CAST PADDING 4" COTTON WEBRIL UNSTERILE 9084

## (undated) DEVICE — ENDO SCOPE WARMER DUAL LAP TM500D

## (undated) DEVICE — PREP CHLORAPREP 26ML TINTED ORANGE  260815

## (undated) DEVICE — SU VICRYL 2-0 CT-2 27" UND J269H

## (undated) DEVICE — DRAPE C-ARM 60X42" 1013

## (undated) DEVICE — BNDG ELASTIC 3"X5YDS UNSTERILE 6611-30

## (undated) DEVICE — ENDO BITE BLOCK 60 MAXI LF 00712804

## (undated) DEVICE — SUCTION MANIFOLD NEPTUNE 2 SYS 4 PORT 0702-020-000

## (undated) DEVICE — SU DERMABOND ADVANCED .7ML DNX12

## (undated) DEVICE — NDL SPINAL 18GA 3.5" 405184

## (undated) DEVICE — VERRES NEEDLE 120MM DISPOSABLE 12/BX

## (undated) DEVICE — SU MONOCRYL 3-0 PS-2 27" Y427H

## (undated) DEVICE — DRAPE C-ARMOR 5 SIDED 5523

## (undated) DEVICE — NDL 25GA 1.5" 305127

## (undated) DEVICE — CAST PADDING 6" WEBRIL II UNSTERILE 4519

## (undated) DEVICE — SU PROLENE 3-0 FS-1 18" 8684G

## (undated) DEVICE — COVER LIGHT HANDLE LT-F02

## (undated) DEVICE — DRAPE STERI U 1015

## (undated) DEVICE — GLOVE PROTEXIS POWDER FREE SMT 8.5 2D72PT85X

## (undated) DEVICE — DRILL BIT ARTHREX MTP 2.0MM AR-8944-22

## (undated) DEVICE — NDL 22GA 1.5"

## (undated) DEVICE — ENDO FORCEP ENDOJAW BIOPSY 2.8MMX230CM FB-220U

## (undated) DEVICE — ENDO TROCAR FIRST ENTRY KII FIOS ADV FIX 05X100MM CFF03

## (undated) DEVICE — TUBING INSUFFLATOR W/FILTER OLYMPUS WA95005A

## (undated) DEVICE — SU ETHILON 3-0 FS-1 18" 669H

## (undated) DEVICE — GLOVE BIOGEL PI INDICATOR 8.0 LF 41680

## (undated) DEVICE — SU VICRYL+ 3-0 27IN SH UND VCP416H

## (undated) DEVICE — DRAPE TOWEL 17X27" BLUE LF DISP 28700-004

## (undated) DEVICE — ENDO KIT COMPLIANCE DYKENDOCMPLY

## (undated) DEVICE — PACK MAJOR EXTREMITY SOP15MEFCA

## (undated) DEVICE — BNDG ELASTIC 4" DBL LENGTH UNSTERILE 6611-14

## (undated) DEVICE — DRILL BIT ARTHREX LPS CAN 3.5MM AR-4160-35

## (undated) DEVICE — SU VICRYL 0 CT-2 27" J334H

## (undated) DEVICE — DRSG GAUZE 4X4" TRAY 6939

## (undated) DEVICE — DRAPE EXTREMITY W/ARMBOARD 29405

## (undated) DEVICE — TROCAR KII SLEEVE 5MM X 100MM 12/BX

## (undated) DEVICE — GLOVE SENSICARE 8.5 MSG1085 LATEX FREE

## (undated) DEVICE — CATH SELF FEMALE 14FR 6" 214

## (undated) DEVICE — BLADE KNIFE SURG 15 371115

## (undated) DEVICE — DRSG GAUZE 4X4" 3033

## (undated) DEVICE — GLOVE PROTEXIS POWDER FREE SMT 6.5  2D72PT65X

## (undated) DEVICE — ENDO BRUSH CHANNEL MASTER CLEANING 2-4.2MM BW-412T

## (undated) DEVICE — DRILL BIT ARTHREX 2.0MM AR-8943-16

## (undated) DEVICE — TOURNIQUET SGL BLADDER 18"X4" RED 5921-218-135

## (undated) DEVICE — ENDO TROCAR FIRST ENTRY KII FIOS ADV FIX 12X100MM CFF73

## (undated) DEVICE — ESU HOLDER LAP INST DISP PURPLE LONG 330MM H-PRO-330

## (undated) DEVICE — DRILL BIT ARTHREX 3.0MM AR-8950-05

## (undated) DEVICE — DRAPE STERI TOWEL LG 1010

## (undated) DEVICE — GLOVE PROTEXIS BLUE W/NEU-THERA 6.5  2D73EB65

## (undated) DEVICE — TOURNIQUET SGL  BLADDER 30"X4" BLUE 5921030135

## (undated) DEVICE — SPONGE LAP 18X18" X8435

## (undated) DEVICE — PACK MAJOR LAPAROTOMY LF SBA15MLFCA

## (undated) DEVICE — SYR 50ML LL W/O NDL 309653

## (undated) DEVICE — BLADE KNIFE SURG 15 SAFETY 373915

## (undated) DEVICE — SLEEVE COMPRESSION SCD KNEE MED 74022

## (undated) DEVICE — ESU LIGASURE REPROC LAPRSPC BLUNT TIP SEALER 5MMX37CM LF1637

## (undated) DEVICE — PACK LAPAROSCOPY LF SBA15LPFCA

## (undated) DEVICE — CAST PLASTER SPLINT 3X15" 7393

## (undated) DEVICE — GLOVE PROTEXIS PI ORTHO PF 7.5 2D73HT75

## (undated) DEVICE — SPECIMEN TRAP

## (undated) DEVICE — TUBING SUCTION 10'X3/16" N510

## (undated) DEVICE — SLEEVE COMPRESSION PLASTIC SCD FOOT MED 5897

## (undated) DEVICE — PAD PERI INDIV WRAP 11" 2022A

## (undated) RX ORDER — FENTANYL CITRATE 50 UG/ML
INJECTION, SOLUTION INTRAMUSCULAR; INTRAVENOUS
Status: DISPENSED
Start: 2025-03-27

## (undated) RX ORDER — FENTANYL CITRATE 50 UG/ML
INJECTION, SOLUTION INTRAMUSCULAR; INTRAVENOUS
Status: DISPENSED
Start: 2022-05-19

## (undated) RX ORDER — BUPIVACAINE HYDROCHLORIDE 2.5 MG/ML
INJECTION, SOLUTION EPIDURAL; INFILTRATION; INTRACAUDAL; PERINEURAL
Status: DISPENSED
Start: 2025-03-27

## (undated) RX ORDER — CEFAZOLIN SODIUM/WATER 2 G/20 ML
SYRINGE (ML) INTRAVENOUS
Status: DISPENSED
Start: 2022-05-19

## (undated) RX ORDER — OXYCODONE HYDROCHLORIDE 5 MG/1
TABLET ORAL
Status: DISPENSED
Start: 2021-05-02

## (undated) RX ORDER — ACETAMINOPHEN 325 MG/1
TABLET ORAL
Status: DISPENSED
Start: 2025-03-27

## (undated) RX ORDER — LABETALOL HYDROCHLORIDE 5 MG/ML
INJECTION, SOLUTION INTRAVENOUS
Status: DISPENSED
Start: 2022-05-19

## (undated) RX ORDER — CEFAZOLIN SODIUM 2 G/100ML
INJECTION, SOLUTION INTRAVENOUS
Status: DISPENSED
Start: 2021-05-14

## (undated) RX ORDER — PROPOFOL 10 MG/ML
INJECTION, EMULSION INTRAVENOUS
Status: DISPENSED
Start: 2025-03-27

## (undated) RX ORDER — DEXAMETHASONE SODIUM PHOSPHATE 4 MG/ML
INJECTION, SOLUTION INTRA-ARTICULAR; INTRALESIONAL; INTRAMUSCULAR; INTRAVENOUS; SOFT TISSUE
Status: DISPENSED
Start: 2021-05-14

## (undated) RX ORDER — SODIUM CHLORIDE, SODIUM LACTATE, POTASSIUM CHLORIDE, CALCIUM CHLORIDE 600; 310; 30; 20 MG/100ML; MG/100ML; MG/100ML; MG/100ML
INJECTION, SOLUTION INTRAVENOUS
Status: DISPENSED
Start: 2022-05-19

## (undated) RX ORDER — PROPOFOL 10 MG/ML
INJECTION, EMULSION INTRAVENOUS
Status: DISPENSED
Start: 2021-05-14

## (undated) RX ORDER — ONDANSETRON 2 MG/ML
INJECTION INTRAMUSCULAR; INTRAVENOUS
Status: DISPENSED
Start: 2021-05-14

## (undated) RX ORDER — LORAZEPAM 1 MG/1
TABLET ORAL
Status: DISPENSED
Start: 2022-11-03

## (undated) RX ORDER — LIDOCAINE HCL/EPINEPHRINE/PF 2%-1:200K
VIAL (ML) INJECTION
Status: DISPENSED
Start: 2021-05-02

## (undated) RX ORDER — LORAZEPAM 2 MG/ML
INJECTION INTRAMUSCULAR
Status: DISPENSED
Start: 2022-11-01

## (undated) RX ORDER — DEXAMETHASONE SODIUM PHOSPHATE 4 MG/ML
INJECTION, SOLUTION INTRA-ARTICULAR; INTRALESIONAL; INTRAMUSCULAR; INTRAVENOUS; SOFT TISSUE
Status: DISPENSED
Start: 2025-03-27

## (undated) RX ORDER — POTASSIUM CHLORIDE 7.45 MG/ML
INJECTION INTRAVENOUS
Status: DISPENSED
Start: 2022-11-01

## (undated) RX ORDER — ACETAMINOPHEN 10 MG/ML
INJECTION, SOLUTION INTRAVENOUS
Status: DISPENSED
Start: 2022-05-19

## (undated) RX ORDER — FENTANYL CITRATE 50 UG/ML
INJECTION, SOLUTION INTRAMUSCULAR; INTRAVENOUS
Status: DISPENSED
Start: 2021-05-14

## (undated) RX ORDER — DEXMEDETOMIDINE HYDROCHLORIDE 4 UG/ML
INJECTION, SOLUTION INTRAVENOUS
Status: DISPENSED
Start: 2025-03-27

## (undated) RX ORDER — OXYCODONE HYDROCHLORIDE 5 MG/1
TABLET ORAL
Status: DISPENSED
Start: 2022-05-19

## (undated) RX ORDER — LIDOCAINE HYDROCHLORIDE 10 MG/ML
INJECTION, SOLUTION EPIDURAL; INFILTRATION; INTRACAUDAL; PERINEURAL
Status: DISPENSED
Start: 2023-10-10

## (undated) RX ORDER — ONDANSETRON 2 MG/ML
INJECTION INTRAMUSCULAR; INTRAVENOUS
Status: DISPENSED
Start: 2025-03-27

## (undated) RX ORDER — MAGNESIUM OXIDE 400 MG/1
TABLET ORAL
Status: DISPENSED
Start: 2022-05-10

## (undated) RX ORDER — PROPOFOL 10 MG/ML
INJECTION, EMULSION INTRAVENOUS
Status: DISPENSED
Start: 2023-10-10

## (undated) RX ORDER — ACETAMINOPHEN 500 MG
TABLET ORAL
Status: DISPENSED
Start: 2021-05-02

## (undated) RX ORDER — LIDOCAINE HYDROCHLORIDE 10 MG/ML
INJECTION, SOLUTION EPIDURAL; INFILTRATION; INTRACAUDAL; PERINEURAL
Status: DISPENSED
Start: 2021-05-02

## (undated) RX ORDER — PROPOFOL 10 MG/ML
INJECTION, EMULSION INTRAVENOUS
Status: DISPENSED
Start: 2022-05-19

## (undated) RX ORDER — DEXMEDETOMIDINE HYDROCHLORIDE 100 UG/ML
INJECTION, SOLUTION INTRAVENOUS
Status: DISPENSED
Start: 2021-05-14

## (undated) RX ORDER — ONDANSETRON 2 MG/ML
INJECTION INTRAMUSCULAR; INTRAVENOUS
Status: DISPENSED
Start: 2022-05-19

## (undated) RX ORDER — MAGNESIUM SULFATE HEPTAHYDRATE 40 MG/ML
INJECTION, SOLUTION INTRAVENOUS
Status: DISPENSED
Start: 2022-11-01

## (undated) RX ORDER — LIDOCAINE HYDROCHLORIDE 20 MG/ML
INJECTION, SOLUTION EPIDURAL; INFILTRATION; INTRACAUDAL; PERINEURAL
Status: DISPENSED
Start: 2021-05-14

## (undated) RX ORDER — BUPIVACAINE HYDROCHLORIDE 5 MG/ML
INJECTION, SOLUTION EPIDURAL; INTRACAUDAL
Status: DISPENSED
Start: 2021-05-14

## (undated) RX ORDER — HYDROCODONE BITARTRATE AND ACETAMINOPHEN 5; 325 MG/1; MG/1
TABLET ORAL
Status: DISPENSED
Start: 2022-05-10

## (undated) RX ORDER — LIDOCAINE HYDROCHLORIDE 20 MG/ML
INJECTION, SOLUTION EPIDURAL; INFILTRATION; INTRACAUDAL; PERINEURAL
Status: DISPENSED
Start: 2022-05-19

## (undated) RX ORDER — KETOROLAC TROMETHAMINE 30 MG/ML
INJECTION, SOLUTION INTRAMUSCULAR; INTRAVENOUS
Status: DISPENSED
Start: 2022-05-19

## (undated) RX ORDER — DEXAMETHASONE SODIUM PHOSPHATE 10 MG/ML
INJECTION, SOLUTION INTRAMUSCULAR; INTRAVENOUS
Status: DISPENSED
Start: 2022-05-19

## (undated) RX ORDER — BUPIVACAINE HYDROCHLORIDE 2.5 MG/ML
INJECTION, SOLUTION EPIDURAL; INFILTRATION; INTRACAUDAL
Status: DISPENSED
Start: 2022-05-19

## (undated) RX ORDER — HYDROMORPHONE HYDROCHLORIDE 2 MG/ML
INJECTION, SOLUTION INTRAMUSCULAR; INTRAVENOUS; SUBCUTANEOUS
Status: DISPENSED
Start: 2025-03-27

## (undated) RX ORDER — DEXAMETHASONE SODIUM PHOSPHATE 10 MG/ML
INJECTION, SOLUTION INTRAMUSCULAR; INTRAVENOUS
Status: DISPENSED
Start: 2021-05-14